# Patient Record
Sex: FEMALE | Race: WHITE | Employment: OTHER | URBAN - METROPOLITAN AREA
[De-identification: names, ages, dates, MRNs, and addresses within clinical notes are randomized per-mention and may not be internally consistent; named-entity substitution may affect disease eponyms.]

---

## 2017-01-15 RX ORDER — DEXLANSOPRAZOLE 60 MG/1
CAPSULE, DELAYED RELEASE ORAL
Qty: 30 CAP | Refills: 0 | Status: SHIPPED | OUTPATIENT
Start: 2017-01-15 | End: 2017-02-10 | Stop reason: SDUPTHER

## 2017-01-30 ENCOUNTER — HOSPITAL ENCOUNTER (OUTPATIENT)
Dept: ULTRASOUND IMAGING | Age: 72
Discharge: HOME | End: 2017-01-30
Attending: INTERNAL MEDICINE
Payer: MEDICARE

## 2017-01-30 DIAGNOSIS — M79.89 LEG SWELLING: ICD-10-CM

## 2017-01-30 DIAGNOSIS — M79.606 LEG PAIN: ICD-10-CM

## 2017-01-30 PROCEDURE — 93970 EXTREMITY STUDY: CPT

## 2017-02-10 RX ORDER — AMITRIPTYLINE HYDROCHLORIDE 50 MG/1
TABLET, FILM COATED ORAL
Qty: 60 TAB | Refills: 0 | Status: SHIPPED | OUTPATIENT
Start: 2017-02-10 | End: 2017-03-16 | Stop reason: SDUPTHER

## 2017-02-10 RX ORDER — DEXLANSOPRAZOLE 60 MG/1
CAPSULE, DELAYED RELEASE ORAL
Qty: 30 CAP | Refills: 0 | Status: SHIPPED | OUTPATIENT
Start: 2017-02-10 | End: 2017-03-16 | Stop reason: SDUPTHER

## 2017-02-10 RX ORDER — MILNACIPRAN HYDROCHLORIDE 50 MG/1
TABLET, FILM COATED ORAL
Qty: 60 TAB | Refills: 0 | Status: SHIPPED | OUTPATIENT
Start: 2017-02-10 | End: 2017-03-16 | Stop reason: SDUPTHER

## 2017-02-10 NOTE — TELEPHONE ENCOUNTER
Orders Placed This Encounter    SAVELLA 50 mg tablet     Sig: TAKE ONE TABLET BY MOUTH TWO TIMES A DAY     Dispense:  60 Tab     Refill:  0    amitriptyline (ELAVIL) 50 mg tablet     Sig: TAKE TWO TABLETS BY MOUTH AT BEDTIME AS NEEDED FOR SLEEP     Dispense:  60 Tab     Refill:  0    DEXILANT 60 mg CpDB     Sig: TAKE ONE CAPSULE BY MOUTH EVERY DAY     Dispense:  30 Cap     Refill:  0     The above medication refills were approved via verbal order by Dr. Modesto Franklin III.

## 2017-03-01 ENCOUNTER — OFFICE VISIT (OUTPATIENT)
Dept: INTERNAL MEDICINE CLINIC | Age: 72
End: 2017-03-01

## 2017-03-01 VITALS
DIASTOLIC BLOOD PRESSURE: 60 MMHG | WEIGHT: 185 LBS | BODY MASS INDEX: 34.93 KG/M2 | HEART RATE: 103 BPM | TEMPERATURE: 99 F | SYSTOLIC BLOOD PRESSURE: 102 MMHG | RESPIRATION RATE: 16 BRPM | OXYGEN SATURATION: 93 % | HEIGHT: 61 IN

## 2017-03-01 DIAGNOSIS — Z00.00 MEDICARE ANNUAL WELLNESS VISIT, SUBSEQUENT: Primary | ICD-10-CM

## 2017-03-01 DIAGNOSIS — Z23 NEED FOR TDAP VACCINATION: ICD-10-CM

## 2017-03-01 DIAGNOSIS — Z11.59 NEED FOR HEPATITIS C SCREENING TEST: ICD-10-CM

## 2017-03-01 DIAGNOSIS — Z13.39 SCREENING FOR ALCOHOLISM: ICD-10-CM

## 2017-03-01 DIAGNOSIS — M05.79 RHEUMATOID ARTHRITIS INVOLVING MULTIPLE SITES WITH POSITIVE RHEUMATOID FACTOR (HCC): ICD-10-CM

## 2017-03-01 DIAGNOSIS — Z00.00 ROUTINE GENERAL MEDICAL EXAMINATION AT A HEALTH CARE FACILITY: ICD-10-CM

## 2017-03-01 DIAGNOSIS — C90.00 MULTIPLE MYELOMA NOT HAVING ACHIEVED REMISSION (HCC): ICD-10-CM

## 2017-03-01 DIAGNOSIS — E78.2 MIXED HYPERLIPIDEMIA: ICD-10-CM

## 2017-03-01 RX ORDER — RANITIDINE 150 MG/1
300 TABLET, FILM COATED ORAL
COMMUNITY
End: 2019-12-09 | Stop reason: SDUPTHER

## 2017-03-01 RX ORDER — PREDNISONE 10 MG/1
20 TABLET ORAL DAILY
COMMUNITY
End: 2020-01-01 | Stop reason: ALTCHOICE

## 2017-03-01 NOTE — ACP (ADVANCE CARE PLANNING)
Advance Care Planning    Patient's Healthcare Decision Maker is: Named in scanned ACP document   Confirm Advance Directive Yes, on file      Reviewed the current advance care plan document on file with patient and all information is up to date. Advanced directive is on file, reviewed and up to date.

## 2017-03-01 NOTE — PROGRESS NOTES
Chief Complaint   Patient presents with    Annual Wellness Visit    Blood Clot     recently dx w/ right leg DVT on xarelto 20 mg daily     Goals that were addressed/or need to be completed after this visit:  Health Maintenance Due   Topic    Hepatitis C Screening     MEDICARE YEARLY EXAM

## 2017-03-01 NOTE — MR AVS SNAPSHOT
Visit Information Date & Time Provider Department Dept. Phone Encounter #  
 3/1/2017 10:30 AM Flex Patel MD [facility] Internal Medicine [phone] [id number] Upcoming Health Maintenance Date Due Hepatitis C Screening 1945 MEDICARE YEARLY EXAM 12/6/2010 GLAUCOMA SCREENING Q2Y 12/10/2017 BREAST CANCER SCRN MAMMOGRAM 9/23/2018 COLONOSCOPY 3/9/2026 DTaP/Tdap/Td series (2 - Td) 3/1/2027 Allergies as of 3/1/2017  Review Complete On: 3/1/2017 By: Kerry Corado LPN Severity Noted Reaction Type Reactions Aggrenox [Aspirin-dipyridamole]  09/28/2009    Rash Allergic to the dipyridamole and not aspirin. Broccoli  01/28/2013    Other (comments) C/O GAS Bumex [Bumetanide]  09/23/2016    Rash [place]  01/28/2013    Diarrhea Corn Starch-kaolin-zinc Oxide  01/28/2013    Diarrhea Doxil [Doxorubicin, Peg-liposomal]  05/06/2011    Rash Burning of skin Flagyl [Metronidazole]  09/28/2009    Rash Keflex [Cephalexin]  09/28/2009    Rash Lasix [Furosemide]  04/07/2011    Rash Macrobid [Nitrofurantoin Monohyd/m-cryst]  09/28/2009    Shortness of Breath Methotrexate  09/28/2009    Hives, Rash Pcn [Penicillins]  09/28/2009    Rash  
 Sulfa (Sulfonamide Antibiotics)  09/28/2009    Hives Tetanus And Diphtheria Toxoids  03/01/2017    Swelling Thalidomide  08/18/2010    Rash Current Immunizations  Reviewed on 6/4/2016 Name Date Influenza High Dose Vaccine PF 10/26/2016, 11/10/2015 Influenza Vaccine Whole 11/28/2012 Pneumococcal Polysaccharide (PPSV-23) 8/12/2014 Pneumococcal Vaccine (Unspecified Type) 11/21/2004 Not reviewed this visit You Were Diagnosed With   
  
 Codes Comments Need for Tdap vaccination    -  Primary ICD-10-CM: B50 ICD-9-CM: V06.1 Multiple myeloma not having achieved remission ([facility] [address]     ICD-10-CM: C90.00 ICD-9-CM: 203.00   
 Rheumatoid arthritis involving multiple sites with positive rheumatoid factor (HCC)     ICD-10-CM: M05.79 ICD-9-CM: 714.0 Mixed hyperlipidemia     ICD-10-CM: E78.2 ICD-9-CM: 272.2 Need for hepatitis C screening test     ICD-10-CM: Z11.59 
ICD-9-CM: V73.89 Medicare annual wellness visit, subsequent     ICD-10-CM: Z00.00 ICD-9-CM: V70.0 Vitals BP  
  
  
  
  
  
 102/60 Vitals History BMI and BSA Data Body Mass Index Body Surface Area 35.54 kg/m 2 1.89 m 2 Preferred Pharmacy Pharmacy Name Phone [address] [facility], [address] [place] [address] [phone] Your Updated Medication List  
  
   
This list is accurate as of: 3/1/17 11:28 AM.  Always use your most recent med list.  
  
  
  
  
 acetaminophen 500 mg tablet Commonly known as:  TYLENOL Take 1,000 mg by mouth every six (6) hours as needed for Pain. aluminum hydrox-magnesium carb  mg/15 mL suspension Commonly known as:  GAVISCON Take 15 mL by mouth every six (6) hours as needed for Indigestion. amitriptyline 50 mg tablet Commonly known as:  ELAVIL TAKE TWO TABLETS BY MOUTH AT BEDTIME AS NEEDED FOR SLEEP B.infantis-B.ani-B.long-B.bifi 10-15 mg Tbec Take 1 Tab by mouth every morning. betamethasone dipropionate 0.05 % ointment Commonly known as:  Essie Light Apply  to affected area two (2) times a day. calcium carbonate 200 mg calcium (500 mg) Chew Commonly known as:  TUMS Take 1 Tab by mouth as needed. calcium citrate-vitamin D3 tablet Commonly known as:  CITRACAL WITH VITAMIN D MAXIMUM Take  by mouth two (2) times a day. camphor-menthol 0.5-0.5 % lotion Commonly known as:  [employer] Apply  to affected area three (3) times daily as needed for Itching. cetirizine 10 mg tablet Commonly known as:  ZYRTEC Take 10 mg by mouth nightly. Comp. [address] [place] [address] 1 Device by Does Not Apply route daily. COMPAZINE 5 mg tablet Generic drug:  prochlorperazine Take 5 mg by mouth every six (6) hours as needed for Nausea. DEXILANT 60 mg Cpdb Generic drug:  Dexlansoprazole TAKE ONE CAPSULE BY MOUTH EVERY DAY  
  
 diazePAM 10 mg tablet Commonly known as:  VALIUM Take 10 mg by mouth two (2) times a day. docusate sodium 100 mg capsule Commonly known as:  Dorthelala Matsu Take 400 mg by mouth. 1 capsule in the morning and 3 capsule at night  
  
 fluticasone 50 mcg/actuation nasal spray Commonly known as:  Caffie Euler 2 Sprays by Both Nostrils route as needed. gabapentin 300 mg capsule Commonly known as:  NEURONTIN  
TAKE ONE CAPSULE BY MOUTH FOUR TIMES A DAY  
  
 guaiFENesin  mg SR tablet Commonly known as:  Padilla Cylex Padilla Take 1,200 mg by mouth as needed. HYDROmorphone 4 mg tablet Commonly known as:  DILAUDID Take 4-8 mg by mouth every four (4) hours as needed for Pain.  
  
 lubiPROStone 24 mcg capsule Commonly known as:  Terrilee Passer Take 1 Cap by mouth two (2) times a day. magnesium hydroxide 400 mg/5 mL suspension Commonly known as:  MILK OF MAGNESIA Take 30 mL by mouth daily as needed for Constipation. ondansetron 4 mg disintegrating tablet Commonly known as:  ZOFRAN ODT Take 1 Tab by mouth every six (6) hours as needed for Nausea. Indications: PREVENTION OF POST-OPERATIVE NAUSEA AND VOMITING  
  
 OTHER(NON-FORMULARY) LLQ Morphine/bupivacaine pain pump for multiple myeloma. 4.287mg/3.125 mg per day  
  
 polyethylene glycol 17 gram packet Commonly known as:  Ronne Manuel Take 17 g by mouth daily as needed. potassium chloride 20 mEq tablet Commonly known as:  K-DUR, KLOR-CON  
TAKE ONE TABLET BY MOUTH EVERY DAY  
  
 predniSONE 10 mg tablet Commonly known as:  Elton Felling Take  by mouth every other day. SAVELLA 50 mg tablet Generic drug:  Milnacipran TAKE ONE TABLET BY MOUTH TWO TIMES A DAY  
 senna 8.6 mg tablet Commonly known as:  [employer] Take 4 Tabs by mouth two (2) times a day. 4 PILLS IN AM  4 PILLS IN PM  
  
 simvastatin 20 mg tablet Commonly known as:  ZOCOR  
TAKE ONE TABLET BY MOUTH AT BEDTIME  
  
 spironolactone 50 mg tablet Commonly known as:  ALDACTONE Take 1 Tab by mouth two (2) times a day. Indications: EDEMA XARELTO 20 mg Tab tablet Generic drug:  rivaroxaban Take  by mouth daily. ZANTAC 150 mg tablet Generic drug:  raNITIdine Take 150 mg by mouth two (2) times a day. We Performed the Following CBC WITH AUTOMATED DIFF [95169 CPT(R)] HEPATITIS C AB [87742 CPT(R)] LIPID PANEL [48822 CPT(R)] METABOLIC PANEL, COMPREHENSIVE [86060 CPT(R)] TSH RFX ON ABNORMAL TO FREE T4 [[id number] Custom] UA/M W/RFLX CULTURE, ROUTINE [[id number] Custom] Introducing [facility]! Ren Martinez introduces [employer] patient portal. Now you can access parts of your medical record, email your doctor's office, and request medication refills online. 1. In your internet browser, go to [email or web address] 2. Click on the First Time User? Click Here link in the Sign In box. You will see the New Member Sign Up page. 3. Enter your [facility] Access Code exactly as it appears below. You will not need to use this code after youve completed the sign-up process. If you do not sign up before the expiration date, you must request a new code. · [facility] Access Code: [email or web address] Expires: 5/30/2017 10:55 AM 
 
4. Enter the last four digits of your Social Security Number (xxxx) and Date of Birth (mm/dd/yyyy) as indicated and click Submit. You will be taken to the next sign-up page. 5. Create a [employer] ID. This will be your [facility] [facility] login ID and cannot be changed, so think of one that is secure and easy to remember. 6. Create a [employer] password. You can change your password at any time. 7. Enter your Password Reset Question and Answer. This can be used at a later time if you forget your password. 8. Enter your e-mail address. You will receive e-mail notification when new information is available in [address] 9. Click Sign Up. You can now view and download portions of your medical record. 10. Click the Download Summary menu link to download a portable copy of your medical information. If you have questions, please visit the Frequently Asked Questions section of the [employer] website. Remember, [employer] is NOT to be used for urgent needs. For medical emergencies, dial 911. Now available from your iPhone and Android! Please provide this summary of care documentation to your next provider. Your primary care clinician is listed as Daniela Pulido64 If you have any questions after today's visit, please call [phone].

## 2017-03-02 NOTE — PROGRESS NOTES
This is a Subsequent Medicare Annual Wellness Visit providing Personalized Prevention Plan Services (PPPS) (Performed 12 months after initial AWV and PPPS )  Also for followup of lipids, recent DVT as well as chronic pain. Chronic constipation is up to date. I have reviewed the patient's medical history in detail and updated the computerized patient record. 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    Autoimmune disease ([address]     FIBROMYALGIA    Cancer ([address] 2007    MULTIPLE MYELOMA    Chronic pain     spinal stenosis per pt    GERD (gastroesophageal reflux disease)     High cholesterol     History of acute pancreatitis     History of blood transfusion     Ill-defined condition 2009    SALMONELLA     Ill-defined condition 2011    PANCREATITIS    Osteoporosis     Other complication due to venous access device ([facility] [address] 1/29/2013    Psychiatric disorder     ANXIETY    Recurrent umbilical hernia 1/02/5471    Recurrent ventral incisional hernia 3/28/2016    Rheumatoid arthritis(714.0)     Sleep apnea     INTOLERATANT OF CPAP    Thromboembolus (HCC)     Right leg DVT    Urinary incontinence       Past Surgical History:   Procedure Laterality Date    HX CHOLECYSTECTOMY  2004    HX CRANIOTOMY      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 Conception Jetty    HX ORTHOPAEDIC Right 2001    BONE SPURS SHOULDER    HX OTHER SURGICAL  2010    LLQ PAIN PUMP FOR MORPHINE INFUSION    HX OTHER SURGICAL  1-16-14    repair of recurrent umbilical hernia with ventralex pillow top mesh and extensive lysis of [id number]-SXD-OX.  Conception Jetty    HX OTHER SURGICAL  5-31-16 repair of recurrent ventral incisional hernia with underlay mesh-[facility]-Dr. Kenny Duckworth    HX VASCULAR ACCESS Right 1/2013    POWER PORT     HX VASCULAR ACCESS  2010    PAIN PUMP    NEUROLOGICAL PROCEDURE UNLISTED  2007    KYPHOPLASTY X2    NEUROLOGICAL PROCEDURE UNLISTED  10/26/15    Kyphoplasty t-5    PAIN PUMP DEVICE      implanted in LLQ, MORPHINE     Current Outpatient Prescriptions   Medication Sig Dispense Refill    rivaroxaban (XARELTO) 20 mg tab tablet Take  by mouth daily.  raNITIdine (ZANTAC) 150 mg tablet Take 150 mg by mouth two (2) times a day.  predniSONE (DELTASONE) 10 mg tablet Take  by mouth every other day.  camphor-menthol (SARNA) 0.5-0.5 % lotion Apply  to affected area three (3) times daily as needed for Itching. 222 mL 0    SAVELLA 50 mg tablet TAKE ONE TABLET BY MOUTH TWO TIMES A DAY 60 Tab 0    amitriptyline (ELAVIL) 50 mg tablet TAKE TWO TABLETS BY MOUTH AT BEDTIME AS NEEDED FOR SLEEP 60 Tab 0    DEXILANT 60 mg CpDB TAKE ONE CAPSULE BY MOUTH EVERY DAY 30 Cap 0    potassium chloride (K-DUR, KLOR-CON) 20 mEq tablet TAKE ONE TABLET BY MOUTH EVERY DAY 30 Tab 0    lubiPROStone (AMITIZA) 24 mcg capsule Take 1 Cap by mouth two (2) times a day. 180 Cap 1    simvastatin (ZOCOR) 20 mg tablet TAKE ONE TABLET BY MOUTH AT BEDTIME 30 Tab 11    spironolactone (ALDACTONE) 50 mg tablet Take 1 Tab by mouth two (2) times a day. Indications: EDEMA 180 Tab 1    Comp. Stocking,Knee,Regular,Lrg misc 1 Device by Does Not Apply route daily. 1 Device 0    B.infantis-B.ani-B.long-B.bifi 10-15 mg TbEC Take 1 Tab by mouth every morning.  aluminum hydrox-magnesium carb (GAVISCON)  mg/15 mL suspension Take 15 mL by mouth every six (6) hours as needed for Indigestion.  calcium carbonate (TUMS) 200 mg calcium (500 mg) chew Take 1 Tab by mouth as needed.  calcium citrate-vitamin D3 (CITRACAL WITH VITAMIN D MAXIMUM) tablet Take  by mouth two (2) times a day.       magnesium hydroxide (MILK OF MAGNESIA) 400 mg/5 mL suspension Take 30 mL by mouth daily as needed for Constipation.  gabapentin (NEURONTIN) 300 mg capsule TAKE ONE CAPSULE BY MOUTH FOUR TIMES A  Cap 5    betamethasone dipropionate (DIPROLENE) 0.05 % ointment Apply  to affected area two (2) times a day.  fluticasone (FLONASE) 50 mcg/actuation nasal spray 2 Sprays by Both Nostrils route as needed. (Patient taking differently: 2 Sprays by Both Nostrils route two (2) times a day.) 1 Bottle 3    OTHER,NON-FORMULARY, LLQ Morphine/bupivacaine pain pump for multiple myeloma. 4.287mg/3.125 mg per day      cetirizine (ZYRTEC) 10 mg tablet Take 10 mg by mouth nightly.  diazepam (VALIUM) 10 mg tablet Take 10 mg by mouth two (2) times a day.  docusate sodium (COLACE) 100 mg capsule Take 400 mg by mouth. 1 capsule in the morning and 3 capsule at night      polyethylene glycol (MIRALAX) 17 gram packet Take 17 g by mouth daily as needed.  senna (SENOKOT) 8.6 mg tablet Take 4 Tabs by mouth two (2) times a day. 4 PILLS IN AM  4 PILLS IN PM      HYDROmorphone (DILAUDID) 4 mg tablet Take 4-8 mg by mouth every four (4) hours as needed for Pain.  prochlorperazine (COMPAZINE) 5 mg tablet Take 5 mg by mouth every six (6) hours as needed for Nausea.  ondansetron (ZOFRAN ODT) 4 mg disintegrating tablet Take 1 Tab by mouth every six (6) hours as needed for Nausea. Indications: PREVENTION OF POST-OPERATIVE NAUSEA AND VOMITING 30 Tab 0    acetaminophen (TYLENOL) 500 mg tablet Take 1,000 mg by mouth every six (6) hours as needed for Pain.  guaiFENesin SR (MUCINEX) 600 mg SR tablet Take 1,200 mg by mouth as needed. 60 Tab 0     Allergies   Allergen Reactions    Aggrenox [Aspirin-Dipyridamole] Rash     Allergic to the dipyridamole and not aspirin.     Broccoli Other (comments)     C/O GAS    Bumex [Bumetanide] Rash    Corn Diarrhea    Corn Starch-Kaolin-Zinc Oxide Diarrhea    Doxil [Doxorubicin, Peg-Liposomal] Rash     Burning of skin      Flagyl [Metronidazole] Rash    Keflex [Cephalexin] Rash    Lasix [Furosemide] Rash    Macrobid [Nitrofurantoin Monohyd/M-Cryst] Shortness of Breath    Methotrexate Hives and Rash    Pcn [Penicillins] Rash    Sulfa (Sulfonamide Antibiotics) Hives    Tetanus And Diphtheria Toxoids Swelling    Thalidomide Rash     Family History   Problem Relation Age of Onset    Arthritis-osteo Mother     Anesth Problems Neg Hx      Social History   Substance Use Topics    Smoking status: Former Smoker     Years: 10.00     Quit date: 10/7/2005    Smokeless tobacco: Never Used    Alcohol use No     Patient Active Problem List   Diagnosis Code    Multiple myeloma ([facility]) C90.00    Fibromyalgia M79.7    GERD (gastroesophageal reflux disease) K21.9    Rheumatoid arthritis involving multiple sites with positive rheumatoid factor ([facility]) M05.79    Infected seroma, postoperative [id number]    Other complication due to venous access device ([facility]) T82.898A    Recurrent umbilical hernia U58.3    Umbilical hernia S36.0    Dysphagia R13.10    Acute bronchitis J20.9    Sinusitis J32.9    Advance directive on file Z78.9    Recurrent ventral incisional hernia K43.2       Depression Risk Factor Screening:     PHQ 2 / 9, over the last two weeks 9/1/2016   Little interest or pleasure in doing things Several days   Feeling down, depressed or hopeless Several days   Total Score PHQ 2 2     Alcohol Risk Factor Screening: On any occasion during the past 3 months, have you had more than 3 drinks containing alcohol? No    Do you average more than 7 drinks per week? No      Functional Ability and Level of Safety:     Hearing Loss   normal-to-mild    Activities of Daily Living   Self-care. Requires assistance with: no ADLs    Fall Risk     Fall Risk Assessment, last 12 mths 3/1/2017   Able to walk? Yes   Fall in past 12 months?  No     Abuse Screen   Patient is not abused    Review of Systems A comprehensive review of systems was negative except for: Constitutional: positive for weight up and doing little exercise. Gastrointestinal: positive for some loose bowels off and on with constipation. No bleeding. some ongoing back pain as well as burning and itch in the back. Physical Examination     Evaluation of Cognitive Function:  Mood/affect:  neutral  Appearance: age appropriate  Family member/caregiver input:  in the office with her and concurs with her history. Seeing the oncology MD regularly. Visit Vitals    /60    Pulse (!) 103    Temp 99 °F (37.2 °C) (Oral)    Resp 16    Ht 5' 0.5\" (1.537 m)    Wt 185 lb (83.9 kg)    SpO2 93%    BMI 35.54 kg/m2     General appearance: alert, cooperative, no distress, appears stated age  Head: Normocephalic, without obvious abnormality, atraumatic  Eyes: negative  Ears: normal TM's and external ear canals AU  Nose: Nares normal. Septum midline. Mucosa normal. No drainage or sinus tenderness. Throat: Lips, mucosa, and tongue normal. Teeth and gums normal  Neck: supple, symmetrical, trachea midline, no adenopathy, thyroid: not enlarged, symmetric, no tenderness/mass/nodules, no carotid bruit and no JVD  Back: symmetric, no curvature. ROM normal. No CVA tenderness. Lungs: clear to auscultation bilaterally  Heart: regular rate and rhythm, S1, S2 normal, no murmur, click, rub or gallop  Abdomen: soft, non-tender. Bowel sounds normal. No masses,  no organomegaly, there is a palpable pump in the left lower abdomen.    Extremities: edema 2  Pulses: 2+ and symmetric  Lymph nodes: Cervical, supraclavicular, and axillary nodes normal.  Neurologic: Grossly normal    Patient Care Team:  Nereida Mata MD as PCP - General  Ivan Pretty RN as Ambulatory Care Navigator (Internal Medicine)  Sarah Nava MD as Physician (Pain Management)  Joseph Calabrese MD as Physician (Hematology and Oncology)  Ashlee Quinteros MD as Physician (Gastroenterology)  Warren Swanson MD (General Surgery)  Channing Groves RN as Nurse Navigator (Internal Medicine)  Mechelle Vázquez MD (Dermatology)  Read Heaven Bergman MD (Ophthalmology)    Advice/Referrals/Counseling   Education and counseling provided:  Are appropriate based on today's review and evaluation  End-of-Life planning (with patient's consent)  Screening Mammography  Diabetes screening test      Assessment/Plan   Wilda Mi was seen today for annual wellness visit and blood clot. Diagnoses and all orders for this visit:    Need for Tdap vaccination    Multiple myeloma not having achieved remission ([facility] [address] - on palliative approach right now    Rheumatoid arthritis involving multiple sites with positive rheumatoid factor ([facility] [address] - stable    Mixed hyperlipidemia - check lipids to be sure LDL is controlled. -     CBC WITH AUTOMATED DIFF  -     LIPID PANEL  -     METABOLIC PANEL, COMPREHENSIVE  -     TSH RFX ON ABNORMAL TO FREE T4  -     UA/M W/RFLX CULTURE, ROUTINE    Need for hepatitis C screening test  -     HEPATITIS C AB    Medicare annual wellness visit, subsequent    Other orders chronic constipation - will continue same meds for now and hold for diarrhea. Follow-up Disposition: 12 months and as needed. Advised her to call back or return to office if symptoms worsen/change/persist.  Discussed expected course/resolution/complications of diagnosis in detail with patient. Medication risks/benefits/costs/interactions/alternatives discussed with patient. She was given an after visit summary which includes diagnoses, current medications, & vitals. She expressed understanding with the diagnosis and plan. Emely Haq

## 2017-03-07 ENCOUNTER — HOSPITAL ENCOUNTER (OUTPATIENT)
Dept: LAB | Age: 72
Discharge: HOME | End: 2017-03-07
Payer: MEDICARE

## 2017-03-07 PROCEDURE — 86803 HEPATITIS C AB TEST: CPT

## 2017-03-07 PROCEDURE — 36415 COLL VENOUS BLD VENIPUNCTURE: CPT

## 2017-03-07 PROCEDURE — 87086 URINE CULTURE/COLONY COUNT: CPT

## 2017-03-07 PROCEDURE — 80061 LIPID PANEL: CPT

## 2017-03-07 PROCEDURE — 85025 COMPLETE CBC W/AUTO DIFF WBC: CPT

## 2017-03-07 PROCEDURE — 80053 COMPREHEN METABOLIC PANEL: CPT

## 2017-03-07 PROCEDURE — 84443 ASSAY THYROID STIM HORMONE: CPT

## 2017-03-07 PROCEDURE — 81001 URINALYSIS AUTO W/SCOPE: CPT

## 2017-03-08 LAB
ALBUMIN SERPL-MCNC: 4.2 G/DL (ref 3.5–4.8)
ALBUMIN/GLOB SERPL: 1.4 {RATIO} (ref 1.1–2.5)
ALP SERPL-CCNC: 82 IU/L (ref 39–117)
ALT SERPL-CCNC: 6 IU/L (ref 0–32)
APPEARANCE UR: CLEAR
AST SERPL-CCNC: 29 IU/L (ref 0–40)
BACTERIA #/AREA URNS HPF: ABNORMAL /[HPF]
BACTERIA UR CULT: NORMAL
BASOPHILS # BLD AUTO: 0.1 X10E3/UL (ref 0–0.2)
BASOPHILS NFR BLD AUTO: 1 %
BILIRUB SERPL-MCNC: 0.2 MG/DL (ref 0–1.2)
BILIRUB UR QL STRIP: NEGATIVE
BUN SERPL-MCNC: 9 MG/DL (ref 8–27)
BUN/CREAT SERPL: 11 (ref 11–26)
CALCIUM SERPL-MCNC: 9.5 MG/DL (ref 8.7–10.3)
CASTS URNS QL MICRO: ABNORMAL /LPF
CHLORIDE SERPL-SCNC: 96 MMOL/L (ref 96–106)
CHOLEST SERPL-MCNC: 137 MG/DL (ref 100–199)
CO2 SERPL-SCNC: 27 MMOL/L (ref 18–29)
COLOR UR: YELLOW
CREAT SERPL-MCNC: 0.81 MG/DL (ref 0.57–1)
EOSINOPHIL # BLD AUTO: 0.7 X10E3/UL (ref 0–0.4)
EOSINOPHIL NFR BLD AUTO: 8 %
EPI CELLS #/AREA URNS HPF: ABNORMAL /HPF
ERYTHROCYTE [DISTWIDTH] IN BLOOD BY AUTOMATED COUNT: 13.8 % (ref 12.3–15.4)
GLOBULIN SER CALC-MCNC: 2.9 G/DL (ref 1.5–4.5)
GLUCOSE SERPL-MCNC: 85 MG/DL (ref 65–99)
GLUCOSE UR QL: NEGATIVE
HCT VFR BLD AUTO: 39.2 % (ref 34–46.6)
HCV AB S/CO SERPL IA: <0.1 S/CO RATIO (ref 0–0.9)
HDLC SERPL-MCNC: 47 MG/DL
HGB BLD-MCNC: 12.7 G/DL (ref 11.1–15.9)
HGB UR QL STRIP: NEGATIVE
IMM GRANULOCYTES # BLD: 0.1 X10E3/UL (ref 0–0.1)
IMM GRANULOCYTES NFR BLD: 1 %
KETONES UR QL STRIP: NEGATIVE
LDLC SERPL CALC-MCNC: 38 MG/DL (ref 0–99)
LEUKOCYTE ESTERASE UR QL STRIP: ABNORMAL
LYMPHOCYTES # BLD AUTO: 1.8 X10E3/UL (ref 0.7–3.1)
LYMPHOCYTES NFR BLD AUTO: 19 %
MCH RBC QN AUTO: 30.5 PG (ref 26.6–33)
MCHC RBC AUTO-ENTMCNC: 32.4 G/DL (ref 31.5–35.7)
MCV RBC AUTO: 94 FL (ref 79–97)
MICRO URNS: ABNORMAL
MONOCYTES # BLD AUTO: 0.8 X10E3/UL (ref 0.1–0.9)
MONOCYTES NFR BLD AUTO: 9 %
MUCOUS THREADS URNS QL MICRO: PRESENT
NEUTROPHILS # BLD AUTO: 5.9 X10E3/UL (ref 1.4–7)
NEUTROPHILS NFR BLD AUTO: 62 %
NITRITE UR QL STRIP: NEGATIVE
PH UR STRIP: 5.5 [PH] (ref 5–7.5)
PLATELET # BLD AUTO: 341 X10E3/UL (ref 150–379)
POTASSIUM SERPL-SCNC: 4.3 MMOL/L (ref 3.5–5.2)
PROT SERPL-MCNC: 7.1 G/DL (ref 6–8.5)
PROT UR QL STRIP: ABNORMAL
RBC # BLD AUTO: 4.16 X10E6/UL (ref 3.77–5.28)
RBC #/AREA URNS HPF: ABNORMAL /HPF
SODIUM SERPL-SCNC: 142 MMOL/L (ref 134–144)
SP GR UR: 1.02 (ref 1–1.03)
TRIGL SERPL-MCNC: 260 MG/DL (ref 0–149)
TSH SERPL DL<=0.005 MIU/L-ACNC: 1.45 UIU/ML (ref 0.45–4.5)
URINALYSIS REFLEX, 377202: ABNORMAL
UROBILINOGEN UR STRIP-MCNC: 0.2 MG/DL (ref 0.2–1)
VLDLC SERPL CALC-MCNC: 52 MG/DL (ref 5–40)
WBC # BLD AUTO: 9.3 X10E3/UL (ref 3.4–10.8)
WBC #/AREA URNS HPF: ABNORMAL /HPF

## 2017-03-16 RX ORDER — MILNACIPRAN HYDROCHLORIDE 50 MG/1
TABLET, FILM COATED ORAL
Qty: 180 TAB | Refills: 1 | Status: SHIPPED | OUTPATIENT
Start: 2017-03-16 | End: 2017-09-12 | Stop reason: SDUPTHER

## 2017-03-16 RX ORDER — DEXLANSOPRAZOLE 60 MG/1
CAPSULE, DELAYED RELEASE ORAL
Qty: 90 CAP | Refills: 1 | Status: SHIPPED | OUTPATIENT
Start: 2017-03-16 | End: 2017-09-12 | Stop reason: SDUPTHER

## 2017-03-16 RX ORDER — AMITRIPTYLINE HYDROCHLORIDE 50 MG/1
TABLET, FILM COATED ORAL
Qty: 180 TAB | Refills: 1 | Status: SHIPPED | OUTPATIENT
Start: 2017-03-16 | End: 2017-09-28 | Stop reason: SDUPTHER

## 2017-03-16 NOTE — TELEPHONE ENCOUNTER
Orders Placed This Encounter    DEXILANT 60 mg CpDB     Sig: TAKE ONE CAPSULE BY MOUTH EVERY DAY     Dispense:  90 Cap     Refill:  1    amitriptyline (ELAVIL) 50 mg tablet     Sig: TAKE TWO TABLETS BY MOUTH AT BEDTIME AS NEEDED FOR SLEEP     Dispense:  180 Tab     Refill:  1    SAVELLA 50 mg tablet     Sig: TAKE ONE TABLET BY MOUTH TWO TIMES A DAY     Dispense:  180 Tab     Refill:  1     The above medication refills were approved via verbal order by Dr. Joaquin Jack III.

## 2017-03-17 LAB — CREATININE, EXTERNAL: 0.9

## 2017-03-22 RX ORDER — GABAPENTIN 300 MG/1
CAPSULE ORAL
Qty: 120 CAP | Refills: 0 | Status: SHIPPED | OUTPATIENT
Start: 2017-03-22 | End: 2017-05-31 | Stop reason: SDUPTHER

## 2017-03-22 NOTE — TELEPHONE ENCOUNTER
Orders Placed This Encounter    gabapentin (NEURONTIN) 300 mg capsule     Sig: TAKE ONE CAPSULE BY MOUTH FOUR TIMES A DAY     Dispense:  120 Cap     Refill:  0     The above medication refills were approved via verbal order by Dr. Kle Fernández III. Patient due f/u visit 03/2018.

## 2017-05-31 RX ORDER — GABAPENTIN 300 MG/1
CAPSULE ORAL
Qty: 120 CAP | Refills: 1 | Status: SHIPPED | OUTPATIENT
Start: 2017-05-31 | End: 2017-09-12 | Stop reason: SDUPTHER

## 2017-05-31 NOTE — TELEPHONE ENCOUNTER
Orders Placed This Encounter    gabapentin (NEURONTIN) 300 mg capsule     Sig: TAKE ONE CAPSULE BY MOUTH FOUR TIMES A DAY     Dispense:  120 Cap     Refill:  1     The above medication refills were approved via verbal order by Dr. Wes Burnham III.

## 2017-09-10 ENCOUNTER — ANESTHESIA EVENT (OUTPATIENT)
Dept: INTERVENTIONAL RADIOLOGY/VASCULAR | Age: 72
End: 2017-09-10
Payer: MEDICARE

## 2017-09-11 ENCOUNTER — ANESTHESIA (OUTPATIENT)
Dept: INTERVENTIONAL RADIOLOGY/VASCULAR | Age: 72
End: 2017-09-11
Payer: MEDICARE

## 2017-09-11 ENCOUNTER — HOSPITAL ENCOUNTER (OUTPATIENT)
Dept: INTERVENTIONAL RADIOLOGY/VASCULAR | Age: 72
Discharge: HOME | End: 2017-09-11
Attending: INTERNAL MEDICINE | Admitting: INTERNAL MEDICINE
Payer: MEDICARE

## 2017-09-11 VITALS
SYSTOLIC BLOOD PRESSURE: 126 MMHG | OXYGEN SATURATION: 93 % | HEIGHT: 63 IN | RESPIRATION RATE: 18 BRPM | TEMPERATURE: 98.6 F | WEIGHT: 182 LBS | BODY MASS INDEX: 32.25 KG/M2 | DIASTOLIC BLOOD PRESSURE: 59 MMHG | HEART RATE: 95 BPM

## 2017-09-11 DIAGNOSIS — C90.00 MULTIPLE MYELOMA (HCC): ICD-10-CM

## 2017-09-11 PROCEDURE — C1713 ANCHOR/SCREW BN/BN,TIS/BN: HCPCS

## 2017-09-11 PROCEDURE — 76060000032 HC ANESTHESIA 0.5 TO 1 HR

## 2017-09-11 PROCEDURE — 74011000250 HC RX REV CODE- 250: Performed by: RADIOLOGY

## 2017-09-11 PROCEDURE — 74011250636 HC RX REV CODE- 250/636: Performed by: RADIOLOGY

## 2017-09-11 PROCEDURE — 22514 PERQ VERTEBRAL AUGMENTATION: CPT

## 2017-09-11 PROCEDURE — 77030034842 HC TAMP SPN BN INFL EXP II KYPH -I

## 2017-09-11 PROCEDURE — 74011000250 HC RX REV CODE- 250

## 2017-09-11 PROCEDURE — 74011250636 HC RX REV CODE- 250/636

## 2017-09-11 PROCEDURE — 77030003666 HC NDL SPINAL BD -A

## 2017-09-11 PROCEDURE — 74011636320 HC RX REV CODE- 636/320: Performed by: RADIOLOGY

## 2017-09-11 PROCEDURE — 77030003560 HC NDL HUBR BARD -A

## 2017-09-11 RX ORDER — DEXMEDETOMIDINE HYDROCHLORIDE 4 UG/ML
INJECTION, SOLUTION INTRAVENOUS AS NEEDED
Status: DISCONTINUED | OUTPATIENT
Start: 2017-09-11 | End: 2017-09-11 | Stop reason: HOSPADM

## 2017-09-11 RX ORDER — VANCOMYCIN 1.75 GRAM/500 ML IN 0.9 % SODIUM CHLORIDE INTRAVENOUS
1750 ONCE
Status: COMPLETED | OUTPATIENT
Start: 2017-09-11 | End: 2017-09-11

## 2017-09-11 RX ORDER — DEXAMETHASONE SODIUM PHOSPHATE 4 MG/ML
INJECTION, SOLUTION INTRA-ARTICULAR; INTRALESIONAL; INTRAMUSCULAR; INTRAVENOUS; SOFT TISSUE AS NEEDED
Status: DISCONTINUED | OUTPATIENT
Start: 2017-09-11 | End: 2017-09-11 | Stop reason: HOSPADM

## 2017-09-11 RX ORDER — KETOROLAC TROMETHAMINE 30 MG/ML
15 INJECTION, SOLUTION INTRAMUSCULAR; INTRAVENOUS AS NEEDED
Status: DISCONTINUED | OUTPATIENT
Start: 2017-09-11 | End: 2017-09-11 | Stop reason: HOSPADM

## 2017-09-11 RX ORDER — MIDAZOLAM HYDROCHLORIDE 1 MG/ML
INJECTION, SOLUTION INTRAMUSCULAR; INTRAVENOUS AS NEEDED
Status: DISCONTINUED | OUTPATIENT
Start: 2017-09-11 | End: 2017-09-11 | Stop reason: HOSPADM

## 2017-09-11 RX ORDER — SODIUM CHLORIDE 9 MG/ML
INJECTION, SOLUTION INTRAVENOUS
Status: DISCONTINUED | OUTPATIENT
Start: 2017-09-11 | End: 2017-09-11 | Stop reason: HOSPADM

## 2017-09-11 RX ORDER — BUPIVACAINE HYDROCHLORIDE 5 MG/ML
30 INJECTION, SOLUTION EPIDURAL; INTRACAUDAL
Status: COMPLETED | OUTPATIENT
Start: 2017-09-11 | End: 2017-09-11

## 2017-09-11 RX ORDER — FENTANYL CITRATE 50 UG/ML
INJECTION, SOLUTION INTRAMUSCULAR; INTRAVENOUS AS NEEDED
Status: DISCONTINUED | OUTPATIENT
Start: 2017-09-11 | End: 2017-09-11 | Stop reason: HOSPADM

## 2017-09-11 RX ORDER — PROPOFOL 10 MG/ML
INJECTION, EMULSION INTRAVENOUS AS NEEDED
Status: DISCONTINUED | OUTPATIENT
Start: 2017-09-11 | End: 2017-09-11 | Stop reason: HOSPADM

## 2017-09-11 RX ORDER — SODIUM CHLORIDE 9 MG/ML
25 INJECTION, SOLUTION INTRAVENOUS ONCE
Status: COMPLETED | OUTPATIENT
Start: 2017-09-11 | End: 2017-09-11

## 2017-09-11 RX ORDER — PROPOFOL 10 MG/ML
INJECTION, EMULSION INTRAVENOUS
Status: DISCONTINUED | OUTPATIENT
Start: 2017-09-11 | End: 2017-09-11 | Stop reason: HOSPADM

## 2017-09-11 RX ORDER — LIDOCAINE HYDROCHLORIDE 20 MG/ML
20 INJECTION, SOLUTION INFILTRATION; PERINEURAL
Status: COMPLETED | OUTPATIENT
Start: 2017-09-11 | End: 2017-09-11

## 2017-09-11 RX ORDER — ACETAMINOPHEN 10 MG/ML
INJECTION, SOLUTION INTRAVENOUS AS NEEDED
Status: DISCONTINUED | OUTPATIENT
Start: 2017-09-11 | End: 2017-09-11 | Stop reason: HOSPADM

## 2017-09-11 RX ORDER — ONDANSETRON 2 MG/ML
INJECTION INTRAMUSCULAR; INTRAVENOUS AS NEEDED
Status: DISCONTINUED | OUTPATIENT
Start: 2017-09-11 | End: 2017-09-11 | Stop reason: HOSPADM

## 2017-09-11 RX ADMIN — DEXMEDETOMIDINE HYDROCHLORIDE 4 MCG: 4 INJECTION, SOLUTION INTRAVENOUS at 12:38

## 2017-09-11 RX ADMIN — DEXMEDETOMIDINE HYDROCHLORIDE 4 MCG: 4 INJECTION, SOLUTION INTRAVENOUS at 12:32

## 2017-09-11 RX ADMIN — MIDAZOLAM HYDROCHLORIDE 1 MG: 1 INJECTION, SOLUTION INTRAMUSCULAR; INTRAVENOUS at 12:32

## 2017-09-11 RX ADMIN — PROPOFOL 30 MG: 10 INJECTION, EMULSION INTRAVENOUS at 13:02

## 2017-09-11 RX ADMIN — ACETAMINOPHEN 1000 MG: 10 INJECTION, SOLUTION INTRAVENOUS at 12:35

## 2017-09-11 RX ADMIN — SODIUM CHLORIDE 25 ML/HR: 900 INJECTION, SOLUTION INTRAVENOUS at 11:46

## 2017-09-11 RX ADMIN — FENTANYL CITRATE 50 MCG: 50 INJECTION, SOLUTION INTRAMUSCULAR; INTRAVENOUS at 12:32

## 2017-09-11 RX ADMIN — FENTANYL CITRATE 50 MCG: 50 INJECTION, SOLUTION INTRAMUSCULAR; INTRAVENOUS at 12:34

## 2017-09-11 RX ADMIN — IOHEXOL 20 ML: 240 INJECTION, SOLUTION INTRATHECAL; INTRAVASCULAR; INTRAVENOUS; ORAL at 13:09

## 2017-09-11 RX ADMIN — DEXMEDETOMIDINE HYDROCHLORIDE 4 MCG: 4 INJECTION, SOLUTION INTRAVENOUS at 12:33

## 2017-09-11 RX ADMIN — DEXMEDETOMIDINE HYDROCHLORIDE 4 MCG: 4 INJECTION, SOLUTION INTRAVENOUS at 12:31

## 2017-09-11 RX ADMIN — PROPOFOL 40 MCG/KG/MIN: 10 INJECTION, EMULSION INTRAVENOUS at 12:34

## 2017-09-11 RX ADMIN — DEXMEDETOMIDINE HYDROCHLORIDE 4 MCG: 4 INJECTION, SOLUTION INTRAVENOUS at 12:47

## 2017-09-11 RX ADMIN — MIDAZOLAM HYDROCHLORIDE 1 MG: 1 INJECTION, SOLUTION INTRAMUSCULAR; INTRAVENOUS at 12:30

## 2017-09-11 RX ADMIN — DEXMEDETOMIDINE HYDROCHLORIDE 4 MCG: 4 INJECTION, SOLUTION INTRAVENOUS at 12:43

## 2017-09-11 RX ADMIN — ONDANSETRON 4 MG: 2 INJECTION INTRAMUSCULAR; INTRAVENOUS at 12:38

## 2017-09-11 RX ADMIN — DEXMEDETOMIDINE HYDROCHLORIDE 4 MCG: 4 INJECTION, SOLUTION INTRAVENOUS at 12:36

## 2017-09-11 RX ADMIN — DEXMEDETOMIDINE HYDROCHLORIDE 4 MCG: 4 INJECTION, SOLUTION INTRAVENOUS at 12:35

## 2017-09-11 RX ADMIN — SODIUM CHLORIDE: 9 INJECTION, SOLUTION INTRAVENOUS at 11:00

## 2017-09-11 RX ADMIN — KETOROLAC TROMETHAMINE 15 MG: 60 INJECTION, SOLUTION INTRAMUSCULAR at 13:11

## 2017-09-11 RX ADMIN — DEXAMETHASONE SODIUM PHOSPHATE 4 MG: 4 INJECTION, SOLUTION INTRA-ARTICULAR; INTRALESIONAL; INTRAMUSCULAR; INTRAVENOUS; SOFT TISSUE at 12:38

## 2017-09-11 RX ADMIN — LIDOCAINE HYDROCHLORIDE 10 ML: 20 INJECTION, SOLUTION INFILTRATION; PERINEURAL at 13:08

## 2017-09-11 RX ADMIN — PROPOFOL 30 MG: 10 INJECTION, EMULSION INTRAVENOUS at 12:49

## 2017-09-11 RX ADMIN — DEXMEDETOMIDINE HYDROCHLORIDE 4 MCG: 4 INJECTION, SOLUTION INTRAVENOUS at 12:34

## 2017-09-11 RX ADMIN — BUPIVACAINE HYDROCHLORIDE 10 ML: 5 INJECTION, SOLUTION EPIDURAL; INTRACAUDAL at 13:07

## 2017-09-11 RX ADMIN — VANCOMYCIN HYDROCHLORIDE 1750 MG: 10 INJECTION, POWDER, LYOPHILIZED, FOR SOLUTION INTRAVENOUS at 12:00

## 2017-09-11 RX ADMIN — DEXMEDETOMIDINE HYDROCHLORIDE 4 MCG: 4 INJECTION, SOLUTION INTRAVENOUS at 12:40

## 2017-09-11 NOTE — IP AVS SNAPSHOT
[address] 
[phone] Patient: Deep Frazier MRN: [id number] :1945 You are allergic to the following Allergen Reactions Aggrenox (Aspirin-Dipyridamole) Rash Allergic to the dipyridamole and not aspirin. Broccoli Other (comments) C/O GAS Bumex (Bumetanide) Rash Corn Diarrhea Corn Starch-Kaolin-Zinc Oxide Diarrhea Doxil (Doxorubicin, Peg-Liposomal) Rash Burning of skin Flagyl (Metronidazole) Rash Keflex (Cephalexin) Rash Lasix (Furosemide) Rash Macrobid (Nitrofurantoin Monohyd/M-Cryst) Shortness of Breath Methotrexate Hives Rash Pcn (Penicillins) Rash  
    
 Sulfa (Sulfonamide Antibiotics) Hives Tetanus And Diphtheria Toxoids Swelling Thalidomide Rash Recent Documentation Height Weight BMI OB Status Smoking Status 1.6 m 82.6 kg 32.24 kg/m2 Postmenopausal Former Smoker Emergency Contacts Name Discharge Info Relation Home Work Mobile Jenkins,Claude DISCHARGE CAREGIVER [3] Spouse [3] [phone] About your hospitalization You were admitted on:  2017 You last received care in the:  [facility] RAD [facility]GIO IR You were discharged on:  2017 Unit phone number:  [phone] Why you were hospitalized Your primary diagnosis was:  Not on File Providers Seen During Your Hospitalizations Provider Role Specialty Primary office phone Karime Hui MD Attending Provider Hematology and Oncology [phone] Your Primary Care Physician (PCP) Primary Care Physician Office Phone Office Fax Mohini Salas [phone] Follow-up Information Follow up With Details Comments Contact Info Via Yashira Porter MD   [address]  [address] [place] [address] 
[phone] Current Discharge Medication List  
  
CONTINUE these medications which have CHANGED Dose & Instructions Dispensing Information Comments Morning Noon Evening Bedtime  
 fluticasone 50 mcg/actuation nasal spray Commonly known as:  Nga Paulson What changed:  when to take this Your last dose was: Your next dose is:    
   
   
 Dose:  2 Spray 2 Sprays by Both Nostrils route as needed. Quantity:  1 Bottle Refills:  3 CONTINUE these medications which have NOT CHANGED Dose & Instructions Dispensing Information Comments Morning Noon Evening Bedtime  
 acetaminophen 500 mg tablet Commonly known as:  TYLENOL Your last dose was: Your next dose is:    
   
   
 Dose:  1000 mg Take 1,000 mg by mouth every six (6) hours as needed for Pain. Refills:  0  
     
   
   
   
  
 aluminum hydrox-magnesium carb  mg/15 mL suspension Commonly known as:  GAVISCON Your last dose was: Your next dose is:    
   
   
 Dose:  15 mL Take 15 mL by mouth every six (6) hours as needed for Indigestion. Refills:  0  
     
   
   
   
  
 amitriptyline 50 mg tablet Commonly known as:  ELAVIL Your last dose was: Your next dose is: TAKE TWO TABLETS BY MOUTH AT BEDTIME AS NEEDED FOR SLEEP Quantity:  180 Tab Refills:  1 B.infantis-B.ani-B.long-B.bifi 10-15 mg Tbec Your last dose was: Your next dose is:    
   
   
 Dose:  1 Tab Take 1 Tab by mouth every morning. Refills:  0  
     
   
   
   
  
 betamethasone dipropionate 0.05 % ointment Commonly known as:  Erin Carloss Your last dose was: Your next dose is:    
   
   
 Apply  to affected area two (2) times a day. Refills:  0  
     
   
   
   
  
 calcium carbonate 200 mg calcium (500 mg) Chew Commonly known as:  TUMS Your last dose was: Your next dose is: Dose:  1 Tab Take 1 Tab by mouth as needed. Refills:  0  
     
   
   
   
  
 calcium citrate-vitamin D3 tablet Commonly known as:  CITRACAL WITH VITAMIN D MAXIMUM Your last dose was: Your next dose is: Take  by mouth two (2) times a day. Refills:  0  
     
   
   
   
  
 camphor-menthol 0.5-0.5 % lotion Commonly known as:  [employer] Your last dose was: Your next dose is:    
   
   
 Apply  to affected area three (3) times daily as needed for Itching. Quantity:  222 mL Refills:  0  
     
   
   
   
  
 cetirizine 10 mg tablet Commonly known as:  ZYRTEC Your last dose was: Your next dose is:    
   
   
 Dose:  10 mg Take 10 mg by mouth nightly. Refills:  0 Comp. [address] Your last dose was: Your next dose is:    
   
   
 Dose:  1 Device 1 Device by Does Not Apply route daily. Quantity:  1 Device Refills:  0  
 20-30mmHg COMPAZINE 5 mg tablet Generic drug:  prochlorperazine Your last dose was: Your next dose is:    
   
   
 Dose:  5 mg Take 5 mg by mouth every six (6) hours as needed for Nausea. Refills:  0 DEXILANT 60 mg Cpdb Generic drug:  Dexlansoprazole Your last dose was: Your next dose is: TAKE ONE CAPSULE BY MOUTH EVERY DAY Quantity:  90 Cap Refills:  1  
     
   
   
   
  
 diazePAM 10 mg tablet Commonly known as:  VALIUM Your last dose was: Your next dose is:    
   
   
 Dose:  10 mg Take 10 mg by mouth two (2) times a day. Refills:  0  
     
   
   
   
  
 docusate sodium 100 mg capsule Commonly known as:  Leonidas Hicks Your last dose was: Your next dose is:    
   
   
 Dose:  400 mg Take 400 mg by mouth. 1 capsule in the morning and 3 capsule at night Refills:  0 gabapentin 300 mg capsule Commonly known as:  NEURONTIN Your last dose was: Your next dose is: TAKE ONE CAPSULE BY MOUTH FOUR TIMES A DAY Quantity:  120 Cap Refills:  1  
     
   
   
   
  
 guaiFENesin  mg SR tablet Commonly known as:  Padilla & Padilla Your last dose was: Your next dose is:    
   
   
 Dose:  1200 mg Take 1,200 mg by mouth as needed. Quantity:  60 Tab Refills:  0 HYDROmorphone 4 mg tablet Commonly known as:  DILAUDID Your last dose was: Your next dose is:    
   
   
 Dose:  4-8 mg Take 4-8 mg by mouth every four (4) hours as needed for Pain. Refills:  0  
     
   
   
   
  
 lubiPROStone 24 mcg capsule Commonly known as:  Annabelle Space Your last dose was: Your next dose is:    
   
   
 Dose:  24 mcg Take 1 Cap by mouth two (2) times a day. Quantity:  180 Cap Refills:  1  
     
   
   
   
  
 magnesium hydroxide 400 mg/5 mL suspension Commonly known as:  MILK OF MAGNESIA Your last dose was: Your next dose is:    
   
   
 Dose:  30 mL Take 30 mL by mouth daily as needed for Constipation. Refills:  0  
     
   
   
   
  
 ondansetron 4 mg disintegrating tablet Commonly known as:  ZOFRAN ODT Your last dose was: Your next dose is:    
   
   
 Dose:  4 mg Take 1 Tab by mouth every six (6) hours as needed for Nausea. Indications: PREVENTION OF POST-OPERATIVE NAUSEA AND VOMITING Quantity:  30 Tab Refills:  0  
     
   
   
   
  
 OTHER(NON-FORMULARY) Your last dose was: Your next dose is: LLQ Morphine/bupivacaine pain pump for multiple myeloma. 4.287mg/3.125 mg per day Refills:  0  
     
   
   
   
  
 polyethylene glycol 17 gram packet Commonly known as:  Paula Mule Your last dose was: Your next dose is:    
   
   
 Dose:  17 g Take 17 g by mouth daily as needed. Refills:  0 potassium chloride 20 mEq tablet Commonly known as:  K-DUR, KLOR-CON Your last dose was: Your next dose is: TAKE ONE TABLET BY MOUTH EVERY DAY Quantity:  30 Tab Refills:  0  
     
   
   
   
  
 predniSONE 10 mg tablet Commonly known as:  Jomar Noss Your last dose was: Your next dose is: Take  by mouth every other day. Refills:  0 SAVELLA 50 mg tablet Generic drug:  Milnacipran Your last dose was: Your next dose is: TAKE ONE TABLET BY MOUTH TWO TIMES A DAY Quantity:  180 Tab Refills:  1  
     
   
   
   
  
 senna 8.6 mg tablet Commonly known as:  Arsh Gutierrez Your last dose was: Your next dose is:    
   
   
 Dose:  4 Tab Take 4 Tabs by mouth two (2) times a day. 4 PILLS IN AM  4 PILLS IN PM  
 Refills:  0  
     
   
   
   
  
 simvastatin 20 mg tablet Commonly known as:  ZOCOR Your last dose was: Your next dose is: TAKE ONE TABLET BY MOUTH AT BEDTIME Quantity:  30 Tab Refills:  11  
     
   
   
   
  
 spironolactone 50 mg tablet Commonly known as:  ALDACTONE Your last dose was: Your next dose is:    
   
   
 Dose:  50 mg Take 1 Tab by mouth two (2) times a day. Indications: EDEMA Quantity:  180 Tab Refills:  1 XARELTO 20 mg Tab tablet Generic drug:  rivaroxaban Your last dose was: Your next dose is: Take  by mouth daily. Refills:  0  
     
   
   
   
  
 ZANTAC 150 mg tablet Generic drug:  raNITIdine Your last dose was: Your next dose is:    
   
   
 Dose:  150 mg Take 150 mg by mouth two (2) times a day. Refills:  0 Discharge Instructions Kyphoplasty: What to Expect at [facility] Your Recovery After kyphoplasty to relieve pain from compression fractures, your back may feel sore where the hollow needle (trocar) went into your back. This should go away in a few days. Most people are able to return to their daily activities within a day after kyphoplasty. This care sheet gives you a general idea about how long it will take for you to recover. But each person recovers at a different pace. Follow the steps below to get better as quickly as possible. How can you care for yourself at home? Activity · Take it easy for the first 24 hours. Rest when you feel tired. Getting enough sleep will help you recover. · For the first day after the procedure, avoid lifting anything that would make you strain. This may include heavy grocery bags and milk containers, a heavy briefcase or backpack, cat litter or dog food bags, a vacuum , or a child. Diet · You can eat your normal diet. If your stomach is upset, try bland, low-fat foods like plain rice, broiled chicken, toast, and yogurt. Medicines · Your doctor will tell you if and when you can restart your medicines. He or she will also give you instructions about taking any new medicines. · If you take blood thinners, such as warfarin (Coumadin), clopidogrel (Plavix), or aspirin, be sure to talk to your doctor. He or she will tell you if and when to start taking those medicines again. Make sure that you understand exactly what your doctor wants you to do. · Be safe with medicines. Take pain medicines exactly as directed. ¨ If the doctor gave you a prescription medicine for pain, take it as prescribed. ¨ If you are not taking a prescription pain medicine, ask your doctor if you can take an over-the-counter medicine. ¨ Do not take two or more pain medicines at the same time unless your doctor told you to. Many pain medicines have acetaminophen, which is Tylenol. Too much acetaminophen (Tylenol) can be harmful. Incision care · You will have a dressing over the cut (incision).  A dressing helps the incision heal and protects it. You may shower tomorrow and replace bandage with bandaids until sites are fully healed. Ice 
· If you are sore where the needle was inserted, put ice or a cold pack on your back for 10 to 20 minutes at a time. Try to do this every 1 to 2 hours for the next 3 days (when you are awake) or until the swelling goes down. Put a thin cloth between the ice and your skin. Follow-up care is a key part of your treatment and safety. Be sure to make and go to all appointments, and call your doctor if you are having problems. It's also a good idea to know your test results and keep a list of the medicines you take. When should you call for help? Call 911 anytime you think you may need emergency care. For example, call if: 
· You passed out (lost consciousness). · You have severe trouble breathing. · You have sudden chest pain and shortness of breath, or you cough up blood. · You are unable to move a leg at all. Call your doctor now or seek immediate medical care if: 
· You have new or worse symptoms in your legs, belly, or buttocks. Symptoms may include: ¨ Numbness or tingling. ¨ Weakness. ¨ Pain. · You lose bladder or bowel control. · You have signs of infection, such as: 
¨ Increased pain, swelling, warmth, or redness. ¨ Red streaks leading from the incision. ¨ Pus draining from the incision. ¨ Swollen lymph nodes in your neck, armpits, or groin. ¨ A fever. Watch closely for any changes in your health, and be sure to contact your doctor if: 
· You do not get better as expected. Where can you learn more? Go to http://hadley-clemencia.info/. Enter [phone] in the search box to learn more about \"Kyphoplasty: What to Expect at Home. \" Current as of: March 21, 2017 Content Version: 11.3 © 5096-1012 [employer], [employer].  Care instructions adapted under license by [employer] (which disclaims liability or warranty for this information). If you have questions about a medical condition or this instruction, always ask your healthcare professional. [place] [address] any warranty or liability for your use of this information. Side effects of sedation medications and other medications used today have been reviewed. Notify us of nausea, itching, hives, dizziness, or anything else out of the ordinary. Should you experience any of these significant changes, please call [phone] between the hours of 7:30 am and 10 pm or [phone] after hours. After hours, ask the  to page the [address] Technologist, and describe the problem to the technologist.  
 
 
 
Discharge Orders None ACO Transitions of Care Introducing [employer] [address] Nicole Owens offers a voluntary care coordination program to provide high quality service and care to [facility] fee-for-service beneficiaries. Venkata Gordon was designed to help you enhance your health and well-being through the following services: ? Transitions of Care  support for individuals who are transitioning from one care setting to another (example: Hospital to home). ? Chronic and Complex Care Coordination  support for individuals and caregivers of those with serious or chronic illnesses or with more than one chronic (ongoing) condition and those who take a number of different medications. If you meet specific medical criteria, a [facility] [address] may call you directly to coordinate your care with your primary care physician and your other care providers. For questions about the [facility] programs, please, contact your physicians office. For general questions or additional information about Accountable Care Organizations: 
Please visit www.medicare.gov/acos. html or call 1-800-MEDICARE (6-756.814.5002) TTY users should call 8-275.638.3202. Introducing hospitals & [facility] SERVICES! [employer] introduces [employer] patient portal. Now you can access parts of your medical record, email your doctor's office, and request medication refills online. 1. In your internet browser, go to [email or web address] 2. Click on the First Time User? Click Here link in the Sign In box. You will see the New Member Sign Up page. 3. Enter your [employer] Access Code exactly as it appears below. You will not need to use this code after youve completed the sign-up process. If you do not sign up before the expiration date, you must request a new code. · [employer] Access Code: [email or web address] Expires: 12/10/2017  2:58 PM 
 
4. Enter the last four digits of your Social Security Number (xxxx) and Date of Birth (mm/dd/yyyy) as indicated and click Submit. You will be taken to the next sign-up page. 5. Create a [employer]lass[employer] ID. This will be your [employer] login ID and cannot be changed, so think of one that is secure and easy to remember. 6. Create a [employer] password. You can change your password at any time. 7. Enter your Password Reset Question and Answer. This can be used at a later time if you forget your password. 8. Enter your e-mail address. You will receive e-mail notification when new information is available in [address] 9. Click Sign Up. You can now view and download portions of your medical record. 10. Click the Download Summary menu link to download a portable copy of your medical information. If you have questions, please visit the Frequently Asked Questions section of the [employer] website. Remember, [employer] is NOT to be used for urgent needs. For medical emergencies, dial 911. Now available from your iPhone and Android! General Information Please provide this summary of care documentation to your next provider. Patient Signature:  ____________________________________________________________ Date:  ____________________________________________________________  
  
Gearldine Moulds Provider Signature:  ____________________________________________________________ Date:  ____________________________________________________________

## 2017-09-11 NOTE — IP AVS SNAPSHOT
[address] [facility] [address] [place] [address] 
[phone] Patient: Jordyn Dimas MRN: [id number] :1945 Current Discharge Medication List  
  
CONTINUE these medications which have CHANGED Dose & Instructions Dispensing Information Comments Morning Noon Evening Bedtime  
 fluticasone 50 mcg/actuation nasal spray Commonly known as:  Sharla [place] What changed:  when to take this Your last dose was: Your next dose is:    
   
   
 Dose:  2 Spray 2 Sprays by Both Nostrils route as needed. Quantity:  1 Bottle Refills:  3 CONTINUE these medications which have NOT CHANGED Dose & Instructions Dispensing Information Comments Morning Noon Evening Bedtime  
 acetaminophen 500 mg tablet Commonly known as:  TYLENOL Your last dose was: Your next dose is:    
   
   
 Dose:  1000 mg Take 1,000 mg by mouth every six (6) hours as needed for Pain. Refills:  0  
     
   
   
   
  
 aluminum hydrox-magnesium carb  mg/15 mL suspension Commonly known as:  GAVISCON Your last dose was: Your next dose is:    
   
   
 Dose:  15 mL Take 15 mL by mouth every six (6) hours as needed for Indigestion. Refills:  0  
     
   
   
   
  
 amitriptyline 50 mg tablet Commonly known as:  ELAVIL Your last dose was: Your next dose is: TAKE TWO TABLETS BY MOUTH AT BEDTIME AS NEEDED FOR SLEEP Quantity:  180 Tab Refills:  1 B.infantis-B.ani-B.long-B.bifi 10-15 mg Tbec Your last dose was: Your next dose is:    
   
   
 Dose:  1 Tab Take 1 Tab by mouth every morning. Refills:  0  
     
   
   
   
  
 betamethasone dipropionate 0.05 % ointment Commonly known as:  Cecille Tipton Your last dose was: Your next dose is:    
   
   
 Apply  to affected area two (2) times a day. Refills:  0 calcium carbonate 200 mg calcium (500 mg) Chew Commonly known as:  TUMS Your last dose was: Your next dose is:    
   
   
 Dose:  1 Tab Take 1 Tab by mouth as needed. Refills:  0  
     
   
   
   
  
 calcium citrate-vitamin D3 tablet Commonly known as:  CITRACAL WITH VITAMIN D MAXIMUM Your last dose was: Your next dose is: Take  by mouth two (2) times a day. Refills:  0  
     
   
   
   
  
 camphor-menthol 0.5-0.5 % lotion Commonly known as:  [employer] Your last dose was: Your next dose is:    
   
   
 Apply  to affected area three (3) times daily as needed for Itching. Quantity:  222 mL Refills:  0  
     
   
   
   
  
 cetirizine 10 mg tablet Commonly known as:  ZYRTEC Your last dose was: Your next dose is:    
   
   
 Dose:  10 mg Take 10 mg by mouth nightly. Refills:  0 [address]. [address] Your last dose was: Your next dose is:    
   
   
 Dose:  1 Device 1 Device by Does Not Apply route daily. Quantity:  1 Device Refills:  0  
 20-30mmHg COMPAZINE 5 mg tablet Generic drug:  prochlorperazine Your last dose was: Your next dose is:    
   
   
 Dose:  5 mg Take 5 mg by mouth every six (6) hours as needed for Nausea. Refills:  0 DEXILANT 60 mg Cpdb Generic drug:  Dexlansoprazole Your last dose was: Your next dose is: TAKE ONE CAPSULE BY MOUTH EVERY DAY Quantity:  90 Cap Refills:  1  
     
   
   
   
  
 diazePAM 10 mg tablet Commonly known as:  VALIUM Your last dose was: Your next dose is:    
   
   
 Dose:  10 mg Take 10 mg by mouth two (2) times a day. Refills:  0  
     
   
   
   
  
 docusate sodium 100 mg capsule Commonly known as:  Ramón Luke Your last dose was: Your next dose is: Dose:  400 mg Take 400 mg by mouth. 1 capsule in the morning and 3 capsule at night Refills:  0  
     
   
   
   
  
 gabapentin 300 mg capsule Commonly known as:  NEURONTIN Your last dose was: Your next dose is: TAKE ONE CAPSULE BY MOUTH FOUR TIMES A DAY Quantity:  120 Cap Refills:  1  
     
   
   
   
  
 guaiFENesin  mg SR tablet Commonly known as:  Padilla & Padilla Your last dose was: Your next dose is:    
   
   
 Dose:  1200 mg Take 1,200 mg by mouth as needed. Quantity:  60 Tab Refills:  0 HYDROmorphone 4 mg tablet Commonly known as:  DILAUDID Your last dose was: Your next dose is:    
   
   
 Dose:  4-8 mg Take 4-8 mg by mouth every four (4) hours as needed for Pain. Refills:  0  
     
   
   
   
  
 lubiPROStone 24 mcg capsule Commonly known as:  Ioana Proper Your last dose was: Your next dose is:    
   
   
 Dose:  24 mcg Take 1 Cap by mouth two (2) times a day. Quantity:  180 Cap Refills:  1  
     
   
   
   
  
 magnesium hydroxide 400 mg/5 mL suspension Commonly known as:  MILK OF MAGNESIA Your last dose was: Your next dose is:    
   
   
 Dose:  30 mL Take 30 mL by mouth daily as needed for Constipation. Refills:  0  
     
   
   
   
  
 ondansetron 4 mg disintegrating tablet Commonly known as:  ZOFRAN ODT Your last dose was: Your next dose is:    
   
   
 Dose:  4 mg Take 1 Tab by mouth every six (6) hours as needed for Nausea. Indications: PREVENTION OF POST-OPERATIVE NAUSEA AND VOMITING Quantity:  30 Tab Refills:  0  
     
   
   
   
  
 OTHER(NON-FORMULARY) Your last dose was: Your next dose is: LLQ Morphine/bupivacaine pain pump for multiple myeloma. 4.287mg/3.125 mg per day Refills:  0  
     
   
   
   
  
 polyethylene glycol 17 gram packet Commonly known as:  Ton Hernandez Your last dose was: Your next dose is:    
   
   
 Dose:  17 g Take 17 g by mouth daily as needed. Refills:  0  
     
   
   
   
  
 potassium chloride 20 mEq tablet Commonly known as:  K-NICKY SHELLEYOR-CON Your last dose was: Your next dose is: TAKE ONE TABLET BY MOUTH EVERY DAY Quantity:  30 Tab Refills:  0  
     
   
   
   
  
 predniSONE 10 mg tablet Commonly known as:  Whit Smoker Your last dose was: Your next dose is: Take  by mouth every other day. Refills:  0 SAVELLA 50 mg tablet Generic drug:  Milnacipran Your last dose was: Your next dose is: TAKE ONE TABLET BY MOUTH TWO TIMES A DAY Quantity:  180 Tab Refills:  1  
     
   
   
   
  
 senna 8.6 mg tablet Commonly known as:  Arsh [employer] Your last dose was: Your next dose is:    
   
   
 Dose:  4 Tab Take 4 Tabs by mouth two (2) times a day. 4 PILLS IN AM  4 PILLS IN PM  
 Refills:  0  
     
   
   
   
  
 simvastatin 20 mg tablet Commonly known as:  ZOCOR Your last dose was: Your next dose is: TAKE ONE TABLET BY MOUTH AT BEDTIME Quantity:  30 Tab Refills:  11  
     
   
   
   
  
 spironolactone 50 mg tablet Commonly known as:  ALDACTONE Your last dose was: Your next dose is:    
   
   
 Dose:  50 mg Take 1 Tab by mouth two (2) times a day. Indications: EDEMA Quantity:  180 Tab Refills:  1 XARELTO 20 mg Tab tablet Generic drug:  rivaroxaban Your last dose was: Your next dose is: Take  by mouth daily. Refills:  0  
     
   
   
   
  
 ZANTAC 150 mg tablet Generic drug:  raNITIdine Your last dose was: Your next dose is:    
   
   
 Dose:  150 mg Take 150 mg by mouth two (2) times a day. Refills:  0

## 2017-09-11 NOTE — DISCHARGE INSTRUCTIONS
Kyphoplasty: What to Expect at [address] [place] [address]  After kyphoplasty to relieve pain from compression fractures, your back may feel sore where the hollow needle (trocar) went into your back. This should go away in a few days. Most people are able to return to their daily activities within a day after kyphoplasty. This care sheet gives you a general idea about how long it will take for you to recover. But each person recovers at a different pace. Follow the steps below to get better as quickly as possible. How can you care for yourself at home? Activity  · Take it easy for the first 24 hours. Rest when you feel tired. Getting enough sleep will help you recover. · For the first day after the procedure, avoid lifting anything that would make you strain. This may include heavy grocery bags and milk containers, a heavy briefcase or backpack, cat litter or dog food bags, a vacuum , or a child. Diet  · You can eat your normal diet. If your stomach is upset, try bland, low-fat foods like plain rice, broiled chicken, toast, and yogurt. Medicines  · Your doctor will tell you if and when you can restart your medicines. He or she will also give you instructions about taking any new medicines. · If you take blood thinners, such as warfarin (Coumadin), clopidogrel (Plavix), or aspirin, be sure to talk to your doctor. He or she will tell you if and when to start taking those medicines again. Make sure that you understand exactly what your doctor wants you to do. · Be safe with medicines. Take pain medicines exactly as directed. ¨ If the doctor gave you a prescription medicine for pain, take it as prescribed. ¨ If you are not taking a prescription pain medicine, ask your doctor if you can take an over-the-counter medicine. ¨ Do not take two or more pain medicines at the same time unless your doctor told you to. Many pain medicines have acetaminophen, which is Tylenol.  Too much acetaminophen (Tylenol) can be harmful. Incision care  · You will have a dressing over the cut (incision). A dressing helps the incision heal and protects it. You may shower tomorrow and replace bandage with bandaids until sites are fully healed. Ice  · If you are sore where the needle was inserted, put ice or a cold pack on your back for 10 to 20 minutes at a time. Try to do this every 1 to 2 hours for the next 3 days (when you are awake) or until the swelling goes down. Put a thin cloth between the ice and your skin. Follow-up care is a key part of your treatment and safety. Be sure to make and go to all appointments, and call your doctor if you are having problems. It's also a good idea to know your test results and keep a list of the medicines you take. When should you call for help? Call 911 anytime you think you may need emergency care. For example, call if:  · You passed out (lost consciousness). · You have severe trouble breathing. · You have sudden chest pain and shortness of breath, or you cough up blood. · You are unable to move a leg at all. Call your doctor now or seek immediate medical care if:  · You have new or worse symptoms in your legs, belly, or buttocks. Symptoms may include:  ¨ Numbness or tingling. ¨ Weakness. ¨ Pain. · You lose bladder or bowel control. · You have signs of infection, such as:  ¨ Increased pain, swelling, warmth, or redness. ¨ Red streaks leading from the incision. ¨ Pus draining from the incision. ¨ Swollen lymph nodes in your neck, armpits, or groin. ¨ A fever. Watch closely for any changes in your health, and be sure to contact your doctor if:  · You do not get better as expected. Where can you learn more? Go to http://hadley-clemencia.info/. Enter 473-804-226 in the search box to learn more about \"Kyphoplasty: What to Expect at Home. \"  Current as of: March 21, 2017  Content Version: 11.3  © 0385-3776 [employer]  Care instructions adapted under license by [employer] (which disclaims liability or warranty for this information). If you have questions about a medical condition or this instruction, always ask your healthcare professional. [place] [address] any warranty or liability for your use of this information. Side effects of sedation medications and other medications used today have been reviewed. Notify us of nausea, itching, hives, dizziness, or anything else out of the ordinary. Should you experience any of these significant changes, please call [phone] between the hours of 7:30 am and 10 pm or [phone] after hours.  After hours, ask the  to page the [address] Technologist, and describe the problem to the technologist.

## 2017-09-11 NOTE — ANESTHESIA PREPROCEDURE EVALUATION
Anesthetic History   No history of anesthetic complications            Review of Systems / Medical History  Patient summary reviewed, nursing notes reviewed and pertinent labs reviewed    Pulmonary        Sleep apnea: No treatment           Neuro/Psych   Within defined limits           Cardiovascular  Within defined limits                Exercise tolerance: >4 METS     GI/Hepatic/Renal     GERD           Endo/Other        Obesity and arthritis     Other Findings   Comments:   Multiple myeloma (HCC)  09/29/2009     Fibromyalgia  09/29/2009     GERD (gastroesophageal reflux disease)  09/29/2009     Rheumatoid Arthritis (HCC)                   Physical Exam    Airway  Mallampati: II  TM Distance: > 6 cm  Neck ROM: normal range of motion   Mouth opening: Normal     Cardiovascular  Regular rate and rhythm,  S1 and S2 normal,  no murmur, click, rub, or gallop             Dental    Dentition: Caps/crowns     Pulmonary  Breath sounds clear to auscultation               Abdominal  GI exam deferred       Other Findings            Anesthetic Plan    ASA: 3  Anesthesia type: MAC          Induction: Intravenous  Anesthetic plan and risks discussed with: Patient      All questions answered

## 2017-09-11 NOTE — PROCEDURES
PROCEDURE:Kyphoplasty L4 vertebra. INDICATION:severe refractory LBP. ANESTHESIA:MAC and local.  COMPLICATION:NONE. SPECIMENS REMOVED:none. BLOOD LOSS:NONE. /ASSISTANT:BOB Tong RECOMMENDATIONS:none. CONSENT OBTAINED:YES.  NOTES:none.

## 2017-09-11 NOTE — ROUTINE PROCESS
Pt. Discharged to home and transported to d/c lot via w/c. Assisted with dressing and into bathroom via w/c. Pt. and family verbalized understanding of d/c instructions.

## 2017-09-11 NOTE — H&P
Radiology History and Physical    Patient: Jag Edgar 70 y.o. female     Chief Complaint: No chief complaint on file. History of Present Illness: Severe refractory LBP. History:    Past Medical History:   Diagnosis Date    Anemia     Aneurysm ([facility] 3/2005    HX LEFT OPTIC NERVE    Autoimmune disease ([facility]     FIBROMYALGIA    Cancer ([facility] 2007    MULTIPLE MYELOMA    Chronic pain     spinal stenosis per pt    Fibromyalgia     GERD (gastroesophageal reflux disease)     High cholesterol     History of acute pancreatitis     History of blood transfusion     Ill-defined condition 2009    SALMONELLA     Ill-defined condition 2011    PANCREATITIS    Osteoporosis     Other complication due to venous access device ([facility] 1/29/2013    Psychiatric disorder     ANXIETY    Recurrent umbilical hernia 2/62/3011    Recurrent ventral incisional hernia 3/28/2016    Rheumatoid arthritis ([facility]     Sleep apnea     INTOLERATANT OF CPAP    Thromboembolus (HCC)     Right leg DVT    Urinary incontinence      Family History   Problem Relation Age of Onset    Arthritis-osteo Mother     Anesth Problems Neg Hx      Social History     Social History    Marital status:      Spouse name: N/A    Number of children: N/A    Years of education: N/A     Occupational History    Not on file. Social History Main Topics    Smoking status: Former Smoker     Years: 10.00     Quit date: 10/7/2005    Smokeless tobacco: Never Used    Alcohol use No    Drug use: No    Sexual activity: Not on file     Other Topics Concern    Not on file     Social History Narrative       Allergies: Allergies   Allergen Reactions    Aggrenox [Aspirin-Dipyridamole] Rash     Allergic to the dipyridamole and not aspirin.     Broccoli Other (comments)     C/O GAS    Bumex [Bumetanide] Rash    Corn Diarrhea    Corn Starch-Kaolin-Zinc Oxide Diarrhea    Doxil [Doxorubicin, Peg-Liposomal] Rash     Burning of skin      Flagyl [Metronidazole] Rash    Keflex [Cephalexin] Rash    Lasix [Furosemide] Rash    Macrobid [Nitrofurantoin Monohyd/M-Cryst] Shortness of Breath    Methotrexate Hives and Rash    Pcn [Penicillins] Rash    Sulfa (Sulfonamide Antibiotics) Hives    Tetanus And Diphtheria Toxoids Swelling    Thalidomide Rash       Current Medications:  Current Facility-Administered Medications   Medication Dose Route Frequency    ketorolac (TORADOL) injection 15 mg  15 mg IntraVENous PRN        Physical Exam:  Blood pressure (!) 88/38, pulse 83, temperature 98.6 °F (37 °C), resp. rate 13, height 5' 3\" (1.6 m), weight 82.6 kg (182 lb), SpO2 100 %. GENERAL: alert, cooperative, severe distress, appears stated age, LUNG: clear to auscultation bilaterally, HEART: regular rate and rhythm      Alerts:    Hospital Problems  Date Reviewed: 9/11/2017    None          Laboratory:    No results for input(s): HGB, HCT, WBC, PLT, INR, BUN, CREA, K, CRCLT, HGBEXT, HCTEXT, PLTEXT in the last 72 hours. No lab exists for component: PTT, PT, INREXT      Plan of Care/Planned Procedure:  Risks, benefits, and alternatives reviewed with patient and she agrees to proceed with the procedure.

## 2017-09-12 RX ORDER — GABAPENTIN 300 MG/1
CAPSULE ORAL
Qty: 120 CAP | Refills: 0 | Status: SHIPPED | OUTPATIENT
Start: 2017-09-12 | End: 2017-10-09 | Stop reason: SDUPTHER

## 2017-09-12 RX ORDER — MILNACIPRAN HYDROCHLORIDE 50 MG/1
TABLET, FILM COATED ORAL
Qty: 60 TAB | Refills: 0 | Status: SHIPPED | OUTPATIENT
Start: 2017-09-12 | End: 2017-10-09 | Stop reason: SDUPTHER

## 2017-09-12 RX ORDER — DEXLANSOPRAZOLE 60 MG/1
CAPSULE, DELAYED RELEASE ORAL
Qty: 30 CAP | Refills: 0 | Status: SHIPPED | OUTPATIENT
Start: 2017-09-12 | End: 2017-10-09 | Stop reason: SDUPTHER

## 2017-09-17 NOTE — ANESTHESIA POSTPROCEDURE EVALUATION
Post-Anesthesia Evaluation and Assessment    Patient: Mumtaz Hernandez MRN: [id number]  SSN: [id number]    YOB: 1945  Age: 70 y.o. Sex: female       Cardiovascular Function/Vital Signs  Visit Vitals    /59 (BP 1 Location: Left arm, BP Patient Position: Head of bed elevated (Comment degrees))    Pulse 95    Temp 37 °C (98.6 °F)    Resp 18    Ht 5' 3\" (1.6 m)    Wt 82.6 kg (182 lb)    SpO2 93%    BMI 32.24 kg/m2       Patient is status post MAC anesthesia for * No procedures listed *. Nausea/Vomiting: None    Postoperative hydration reviewed and adequate. Pain:  Pain Scale 1: Numeric (0 - 10) (09/11/17 1555)  Pain Intensity 1: 0 (09/11/17 1555)   Managed    Neurological Status:   Neuro  LLE Motor Response: Purposeful (09/11/17 1338)  RLE Motor Response: Purposeful (09/11/17 1338)   At baseline    Mental Status and Level of Consciousness: Arousable    Pulmonary Status:   O2 Device: Room air (09/11/17 1515)   Adequate oxygenation and airway patent    Complications related to anesthesia: None    Post-anesthesia assessment completed.  No concerns    Signed By: Graciela Baltazar MD     September 17, 2017

## 2017-09-19 ENCOUNTER — HOSPITAL ENCOUNTER (OUTPATIENT)
Dept: GENERAL RADIOLOGY | Age: 72
Discharge: HOME | End: 2017-09-19
Payer: MEDICARE

## 2017-09-19 DIAGNOSIS — J02.9 SORE THROAT: ICD-10-CM

## 2017-09-19 PROCEDURE — 71020 XR CHEST PA LAT: CPT

## 2017-09-28 ENCOUNTER — OFFICE VISIT (OUTPATIENT)
Dept: INTERNAL MEDICINE CLINIC | Age: 72
End: 2017-09-28

## 2017-09-28 VITALS
HEIGHT: 63 IN | RESPIRATION RATE: 16 BRPM | BODY MASS INDEX: 32.6 KG/M2 | HEART RATE: 108 BPM | TEMPERATURE: 99.4 F | OXYGEN SATURATION: 94 % | DIASTOLIC BLOOD PRESSURE: 68 MMHG | SYSTOLIC BLOOD PRESSURE: 112 MMHG | WEIGHT: 184 LBS

## 2017-09-28 DIAGNOSIS — Z23 ENCOUNTER FOR IMMUNIZATION: Primary | ICD-10-CM

## 2017-09-28 RX ORDER — AMITRIPTYLINE HYDROCHLORIDE 50 MG/1
TABLET, FILM COATED ORAL
Qty: 180 TAB | Refills: 1 | Status: SHIPPED | OUTPATIENT
Start: 2017-09-28 | End: 2018-03-24 | Stop reason: SDUPTHER

## 2017-09-28 NOTE — MR AVS SNAPSHOT
Visit Information Date & Time Provider Department Dept. Phone Encounter #  
 9/28/2017 10:15 AM Jeremy Hernandez MD [facility] [phone] [id number] Follow-up Instructions Return for Wellness Visit. Upcoming Health Maintenance Date Due INFLUENZA AGE 9 TO ADULT 8/1/2017 GLAUCOMA SCREENING Q2Y 12/10/2017 MEDICARE YEARLY EXAM 3/2/2018 BREAST CANCER SCRN MAMMOGRAM 9/23/2018 COLONOSCOPY 3/9/2026 DTaP/Tdap/Td series (2 - Td) 3/1/2027 Allergies as of 9/28/2017  Review Complete On: 9/28/2017 By: Jeremy Hernandez MD  
  
 Severity Noted Reaction Type Reactions Aggrenox [Aspirin-dipyridamole]  09/28/2009    Rash Allergic to the dipyridamole and not aspirin. Broccoli  01/28/2013    Other (comments) C/O GAS Bumex [Bumetanide]  09/23/2016    Rash [place]  01/28/2013    Diarrhea Corn Starch-kaolin-zinc Oxide  01/28/2013    Diarrhea Doxil [Doxorubicin, Peg-liposomal]  05/06/2011    Rash Burning of skin Flagyl [Metronidazole]  09/28/2009    Rash Keflex [Cephalexin]  09/28/2009    Rash Lasix [Furosemide]  04/07/2011    Rash Macrobid [Nitrofurantoin Monohyd/m-cryst]  09/28/2009    Shortness of Breath Methotrexate  09/28/2009    Hives, Rash Pcn [Penicillins]  09/28/2009    Rash  
 Sulfa (Sulfonamide Antibiotics)  09/28/2009    Hives Tetanus And Diphtheria Toxoids  03/01/2017    Swelling Thalidomide  08/18/2010    Rash Current Immunizations  Reviewed on 6/4/2016 Name Date Influenza High Dose Vaccine PF  Incomplete, 10/26/2016, 11/10/2015 Influenza Vaccine Whole 11/28/2012 Pneumococcal Polysaccharide (PPSV-23) 8/12/2014 ZZZ-RETIRED (DO NOT USE) Pneumococcal Vaccine (Unspecified Type) 11/21/2004 Not reviewed this visit You Were Diagnosed With   
  
 Codes Comments Encounter for immunization    -  Primary ICD-10-CM: D84 ICD-9-CM: V03.89 Vitals BP Pulse Temp Resp Height(growth percentile) Weight(growth percentile) 112/68 (!) 108 99.4 °F (37.4 °C) (Oral) 16 5' 3\" (1.6 m) 184 lb (83.5 kg) SpO2 BMI OB Status Smoking Status 94% 32.59 kg/m2 Postmenopausal Former Smoker Vitals History BMI and BSA Data Body Mass Index Body Surface Area 32.59 kg/m 2 1.93 m 2 Preferred Pharmacy Pharmacy Name Phone [address], [address] [place] [address] [phone] Your Updated Medication List  
  
   
This list is accurate as of: 9/28/17 11:10 AM.  Always use your most recent med list.  
  
  
  
  
 acetaminophen 500 mg tablet Commonly known as:  TYLENOL Take 1,000 mg by mouth every six (6) hours as needed for Pain. aluminum hydrox-magnesium carb  mg/15 mL suspension Commonly known as:  GAVISCON Take 15 mL by mouth every six (6) hours as needed for Indigestion. amitriptyline 50 mg tablet Commonly known as:  ELAVIL TAKE TWO TABLETS BY MOUTH AT BEDTIME AS NEEDED FOR SLEEP B.infantis-B.ani-B.long-B.bifi 10-15 mg Tbec Take 1 Tab by mouth every morning. betamethasone dipropionate 0.05 % ointment Commonly known as:  Birtha Risk Apply  to affected area two (2) times a day. calcium carbonate 200 mg calcium (500 mg) Chew Commonly known as:  TUMS Take 1 Tab by mouth as needed. calcium citrate-vitamin D3 tablet Commonly known as:  CITRACAL WITH VITAMIN D MAXIMUM Take  by mouth two (2) times a day. camphor-menthol 0.5-0.5 % lotion Commonly known as:  Kenyatta Products Apply  to affected area three (3) times daily as needed for Itching. cetirizine 10 mg tablet Commonly known as:  ZYRTEC Take 10 mg by mouth nightly. Comp. [address] [facility] [address] 1 Device by Does Not Apply route daily. COMPAZINE 5 mg tablet Generic drug:  prochlorperazine Take 5 mg by mouth every six (6) hours as needed for Nausea. DEXILANT 60 mg Cpdb Generic drug:  Dexlansoprazole TAKE 1 CAPSULE BY MOUTH EVERY DAY  
  
 diazePAM 10 mg tablet Commonly known as:  VALIUM Take 10 mg by mouth two (2) times a day. docusate sodium 100 mg capsule Commonly known as:  Vernard Geovani Take 400 mg by mouth. 1 capsule in the morning and 3 capsule at night  
  
 fluticasone 50 mcg/actuation nasal spray Commonly known as:  [place] 2 Sprays by Both Nostrils route as needed. gabapentin 300 mg capsule Commonly known as:  NEURONTIN  
TAKE ONE CAPSULE BY MOUTH FOUR TIMES A DAY  
  
 guaiFENesin  mg SR tablet Commonly known as:  [address] Take 1,200 mg by mouth as needed. HYDROmorphone 4 mg tablet Commonly known as:  DILAUDID Take 4-8 mg by mouth every four (4) hours as needed for Pain.  
  
 lubiPROStone 24 mcg capsule Commonly known as:  Nereyda Gasman Take 1 Cap by mouth two (2) times a day. magnesium hydroxide 400 mg/5 mL suspension Commonly known as:  MILK OF MAGNESIA Take 30 mL by mouth daily as needed for Constipation. ondansetron 4 mg disintegrating tablet Commonly known as:  ZOFRAN ODT Take 1 Tab by mouth every six (6) hours as needed for Nausea. Indications: PREVENTION OF POST-OPERATIVE NAUSEA AND VOMITING  
  
 OTHER(NON-FORMULARY) LLQ Morphine/bupivacaine pain pump for multiple myeloma. 4.287mg/3.125 mg per day  
  
 polyethylene glycol 17 gram packet Commonly known as:  Marlane Blinks Take 17 g by mouth daily as needed. potassium chloride 20 mEq tablet Commonly known as:  K-DUR, KLOR-CON  
TAKE ONE TABLET BY MOUTH EVERY DAY  
  
 predniSONE 10 mg tablet Commonly known as:  Susan Ada Take  by mouth every other day. SAVELLA 50 mg tablet Generic drug:  Milnacipran TAKE 1 TABLET BY MOUTH TWICE DAILY  
  
 senna 8.6 mg tablet Commonly known as:  [employer] Take 4 Tabs by mouth two (2) times a day. 4 PILLS IN AM  4 PILLS IN PM  
  
 simvastatin 20 mg tablet Commonly known as:  ZOCOR  
 TAKE ONE TABLET BY MOUTH AT BEDTIME  
  
 spironolactone 50 mg tablet Commonly known as:  ALDACTONE Take 1 Tab by mouth two (2) times a day. Indications: EDEMA XARELTO 20 mg Tab tablet Generic drug:  rivaroxaban Take  by mouth daily. ZANTAC 150 mg tablet Generic drug:  raNITIdine Take 150 mg by mouth two (2) times a day. We Performed the Following ADMIN INFLUENZA VIRUS VAC [ [facility]] INFLUENZA VIRUS VACCINE, HIGH DOSE SEASONAL, PRESERVATIVE FREE [52348 CPT(R)] Follow-up Instructions Return for Wellness Visit. Introducing [facility]! Artie Atkinson introduces [employer] patient portal. Now you can access parts of your medical record, email your doctor's office, and request medication refills online. 1. In your internet browser, go to [email or web address] 2. Click on the First Time User? Click Here link in the Sign In box. You will see the New Member Sign Up page. 3. Enter your [employer] Access Code exactly as it appears below. You will not need to use this code after youve completed the sign-up process. If you do not sign up before the expiration date, you must request a new code. · [employer] Access Code: [email or web address] Expires: 12/10/2017  2:58 PM 
 
4. Enter the last four digits of your Social Security Number (xxxx) and Date of Birth (mm/dd/yyyy) as indicated and click Submit. You will be taken to the next sign-up page. 5. Create a [employer] ID. This will be your [employer] login ID and cannot be changed, so think of one that is secure and easy to remember. 6. Create a [employer] password. You can change your password at any time. 7. Enter your Password Reset Question and Answer. This can be used at a later time if you forget your password. 8. Enter your e-mail address. You will receive e-mail notification when new information is available in [address]. 9. Click Sign Up.  You can now view and download portions of your medical record. 10. Click the Download Summary menu link to download a portable copy of your medical information. If you have questions, please visit the Frequently Asked Questions section of the [employer] website. Remember, [employer] is NOT to be used for urgent needs. For medical emergencies, dial 911. Now available from your iPhone and Android! Please provide this summary of care documentation to your next provider. Your primary care clinician is listed as Daniela [phone] If you have any questions after today's visit, please call [phone].

## 2017-09-28 NOTE — PROGRESS NOTES
Patient denies any symptoms, reactions or allergies that would exclude them from being immunized today. Risks and adverse reactions were discussed and the VIS was given to them. All questions were addressed. Fluzone High-Dose  [id number] Formula  Left Arm  0.5 mL  Lot# [id number]  Exp: 04/13/2018  [facility] [address]  [address] - [id number]  Pt tolerated well. The patient was observed for 15 min post injection. There were no reactions observed.

## 2017-09-28 NOTE — PROGRESS NOTES
HPI:  Adri Narayan is a 70y.o. year old female who is here for a routine visit:    Here for follow-up of multiple medical problems. She has multiple myeloma and recently had increase in her protein levels. She is seeing oncology regularly and having lab work done on a weekly basis. She was recently started back on chemotherapy but had to stop secondary to fevers up to 103. She just had another kyphoplasty done of her back in the last 2 weeks and her back pain is slightly better. Does have some persistent pain that comes around the front of her abdomen. Some increase in abdominal girth as well as increased edema in her ankles. She is unable able to tolerate any other diuretics secondary to her allergies. She has had some increase in her shortness of breath and dyspnea on exertion. And she does have an appointment scheduled for an echocardiogram in the next week.       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    Autoimmune disease ([address]     FIBROMYALGIA    Cancer ([facility] 2007    MULTIPLE MYELOMA    Chronic pain     spinal stenosis per pt    Fibromyalgia     GERD (gastroesophageal reflux disease)     High cholesterol     History of acute pancreatitis     History of blood transfusion     Ill-defined condition 2009    SALMONELLA     Ill-defined condition 2011    PANCREATITIS    Osteoporosis     Other complication due to venous access device ([address] 1/29/2013    Psychiatric disorder     ANXIETY    Recurrent umbilical hernia 6/99/6507    Recurrent ventral incisional hernia 3/28/2016    Rheumatoid arthritis ([address]     Sleep apnea     INTOLERATANT OF CPAP    Thromboembolus (HCC)     Right leg DVT    Urinary incontinence        Past Surgical History:   Procedure Laterality Date    HX CHOLECYSTECTOMY  2004    HX CRANIOTOMY      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 Rogers Roy    HX KYPHOPLASTY  09/14/2017    L4    HX ORTHOPAEDIC Right 2001    BONE SPURS SHOULDER    HX OTHER SURGICAL  2010    LLQ PAIN PUMP FOR MORPHINE INFUSION    HX OTHER SURGICAL  1-16-14    repair of recurrent umbilical hernia with ventralex pillow top mesh and extensive lysis of ximzzebco-RXN-RD. Rudi Snuffer    HX OTHER SURGICAL  5-31-16    repair of recurrent ventral incisional hernia with underlay mesh-[facility]-Dr. Rogers Roy    HX VASCULAR ACCESS Right 1/2013    POWER PORT     HX VASCULAR ACCESS  2010    PAIN PUMP    NEUROLOGICAL PROCEDURE UNLISTED  2007    KYPHOPLASTY X2    NEUROLOGICAL PROCEDURE UNLISTED  10/26/15    Kyphoplasty t-5    PAIN PUMP DEVICE      implanted in Q, MORPHINE       Prior to Admission medications    Medication Sig Start Date End Date Taking? Authorizing Provider   amitriptyline (ELAVIL) 50 mg tablet TAKE TWO TABLETS BY MOUTH AT BEDTIME AS NEEDED FOR SLEEP 9/28/17  Yes Juana Amin III, MD   DEXILANT 60 mg CpDB TAKE 1 CAPSULE BY MOUTH EVERY DAY 9/12/17  Yes Juana Amin III, MD   SAVELLA 50 mg tablet TAKE 1 TABLET BY MOUTH TWICE DAILY 9/12/17  Yes Juana Amin III, MD   gabapentin (NEURONTIN) 300 mg capsule TAKE ONE CAPSULE BY MOUTH FOUR TIMES A DAY 9/12/17  Yes Juana Amin III, MD   rivaroxaban (XARELTO) 20 mg tab tablet Take  by mouth daily. Yes Historical Provider   raNITIdine (ZANTAC) 150 mg tablet Take 150 mg by mouth two (2) times a day. Yes Historical Provider   camphor-menthol PILAR OROPEZA [facility]) 0.5-0.5 % lotion Apply  to affected area three (3) times daily as needed for Itching.  3/1/17  Yes Juana Amin III, MD   potassium chloride (K-DUR, KLOR-CON) 20 mEq tablet TAKE ONE TABLET BY MOUTH EVERY DAY 11/30/16  Yes Irish Daniels MD   lubiPROStone [facility]) 24 mcg capsule Take 1 Cap by mouth two (2) times a day. 11/15/16  Yes Hang Escalante III, MD   simvastatin (ZOCOR) 20 mg tablet TAKE ONE TABLET BY MOUTH AT BEDTIME 10/23/16  Yes Hang Escalante III, MD   spironolactone (ALDACTONE) 50 mg tablet Take 1 Tab by mouth two (2) times a day. Indications: EDEMA 9/23/16  Yes Chip Gresham MD   Comp. Stocking,Knee,Regular,Lrg misc 1 Device by Does Not Apply route daily. 9/23/16  Yes Hang Escalante III, MD   ondansetron (ZOFRAN ODT) 4 mg disintegrating tablet Take 1 Tab by mouth every six (6) hours as needed for Nausea. Indications: PREVENTION OF POST-OPERATIVE NAUSEA AND VOMITING 6/7/16  Yes Pieter Kocher, NP   B.infantis-B.ani-B.long-B.bifi 10-15 mg TbEC Take 1 Tab by mouth every morning. Yes Historical Provider   aluminum hydrox-magnesium carb (GAVISCON)  mg/15 mL suspension Take 15 mL by mouth every six (6) hours as needed for Indigestion. Yes Historical Provider   calcium carbonate (TUMS) 200 mg calcium (500 mg) chew Take 1 Tab by mouth as needed. Yes Historical Provider   calcium citrate-vitamin D3 (CITRACAL WITH VITAMIN D MAXIMUM) tablet Take  by mouth two (2) times a day. Yes Historical Provider   magnesium hydroxide (MILK OF MAGNESIA) 400 mg/5 mL suspension Take 30 mL by mouth daily as needed for Constipation. Yes Historical Provider   betamethasone dipropionate (DIPROLENE) 0.05 % ointment Apply  to affected area two (2) times a day. Yes Historical Provider   acetaminophen (TYLENOL) 500 mg tablet Take 1,000 mg by mouth every six (6) hours as needed for Pain. Yes Historical Provider   fluticasone (FLONASE) 50 mcg/actuation nasal spray 2 Sprays by Both Nostrils route as needed. Patient taking differently: 2 Sprays by Both Nostrils route two (2) times a day. 3/9/16  Yes Hang Escalante III, MD   OTHER,NON-FORMULARY, LLQ Morphine/bupivacaine pain pump for multiple myeloma.   4.287mg/3.125 mg per day   Yes Historical Provider   diazepam (VALIUM) 10 mg tablet Take 10 mg by mouth two (2) times a day. Yes Historical Provider   docusate sodium (COLACE) 100 mg capsule Take 400 mg by mouth. 1 capsule in the morning and 3 capsule at night   Yes Historical Provider   polyethylene glycol (MIRALAX) 17 gram packet Take 17 g by mouth daily as needed. Yes Historical Provider   senna (SENOKOT) 8.6 mg tablet Take 4 Tabs by mouth two (2) times a day. 4 PILLS IN AM  4 PILLS IN PM   Yes Historical Provider   HYDROmorphone (DILAUDID) 4 mg tablet Take 4-8 mg by mouth every four (4) hours as needed for Pain. Yes Historical Provider   prochlorperazine (COMPAZINE) 5 mg tablet Take 5 mg by mouth every six (6) hours as needed for Nausea. Yes Historical Provider   predniSONE (DELTASONE) 10 mg tablet Take  by mouth every other day. Historical Provider   guaiFENesin SR (MUCINEX) 600 mg SR tablet Take 1,200 mg by mouth as needed. 3/9/16   Marielena Billings III, MD   cetirizine (ZYRTEC) 10 mg tablet Take 10 mg by mouth nightly. Historical Provider       Social History     Social History    Marital status:      Spouse name: N/A    Number of children: N/A    Years of education: N/A     Occupational History    Not on file.      Social History Main Topics    Smoking status: Former Smoker     Years: 10.00     Quit date: 10/7/2005    Smokeless tobacco: Never Used    Alcohol use No    Drug use: No    Sexual activity: Not on file     Other Topics Concern    Not on file     Social History Narrative          ROS  Per HPI    Visit Vitals    /68    Pulse (!) 108    Temp 99.4 °F (37.4 °C) (Oral)    Resp 16    Ht 5' 3\" (1.6 m)    Wt 184 lb (83.5 kg)    SpO2 94%    BMI 32.59 kg/m2         Physical Exam   Physical Examination: General appearance - chronically ill appearing  Mouth - mucous membranes moist, pharynx normal without lesions  Neck - supple, no significant adenopathy  Lymphatics - no palpable lymphadenopathy, no hepatosplenomegaly  Chest -breath sounds in both bases that are coarse. No wheeze. Heart - normal rate, regular rhythm, normal S1, S2, no murmurs, rubs, clicks or gallops  Abdomen -normal bowel sounds but the abdomen is distended and diffusely tender. Extremities - pedal edema 2 +, intact peripheral pulses      Assessment/Plan:  Diagnoses and all orders for this visit:    1. Encounter for immunization  -     ADMIN INFLUENZA VIRUS VAC  -     INFLUENZA VIRUS VACCINE, HIGH DOSE SEASONAL, PRESERVATIVE FREE    Other orders  -     amitriptyline (ELAVIL) 50 mg tablet; TAKE TWO TABLETS BY MOUTH AT BEDTIME AS NEEDED FOR SLEEP       Follow-up Disposition:  Return for Wellness Visit. #1 edema and? Ascites likely related to her underlying illness. After her echocardiogram would consider an abdominal ultrasound to evaluate for this. She is likely unable to to benefit from other diuretics but may consider paracentesis. 2.  Multiple myeloma as per the oncologist.  3.  Chronic pain syndrome per her pain management MD  4. Chronic constipation controlled on her current medicines. Advised her to call back or return to office if symptoms worsen/change/persist.  Discussed expected course/resolution/complications of diagnosis in detail with patient. Medication risks/benefits/costs/interactions/alternatives discussed with patient. She was given an after visit summary which includes diagnoses, current medications, & vitals. She expressed understanding with the diagnosis and plan.

## 2017-10-09 RX ORDER — MILNACIPRAN HYDROCHLORIDE 50 MG/1
TABLET, FILM COATED ORAL
Qty: 60 TAB | Refills: 0 | Status: SHIPPED | OUTPATIENT
Start: 2017-10-09 | End: 2017-11-06 | Stop reason: SDUPTHER

## 2017-10-09 RX ORDER — GABAPENTIN 300 MG/1
CAPSULE ORAL
Qty: 120 CAP | Refills: 0 | Status: SHIPPED | OUTPATIENT
Start: 2017-10-09 | End: 2017-12-12 | Stop reason: SDUPTHER

## 2017-10-09 RX ORDER — DEXLANSOPRAZOLE 60 MG/1
CAPSULE, DELAYED RELEASE ORAL
Qty: 30 CAP | Refills: 0 | Status: SHIPPED | OUTPATIENT
Start: 2017-10-09 | End: 2017-11-06 | Stop reason: SDUPTHER

## 2017-10-20 ENCOUNTER — HOSPITAL ENCOUNTER (OUTPATIENT)
Dept: GENERAL RADIOLOGY | Age: 72
Discharge: HOME | End: 2017-10-20
Payer: MEDICARE

## 2017-10-20 DIAGNOSIS — M25.511 RIGHT SHOULDER PAIN: ICD-10-CM

## 2017-10-20 DIAGNOSIS — C90.00 MULTIPLE MYELOMA (HCC): ICD-10-CM

## 2017-10-20 DIAGNOSIS — R07.81 PLEURITIC CHEST PAIN: ICD-10-CM

## 2017-10-20 PROCEDURE — 71101 X-RAY EXAM UNILAT RIBS/CHEST: CPT

## 2017-10-20 PROCEDURE — 73030 X-RAY EXAM OF SHOULDER: CPT

## 2017-10-30 RX ORDER — LUBIPROSTONE 24 UG/1
CAPSULE, GELATIN COATED ORAL
Qty: 60 CAP | Refills: 0 | Status: SHIPPED | OUTPATIENT
Start: 2017-10-30 | End: 2017-12-02 | Stop reason: SDUPTHER

## 2017-11-05 RX ORDER — SIMVASTATIN 20 MG/1
TABLET, FILM COATED ORAL
Qty: 30 TAB | Refills: 0 | Status: SHIPPED | OUTPATIENT
Start: 2017-11-05 | End: 2017-12-02 | Stop reason: SDUPTHER

## 2017-11-06 RX ORDER — MILNACIPRAN HYDROCHLORIDE 50 MG/1
TABLET, FILM COATED ORAL
Qty: 60 TAB | Refills: 0 | Status: SHIPPED | OUTPATIENT
Start: 2017-11-06 | End: 2017-12-02 | Stop reason: SDUPTHER

## 2017-11-06 RX ORDER — DEXLANSOPRAZOLE 60 MG/1
CAPSULE, DELAYED RELEASE ORAL
Qty: 30 CAP | Refills: 0 | Status: SHIPPED | OUTPATIENT
Start: 2017-11-06 | End: 2017-12-02 | Stop reason: SDUPTHER

## 2017-12-03 RX ORDER — DEXLANSOPRAZOLE 60 MG/1
CAPSULE, DELAYED RELEASE ORAL
Qty: 30 CAP | Refills: 0 | Status: SHIPPED | OUTPATIENT
Start: 2017-12-03 | End: 2017-12-27 | Stop reason: SDUPTHER

## 2017-12-03 RX ORDER — SIMVASTATIN 20 MG/1
TABLET, FILM COATED ORAL
Qty: 30 TAB | Refills: 0 | Status: SHIPPED | OUTPATIENT
Start: 2017-12-03 | End: 2017-12-28 | Stop reason: SDUPTHER

## 2017-12-03 RX ORDER — MILNACIPRAN HYDROCHLORIDE 50 MG/1
TABLET, FILM COATED ORAL
Qty: 60 TAB | Refills: 0 | Status: SHIPPED | OUTPATIENT
Start: 2017-12-03 | End: 2017-12-27 | Stop reason: SDUPTHER

## 2017-12-03 RX ORDER — LUBIPROSTONE 24 UG/1
CAPSULE, GELATIN COATED ORAL
Qty: 60 CAP | Refills: 0 | Status: SHIPPED | OUTPATIENT
Start: 2017-12-03 | End: 2017-12-27 | Stop reason: SDUPTHER

## 2017-12-12 RX ORDER — GABAPENTIN 300 MG/1
CAPSULE ORAL
Qty: 120 CAP | Refills: 0 | Status: SHIPPED | OUTPATIENT
Start: 2017-12-12 | End: 2018-02-20 | Stop reason: SDUPTHER

## 2017-12-27 RX ORDER — MILNACIPRAN HYDROCHLORIDE 50 MG/1
TABLET, FILM COATED ORAL
Qty: 60 TAB | Refills: 0 | Status: SHIPPED | OUTPATIENT
Start: 2017-12-27 | End: 2018-02-01 | Stop reason: SDUPTHER

## 2017-12-27 RX ORDER — LUBIPROSTONE 24 UG/1
CAPSULE, GELATIN COATED ORAL
Qty: 60 CAP | Refills: 0 | Status: SHIPPED | OUTPATIENT
Start: 2017-12-27 | End: 2018-02-01 | Stop reason: SDUPTHER

## 2017-12-27 RX ORDER — DEXLANSOPRAZOLE 60 MG/1
CAPSULE, DELAYED RELEASE ORAL
Qty: 30 CAP | Refills: 0 | Status: SHIPPED | OUTPATIENT
Start: 2017-12-27 | End: 2018-02-01 | Stop reason: SDUPTHER

## 2017-12-28 RX ORDER — SIMVASTATIN 20 MG/1
TABLET, FILM COATED ORAL
Qty: 30 TAB | Refills: 0 | Status: SHIPPED | OUTPATIENT
Start: 2017-12-28 | End: 2018-03-03 | Stop reason: SDUPTHER

## 2018-02-01 RX ORDER — DEXLANSOPRAZOLE 60 MG/1
CAPSULE, DELAYED RELEASE ORAL
Qty: 30 CAP | Refills: 0 | Status: SHIPPED | OUTPATIENT
Start: 2018-02-01 | End: 2018-02-02 | Stop reason: SDUPTHER

## 2018-02-01 RX ORDER — LUBIPROSTONE 24 UG/1
CAPSULE, GELATIN COATED ORAL
Qty: 60 CAP | Refills: 0 | Status: SHIPPED | OUTPATIENT
Start: 2018-02-01 | End: 2018-02-02 | Stop reason: SDUPTHER

## 2018-02-01 RX ORDER — MILNACIPRAN HYDROCHLORIDE 50 MG/1
TABLET, FILM COATED ORAL
Qty: 60 TAB | Refills: 0 | Status: SHIPPED | OUTPATIENT
Start: 2018-02-01 | End: 2018-02-02 | Stop reason: SDUPTHER

## 2018-02-02 ENCOUNTER — OFFICE VISIT (OUTPATIENT)
Dept: INTERNAL MEDICINE CLINIC | Age: 73
End: 2018-02-02

## 2018-02-02 VITALS
BODY MASS INDEX: 32.78 KG/M2 | WEIGHT: 185 LBS | TEMPERATURE: 98.2 F | SYSTOLIC BLOOD PRESSURE: 124 MMHG | DIASTOLIC BLOOD PRESSURE: 78 MMHG | HEART RATE: 100 BPM | HEIGHT: 63 IN | OXYGEN SATURATION: 98 % | RESPIRATION RATE: 16 BRPM

## 2018-02-02 DIAGNOSIS — M05.79 RHEUMATOID ARTHRITIS INVOLVING MULTIPLE SITES WITH POSITIVE RHEUMATOID FACTOR (HCC): ICD-10-CM

## 2018-02-02 DIAGNOSIS — C90.00 MULTIPLE MYELOMA NOT HAVING ACHIEVED REMISSION (HCC): ICD-10-CM

## 2018-02-02 DIAGNOSIS — L03.116 CELLULITIS AND ABSCESS OF LEFT LEG: Primary | ICD-10-CM

## 2018-02-02 DIAGNOSIS — L02.416 CELLULITIS AND ABSCESS OF LEFT LEG: Primary | ICD-10-CM

## 2018-02-02 RX ORDER — FLUTICASONE PROPIONATE 50 MCG
2 SPRAY, SUSPENSION (ML) NASAL 2 TIMES DAILY
Qty: 3 BOTTLE | Refills: 3 | Status: SHIPPED | OUTPATIENT
Start: 2018-02-02 | End: 2019-02-07 | Stop reason: SDUPTHER

## 2018-02-02 RX ORDER — CLINDAMYCIN HYDROCHLORIDE 300 MG/1
300 CAPSULE ORAL 3 TIMES DAILY
Qty: 30 CAP | Refills: 0 | Status: SHIPPED | OUTPATIENT
Start: 2018-02-02 | End: 2018-03-23 | Stop reason: ALTCHOICE

## 2018-02-02 RX ORDER — CLINDAMYCIN HYDROCHLORIDE 300 MG/1
300 CAPSULE ORAL 3 TIMES DAILY
COMMUNITY
Start: 2018-01-26 | End: 2018-02-02 | Stop reason: SDUPTHER

## 2018-02-02 RX ORDER — LUBIPROSTONE 24 UG/1
CAPSULE, GELATIN COATED ORAL
Qty: 60 CAP | Refills: 0 | Status: SHIPPED | OUTPATIENT
Start: 2018-02-02 | End: 2018-03-23 | Stop reason: SDUPTHER

## 2018-02-02 RX ORDER — DEXLANSOPRAZOLE 60 MG/1
CAPSULE, DELAYED RELEASE ORAL
Qty: 30 CAP | Refills: 0 | Status: SHIPPED | OUTPATIENT
Start: 2018-02-02 | End: 2018-03-23 | Stop reason: SDUPTHER

## 2018-02-02 NOTE — PROGRESS NOTES
HPI:  Wilmer Ashley is a 67y.o. year old female who is here for a routine visit:    Here for an emergency room follow-up visit. She was seen there last week at the podiatrist and her there with redness on her left lower leg. They had given her a prescription for Cipro but were concerned that she might have a DVT. In the emergency room Doppler was negative for DVT. Her antibiotic was changed to clindamycin and she was discharged. The redness and swelling and bruising are significantly improved. She continues to have some pain and redness there. She has a follow-up appointment next week with the oncologist to discuss her treatment for myeloma. They are trying to get her to reduce her use of narcotics due to insurance reasons and she is having a difficult time being able to do that.       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    Anxiety     Chronic pain     spinal stenosis per pt    Fibromyalgia     GERD (gastroesophageal reflux disease)     High cholesterol     History of acute pancreatitis     History of blood transfusion     History of Salmonella infection 2009    Multiple myeloma ([address] 2007    Osteoporosis     Other complication due to venous access device ([address] 1/29/2013    Recurrent umbilical hernia 9/66/3190    Recurrent ventral incisional hernia 3/28/2016    Rheumatoid arthritis(714.0)     Sleep apnea     INTOLERATANT OF CPAP    Thromboembolus (HCC)     Right leg DVT    Urinary incontinence        Past Surgical History:   Procedure Laterality Date    HX CHOLECYSTECTOMY  2004    HX CRANIOTOMY      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 Barbra Perez. Yonathan    HX KYPHOPLASTY  09/14/2017    L4    HX ORTHOPAEDIC Right 2001    BONE SPURS SHOULDER    HX OTHER SURGICAL  2010    LLQ PAIN PUMP FOR MORPHINE INFUSION    HX OTHER SURGICAL  1-16-14    repair of recurrent umbilical hernia with ventralex pillow top mesh and extensive lysis of [id number]-YKR-JV. Julene Minus    HX OTHER SURGICAL  5-31-16    [facility] recurrent ventral incisional her[facility]    HX VASCULAR ACCESS Right 1/2013    POWER PORT     HX VASCULAR ACCESS  2010    PAIN PUMP    NEUROLOGICAL PROCEDURE UNLISTED  2007    KYPHOPLASTY X2    NEUROLOGICAL PROCEDURE UNLISTED  10/26/15    Kyphoplasty t-5    PAIN PUMP DEVICE      implanted in LLQ, MORPHINE       Prior to Admission medications    Medication Sig Start Date End Date Taking? Authorizing Provider   fluticasone (FLONASE) 50 mcg/actuation nasal spray 2 Sprays by Both Nostrils route two (2) times a day. 2/2/18  Yes Satish Hernandez III, MD   clindamycin (CLEOCIN) 300 mg capsule Take 1 Cap by mouth three (3) times daily. 2/2/18  Yes Satish Hernandez III, MD   lubiPROStone (AMITIZA) 24 mcg capsule TAKE 1 CAPSULE BY MOUTH TWICE DAILY 2/2/18  Yes Satish Hernandez III, MD   Milnacipran (SAVELLA) 50 mg tablet TAKE 1 TABLET BY MOUTH TWICE DAILY 2/2/18  Yes Satish Hernandez III, MD   Dexlansoprazole (DEXILANT) 60 mg CpDB TAKE 1 CAPSULE BY MOUTH EVERY DAY 2/2/18  Yes Satish Hernandez III, MD   simvastatin (ZOCOR) 20 mg tablet TAKE ONE TABLET BY MOUTH AT BEDTIME 12/28/17  Yes Satish Hernandez III, MD   gabapentin (NEURONTIN) 300 mg capsule TAKE ONE CAPSULE BY MOUTH FOUR TIMES A DAY 12/12/17  Yes Satish Hernandez III, MD   amitriptyline (ELAVIL) 50 mg tablet TAKE TWO TABLETS BY MOUTH AT BEDTIME AS NEEDED FOR SLEEP 9/28/17  Yes Elise Byrnes MD   rivaroxaban (XARELTO) 20 mg tab tablet Take  by mouth daily. Yes Historical Provider   raNITIdine (ZANTAC) 150 mg tablet Take 150 mg by mouth two (2) times a day.    Yes Historical Provider predniSONE (DELTASONE) 10 mg tablet Take 20 mg by mouth every other day. Yes Historical Provider   potassium chloride (K-DUR, KLOR-CON) 20 mEq tablet TAKE ONE TABLET BY MOUTH EVERY DAY 11/30/16  Yes Christo Horta III, MD   spironolactone (ALDACTONE) 50 mg tablet Take 1 Tab by mouth two (2) times a day. Indications: EDEMA 9/23/16  Yes Charity Renee MD   Comp. Stocking,Knee,Regular,Lrg misc 1 Device by Does Not Apply route daily. 9/23/16  Yes Christo Horta III, MD   ondansetron (ZOFRAN ODT) 4 mg disintegrating tablet Take 1 Tab by mouth every six (6) hours as needed for Nausea. Indications: PREVENTION OF POST-OPERATIVE NAUSEA AND VOMITING 6/7/16  Yes WENDY Blairinfantis-B.ani-B.long-B.bifi 10-15 mg TbEC Take 1 Tab by mouth every morning. Yes Historical Provider   aluminum hydrox-magnesium carb (GAVISCON)  mg/15 mL suspension Take 15 mL by mouth every six (6) hours as needed for Indigestion. Yes Historical Provider   calcium carbonate (TUMS) 200 mg calcium (500 mg) chew Take 1 Tab by mouth as needed. Yes Historical Provider   calcium citrate-vitamin D3 (CITRACAL WITH VITAMIN D MAXIMUM) tablet Take  by mouth two (2) times a day. Yes Historical Provider   magnesium hydroxide (MILK OF MAGNESIA) 400 mg/5 mL suspension Take 30 mL by mouth daily as needed for Constipation. Yes Historical Provider   betamethasone dipropionate (DIPROLENE) 0.05 % ointment Apply  to affected area two (2) times a day. Yes Historical Provider   acetaminophen (TYLENOL) 500 mg tablet Take 1,000 mg by mouth every six (6) hours as needed for Pain. Yes Historical Provider   guaiFENesin SR (MUCINEX) 600 mg SR tablet Take 1,200 mg by mouth as needed. 3/9/16  Yes Christo Horta III, MD   OTHER,NON-FORMULARY, LLQ Morphine/bupivacaine pain pump for multiple myeloma. 4.287mg/3.125 mg per day   Yes Historical Provider   cetirizine (ZYRTEC) 10 mg tablet Take 10 mg by mouth nightly.    Yes Historical Provider   diazepam (VALIUM) 10 mg tablet Take 10 mg by mouth two (2) times a day. Yes Historical Provider   docusate sodium (COLACE) 100 mg capsule Take 400 mg by mouth. 1 capsule in the morning and 3 capsule at night   Yes Historical Provider   polyethylene glycol (MIRALAX) 17 gram packet Take 17 g by mouth daily as needed. Yes Historical Provider   HYDROmorphone (DILAUDID) 4 mg tablet Take 4-8 mg by mouth every four (4) hours as needed for Pain. Yes Historical Provider   prochlorperazine (COMPAZINE) 5 mg tablet Take 5 mg by mouth every six (6) hours as needed for Nausea. Yes Historical Provider   camphor-menthol PILAR OROPEZA [facility]) 0.5-0.5 % lotion Apply  to affected area three (3) times daily as needed for Itching. 3/1/17   MD jeniffer Kapadia III (SENOKOT) 8.6 mg tablet Take 4 Tabs by mouth two (2) times a day. 4 PILLS IN AM  4 PILLS IN PM    Historical Provider       Social History     Social History    Marital status:      Spouse name: N/A    Number of children: N/A    Years of education: N/A     Occupational History    Not on file. Social History Main Topics    Smoking status: Former Smoker     Years: 10.00     Quit date: 10/7/2005    Smokeless tobacco: Never Used    Alcohol use No    Drug use: No    Sexual activity: Not on file     Other Topics Concern    Not on file     Social History Narrative          ROS  Per HPI.   Some pain in the right knee as well where she has had a previous history of Baker's cyst.    Visit Vitals    /78    Pulse 100    Temp 98.2 °F (36.8 °C) (Oral)    Resp 16    Ht 5' 3\" (1.6 m)    Wt 185 lb (83.9 kg)    SpO2 98%    BMI 32.77 kg/m2         Physical Exam   Physical Examination: General appearance - alert, well appearing, and in no distress and overweight  Chest - clear to auscultation, no wheezes, rales or rhonchi, symmetric air entry  Heart - normal rate, regular rhythm, normal S1, S2, no murmurs, rubs, clicks or gallops  Musculoskeletal - abnormal exam of right knee with tenderness behind the popliteal fossa. Normal range of motion without effusion. , abnormal exam of left left lower leg with moderate erythema and 1+ edema on the lower anterior shin. No calf tenderness. 1+ edema. Cells are intact. Extremities - peripheral pulses normal, no pedal edema, no clubbing or cyanosis      Assessment/Plan:  Diagnoses and all orders for this visit:    1. Cellulitis and abscess of left leg -will extend her antibiotics out a little bit longer. Also asked her to elevate the leg, wrap it, and use warm compresses and she will let me know if not improving. 2. Rheumatoid arthritis involving multiple sites with positive rheumatoid factor (HCC) -stable    3. Multiple myeloma not having achieved remission ([facility] [address] [address] -progressive and followed by oncology. Other orders  -     fluticasone (FLONASE) 50 mcg/actuation nasal spray; 2 Sprays by Both Nostrils route two (2) times a day. -     clindamycin (CLEOCIN) 300 mg capsule; Take 1 Cap by mouth three (3) times daily. -     lubiPROStone (AMITIZA) 24 mcg capsule; TAKE 1 CAPSULE BY MOUTH TWICE DAILY  -     Milnacipran (SAVELLA) 50 mg tablet; TAKE 1 TABLET BY MOUTH TWICE DAILY  -     Dexlansoprazole (DEXILANT) 60 mg CpDB; TAKE 1 CAPSULE BY MOUTH EVERY DAY  4.  Baker's cyst- suggested a brace for her knee but also explained that there is little else that can be done to improve that. Follow-up Disposition: as needed. Advised her to call back or return to office if symptoms worsen/change/persist.  Discussed expected course/resolution/complications of diagnosis in detail with patient. Medication risks/benefits/costs/interactions/alternatives discussed with patient. She was given an after visit summary which includes diagnoses, current medications, & vitals. She expressed understanding with the diagnosis and plan.

## 2018-02-02 NOTE — MR AVS SNAPSHOT
[address] [facility] [address] [place] [address] 
[phone] Patient: Russell Posada MRN:  [id number] Visit Information Date & Time Provider Department Dept. Phone Encounter #  
 2018  2:00 PM Rony Garcia MD [facility] Internal Medicine [id number] Your Appointments 3/23/2018 11:00 AM  
ROUTINE CARE with Rony Garcia MD  
[facility] Internal Medicine [facility]: 6 month f/u  
 [address] [facility] [address] [facility] [address]  
[id number] [address] Upcoming Health Maintenance Date Due  
 GLAUCOMA SCREENING Q2Y 12/10/2017 MEDICARE YEARLY EXAM 3/2/2018 BREAST CANCER SCRN MAMMOGRAM 2018 COLONOSCOPY 3/9/2026 DTaP/Tdap/Td series (2 - Td) 3/1/2027 Allergies as of 2018  Review Complete On: 2018 By: Rony Garcia MD  
  
 Severity Noted Reaction Type Reactions Aggrenox [Aspirin-dipyridamole]  2009    Rash Allergic to the dipyridamole and not aspirin. Broccoli  2013    Other (comments) C/O GAS Bumex [Bumetanide]  2016    Rash Pinola  2013    Diarrhea Corn Starch-kaolin-zinc Oxide  2013    Diarrhea Doxil [Doxorubicin, Peg-liposomal]  2011    Rash Burning of skin Flagyl [Metronidazole]  2009    Rash Keflex [Cephalexin]  2009    Rash Lasix [Furosemide]  2011    Rash Macrobid [Nitrofurantoin Monohyd/m-cryst]  2009    Shortness of Breath Methotrexate  2009    Hives, Rash Pcn [Penicillins]  2009    Rash  
 Sulfa (Sulfonamide Antibiotics)  2009    Hives Tetanus And Diphtheria Toxoids  2017    Swelling Thalidomide  2010    Rash Current Immunizations  Reviewed on 2016 Name Date Influenza High Dose Vaccine PF 2017, 10/26/2016, 11/10/2015 Influenza Vaccine Whole 11/28/2012 Pneumococcal Polysaccharide (PPSV-23) 8/12/2014 ZZZ-RETIRED (DO NOT USE) Pneumococcal Vaccine (Unspecified Type) 11/21/2004 Not reviewed this visit You Were Diagnosed With   
  
 Codes Comments Cellulitis and abscess of left leg    -  Primary ICD-10-CM: L03.116, L02.416 ICD-9-CM: 035. 6 Rheumatoid arthritis involving multiple sites with positive rheumatoid factor (HCC)     ICD-10-CM: M05.79 ICD-9-CM: 714.0 Multiple myeloma not having achieved remission ([facility]     ICD-10-CM: C90.00 ICD-9-CM: 203.00 Vitals BP Pulse Temp Resp Height(growth percentile) Weight(growth percentile) 124/78 100 98.2 °F (36.8 °C) (Oral) 16 5' 3\" (1.6 m) 185 lb (83.9 kg) SpO2 BMI OB Status Smoking Status 98% 32.77 kg/m2 Postmenopausal Former Smoker Vitals History BMI and BSA Data Body Mass Index Body Surface Area 32.77 kg/m 2 1.93 m 2 Preferred Pharmacy Pharmacy Name Phone 5 [facility], [address] [place] [address] [phone] Your Updated Medication List  
  
   
This list is accurate as of: 2/2/18  2:38 PM.  Always use your most recent med list.  
  
  
  
  
 acetaminophen 500 mg tablet Commonly known as:  TYLENOL Take 1,000 mg by mouth every six (6) hours as needed for Pain. aluminum hydrox-magnesium carb  mg/15 mL suspension Commonly known as:  GAVISCON Take 15 mL by mouth every six (6) hours as needed for Indigestion. AMITIZA 24 mcg capsule Generic drug:  lubiPROStone TAKE 1 CAPSULE BY MOUTH TWICE DAILY  
  
 amitriptyline 50 mg tablet Commonly known as:  ELAVIL TAKE TWO TABLETS BY MOUTH AT BEDTIME AS NEEDED FOR SLEEP B.infantis-B.ani-B.long-B.bifi 10-15 mg Tbec Take 1 Tab by mouth every morning. betamethasone dipropionate 0.05 % ointment Commonly known as:  Deborrah Alfredo Apply  to affected area two (2) times a day. calcium carbonate 200 mg calcium (500 mg) Chew Commonly known as:  TUMS Take 1 Tab by mouth as needed. calcium citrate-vitamin D3 tablet Commonly known as:  CITRACAL WITH VITAMIN D MAXIMUM Take  by mouth two (2) times a day. camphor-menthol 0.5-0.5 % lotion Commonly known as:  Kenyatta Products Apply  to affected area three (3) times daily as needed for Itching. cetirizine 10 mg tablet Commonly known as:  ZYRTEC Take 10 mg by mouth nightly. clindamycin 300 mg capsule Commonly known as:  CLEOCIN Take 1 Cap by mouth three (3) times daily. Comp. [address] 1 Device by Does Not Apply route daily. COMPAZINE 5 mg tablet Generic drug:  prochlorperazine Take 5 mg by mouth every six (6) hours as needed for Nausea. DEXILANT 60 mg Cpdb Generic drug:  Dexlansoprazole TAKE 1 CAPSULE BY MOUTH EVERY DAY  
  
 diazePAM 10 mg tablet Commonly known as:  VALIUM Take 10 mg by mouth two (2) times a day. docusate sodium 100 mg capsule Commonly known as:  Montey Roch Take 400 mg by mouth. 1 capsule in the morning and 3 capsule at night  
  
 fluticasone 50 mcg/actuation nasal spray Commonly known as:  Myna Heller 2 Sprays by Both Nostrils route two (2) times a day.  
  
 gabapentin 300 mg capsule Commonly known as:  NEURONTIN  
TAKE ONE CAPSULE BY MOUTH FOUR TIMES A DAY  
  
 guaiFENesin  mg SR tablet Commonly known as:  Padilla [employer] Padilla Take 1,200 mg by mouth as needed. HYDROmorphone 4 mg tablet Commonly known as:  DILAUDID Take 4-8 mg by mouth every four (4) hours as needed for Pain.  
  
 magnesium hydroxide 400 mg/5 mL suspension Commonly known as:  MILK OF MAGNESIA Take 30 mL by mouth daily as needed for Constipation. ondansetron 4 mg disintegrating tablet Commonly known as:  ZOFRAN ODT Take 1 Tab by mouth every six (6) hours as needed for Nausea.  Indications: PREVENTION OF POST-OPERATIVE NAUSEA AND VOMITING  
  
 OTHER(NON-FORMULARY) LLQ Morphine/bupivacaine pain pump for multiple myeloma. 4.287mg/3.125 mg per day  
  
 polyethylene glycol 17 gram packet Commonly known as:  Venetta Linear Take 17 g by mouth daily as needed. potassium chloride 20 mEq tablet Commonly known as:  K-DUR, KLOR-CON  
TAKE ONE TABLET BY MOUTH EVERY DAY  
  
 predniSONE 10 mg tablet Commonly known as:  Namrata Passy Take 20 mg by mouth every other day. SAVELLA 50 mg tablet Generic drug:  Milnacipran TAKE 1 TABLET BY MOUTH TWICE DAILY  
  
 senna 8.6 mg tablet Commonly known as:  [employer] Take 4 Tabs by mouth two (2) times a day. 4 PILLS IN AM  4 PILLS IN PM  
  
 simvastatin 20 mg tablet Commonly known as:  ZOCOR  
TAKE ONE TABLET BY MOUTH AT BEDTIME  
  
 spironolactone 50 mg tablet Commonly known as:  ALDACTONE Take 1 Tab by mouth two (2) times a day. Indications: EDEMA XARELTO 20 mg Tab tablet Generic drug:  rivaroxaban Take  by mouth daily. ZANTAC 150 mg tablet Generic drug:  raNITIdine Take 150 mg by mouth two (2) times a day. Prescriptions Sent to Pharmacy Refills  
 fluticasone (FLONASE) 50 mcg/actuation nasal spray 3 Si Sprays by Both Nostrils route two (2) times a day. Class: Normal  
 Pharmacy: [address] [place] [address] Ph #: [phone] Route: Both Nostrils  
 clindamycin (CLEOCIN) 300 mg capsule 0 Sig: Take 1 Cap by mouth three (3) times daily. Class: Normal  
 Pharmacy: [address] [place] [address] Ph #: [phone] Route: Oral  
  
Introducing \[facility] [place] [facility]\"" & HEALTH SERVICES! Jenae Luevano introduces [employer] patient portal. Now you can access parts of your medical record, email your doctor's office, and request medication refills online. 1. In your internet browser, go to [email or web address] 2. Click on the First Time User? Click Here link in the Sign In box.  You will see the New Member Sign Up page. 3. Enter your [employer] Access Code exactly as it appears below. You will not need to use this code after youve completed the sign-up process. If you do not sign up before the expiration date, you must request a new code. · [employer] Access Code: [facility] Expires: 5/3/2018  2:38 PM 
 
4. Enter the last four digits of your Social Security Number (xxxx) and Date of Birth (mm/dd/yyyy) as indicated and click Submit. You will be taken to the next sign-up page. 5. Create a [employer] ID. This will be your [employer] login ID and cannot be changed, so think of one that is secure and easy to remember. 6. Create a [employer] password. You can change your password at any time. 7. Enter your Password Reset Question and Answer. This can be used at a later time if you forget your password. 8. Enter your e-mail address. You will receive e-mail notification when new information is available in [address]. 9. Click Sign Up. You can now view and download portions of your medical record. 10. Click the Download Summary menu link to download a portable copy of your medical information. If you have questions, please visit the Frequently Asked Questions section of the [employer] website. Remember, [employer] is NOT to be used for urgent needs. For medical emergencies, dial 911. Now available from your iPhone and Android! Please provide this summary of care documentation to your next provider. Your primary care clinician is listed as Daniela Pulido64 If you have any questions after today's visit, please call [phone].

## 2018-02-19 ENCOUNTER — HOSPITAL ENCOUNTER (OUTPATIENT)
Dept: GENERAL RADIOLOGY | Age: 73
Discharge: HOME | End: 2018-02-19
Attending: INTERNAL MEDICINE
Payer: MEDICARE

## 2018-02-19 DIAGNOSIS — C90.00 MYELOMA (HCC): ICD-10-CM

## 2018-02-19 PROCEDURE — 77075 RADEX OSSEOUS SURVEY COMPL: CPT

## 2018-02-20 RX ORDER — GABAPENTIN 300 MG/1
CAPSULE ORAL
Qty: 120 CAP | Refills: 0 | Status: SHIPPED | OUTPATIENT
Start: 2018-02-20 | End: 2018-03-23 | Stop reason: SDUPTHER

## 2018-03-04 RX ORDER — SIMVASTATIN 20 MG/1
TABLET, FILM COATED ORAL
Qty: 30 TAB | Refills: 0 | Status: SHIPPED | OUTPATIENT
Start: 2018-03-04 | End: 2018-03-23 | Stop reason: SDUPTHER

## 2018-03-23 ENCOUNTER — OFFICE VISIT (OUTPATIENT)
Dept: INTERNAL MEDICINE CLINIC | Age: 73
End: 2018-03-23

## 2018-03-23 VITALS
HEART RATE: 107 BPM | OXYGEN SATURATION: 96 % | RESPIRATION RATE: 14 BRPM | HEIGHT: 63 IN | WEIGHT: 188 LBS | TEMPERATURE: 98.5 F | SYSTOLIC BLOOD PRESSURE: 128 MMHG | DIASTOLIC BLOOD PRESSURE: 78 MMHG | BODY MASS INDEX: 33.31 KG/M2

## 2018-03-23 DIAGNOSIS — R60.9 EDEMA, UNSPECIFIED TYPE: ICD-10-CM

## 2018-03-23 DIAGNOSIS — M79.7 FIBROMYALGIA: ICD-10-CM

## 2018-03-23 DIAGNOSIS — Z23 NEED FOR SHINGLES VACCINE: ICD-10-CM

## 2018-03-23 DIAGNOSIS — C90.00 MULTIPLE MYELOMA NOT HAVING ACHIEVED REMISSION (HCC): ICD-10-CM

## 2018-03-23 DIAGNOSIS — M05.79 RHEUMATOID ARTHRITIS INVOLVING MULTIPLE SITES WITH POSITIVE RHEUMATOID FACTOR (HCC): ICD-10-CM

## 2018-03-23 DIAGNOSIS — E78.2 MIXED HYPERLIPIDEMIA: Primary | ICD-10-CM

## 2018-03-23 RX ORDER — DEXLANSOPRAZOLE 60 MG/1
CAPSULE, DELAYED RELEASE ORAL
Qty: 30 CAP | Refills: 11 | Status: SHIPPED | OUTPATIENT
Start: 2018-03-23 | End: 2019-03-02 | Stop reason: SDUPTHER

## 2018-03-23 RX ORDER — AMMONIUM LACTATE 12 G/100G
LOTION TOPICAL
COMMUNITY
Start: 2018-03-08 | End: 2019-02-07 | Stop reason: ALTCHOICE

## 2018-03-23 RX ORDER — POTASSIUM CHLORIDE 20 MEQ/1
TABLET, EXTENDED RELEASE ORAL
Qty: 30 TAB | Refills: 11 | Status: SHIPPED | OUTPATIENT
Start: 2018-03-23 | End: 2019-04-10 | Stop reason: ALTCHOICE

## 2018-03-23 RX ORDER — GABAPENTIN 300 MG/1
CAPSULE ORAL
Qty: 120 CAP | Refills: 11 | Status: SHIPPED | OUTPATIENT
Start: 2018-03-23 | End: 2019-06-20 | Stop reason: SDUPTHER

## 2018-03-23 RX ORDER — LUBIPROSTONE 24 UG/1
CAPSULE, GELATIN COATED ORAL
Qty: 60 CAP | Refills: 11 | Status: SHIPPED | OUTPATIENT
Start: 2018-03-23 | End: 2019-01-31

## 2018-03-23 RX ORDER — SIMVASTATIN 20 MG/1
TABLET, FILM COATED ORAL
Qty: 30 TAB | Refills: 11 | Status: SHIPPED | OUTPATIENT
Start: 2018-03-23 | End: 2019-04-18 | Stop reason: SDUPTHER

## 2018-03-23 RX ORDER — CIPROFLOXACIN 500 MG/1
500 TABLET ORAL 2 TIMES DAILY
COMMUNITY
Start: 2018-03-14 | End: 2019-01-24

## 2018-03-23 NOTE — PROGRESS NOTES
HPI:  Bryanna Briseno is a 67y.o. year old female who is here for a routine visit:    Here for her follow-up visit. Had recent problems with cellulitis in both of her lower extremities. She has been seeing dermatology and is now on her third week of Cipro. She has been trying to use compression stockings intermittently. She denies any fevers or chills. Denies any sweats. She is tolerating the antibiotics reasonably well. She is seeing oncology as well as pain and dermatology regularly. The dermatologist has been treating her for the cellulitis. She did have a recent culture done per her. She saw the oncologist this week who wants to restart her chemotherapy but she is not done that due to the ongoing cellulitis. Bowel movements have been doing reasonably well. Denies any chest pains or shortness of breath. No issues with her previously repaired hernia.       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    Anxiety     Chronic pain     spinal stenosis per pt    Fibromyalgia     GERD (gastroesophageal reflux disease)     High cholesterol     History of acute pancreatitis     History of blood transfusion     History of Salmonella infection 2009    Multiple myeloma ([address] 2007    Osteoporosis     Other complication due to venous access device ([address] 1/29/2013    Recurrent umbilical hernia 5/42/7120    Recurrent ventral incisional hernia 3/28/2016    Rheumatoid arthritis(714.0)     Sleep apnea     INTOLERATANT OF CPAP    Thromboembolus (HCC)     Right leg DVT    Urinary incontinence        Past Surgical History:   Procedure Laterality Date    HX CHOLECYSTECTOMY  2004    HX CRANIOTOMY      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 Ezekiel Reyez    HX KYPHOPLASTY  09/14/2017    L4    HX ORTHOPAEDIC Right 2001    BONE SPURS SHOULDER    HX OTHER SURGICAL  2010    LLQ PAIN PUMP FOR MORPHINE INFUSION    HX OTHER SURGICAL  1-16-14    repair of recurrent umbilical hernia with ventralex pillow top mesh and extensive lysis of rybcldswz-EAP-HLDR. Ezekiel Reyez    HX OTHER SURGICAL  5-31-16    repair of recurrent ventral incisional hernia with underlay mesh-[facility]-Dr. Ezekiel Reyez    HX VASCULAR ACCESS Right 1/2013    POWER PORT     HX VASCULAR ACCESS  2010    PAIN PUMP    NEUROLOGICAL PROCEDURE UNLISTED  2007    KYPHOPLASTY X2    NEUROLOGICAL PROCEDURE UNLISTED  10/26/15    Kyphoplasty t-5    PAIN PUMP DEVICE      implanted in LLQ, MORPHINE       Prior to Admission medications    Medication Sig Start Date End Date Taking? Authorizing Provider   varicella-zoster recombinant, PF, (SHINGRIX, PF,) 50 mcg/0.5 mL susr injection 0.5 mL by IntraMUSCular route once for 1 dose. 3/23/18 3/23/18 Yes Ethan Blanton III, MD   ciprofloxacin HCl (CIPRO) 500 mg tablet Take 500 mg by mouth two (2) times a day.  3/14/18  Yes Historical Provider   ammonium lactate (LAC-HYDRIN) 12 % lotion  3/8/18  Yes Historical Provider   gabapentin (NEURONTIN) 300 mg capsule TAKE ONE CAPSULE BY MOUTH FOUR TIMES A DAY 3/23/18  Yes Ethan Blanton III, MD   simvastatin (ZOCOR) 20 mg tablet TAKE ONE TABLET BY MOUTH AT BEDTIME 3/23/18  Yes Ethan Blanton III, MD   potassium chloride (K-DUR, KLOR-CON) 20 mEq tablet TAKE ONE TABLET BY MOUTH EVERY DAY 3/23/18  Yes Ethan Blanton III, MD   lubiPROStone (AMITIZA) 24 mcg capsule TAKE 1 CAPSULE BY MOUTH TWICE DAILY 3/23/18  Yes Ethan Blanton III, MD   Milnacipran (SAVELLA) 50 mg tablet TAKE 1 TABLET BY MOUTH TWICE DAILY 3/23/18  Yes Ethan Blanton III, MD   Dexlansoprazole (DEXILANT) 60 mg CpDB TAKE 1 CAPSULE BY MOUTH EVERY DAY 3/23/18  Yes Delana Lefort, MD   fluticasone (FLONASE) 50 mcg/actuation nasal spray 2 Sprays by Both Nostrils route two (2) times a day. 2/2/18  Yes Scott Dave III, MD   amitriptyline (ELAVIL) 50 mg tablet TAKE TWO TABLETS BY MOUTH AT BEDTIME AS NEEDED FOR SLEEP 9/28/17  Yes Yesica Demarco MD   rivaroxaban (XARELTO) 20 mg tab tablet Take  by mouth daily. Yes Historical Provider   raNITIdine (ZANTAC) 150 mg tablet Take 150 mg by mouth two (2) times a day. Yes Historical Provider   predniSONE (DELTASONE) 10 mg tablet Take 20 mg by mouth every other day. Yes Historical Provider   spironolactone (ALDACTONE) 50 mg tablet Take 1 Tab by mouth two (2) times a day. Indications: EDEMA 9/23/16  Yes Yesica Demarco MD   Comp. Stocking,Knee,Regular,Lrg misc 1 Device by Does Not Apply route daily. 9/23/16  Yes Scott Dave III, MD   aluminum hydrox-magnesium carb (GAVISCON)  mg/15 mL suspension Take 15 mL by mouth every six (6) hours as needed for Indigestion. Yes Historical Provider   calcium carbonate (TUMS) 200 mg calcium (500 mg) chew Take 1 Tab by mouth as needed. Yes Historical Provider   calcium citrate-vitamin D3 (CITRACAL WITH VITAMIN D MAXIMUM) tablet Take  by mouth two (2) times a day. Yes Historical Provider   magnesium hydroxide (MILK OF MAGNESIA) 400 mg/5 mL suspension Take 30 mL by mouth daily as needed for Constipation. Yes Historical Provider   acetaminophen (TYLENOL) 500 mg tablet Take 1,000 mg by mouth every six (6) hours as needed for Pain. Yes Historical Provider   OTHER,NON-FORMULARY, LLQ Morphine/bupivacaine pain pump for multiple myeloma. 4.287mg/3.125 mg per day   Yes Historical Provider   cetirizine (ZYRTEC) 10 mg tablet Take 10 mg by mouth nightly. Yes Historical Provider   diazepam (VALIUM) 10 mg tablet Take 10 mg by mouth two (2) times a day. Yes Historical Provider   docusate sodium (COLACE) 100 mg capsule Take 400 mg by mouth.  1 capsule in the morning and 3 capsule at night   Yes Historical Provider polyethylene glycol (MIRALAX) 17 gram packet Take 17 g by mouth daily as needed. Yes Historical Provider   senna (SENOKOT) 8.6 mg tablet Take 4 Tabs by mouth two (2) times a day. 4 PILLS IN AM  4 PILLS IN PM   Yes Historical Provider   HYDROmorphone (DILAUDID) 4 mg tablet Take 4-8 mg by mouth every four (4) hours as needed for Pain. Yes Historical Provider   prochlorperazine (COMPAZINE) 5 mg tablet Take 5 mg by mouth every six (6) hours as needed for Nausea. Yes Historical Provider   ondansetron (ZOFRAN ODT) 4 mg disintegrating tablet Take 1 Tab by mouth every six (6) hours as needed for Nausea. Indications: PREVENTION OF POST-OPERATIVE NAUSEA AND VOMITING 6/7/16   WENDY Merrittinfantis-B.ani-B.long-B.bifi 10-15 mg TbEC Take 1 Tab by mouth every morning. Historical Provider   betamethasone dipropionate (DIPROLENE) 0.05 % ointment Apply  to affected area two (2) times a day. Historical Provider   guaiFENesin SR (MUCINEX) 600 mg SR tablet Take 1,200 mg by mouth as needed. 3/9/16   Eli Batista MD       Social History     Social History    Marital status:      Spouse name: N/A    Number of children: N/A    Years of education: N/A     Occupational History    Not on file.      Social History Main Topics    Smoking status: Former Smoker     Years: 10.00     Quit date: 10/7/2005    Smokeless tobacco: Never Used    Alcohol use No    Drug use: No    Sexual activity: Not on file     Other Topics Concern    Not on file     Social History Narrative          ROS  Per HPI    Visit Vitals    /78    Pulse (!) 107    Temp 98.5 °F (36.9 °C) (Oral)    Resp 14    Ht 5' 3\" (1.6 m)    Wt 188 lb (85.3 kg)    SpO2 96%    BMI 33.3 kg/m2         Physical Exam   Physical Examination: General appearance - alert, well appearing, and in no distress and overweight  Mouth - mucous membranes moist, pharynx normal without lesions  Neck - supple, no significant adenopathy  Chest - clear to auscultation, no wheezes, rales or rhonchi, symmetric air entry  Heart - normal rate, regular rhythm, normal S1, S2, no murmurs, rubs, clicks or gallops  Abdomen - soft, nontender, nondistended, no masses or organomegaly  Extremities -she has 2+ pitting edema bilaterally. She does have erythema, warmth, and slight tenderness on the anterior shins of both lower extremities. She does have extensive varicosities. No skin breaks. No drainage. Pulses are 2+ and equal bilaterally. Assessment/Plan:  Diagnoses and all orders for this visit:    1. Mixed hyperlipidemia -due for follow-up labs. Appears to be tolerating medication well. Will check these with her next blood test at the oncologist.  -     LIPID PANEL  -     TSH RFX ON ABNORMAL TO FREE T4  -     UA/M W/RFLX CULTURE, ROUTINE    2. Need for shingles vaccine    3. Edema, unspecified type -appears stable. She is getting lab work done through the oncologist on a monthly basis. Her underlying edema is contributing to the cellulitis in her legs. We wrapped her legs with an Ace bandage today and instructed her to keep them elevated as much as possible and use warm compresses and complete her antibiotics. She will let me know if not improving.  -     potassium chloride (K-DUR, KLOR-CON) 20 mEq tablet; TAKE ONE TABLET BY MOUTH EVERY DAY    4. Multiple myeloma not having achieved remission (HCC) -per oncology. 5. Rheumatoid arthritis involving multiple sites with positive rheumatoid factor ([address] [address]    6.  Fibromyalgia    Other orders  -     varicella-zoster recombinant, PF, (SHINGRIX, PF,) 50 mcg/0.5 mL susr injection; 0.5 mL by IntraMUSCular route once for 1 dose.  -     gabapentin (NEURONTIN) 300 mg capsule; TAKE ONE CAPSULE BY MOUTH FOUR TIMES A DAY  -     simvastatin (ZOCOR) 20 mg tablet; TAKE ONE TABLET BY MOUTH AT BEDTIME  -     lubiPROStone (AMITIZA) 24 mcg capsule; TAKE 1 CAPSULE BY MOUTH TWICE DAILY  -     Milnacipran (SAVELLA) 50 mg tablet; TAKE 1 TABLET BY MOUTH TWICE DAILY  -     Dexlansoprazole (DEXILANT) 60 mg CpDB; TAKE 1 CAPSULE BY MOUTH EVERY DAY       Follow-up Disposition:  Return for Wellness Visit. Advised her to call back or return to office if symptoms worsen/change/persist.  Discussed expected course/resolution/complications of diagnosis in detail with patient. Medication risks/benefits/costs/interactions/alternatives discussed with patient. She was given an after visit summary which includes diagnoses, current medications, & vitals. She expressed understanding with the diagnosis and plan.

## 2018-03-23 NOTE — MR AVS SNAPSHOT
[address] [address] 
[phone] Patient: Claudy Rushing MRN:  [id number] Visit Information Date & Time Provider Department Dept. Phone Encounter #  
 3/23/2018 11:00 AM Yesica Demarco MD [facility] Internal Medicine [phone] [id number] Follow-up Instructions Return for Wellness Visit. Upcoming Health Maintenance Date Due  
 GLAUCOMA SCREENING Q2Y 12/10/2017 MEDICARE YEARLY EXAM 3/14/2018 BREAST CANCER SCRN MAMMOGRAM 2018 COLONOSCOPY 3/9/2026 DTaP/Tdap/Td series (2 - Td) 3/1/2027 Allergies as of 3/23/2018  Review Complete On: 3/23/2018 By: Yesica Demarco MD  
  
 Severity Noted Reaction Type Reactions Aggrenox [Aspirin-dipyridamole]  2009    Rash Allergic to the dipyridamole and not aspirin. Broccoli  2013    Other (comments) C/O GAS Bumex [Bumetanide]  2016    Rash Nelson  2013    Diarrhea Corn Starch-kaolin-zinc Oxide  2013    Diarrhea Doxil [Doxorubicin, Peg-liposomal]  2011    Rash Burning of skin Flagyl [Metronidazole]  2009    Rash Keflex [Cephalexin]  2009    Rash Lasix [Furosemide]  2011    Rash Macrobid [Nitrofurantoin Monohyd/m-cryst]  2009    Shortness of Breath Methotrexate  2009    Hives, Rash Pcn [Penicillins]  2009    Rash  
 Sulfa (Sulfonamide Antibiotics)  2009    Hives Tetanus And Diphtheria Toxoids  2017    Swelling Thalidomide  2010    Rash Current Immunizations  Reviewed on 2016 Name Date Influenza High Dose Vaccine PF 2017, 10/26/2016, 11/10/2015 Influenza Vaccine Whole 2012 Pneumococcal Polysaccharide (PPSV-23) 2014 ZZZ-RETIRED (DO NOT USE) Pneumococcal Vaccine (Unspecified Type) 2004 Not reviewed this visit You Were Diagnosed With   
  
 Codes Comments Mixed hyperlipidemia    -  Primary ICD-10-CM: S31.2 ICD-9-CM: 272.2 Need for shingles vaccine     ICD-10-CM: S40 ICD-9-CM: V04.89 Edema, unspecified type     ICD-10-CM: R60.9 ICD-9-CM: 782.3 Multiple myeloma not having achieved remission ([facility] [address]     ICD-10-CM: C90.00 ICD-9-CM: 203.00 Rheumatoid arthritis involving multiple sites with positive rheumatoid factor (HCC)     ICD-10-CM: M05.79 ICD-9-CM: 714.0 Fibromyalgia     ICD-10-CM: M79.7 ICD-9-CM: 729.1 Vitals BP Pulse Temp Resp Height(growth percentile) Weight(growth percentile) 128/78 (!) 107 98.5 °F (36.9 °C) (Oral) 14 5' 3\" (1.6 m) 188 lb (85.3 kg) SpO2 BMI OB Status Smoking Status 96% 33.3 kg/m2 Postmenopausal Former Smoker Vitals History BMI and BSA Data Body Mass Index Body Surface Area  
 33.3 kg/m 2 1.95 m 2 Preferred Pharmacy Pharmacy Name Phone [facility] Hodg[facility] [phone] Your Updated Medication List  
  
   
This list is accurate as of 3/23/18 11:45 AM.  Always use your most recent med list.  
  
  
  
  
 acetaminophen 500 mg tablet Commonly known as:  TYLENOL Take 1,000 mg by mouth every six (6) hours as needed for Pain. aluminum hydrox-magnesium carb  mg/15 mL suspension Commonly known as:  GAVISCON Take 15 mL by mouth every six (6) hours as needed for Indigestion. amitriptyline 50 mg tablet Commonly known as:  ELAVIL TAKE TWO TABLETS BY MOUTH AT BEDTIME AS NEEDED FOR SLEEP  
  
 ammonium lactate 12 % lotion Commonly known as:  LAC-HYDRIN B.infantis-B.ani-B.long-B.bifi 10-15 mg Tbec Take 1 Tab by mouth every morning. betamethasone dipropionate 0.05 % ointment Commonly known as:  Jose Raul [place] Apply  to affected area two (2) times a day. calcium carbonate 200 mg calcium (500 mg) Chew Commonly known as:  TUMS Take 1 Tab by mouth as needed. calcium citrate-vitamin D3 tablet Commonly known as:  CITRACAL WITH VITAMIN D MAXIMUM Take  by mouth two (2) times a day. cetirizine 10 mg tablet Commonly known as:  ZYRTEC Take 10 mg by mouth nightly. ciprofloxacin HCl 500 mg tablet Commonly known as:  CIPRO Take 500 mg by mouth two (2) times a day. Comp. [address] 1 Device by Does Not Apply route daily. COMPAZINE 5 mg tablet Generic drug:  prochlorperazine Take 5 mg by mouth every six (6) hours as needed for Nausea. Dexlansoprazole 60 mg Cpdb Commonly known as:  DEXILANT TAKE 1 CAPSULE BY MOUTH EVERY DAY  
  
 diazePAM 10 mg tablet Commonly known as:  VALIUM Take 10 mg by mouth two (2) times a day. docusate sodium 100 mg capsule Commonly known as:  Alfonse Kras Take 400 mg by mouth. 1 capsule in the morning and 3 capsule at night  
  
 fluticasone 50 mcg/actuation nasal spray Commonly known as:  [place] 2 Sprays by Both Nostrils route two (2) times a day.  
  
 gabapentin 300 mg capsule Commonly known as:  NEURONTIN  
TAKE ONE CAPSULE BY MOUTH FOUR TIMES A DAY  
  
 guaiFENesin  mg SR tablet Commonly known as:  Padilla & Padilla Take 1,200 mg by mouth as needed. HYDROmorphone 4 mg tablet Commonly known as:  DILAUDID Take 4-8 mg by mouth every four (4) hours as needed for Pain.  
  
 lubiPROStone 24 mcg capsule Commonly known as:  Pricila Jack TAKE 1 CAPSULE BY MOUTH TWICE DAILY  
  
 magnesium hydroxide 400 mg/5 mL suspension Commonly known as:  MILK OF MAGNESIA Take 30 mL by mouth daily as needed for Constipation. Milnacipran 50 mg tablet Commonly known as:  Kvng Pick TAKE 1 TABLET BY MOUTH TWICE DAILY  
  
 ondansetron 4 mg disintegrating tablet Commonly known as:  ZOFRAN ODT Take 1 Tab by mouth every six (6) hours as needed for Nausea.  Indications: PREVENTION OF POST-OPERATIVE NAUSEA AND VOMITING  
  
 OTHER(NON-FORMULARY) LLQ Morphine/bupivacaine pain pump for multiple myeloma. 4.287mg/3.125 mg per day  
  
 polyethylene glycol 17 gram packet Commonly known as:  Nenana Goodpasture Take 17 g by mouth daily as needed. potassium chloride 20 mEq tablet Commonly known as:  K-DUR, KLOR-CON  
TAKE ONE TABLET BY MOUTH EVERY DAY  
  
 predniSONE 10 mg tablet Commonly known as:  Jacklyn Curio Take 20 mg by mouth every other day. senna 8.6 mg tablet Commonly known as:  Peter Lucianajs Matt Take 4 Tabs by mouth two (2) times a day. 4 PILLS IN AM  4 PILLS IN PM  
  
 simvastatin 20 mg tablet Commonly known as:  ZOCOR  
TAKE ONE TABLET BY MOUTH AT BEDTIME  
  
 spironolactone 50 mg tablet Commonly known as:  ALDACTONE Take 1 Tab by mouth two (2) times a day. Indications: EDEMA  
  
 varicella-zoster recombinant (PF) 50 mcg/0.5 mL Susr injection Commonly known as:  SHINGRIX (PF)  
0.5 mL by IntraMUSCular route once for 1 dose. XARELTO 20 mg Tab tablet Generic drug:  rivaroxaban Take  by mouth daily. ZANTAC 150 mg tablet Generic drug:  raNITIdine Take 150 mg by mouth two (2) times a day. Prescriptions Sent to Pharmacy Refills  
 varicella-zoster recombinant, PF, (SHINGRIX, PF,) 50 mcg/0.5 mL susr injection 1 Si.5 mL by IntraMUSCular route once for 1 dose. Class: Normal  
 Pharmacy: [address] [place] [address] Ph #: [phone] Route: IntraMUSCular  
 gabapentin (NEURONTIN) 300 mg capsule 11 Sig: TAKE ONE CAPSULE BY MOUTH FOUR TIMES A DAY Class: Normal  
 Pharmacy: [address] [place] [address] Ph #: [phone]  
 simvastatin (ZOCOR) 20 mg tablet 11 Sig: TAKE ONE TABLET BY MOUTH AT BEDTIME Class: Normal  
 Pharmacy: [address] [place] [address] Ph #: [phone]  
 potassium chloride (K-DUR, KLOR-CON) 20 mEq tablet 11  Sig: TAKE ONE TABLET BY MOUTH EVERY DAY  
 Class: Normal  
 Pharmacy: [address] [place] [address] Ph #: [phone]  
 lubiPROStone (AMITIZA) 24 mcg capsule 11 Sig: TAKE 1 CAPSULE BY MOUTH TWICE DAILY Class: Normal  
 Pharmacy: [address], [address] [place] [address] Ph #: [phone] Milnacipran (SAVELLA) 50 mg tablet 11 Sig: TAKE 1 TABLET BY MOUTH TWICE DAILY Class: Normal  
 Pharmacy: [address], [address] [place] [address] Ph #: [phone] Dexlansoprazole (DEXILANT) 60 mg CpDB 11 Sig: TAKE 1 CAPSULE BY MOUTH EVERY DAY Class: Normal  
 Pharmacy: [address], [address] [place] [address] Ph #: [phone] We Performed the Following LIPID PANEL [11898 CPT(R)] TSH RFX ON ABNORMAL TO FREE T4 [[id number] Custom] UA/M W/RFLX CULTURE, ROUTINE [[id number] Custom] Follow-up Instructions Return for Wellness Visit. Introducing [facility] HE[facility]! [facility] introduces [employer] patient portal. Now you can access parts of your medical record, email your doctor's office, and request medication refills online. 1. In your internet browser, go to [email or web address] 2. Click on the First Time User? Click Here link in the Sign In box. You will see the New Member Sign Up page. 3. Enter your [employer] Access Code exactly as it appears below. You will not need to use this code after youve completed the sign-up process. If you do not sign up before the expiration date, you must request a new code. · [employer] Access Code: [email or web address] Expires: 5/3/2018  3:38 PM 
 
4. Enter the last four digits of your Social Security Number (xxxx) and Date of Birth (mm/dd/yyyy) as indicated and click Submit. You will be taken to the next sign-up page. 5. Create a [employer] ID. This will be your [employer] login ID and cannot be changed, so think of one that is secure and easy to remember. 6. Create a [employer] password. You can change your password at any time. 7. Enter your Password Reset Question and Answer. This can be used at a later time if you forget your password. 8. Enter your e-mail address. You will receive e-mail notification when new information is available in [address] 9. Click Sign Up. You can now view and download portions of your medical record. 10. Click the Download Summary menu link to download a portable copy of your medical information. If you have questions, please visit the Frequently Asked Questions section of the [employer] website. Remember, [employer] is NOT to be used for urgent needs. For medical emergencies, dial 911. Now available from your iPhone and Android! Please provide this summary of care documentation to your next provider. Your primary care clinician is listed as [email or web address] If you have any questions after today's visit, please call [phone].

## 2018-03-25 RX ORDER — AMITRIPTYLINE HYDROCHLORIDE 50 MG/1
TABLET, FILM COATED ORAL
Qty: 60 TAB | Refills: 0 | Status: SHIPPED | OUTPATIENT
Start: 2018-03-25 | End: 2018-04-28 | Stop reason: SDUPTHER

## 2018-04-29 RX ORDER — AMITRIPTYLINE HYDROCHLORIDE 50 MG/1
TABLET, FILM COATED ORAL
Qty: 60 TAB | Refills: 0 | Status: SHIPPED | OUTPATIENT
Start: 2018-04-29 | End: 2018-05-26 | Stop reason: SDUPTHER

## 2018-05-28 RX ORDER — AMITRIPTYLINE HYDROCHLORIDE 50 MG/1
TABLET, FILM COATED ORAL
Qty: 60 TAB | Refills: 0 | Status: SHIPPED | OUTPATIENT
Start: 2018-05-28 | End: 2018-06-26 | Stop reason: SDUPTHER

## 2018-06-26 RX ORDER — AMITRIPTYLINE HYDROCHLORIDE 50 MG/1
TABLET, FILM COATED ORAL
Qty: 60 TAB | Refills: 0 | Status: SHIPPED | OUTPATIENT
Start: 2018-06-26 | End: 2018-07-24 | Stop reason: SDUPTHER

## 2018-06-29 ENCOUNTER — HOSPITAL ENCOUNTER (OUTPATIENT)
Dept: GENERAL RADIOLOGY | Age: 73
Discharge: HOME | End: 2018-06-29
Attending: INTERNAL MEDICINE
Payer: MEDICARE

## 2018-06-29 DIAGNOSIS — Z87.81 HISTORY OF COMPRESSION FRACTURE OF SPINE: ICD-10-CM

## 2018-06-29 PROCEDURE — 72100 X-RAY EXAM L-S SPINE 2/3 VWS: CPT

## 2018-06-29 PROCEDURE — 72050 X-RAY EXAM NECK SPINE 4/5VWS: CPT

## 2018-06-29 PROCEDURE — 72070 X-RAY EXAM THORAC SPINE 2VWS: CPT

## 2018-07-03 ENCOUNTER — HOSPITAL ENCOUNTER (OUTPATIENT)
Dept: CT IMAGING | Age: 73
Discharge: HOME | End: 2018-07-03
Attending: INTERNAL MEDICINE
Payer: MEDICARE

## 2018-07-03 ENCOUNTER — HOSPITAL ENCOUNTER (OUTPATIENT)
Dept: NUCLEAR MEDICINE | Age: 73
Discharge: HOME | End: 2018-07-03
Attending: INTERNAL MEDICINE
Payer: MEDICARE

## 2018-07-03 DIAGNOSIS — Z87.81 HISTORY OF COMPRESSION FRACTURE OF SPINE: ICD-10-CM

## 2018-07-03 PROCEDURE — 78306 BONE IMAGING WHOLE BODY: CPT

## 2018-07-03 PROCEDURE — 72131 CT LUMBAR SPINE W/O DYE: CPT

## 2018-07-13 ENCOUNTER — HOSPITAL ENCOUNTER (OUTPATIENT)
Dept: INTERVENTIONAL RADIOLOGY/VASCULAR | Age: 73
Discharge: HOME | End: 2018-07-13
Attending: INTERNAL MEDICINE | Admitting: RADIOLOGY
Payer: MEDICARE

## 2018-07-13 ENCOUNTER — ANESTHESIA EVENT (OUTPATIENT)
Dept: INTERVENTIONAL RADIOLOGY/VASCULAR | Age: 73
End: 2018-07-13
Payer: MEDICARE

## 2018-07-13 ENCOUNTER — ANESTHESIA (OUTPATIENT)
Dept: INTERVENTIONAL RADIOLOGY/VASCULAR | Age: 73
End: 2018-07-13
Payer: MEDICARE

## 2018-07-13 VITALS
RESPIRATION RATE: 14 BRPM | DIASTOLIC BLOOD PRESSURE: 58 MMHG | BODY MASS INDEX: 32.78 KG/M2 | HEART RATE: 88 BPM | OXYGEN SATURATION: 97 % | SYSTOLIC BLOOD PRESSURE: 117 MMHG | TEMPERATURE: 98.3 F | WEIGHT: 185 LBS | HEIGHT: 63 IN

## 2018-07-13 DIAGNOSIS — E85.9 MYELOMA ASSOCIATED AMYLOIDOSIS (HCC): ICD-10-CM

## 2018-07-13 DIAGNOSIS — C90.00 MYELOMA ASSOCIATED AMYLOIDOSIS (HCC): ICD-10-CM

## 2018-07-13 PROCEDURE — 77030021782 HC SYS CEM CART DEL KYPH -C

## 2018-07-13 PROCEDURE — 77030003666 HC NDL SPINAL BD -A

## 2018-07-13 PROCEDURE — 74011000250 HC RX REV CODE- 250

## 2018-07-13 PROCEDURE — C1713 ANCHOR/SCREW BN/BN,TIS/BN: HCPCS

## 2018-07-13 PROCEDURE — 22514 PERQ VERTEBRAL AUGMENTATION: CPT

## 2018-07-13 PROCEDURE — 77030021783 HC SYS CEM DEL MEDT -D

## 2018-07-13 PROCEDURE — 77030034842 HC TAMP SPN BN INFL EXP II KYPH -I

## 2018-07-13 PROCEDURE — 74011636320 HC RX REV CODE- 636/320: Performed by: RADIOLOGY

## 2018-07-13 PROCEDURE — 76060000032 HC ANESTHESIA 0.5 TO 1 HR

## 2018-07-13 PROCEDURE — 74011250636 HC RX REV CODE- 250/636

## 2018-07-13 PROCEDURE — 74011250636 HC RX REV CODE- 250/636: Performed by: RADIOLOGY

## 2018-07-13 PROCEDURE — 74011000250 HC RX REV CODE- 250: Performed by: RADIOLOGY

## 2018-07-13 RX ORDER — KETOROLAC TROMETHAMINE 30 MG/ML
INJECTION, SOLUTION INTRAMUSCULAR; INTRAVENOUS AS NEEDED
Status: DISCONTINUED | OUTPATIENT
Start: 2018-07-13 | End: 2018-07-13 | Stop reason: HOSPADM

## 2018-07-13 RX ORDER — MIDAZOLAM HYDROCHLORIDE 1 MG/ML
INJECTION, SOLUTION INTRAMUSCULAR; INTRAVENOUS AS NEEDED
Status: DISCONTINUED | OUTPATIENT
Start: 2018-07-13 | End: 2018-07-13 | Stop reason: HOSPADM

## 2018-07-13 RX ORDER — HEPARIN 100 UNIT/ML
500 SYRINGE INTRAVENOUS
Status: DISCONTINUED | OUTPATIENT
Start: 2018-07-13 | End: 2018-07-13 | Stop reason: HOSPADM

## 2018-07-13 RX ORDER — MIDAZOLAM HYDROCHLORIDE 1 MG/ML
1 INJECTION, SOLUTION INTRAMUSCULAR; INTRAVENOUS AS NEEDED
Status: CANCELLED | OUTPATIENT
Start: 2018-07-13

## 2018-07-13 RX ORDER — SODIUM CHLORIDE, SODIUM LACTATE, POTASSIUM CHLORIDE, CALCIUM CHLORIDE 600; 310; 30; 20 MG/100ML; MG/100ML; MG/100ML; MG/100ML
100 INJECTION, SOLUTION INTRAVENOUS CONTINUOUS
Status: CANCELLED | OUTPATIENT
Start: 2018-07-13 | End: 2018-07-14

## 2018-07-13 RX ORDER — SODIUM CHLORIDE 9 MG/ML
INJECTION, SOLUTION INTRAVENOUS
Status: DISCONTINUED | OUTPATIENT
Start: 2018-07-13 | End: 2018-07-13 | Stop reason: HOSPADM

## 2018-07-13 RX ORDER — FENTANYL CITRATE 50 UG/ML
25 INJECTION, SOLUTION INTRAMUSCULAR; INTRAVENOUS
Status: CANCELLED | OUTPATIENT
Start: 2018-07-13

## 2018-07-13 RX ORDER — DEXMEDETOMIDINE HYDROCHLORIDE 4 UG/ML
INJECTION, SOLUTION INTRAVENOUS AS NEEDED
Status: DISCONTINUED | OUTPATIENT
Start: 2018-07-13 | End: 2018-07-13 | Stop reason: HOSPADM

## 2018-07-13 RX ORDER — SODIUM CHLORIDE 9 MG/ML
25 INJECTION, SOLUTION INTRAVENOUS ONCE
Status: COMPLETED | OUTPATIENT
Start: 2018-07-13 | End: 2018-07-13

## 2018-07-13 RX ORDER — ROPIVACAINE HYDROCHLORIDE 5 MG/ML
30 INJECTION, SOLUTION EPIDURAL; INFILTRATION; PERINEURAL AS NEEDED
Status: CANCELLED | OUTPATIENT
Start: 2018-07-13

## 2018-07-13 RX ORDER — PROPOFOL 10 MG/ML
INJECTION, EMULSION INTRAVENOUS AS NEEDED
Status: DISCONTINUED | OUTPATIENT
Start: 2018-07-13 | End: 2018-07-13 | Stop reason: HOSPADM

## 2018-07-13 RX ORDER — PROPOFOL 10 MG/ML
INJECTION, EMULSION INTRAVENOUS
Status: DISCONTINUED | OUTPATIENT
Start: 2018-07-13 | End: 2018-07-13 | Stop reason: HOSPADM

## 2018-07-13 RX ORDER — VANCOMYCIN 1.75 GRAM/500 ML IN 0.9 % SODIUM CHLORIDE INTRAVENOUS
1750
Status: COMPLETED | OUTPATIENT
Start: 2018-07-13 | End: 2018-07-13

## 2018-07-13 RX ORDER — ONDANSETRON 2 MG/ML
INJECTION INTRAMUSCULAR; INTRAVENOUS AS NEEDED
Status: DISCONTINUED | OUTPATIENT
Start: 2018-07-13 | End: 2018-07-13 | Stop reason: HOSPADM

## 2018-07-13 RX ORDER — FENTANYL CITRATE 50 UG/ML
INJECTION, SOLUTION INTRAMUSCULAR; INTRAVENOUS AS NEEDED
Status: DISCONTINUED | OUTPATIENT
Start: 2018-07-13 | End: 2018-07-13 | Stop reason: HOSPADM

## 2018-07-13 RX ORDER — DIPHENHYDRAMINE HYDROCHLORIDE 50 MG/ML
12.5 INJECTION, SOLUTION INTRAMUSCULAR; INTRAVENOUS AS NEEDED
Status: CANCELLED | OUTPATIENT
Start: 2018-07-13 | End: 2018-07-13

## 2018-07-13 RX ORDER — FENTANYL CITRATE 50 UG/ML
25 INJECTION, SOLUTION INTRAMUSCULAR; INTRAVENOUS
Status: DISCONTINUED | OUTPATIENT
Start: 2018-07-13 | End: 2018-07-13 | Stop reason: ALTCHOICE

## 2018-07-13 RX ORDER — SODIUM CHLORIDE, SODIUM LACTATE, POTASSIUM CHLORIDE, CALCIUM CHLORIDE 600; 310; 30; 20 MG/100ML; MG/100ML; MG/100ML; MG/100ML
100 INJECTION, SOLUTION INTRAVENOUS CONTINUOUS
Status: CANCELLED | OUTPATIENT
Start: 2018-07-13

## 2018-07-13 RX ORDER — MIDAZOLAM HYDROCHLORIDE 1 MG/ML
0.5 INJECTION, SOLUTION INTRAMUSCULAR; INTRAVENOUS
Status: CANCELLED | OUTPATIENT
Start: 2018-07-13

## 2018-07-13 RX ORDER — BUPIVACAINE HYDROCHLORIDE 5 MG/ML
30 INJECTION, SOLUTION EPIDURAL; INTRACAUDAL
Status: COMPLETED | OUTPATIENT
Start: 2018-07-13 | End: 2018-07-13

## 2018-07-13 RX ORDER — DEXAMETHASONE SODIUM PHOSPHATE 4 MG/ML
INJECTION, SOLUTION INTRA-ARTICULAR; INTRALESIONAL; INTRAMUSCULAR; INTRAVENOUS; SOFT TISSUE AS NEEDED
Status: DISCONTINUED | OUTPATIENT
Start: 2018-07-13 | End: 2018-07-13 | Stop reason: HOSPADM

## 2018-07-13 RX ORDER — LIDOCAINE HYDROCHLORIDE 10 MG/ML
0.1 INJECTION, SOLUTION EPIDURAL; INFILTRATION; INTRACAUDAL; PERINEURAL AS NEEDED
Status: CANCELLED | OUTPATIENT
Start: 2018-07-13

## 2018-07-13 RX ORDER — FAMOTIDINE 10 MG/ML
INJECTION INTRAVENOUS AS NEEDED
Status: DISCONTINUED | OUTPATIENT
Start: 2018-07-13 | End: 2018-07-13 | Stop reason: HOSPADM

## 2018-07-13 RX ORDER — LIDOCAINE HYDROCHLORIDE 10 MG/ML
10 INJECTION, SOLUTION EPIDURAL; INFILTRATION; INTRACAUDAL; PERINEURAL
Status: COMPLETED | OUTPATIENT
Start: 2018-07-13 | End: 2018-07-13

## 2018-07-13 RX ORDER — SODIUM CHLORIDE 0.9 % (FLUSH) 0.9 %
5-10 SYRINGE (ML) INJECTION AS NEEDED
Status: CANCELLED | OUTPATIENT
Start: 2018-07-13

## 2018-07-13 RX ORDER — FAMOTIDINE 10 MG/ML
20 INJECTION INTRAVENOUS EVERY 12 HOURS
Status: DISCONTINUED | OUTPATIENT
Start: 2018-07-13 | End: 2018-07-13 | Stop reason: HOSPADM

## 2018-07-13 RX ORDER — SODIUM CHLORIDE 0.9 % (FLUSH) 0.9 %
5-10 SYRINGE (ML) INJECTION EVERY 8 HOURS
Status: CANCELLED | OUTPATIENT
Start: 2018-07-13

## 2018-07-13 RX ORDER — ONDANSETRON 2 MG/ML
4 INJECTION INTRAMUSCULAR; INTRAVENOUS AS NEEDED
Status: CANCELLED | OUTPATIENT
Start: 2018-07-13

## 2018-07-13 RX ORDER — SODIUM CHLORIDE 9 MG/ML
25 INJECTION, SOLUTION INTRAVENOUS CONTINUOUS
Status: CANCELLED | OUTPATIENT
Start: 2018-07-13 | End: 2018-07-14

## 2018-07-13 RX ORDER — MORPHINE SULFATE 10 MG/ML
2 INJECTION, SOLUTION INTRAMUSCULAR; INTRAVENOUS
Status: CANCELLED | OUTPATIENT
Start: 2018-07-13

## 2018-07-13 RX ORDER — FENTANYL CITRATE 50 UG/ML
50 INJECTION, SOLUTION INTRAMUSCULAR; INTRAVENOUS AS NEEDED
Status: CANCELLED | OUTPATIENT
Start: 2018-07-13

## 2018-07-13 RX ADMIN — KETOROLAC TROMETHAMINE 15 MG: 30 INJECTION, SOLUTION INTRAMUSCULAR; INTRAVENOUS at 12:36

## 2018-07-13 RX ADMIN — FENTANYL CITRATE 25 MCG: 50 INJECTION, SOLUTION INTRAMUSCULAR; INTRAVENOUS at 11:21

## 2018-07-13 RX ADMIN — VANCOMYCIN HYDROCHLORIDE 1750 MG: 10 INJECTION, POWDER, LYOPHILIZED, FOR SOLUTION INTRAVENOUS at 11:45

## 2018-07-13 RX ADMIN — LIDOCAINE HYDROCHLORIDE 10 ML: 10 INJECTION, SOLUTION EPIDURAL; INFILTRATION; INTRACAUDAL; PERINEURAL at 12:07

## 2018-07-13 RX ADMIN — DEXMEDETOMIDINE HYDROCHLORIDE 4 MCG: 4 INJECTION, SOLUTION INTRAVENOUS at 12:06

## 2018-07-13 RX ADMIN — VANCOMYCIN HYDROCHLORIDE 1750 MG: 10 INJECTION, POWDER, LYOPHILIZED, FOR SOLUTION INTRAVENOUS at 11:58

## 2018-07-13 RX ADMIN — PROPOFOL 30 MG: 10 INJECTION, EMULSION INTRAVENOUS at 12:19

## 2018-07-13 RX ADMIN — MIDAZOLAM HYDROCHLORIDE 2 MG: 1 INJECTION, SOLUTION INTRAMUSCULAR; INTRAVENOUS at 12:03

## 2018-07-13 RX ADMIN — DEXMEDETOMIDINE HYDROCHLORIDE 4 MCG: 4 INJECTION, SOLUTION INTRAVENOUS at 12:13

## 2018-07-13 RX ADMIN — DEXMEDETOMIDINE HYDROCHLORIDE 4 MCG: 4 INJECTION, SOLUTION INTRAVENOUS at 12:16

## 2018-07-13 RX ADMIN — PROPOFOL 40 MCG/KG/MIN: 10 INJECTION, EMULSION INTRAVENOUS at 12:02

## 2018-07-13 RX ADMIN — DEXMEDETOMIDINE HYDROCHLORIDE 4 MCG: 4 INJECTION, SOLUTION INTRAVENOUS at 12:10

## 2018-07-13 RX ADMIN — DEXAMETHASONE SODIUM PHOSPHATE 4 MG: 4 INJECTION, SOLUTION INTRA-ARTICULAR; INTRALESIONAL; INTRAMUSCULAR; INTRAVENOUS; SOFT TISSUE at 12:04

## 2018-07-13 RX ADMIN — PROPOFOL 20 MG: 10 INJECTION, EMULSION INTRAVENOUS at 12:28

## 2018-07-13 RX ADMIN — PROPOFOL 20 MG: 10 INJECTION, EMULSION INTRAVENOUS at 12:32

## 2018-07-13 RX ADMIN — FAMOTIDINE 20 MG: 10 INJECTION INTRAVENOUS at 11:30

## 2018-07-13 RX ADMIN — PROPOFOL 20 MG: 10 INJECTION, EMULSION INTRAVENOUS at 12:14

## 2018-07-13 RX ADMIN — FENTANYL CITRATE 50 MCG: 50 INJECTION, SOLUTION INTRAMUSCULAR; INTRAVENOUS at 12:03

## 2018-07-13 RX ADMIN — SODIUM CHLORIDE: 9 INJECTION, SOLUTION INTRAVENOUS at 11:58

## 2018-07-13 RX ADMIN — DEXMEDETOMIDINE HYDROCHLORIDE 4 MCG: 4 INJECTION, SOLUTION INTRAVENOUS at 12:07

## 2018-07-13 RX ADMIN — FENTANYL CITRATE 25 MCG: 50 INJECTION, SOLUTION INTRAMUSCULAR; INTRAVENOUS at 11:36

## 2018-07-13 RX ADMIN — SODIUM CHLORIDE 25 ML/HR: 900 INJECTION, SOLUTION INTRAVENOUS at 11:30

## 2018-07-13 RX ADMIN — PROPOFOL 20 MG: 10 INJECTION, EMULSION INTRAVENOUS at 12:23

## 2018-07-13 RX ADMIN — ONDANSETRON 4 MG: 2 INJECTION INTRAMUSCULAR; INTRAVENOUS at 12:04

## 2018-07-13 RX ADMIN — IOHEXOL 10 ML: 240 INJECTION, SOLUTION INTRATHECAL; INTRAVASCULAR; INTRAVENOUS; ORAL at 12:37

## 2018-07-13 RX ADMIN — BUPIVACAINE HYDROCHLORIDE 150 MG: 5 INJECTION, SOLUTION EPIDURAL; INTRACAUDAL at 12:18

## 2018-07-13 NOTE — ANESTHESIA PREPROCEDURE EVALUATION
Anesthetic History   No history of anesthetic complications            Review of Systems / Medical History  Patient summary reviewed, nursing notes reviewed and pertinent labs reviewed    Pulmonary  Within defined limits      Sleep apnea           Neuro/Psych   Within defined limits           Cardiovascular  Within defined limits                Exercise tolerance: >4 METS     GI/Hepatic/Renal  Within defined limits   GERD           Endo/Other  Within defined limits      Morbid obesity and arthritis     Other Findings              Physical Exam    Airway  Mallampati: III  TM Distance: 4 - 6 cm  Neck ROM: normal range of motion   Mouth opening: Normal     Cardiovascular  Regular rate and rhythm,  S1 and S2 normal,  no murmur, click, rub, or gallop             Dental    Dentition: Caps/crowns     Pulmonary  Breath sounds clear to auscultation               Abdominal  GI exam deferred       Other Findings            Anesthetic Plan    ASA: 2  Anesthesia type: MAC          Induction: Intravenous  Anesthetic plan and risks discussed with: Patient

## 2018-07-13 NOTE — ANESTHESIA POSTPROCEDURE EVALUATION
Post-Anesthesia Evaluation and Assessment    Patient: Kerrie Lew MRN: [id number]  SSN: [id number]    YOB: 1945  Age: 67 y.o. Sex: female       Cardiovascular Function/Vital Signs  Visit Vitals    /58 (BP 1 Location: Left arm, BP Patient Position: Sitting)    Pulse 88    Temp 36.8 °C (98.3 °F)    Resp 14    Ht 5' 3\" (1.6 m)    Wt 83.9 kg (185 lb)    SpO2 97%    Breastfeeding No    BMI 32.77 kg/m2       Patient is status post MAC anesthesia for * No procedures listed *. Nausea/Vomiting: None    Postoperative hydration reviewed and adequate. Pain:  Pain Scale 1: Numeric (0 - 10) (07/13/18 1430)  Pain Intensity 1: 6 (07/13/18 1430)   Managed    Neurological Status: At baseline    Mental Status and Level of Consciousness: Arousable    Pulmonary Status:   O2 Device: Room air (07/13/18 1430)   Adequate oxygenation and airway patent    Complications related to anesthesia: None    Post-anesthesia assessment completed.  No concerns    Signed By: Jacqueline Dao MD     July 13, 2018

## 2018-07-13 NOTE — H&P
Radiology History and Physical    Patient: Anthony Peña 67 y.o. female       Chief Complaint: No chief complaint on file. History of Present Illness: for kypho    History:    Past Medical History:   Diagnosis Date    Anemia     Aneurysm ([facility] [address] 3/2005    HX LEFT OPTIC NERVE    Anxiety     Chronic pain     spinal stenosis per pt    Fibromyalgia     GERD (gastroesophageal reflux disease)     High cholesterol     History of acute pancreatitis     History of blood transfusion     History of Salmonella infection 2009    Multiple myeloma ([facility] [address] 2007    Osteoporosis     Other complication due to venous access device ([facility] 1/29/2013    Recurrent umbilical hernia 8/17/9899    Recurrent ventral incisional hernia 3/28/2016    Rheumatoid arthritis(714.0)     Sleep apnea     INTOLERATANT OF CPAP    Thromboembolus (HCC)     Right leg DVT    Urinary incontinence      Family History   Problem Relation Age of Onset    Arthritis-osteo Mother     Anesth Problems Neg Hx      Social History     Social History    Marital status:      Spouse name: N/A    Number of children: N/A    Years of education: N/A     Occupational History    Not on file. Social History Main Topics    Smoking status: Former Smoker     Years: 10.00     Quit date: 10/7/2005    Smokeless tobacco: Never Used    Alcohol use No    Drug use: No    Sexual activity: Not on file     Other Topics Concern    Not on file     Social History Narrative       Allergies: Allergies   Allergen Reactions    Aggrenox [Aspirin-Dipyridamole] Rash     Allergic to the dipyridamole and not aspirin.     Broccoli Other (comments)     C/O GAS    Bumex [Bumetanide] Rash    Corn Diarrhea    Corn Starch-Kaolin-Zinc Oxide Diarrhea    Doxil [Doxorubicin, Peg-Liposomal] Rash     Burning of skin      Flagyl [Metronidazole] Rash    Keflex [Cephalexin] Rash    Lasix [Furosemide] Rash    Macrobid [Nitrofurantoin Monohyd/M-Cryst] Shortness of Breath  Methotrexate Hives and Rash    Pcn [Penicillins] Rash    Sulfa (Sulfonamide Antibiotics) Hives    Tetanus And Diphtheria Toxoids Swelling    Thalidomide Rash       Current Medications:  Current Facility-Administered Medications   Medication Dose Route Frequency    iohexol (OMNIPAQUE) solution 50 mL  50 mL Intrathecal RAD ONCE    lidocaine (PF) (XYLOCAINE) 10 mg/mL (1 %) injection 10 mL  10 mL SubCUTAneous RAD ONCE    bupivacaine (PF) (MARCAINE) 0.5 % (5 mg/mL) injection 150 mg  30 mL SubCUTAneous RAD ONCE    famotidine (PF) (PEPCID) injection 20 mg  20 mg IntraVENous Q12H    fentaNYL citrate (PF) injection 25 mcg  25 mcg IntraVENous RAD PRN    vancomycin (VANCOCIN) 1750 mg in  ml infusion  1,750 mg IntraVENous RAD ONCE     Facility-Administered Medications Ordered in Other Encounters   Medication Dose Route Frequency    famotidine (PF) (PEPCID) injection   IntraVENous PRN        Physical Exam:  Blood pressure 121/46, pulse 91, temperature 98.3 °F (36.8 °C), resp. rate 11, height 5' 3\" (1.6 m), weight 83.9 kg (185 lb), SpO2 95 %, not currently breastfeeding. LUNG: clear to auscultation bilaterally, HEART: regular rate and rhythm      Alerts:    Hospital Problems  Date Reviewed: 3/23/2018    None          Laboratory:    No results for input(s): HGB, HCT, WBC, PLT, INR, BUN, CREA, K, CRCLT, HGBEXT, HCTEXT, PLTEXT in the last 72 hours. No lab exists for component: PTT, PT, INREXT      Plan of Care/Planned Procedure:  Risks, benefits, and alternatives reviewed with patient and she agrees to proceed with the procedure.        Maddi Oconnor MD

## 2018-07-13 NOTE — PROGRESS NOTES
Kyphoplasty completed by Dr. Marcos William. Report at bedside from Margarito Miller. Denies any discomfort. Remains on monitor.

## 2018-07-13 NOTE — PROGRESS NOTES
Rcvd. In angio via wheelchair. States walks with cane at times. Unable to walk long distance R/T pain. Light pink raised rash noted lower back. States has this off an on x 2 years and takes Prednisone when it appears. VSS. Assessment as noted.  and  at bedside. Dr. Eladio Colón in and explained procedure. Patient and  verbalize understanding of same. Consent signed by patient. \"I know what this is about as I have had several.\"      States pain pump (implanted in left mid abd.) was filled yesterday. Discharge instructions reviewed with patient and . Both verbalize understanding of same.

## 2018-07-13 NOTE — PROGRESS NOTES
Awake and alert following Kyphoplasty. Eating and drinking. HOB elevated. States pain is now 6. \"It takes time. \"   at bedside. Discharge instructions reviewed with patient and . Both verbalize understanding of same.

## 2018-07-13 NOTE — PROGRESS NOTES
Up to bathroom. Gait steady. Denies weakness or numbness. \"I am just tired. \"  Port flushed with Hep lock flush and access removed. To auto via wheelchair with  as .

## 2018-07-13 NOTE — DISCHARGE INSTRUCTIONS
Side effects of sedation medications and other medications used today have been reviewed. Notify us of nausea, itching, hives, dizziness, or anything else out of the ordinary. Should you experience any of these significant changes, please call [phone] between the hours of 7:30 am and 10 pm or [phone] after hours. After hours, ask the  to page the X-ray Technologist, and describe the problem to the technologist.         [address] [facility]  Radiology Department        Radiologist:   Hernandez Clemons MD    Date:    July 13, 2018       Kyphoplasty Discharge Instructions    Go home and rest  and restrict your activity the next 24 hours. You have been given sedating medications, so do not drive or drink alcohol today. Resume your previous diet and medications. You may take Tylenol, as directed on the label, for pain or discomfort. Avoid Ibuprofen (Advil, Motrin etc.) and Aspirin today as they may increase your risk of bleeding. Avoid heavy lifting (nothing greater than 5 pounds),  excessive bending,  and pushing or pulling movements for one week. Then begin to advance your activity as tolerated. Call you doctor right away with any redness, swelling, increasing pain, drainage or fever. Be sure to follow up with your physician, and let him know how you are progressing. If you have new severe pain, numbness, tingling, or weakness in your legs go to the nearest emergency department, and tell them you have had a Kyphoplasty.

## 2018-07-13 NOTE — PROGRESS NOTES
Side effects of sedation medications and other medications used today have been reviewed. Notify us of nausea, itching, hives, dizziness, or any unusual symptoms. Should you experience any of these significant changes, please call [phone] between the hours of 7:30 am and 10 pm or [phone] after hours. After hours, ask the  to page the X-ray Technologist, and describe the problem to the technologist.     [address]  Radiologist:    Ritchie Mitchell MD    Date:   July 13, 2018       Kyphoplasty Discharge Instructions    Go home and rest  and restrict your activity the next 24 hours. You have been given sedating medications, so do not drive or drink alcohol today. Resume your previous diet and medications. You may take Tylenol, as directed on the label, for pain or discomfort. Avoid Ibuprofen (Advil, Motrin etc.) and Aspirin today as they may increase your risk of bleeding. Avoid heavy lifting (nothing greater than 5 pounds),  excessive bending,  and pushing or pulling movements for one week. Then begin to advance your activity as tolerated. Monitor the kyphoplasty site for symptoms of infection, increasing pain, redness, drainage, or fever. Call you physician immediately with any of these symptoms. If you have bleeding -               More than a spot on your dressing -  Hold pressure x 10 min. If you continue to have bleeding or swelling, please call your doctor or our department. Be sure to follow up with your physician, and let him know how you are progressing. If you have new severe pain, numbness, tingling, or weakness in your legs go to the nearest emergency department, and tell them you have had a Kyphoplasty.                   Brendan Montanez RN              Angio Department

## 2018-07-24 RX ORDER — AMITRIPTYLINE HYDROCHLORIDE 50 MG/1
TABLET, FILM COATED ORAL
Qty: 60 TAB | Refills: 0 | Status: SHIPPED | OUTPATIENT
Start: 2018-07-24 | End: 2018-08-16 | Stop reason: SDUPTHER

## 2018-08-16 RX ORDER — AMITRIPTYLINE HYDROCHLORIDE 50 MG/1
TABLET, FILM COATED ORAL
Qty: 60 TAB | Refills: 0 | Status: SHIPPED | OUTPATIENT
Start: 2018-08-16 | End: 2018-09-18 | Stop reason: SDUPTHER

## 2018-09-18 RX ORDER — AMITRIPTYLINE HYDROCHLORIDE 50 MG/1
TABLET, FILM COATED ORAL
Qty: 60 TAB | Refills: 0 | Status: SHIPPED | OUTPATIENT
Start: 2018-09-18 | End: 2018-10-20 | Stop reason: SDUPTHER

## 2018-10-21 RX ORDER — AMITRIPTYLINE HYDROCHLORIDE 50 MG/1
TABLET, FILM COATED ORAL
Qty: 60 TAB | Refills: 0 | Status: SHIPPED | OUTPATIENT
Start: 2018-10-21 | End: 2018-11-24 | Stop reason: SDUPTHER

## 2018-11-25 RX ORDER — AMITRIPTYLINE HYDROCHLORIDE 50 MG/1
TABLET, FILM COATED ORAL
Qty: 60 TAB | Refills: 0 | Status: SHIPPED | OUTPATIENT
Start: 2018-11-25 | End: 2018-12-26 | Stop reason: SDUPTHER

## 2018-11-30 ENCOUNTER — TELEPHONE (OUTPATIENT)
Dept: INTERNAL MEDICINE CLINIC | Age: 73
End: 2018-11-30

## 2018-11-30 NOTE — TELEPHONE ENCOUNTER
Called  and they are not sure why they called? I explained that pt has never had to have a dx mammo? To call back with questions.

## 2018-12-05 ENCOUNTER — HOSPITAL ENCOUNTER (OUTPATIENT)
Dept: MAMMOGRAPHY | Age: 73
Discharge: HOME | End: 2018-12-05
Attending: INTERNAL MEDICINE
Payer: MEDICARE

## 2018-12-05 DIAGNOSIS — Z12.39 SCREENING BREAST EXAMINATION: ICD-10-CM

## 2018-12-05 PROCEDURE — 77067 SCR MAMMO BI INCL CAD: CPT

## 2018-12-26 RX ORDER — AMITRIPTYLINE HYDROCHLORIDE 50 MG/1
TABLET, FILM COATED ORAL
Qty: 60 TAB | Refills: 0 | Status: SHIPPED | OUTPATIENT
Start: 2018-12-26 | End: 2019-02-01 | Stop reason: SDUPTHER

## 2019-01-10 ENCOUNTER — HOSPITAL ENCOUNTER (OUTPATIENT)
Dept: GENERAL RADIOLOGY | Age: 74
Discharge: HOME | End: 2019-01-10
Attending: NURSE PRACTITIONER
Payer: MEDICARE

## 2019-01-10 DIAGNOSIS — M25.512 LEFT SHOULDER PAIN: ICD-10-CM

## 2019-01-10 DIAGNOSIS — Z85.79 HISTORY OF MULTIPLE MYELOMA: ICD-10-CM

## 2019-01-10 PROCEDURE — 73030 X-RAY EXAM OF SHOULDER: CPT

## 2019-01-24 ENCOUNTER — APPOINTMENT (OUTPATIENT)
Dept: CT IMAGING | Age: 74
DRG: 389 | End: 2019-01-24
Attending: EMERGENCY MEDICINE
Payer: MEDICARE

## 2019-01-24 ENCOUNTER — APPOINTMENT (OUTPATIENT)
Dept: GENERAL RADIOLOGY | Age: 74
DRG: 389 | End: 2019-01-24
Attending: EMERGENCY MEDICINE
Payer: MEDICARE

## 2019-01-24 ENCOUNTER — HOSPITAL ENCOUNTER (INPATIENT)
Age: 74
LOS: 7 days | Discharge: HOME | DRG: 389 | End: 2019-01-31
Attending: EMERGENCY MEDICINE | Admitting: INTERNAL MEDICINE
Payer: MEDICARE

## 2019-01-24 DIAGNOSIS — A41.9 SEPSIS, DUE TO UNSPECIFIED ORGANISM: ICD-10-CM

## 2019-01-24 DIAGNOSIS — K56.609 SBO (SMALL BOWEL OBSTRUCTION) (HCC): Primary | ICD-10-CM

## 2019-01-24 LAB
ALBUMIN SERPL-MCNC: 3.1 G/DL (ref 3.5–5)
ALBUMIN/GLOB SERPL: 0.8 {RATIO} (ref 1.1–2.2)
ALP SERPL-CCNC: 101 U/L (ref 45–117)
ALT SERPL-CCNC: 14 U/L (ref 12–78)
ANION GAP SERPL CALC-SCNC: 11 MMOL/L (ref 5–15)
APPEARANCE UR: CLEAR
AST SERPL-CCNC: 46 U/L (ref 15–37)
ATRIAL RATE: 116 BPM
BACTERIA URNS QL MICRO: NEGATIVE /HPF
BASOPHILS # BLD: 0 K/UL (ref 0–0.1)
BASOPHILS NFR BLD: 0 % (ref 0–1)
BILIRUB SERPL-MCNC: 0.5 MG/DL (ref 0.2–1)
BILIRUB UR QL: NEGATIVE
BUN SERPL-MCNC: 9 MG/DL (ref 6–20)
BUN/CREAT SERPL: 9 (ref 12–20)
CALCIUM SERPL-MCNC: 8.9 MG/DL (ref 8.5–10.1)
CALCULATED P AXIS, ECG09: 2 DEGREES
CALCULATED T AXIS, ECG11: 26 DEGREES
CHLORIDE SERPL-SCNC: 94 MMOL/L (ref 97–108)
CO2 SERPL-SCNC: 31 MMOL/L (ref 21–32)
COLOR UR: ABNORMAL
COMMENT, HOLDF: NORMAL
CREAT SERPL-MCNC: 1.01 MG/DL (ref 0.55–1.02)
DIAGNOSIS, 93000: NORMAL
DIFFERENTIAL METHOD BLD: ABNORMAL
EOSINOPHIL # BLD: 0.1 K/UL (ref 0–0.4)
EOSINOPHIL NFR BLD: 1 % (ref 0–7)
EPITH CASTS URNS QL MICRO: ABNORMAL /LPF
ERYTHROCYTE [DISTWIDTH] IN BLOOD BY AUTOMATED COUNT: 17.2 % (ref 11.5–14.5)
GLOBULIN SER CALC-MCNC: 4.1 G/DL (ref 2–4)
GLUCOSE SERPL-MCNC: 142 MG/DL (ref 65–100)
GLUCOSE UR STRIP.AUTO-MCNC: NEGATIVE MG/DL
HCT VFR BLD AUTO: 35.7 % (ref 35–47)
HGB BLD-MCNC: 10.5 G/DL (ref 11.5–16)
HGB UR QL STRIP: ABNORMAL
HYALINE CASTS URNS QL MICRO: ABNORMAL /LPF (ref 0–5)
IMM GRANULOCYTES # BLD AUTO: 0.2 K/UL (ref 0–0.04)
IMM GRANULOCYTES NFR BLD AUTO: 1 % (ref 0–0.5)
KETONES UR QL STRIP.AUTO: ABNORMAL MG/DL
LACTATE BLD-SCNC: 1.34 MMOL/L (ref 0.4–2)
LACTATE BLD-SCNC: 2.4 MMOL/L (ref 0.4–2)
LEUKOCYTE ESTERASE UR QL STRIP.AUTO: NEGATIVE
LIPASE SERPL-CCNC: 77 U/L (ref 73–393)
LYMPHOCYTES # BLD: 0.9 K/UL (ref 0.8–3.5)
LYMPHOCYTES NFR BLD: 4 % (ref 12–49)
MCH RBC QN AUTO: 24.2 PG (ref 26–34)
MCHC RBC AUTO-ENTMCNC: 29.4 G/DL (ref 30–36.5)
MCV RBC AUTO: 82.4 FL (ref 80–99)
MONOCYTES # BLD: 1.2 K/UL (ref 0–1)
MONOCYTES NFR BLD: 6 % (ref 5–13)
NEUTS SEG # BLD: 19.5 K/UL (ref 1.8–8)
NEUTS SEG NFR BLD: 89 % (ref 32–75)
NITRITE UR QL STRIP.AUTO: NEGATIVE
NRBC # BLD: 0.04 K/UL (ref 0–0.01)
NRBC BLD-RTO: 0.2 PER 100 WBC
P-R INTERVAL, ECG05: 122 MS
PH UR STRIP: 7.5 [PH] (ref 5–8)
PLATELET # BLD AUTO: 355 K/UL (ref 150–400)
PMV BLD AUTO: 8.2 FL (ref 8.9–12.9)
POTASSIUM SERPL-SCNC: 3.2 MMOL/L (ref 3.5–5.1)
PROT SERPL-MCNC: 7.2 G/DL (ref 6.4–8.2)
PROT UR STRIP-MCNC: ABNORMAL MG/DL
Q-T INTERVAL, ECG07: 380 MS
QRS DURATION, ECG06: 80 MS
QTC CALCULATION (BEZET), ECG08: 528 MS
RBC # BLD AUTO: 4.33 M/UL (ref 3.8–5.2)
RBC #/AREA URNS HPF: ABNORMAL /HPF (ref 0–5)
SAMPLES BEING HELD,HOLD: NORMAL
SODIUM SERPL-SCNC: 136 MMOL/L (ref 136–145)
SP GR UR REFRACTOMETRY: <1.005 (ref 1–1.03)
TROPONIN I SERPL-MCNC: <0.05 NG/ML
UR CULT HOLD, URHOLD: NORMAL
UROBILINOGEN UR QL STRIP.AUTO: 1 EU/DL (ref 0.2–1)
VENTRICULAR RATE, ECG03: 116 BPM
WBC # BLD AUTO: 22 K/UL (ref 3.6–11)
WBC URNS QL MICRO: ABNORMAL /HPF (ref 0–4)

## 2019-01-24 PROCEDURE — 74011000258 HC RX REV CODE- 258: Performed by: RADIOLOGY

## 2019-01-24 PROCEDURE — 93005 ELECTROCARDIOGRAM TRACING: CPT

## 2019-01-24 PROCEDURE — 81001 URINALYSIS AUTO W/SCOPE: CPT

## 2019-01-24 PROCEDURE — 96375 TX/PRO/DX INJ NEW DRUG ADDON: CPT

## 2019-01-24 PROCEDURE — 71046 X-RAY EXAM CHEST 2 VIEWS: CPT

## 2019-01-24 PROCEDURE — 96361 HYDRATE IV INFUSION ADD-ON: CPT

## 2019-01-24 PROCEDURE — 65270000029 HC RM PRIVATE

## 2019-01-24 PROCEDURE — 80053 COMPREHEN METABOLIC PANEL: CPT

## 2019-01-24 PROCEDURE — 74011250636 HC RX REV CODE- 250/636: Performed by: INTERNAL MEDICINE

## 2019-01-24 PROCEDURE — 77030008771 HC TU NG SALEM SUMP -A

## 2019-01-24 PROCEDURE — 84484 ASSAY OF TROPONIN QUANT: CPT

## 2019-01-24 PROCEDURE — 96374 THER/PROPH/DIAG INJ IV PUSH: CPT

## 2019-01-24 PROCEDURE — 85025 COMPLETE CBC W/AUTO DIFF WBC: CPT

## 2019-01-24 PROCEDURE — 0D9670Z DRAINAGE OF STOMACH WITH DRAINAGE DEVICE, VIA NATURAL OR ARTIFICIAL OPENING: ICD-10-PCS | Performed by: INTERNAL MEDICINE

## 2019-01-24 PROCEDURE — 83605 ASSAY OF LACTIC ACID: CPT

## 2019-01-24 PROCEDURE — 74011636320 HC RX REV CODE- 636/320: Performed by: RADIOLOGY

## 2019-01-24 PROCEDURE — 99285 EMERGENCY DEPT VISIT HI MDM: CPT

## 2019-01-24 PROCEDURE — 83690 ASSAY OF LIPASE: CPT

## 2019-01-24 PROCEDURE — 87040 BLOOD CULTURE FOR BACTERIA: CPT

## 2019-01-24 PROCEDURE — 74011250636 HC RX REV CODE- 250/636: Performed by: EMERGENCY MEDICINE

## 2019-01-24 PROCEDURE — 74011000258 HC RX REV CODE- 258: Performed by: EMERGENCY MEDICINE

## 2019-01-24 PROCEDURE — 74011000250 HC RX REV CODE- 250: Performed by: EMERGENCY MEDICINE

## 2019-01-24 PROCEDURE — 74177 CT ABD & PELVIS W/CONTRAST: CPT

## 2019-01-24 PROCEDURE — 36415 COLL VENOUS BLD VENIPUNCTURE: CPT

## 2019-01-24 PROCEDURE — 74018 RADEX ABDOMEN 1 VIEW: CPT

## 2019-01-24 RX ORDER — SODIUM CHLORIDE 9 MG/ML
75 INJECTION, SOLUTION INTRAVENOUS CONTINUOUS
Status: DISCONTINUED | OUTPATIENT
Start: 2019-01-24 | End: 2019-01-25

## 2019-01-24 RX ORDER — GABAPENTIN 300 MG/1
300 CAPSULE ORAL 4 TIMES DAILY
Status: DISCONTINUED | OUTPATIENT
Start: 2019-01-24 | End: 2019-01-31 | Stop reason: HOSPADM

## 2019-01-24 RX ORDER — ONDANSETRON 2 MG/ML
4 INJECTION INTRAMUSCULAR; INTRAVENOUS
Status: COMPLETED | OUTPATIENT
Start: 2019-01-24 | End: 2019-01-24

## 2019-01-24 RX ORDER — SODIUM CHLORIDE 0.9 % (FLUSH) 0.9 %
5-40 SYRINGE (ML) INJECTION AS NEEDED
Status: DISCONTINUED | OUTPATIENT
Start: 2019-01-24 | End: 2019-01-31 | Stop reason: HOSPADM

## 2019-01-24 RX ORDER — LEVOFLOXACIN 5 MG/ML
750 INJECTION, SOLUTION INTRAVENOUS
Status: DISCONTINUED | OUTPATIENT
Start: 2019-01-24 | End: 2019-01-24

## 2019-01-24 RX ORDER — MORPHINE SULFATE 2 MG/ML
2 INJECTION, SOLUTION INTRAMUSCULAR; INTRAVENOUS
Status: DISCONTINUED | OUTPATIENT
Start: 2019-01-24 | End: 2019-01-31 | Stop reason: HOSPADM

## 2019-01-24 RX ORDER — AMITRIPTYLINE HYDROCHLORIDE 50 MG/1
100 TABLET, FILM COATED ORAL
Status: DISCONTINUED | OUTPATIENT
Start: 2019-01-24 | End: 2019-01-31 | Stop reason: HOSPADM

## 2019-01-24 RX ORDER — POTASSIUM CHLORIDE 750 MG/1
20 TABLET, FILM COATED, EXTENDED RELEASE ORAL DAILY
Status: DISCONTINUED | OUTPATIENT
Start: 2019-01-25 | End: 2019-01-31 | Stop reason: HOSPADM

## 2019-01-24 RX ORDER — TRIAMCINOLONE ACETONIDE 1 MG/G
OINTMENT TOPICAL 2 TIMES DAILY
Status: DISCONTINUED | OUTPATIENT
Start: 2019-01-25 | End: 2019-01-31 | Stop reason: HOSPADM

## 2019-01-24 RX ORDER — FLUTICASONE PROPIONATE 50 MCG
2 SPRAY, SUSPENSION (ML) NASAL 2 TIMES DAILY
Status: DISCONTINUED | OUTPATIENT
Start: 2019-01-25 | End: 2019-01-31 | Stop reason: HOSPADM

## 2019-01-24 RX ORDER — SODIUM CHLORIDE 0.9 % (FLUSH) 0.9 %
10 SYRINGE (ML) INJECTION
Status: COMPLETED | OUTPATIENT
Start: 2019-01-24 | End: 2019-01-24

## 2019-01-24 RX ORDER — LEVOFLOXACIN 5 MG/ML
750 INJECTION, SOLUTION INTRAVENOUS EVERY 24 HOURS
Status: DISCONTINUED | OUTPATIENT
Start: 2019-01-25 | End: 2019-01-29

## 2019-01-24 RX ORDER — ONDANSETRON 2 MG/ML
4 INJECTION INTRAMUSCULAR; INTRAVENOUS
Status: DISCONTINUED | OUTPATIENT
Start: 2019-01-24 | End: 2019-01-31 | Stop reason: HOSPADM

## 2019-01-24 RX ORDER — MORPHINE SULFATE 2 MG/ML
6 INJECTION, SOLUTION INTRAMUSCULAR; INTRAVENOUS
Status: COMPLETED | OUTPATIENT
Start: 2019-01-24 | End: 2019-01-24

## 2019-01-24 RX ORDER — LUBIPROSTONE 24 UG/1
24 CAPSULE, GELATIN COATED ORAL
Status: DISCONTINUED | OUTPATIENT
Start: 2019-01-25 | End: 2019-01-25

## 2019-01-24 RX ORDER — SIMVASTATIN 20 MG/1
20 TABLET, FILM COATED ORAL
Status: DISCONTINUED | OUTPATIENT
Start: 2019-01-24 | End: 2019-01-31 | Stop reason: HOSPADM

## 2019-01-24 RX ORDER — LIDOCAINE HYDROCHLORIDE 20 MG/ML
10 SOLUTION OROPHARYNGEAL
Status: COMPLETED | OUTPATIENT
Start: 2019-01-24 | End: 2019-01-24

## 2019-01-24 RX ORDER — SODIUM CHLORIDE 0.9 % (FLUSH) 0.9 %
5-40 SYRINGE (ML) INJECTION EVERY 8 HOURS
Status: DISCONTINUED | OUTPATIENT
Start: 2019-01-24 | End: 2019-01-31 | Stop reason: HOSPADM

## 2019-01-24 RX ORDER — SPIRONOLACTONE 25 MG/1
50 TABLET ORAL 2 TIMES DAILY
Status: DISCONTINUED | OUTPATIENT
Start: 2019-01-25 | End: 2019-01-31 | Stop reason: HOSPADM

## 2019-01-24 RX ADMIN — MORPHINE SULFATE 6 MG: 2 INJECTION, SOLUTION INTRAMUSCULAR; INTRAVENOUS at 17:16

## 2019-01-24 RX ADMIN — SODIUM CHLORIDE 1000 ML: 900 INJECTION, SOLUTION INTRAVENOUS at 17:16

## 2019-01-24 RX ADMIN — SODIUM CHLORIDE 100 ML: 900 INJECTION, SOLUTION INTRAVENOUS at 17:58

## 2019-01-24 RX ADMIN — SODIUM CHLORIDE 1000 ML: 900 INJECTION, SOLUTION INTRAVENOUS at 17:37

## 2019-01-24 RX ADMIN — MORPHINE SULFATE 2 MG: 2 INJECTION, SOLUTION INTRAMUSCULAR; INTRAVENOUS at 21:52

## 2019-01-24 RX ADMIN — SODIUM CHLORIDE 75 ML/HR: 900 INJECTION, SOLUTION INTRAVENOUS at 23:58

## 2019-01-24 RX ADMIN — ONDANSETRON 4 MG: 2 INJECTION INTRAMUSCULAR; INTRAVENOUS at 17:16

## 2019-01-24 RX ADMIN — LEVOFLOXACIN 750 MG: 5 INJECTION, SOLUTION INTRAVENOUS at 23:58

## 2019-01-24 RX ADMIN — LIDOCAINE HYDROCHLORIDE 10 ML: 20 SOLUTION ORAL; TOPICAL at 21:05

## 2019-01-24 RX ADMIN — Medication 10 ML: at 17:58

## 2019-01-24 RX ADMIN — SODIUM CHLORIDE 641 ML: 900 INJECTION, SOLUTION INTRAVENOUS at 20:39

## 2019-01-24 RX ADMIN — IOPAMIDOL 100 ML: 755 INJECTION, SOLUTION INTRAVENOUS at 17:58

## 2019-01-24 RX ADMIN — CEFEPIME 2 G: 2 INJECTION, POWDER, FOR SOLUTION INTRAVENOUS at 20:37

## 2019-01-24 NOTE — ED TRIAGE NOTES
Triage:  Pt to ED due to concerns over increasing abd distention, abd pain, and nausea over the past day. Pt arrives via EMS from home with noted distended abd, and intermittent belching. Pt has baseline level of confusion per EMS.

## 2019-01-24 NOTE — ED PROVIDER NOTES
68 y.o. female with past medical history significant for anemia, high cholesterol, urinary incontinence, pancreatitis, recurrent umbilical hernia, RA, aneurysm, thromboembolus, and multiple myeloma who presents from home via EMS with chief complaint of abdominal pain. Pt reports 7/10 bilateral lower abdominal pain for 1 day accompanied by subjective fever, diarrhea, vomiting x3, and cough. When asked about prior abdominal surgery, Pt's  states Pt has had cholecystectomy and several hernia repairs. Surgeon - Dr. Angie Burton. Pt is on Xarelto. Pt denies bloody/black stool and hx of appendectomy. There are no other acute medical concerns at this time. PCP: Lily Casper MD 
 
Note written by Arnaldo Hudson, as dictated by Marta Mclean MD 4:32 PM 
 
 
 
  
 
Past Medical History:  
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   
 spinal stenosis per pt  Fibromyalgia  GERD (gastroesophageal reflux disease)  High cholesterol  History of acute pancreatitis  History of blood transfusion  History of Salmonella infection 2009  Multiple myeloma ([address] 2007  Osteoporosis  Other complication due to venous access device ([address] 1/29/2013  Recurrent umbilical hernia 0/03/6072  Recurrent ventral incisional hernia 3/28/2016  Rheumatoid arthritis(714.0)  Sleep apnea INTOLERATANT OF CPAP  Thromboembolus ([address] Right leg DVT  Urinary incontinence Past Surgical History:  
Procedure Laterality Date  HX CHOLECYSTECTOMY  2004  HX CRANIOTOMY    
 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    
 COLONOSCOPIES, EGD  HX GI    
 ESOPHAGUS STREACHED  
 HX HEENT  10/2011  
 b/l cataract surgery in October 2011  HX HERNIA REPAIR  [id number] X6  
 HX HERNIA REPAIR  10-13-14  
 repair of recurrent ventral incisional hernia  with primary suture [id number]. Chanelle Dooley  HX KYPHOPLASTY  2017 L4  
 HX ORTHOPAEDIC Right  BONE SPURS SHOULDER  
 HX OTHER SURGICAL   LLQ PAIN PUMP FOR MORPHINE INFUSION  
 HX OTHER SURGICAL  14  
 repair of recurrent umbilical hernia with ventralex pillow top mesh and extensive lysis of [id number]. Ephraim Sing  HX OTHER SURGICAL  16  
 repair of recurrent ventral incisional hernia with underlay mesh-[facility]-Dr. James Adams  HX VASCULAR ACCESS Right 2013 POWER PORT   
 HX VASCULAR ACCESS  2010 PAIN PUMP  NEUROLOGICAL PROCEDURE UNLISTED   KYPHOPLASTY X2  
 NEUROLOGICAL PROCEDURE UNLISTED  10/26/15 Kyphoplasty t-5  PAIN PUMP DEVICE    
 implanted in LLQ, MORPHINE Family History:  
Problem Relation Age of Onset [facility] Arthritis-osteo Mother  Anesth Problems Neg Hx Social History Socioeconomic History  Marital status:  Spouse name: Not on file  Number of children: Not on file  Years of education: Not on file  Highest education level: Not on file Social Needs  Financial resource strain: Not on file  Food insecurity - worry: Not on file  Food insecurity - inability: Not on file  Transportation needs - medical: Not on file  Transportation needs - non-medical: Not on file Occupational History  Not on file Tobacco Use  Smoking status: Former Smoker Years: 10.00 Last attempt to quit: 10/7/2005 Years since quittin.3  Smokeless tobacco: Never Used Substance and Sexual Activity  Alcohol use: No  
 Drug use: No  
 Sexual activity: Not on file Other Topics Concern  Not on file Social History Narrative  Not on file ALLERGIES: Aggrenox [aspirin-dipyridamole]; Broccoli; Bumex [bumetanide]; Corn; Corn starch-kaolin-zinc oxide; Doxil [doxorubicin, peg-liposomal]; Flagyl [metronidazole]; Keflex [cephalexin]; Lasix [furosemide];  Abbe Arch [nitrofurantoin monohyd/m-cryst]; Methotrexate; Pcn [penicillins]; Sulfa (sulfonamide antibiotics); Tetanus and diphtheria toxoids; and Thalidomide Review of Systems Constitutional: Positive for fever. Negative for appetite change. HENT: Negative for congestion, nosebleeds and sore throat. Eyes: Negative for discharge. Respiratory: Positive for cough. Negative for shortness of breath. Cardiovascular: Negative for chest pain. Gastrointestinal: Positive for abdominal pain, diarrhea, nausea and vomiting. Negative for blood in stool. Genitourinary: Negative for dysuria. Musculoskeletal: Negative. Skin: Negative for rash. Neurological: Negative for weakness and headaches. Hematological: Negative for adenopathy. Psychiatric/Behavioral: Negative. All other systems reviewed and are negative. Vitals:  
 01/24/19 1555 BP: 131/72 Pulse: (!) 120 Resp: 18 Temp: 99.2 °F (37.3 °C) SpO2: 95% Weight: 89.2 kg (196 lb 10.4 oz) Height: 5' 4\" (1.626 m) Physical Exam  
Constitutional: She is oriented to person, place, and time. She appears well-developed and well-nourished. HENT:  
Head: Normocephalic and atraumatic. Mouth/Throat: Oropharynx is clear and moist.  
Eyes: Conjunctivae are normal.  
Neck: Normal range of motion. Neck supple. Cardiovascular: Regular rhythm. Tachycardia present. Murmur heard. Systolic murmur is present with a grade of 3/6. Pulmonary/Chest: Effort normal and breath sounds normal.  
Port in R upper chest.   
Abdominal: Soft. Bowel sounds are normal. There is tenderness (diffuse). Pain pump in LLQ. Musculoskeletal: Normal range of motion. She exhibits no edema or tenderness. Neurological: She is alert and oriented to person, place, and time. Skin: Skin is warm and dry. Psychiatric: She has a normal mood and affect. Her behavior is normal.  
Nursing note and vitals reviewed. Note written by Arnaldo Carpio, as dictated by Jorge Dominguez MD 4:32 PM 
 
MDM Number of Diagnoses or Management Options Critical Care Total time providing critical care: 30-74 minutes Procedures CONSULT NOTE: 
7:18 PM Jorge Dominguez MD spoke with Dr. Leticia Lynch, Consult for Surgery. Discussed available diagnostic tests and clinical findings. Dr. Leticia Lynch recommends medical admission. 7:43 PM 
CONSULT Dr. Ziggy Garcia - will see in consultation. Would like the hospitalist to admit. Hospitalist Floyd for Admission 7:43 PM - Dr. Peterson 
 
ED Room Number: DQ01/09 Patient Name and age:  Claudia Chavarria 68 y.o.  female Working Diagnosis: sepsis. SBO. Readmission: no 
Isolation Requirements:  no 
Recommended Level of Care:  telemetry ED EKG interpretation: 
Rhythm: sinus tachycardia; and regular . Rate (approx.): 120; Axis: normal; P wave: normal; QRS interval: normal ; ST/T wave: non-specific changes; QTc 528 ms. This EKG was interpreted by Srinivas Martin MD,ED Provider. A/P: 
1. SBO - NG tube. Surgery to follow. 2. Sepsis - Cefepime and Levofloxacin given. IVF given.

## 2019-01-25 ENCOUNTER — APPOINTMENT (OUTPATIENT)
Dept: GENERAL RADIOLOGY | Age: 74
DRG: 389 | End: 2019-01-25
Attending: SURGERY
Payer: MEDICARE

## 2019-01-25 LAB
ALBUMIN SERPL-MCNC: 2.3 G/DL (ref 3.5–5)
ALBUMIN/GLOB SERPL: 0.7 {RATIO} (ref 1.1–2.2)
ALP SERPL-CCNC: 84 U/L (ref 45–117)
ALT SERPL-CCNC: 14 U/L (ref 12–78)
ANION GAP SERPL CALC-SCNC: 5 MMOL/L (ref 5–15)
ANION GAP SERPL CALC-SCNC: 7 MMOL/L (ref 5–15)
APTT PPP: 26.1 SEC (ref 22.1–32)
APTT PPP: 39.5 SEC (ref 22.1–32)
AST SERPL-CCNC: 39 U/L (ref 15–37)
BASOPHILS # BLD: 0 K/UL (ref 0–0.1)
BASOPHILS NFR BLD: 0 % (ref 0–1)
BILIRUB SERPL-MCNC: 0.4 MG/DL (ref 0.2–1)
BUN SERPL-MCNC: 8 MG/DL (ref 6–20)
BUN SERPL-MCNC: 9 MG/DL (ref 6–20)
BUN/CREAT SERPL: 10 (ref 12–20)
BUN/CREAT SERPL: 10 (ref 12–20)
CALCIUM SERPL-MCNC: 7.2 MG/DL (ref 8.5–10.1)
CALCIUM SERPL-MCNC: 7.4 MG/DL (ref 8.5–10.1)
CHLORIDE SERPL-SCNC: 100 MMOL/L (ref 97–108)
CHLORIDE SERPL-SCNC: 105 MMOL/L (ref 97–108)
CO2 SERPL-SCNC: 30 MMOL/L (ref 21–32)
CO2 SERPL-SCNC: 31 MMOL/L (ref 21–32)
CREAT SERPL-MCNC: 0.82 MG/DL (ref 0.55–1.02)
CREAT SERPL-MCNC: 0.89 MG/DL (ref 0.55–1.02)
DIFFERENTIAL METHOD BLD: ABNORMAL
EOSINOPHIL # BLD: 0.2 K/UL (ref 0–0.4)
EOSINOPHIL NFR BLD: 1 % (ref 0–7)
ERYTHROCYTE [DISTWIDTH] IN BLOOD BY AUTOMATED COUNT: 17.3 % (ref 11.5–14.5)
ERYTHROCYTE [DISTWIDTH] IN BLOOD BY AUTOMATED COUNT: 17.5 % (ref 11.5–14.5)
GLOBULIN SER CALC-MCNC: 3.4 G/DL (ref 2–4)
GLUCOSE SERPL-MCNC: 132 MG/DL (ref 65–100)
GLUCOSE SERPL-MCNC: 99 MG/DL (ref 65–100)
HCT VFR BLD AUTO: 30 % (ref 35–47)
HCT VFR BLD AUTO: 40 % (ref 35–47)
HGB BLD-MCNC: 11.2 G/DL (ref 11.5–16)
HGB BLD-MCNC: 8.4 G/DL (ref 11.5–16)
IMM GRANULOCYTES # BLD AUTO: 0.2 K/UL (ref 0–0.04)
IMM GRANULOCYTES NFR BLD AUTO: 1 % (ref 0–0.5)
LACTATE SERPL-SCNC: 1.3 MMOL/L (ref 0.4–2)
LYMPHOCYTES # BLD: 1.1 K/UL (ref 0.8–3.5)
LYMPHOCYTES NFR BLD: 6 % (ref 12–49)
MCH RBC QN AUTO: 23.4 PG (ref 26–34)
MCH RBC QN AUTO: 23.5 PG (ref 26–34)
MCHC RBC AUTO-ENTMCNC: 28 G/DL (ref 30–36.5)
MCHC RBC AUTO-ENTMCNC: 28 G/DL (ref 30–36.5)
MCV RBC AUTO: 83.7 FL (ref 80–99)
MCV RBC AUTO: 83.8 FL (ref 80–99)
MONOCYTES # BLD: 1.4 K/UL (ref 0–1)
MONOCYTES NFR BLD: 8 % (ref 5–13)
NEUTS SEG # BLD: 14.6 K/UL (ref 1.8–8)
NEUTS SEG NFR BLD: 84 % (ref 32–75)
NRBC # BLD: 0 K/UL (ref 0–0.01)
NRBC # BLD: 0.02 K/UL (ref 0–0.01)
NRBC BLD-RTO: 0 PER 100 WBC
NRBC BLD-RTO: 0.1 PER 100 WBC
PLATELET # BLD AUTO: 209 K/UL (ref 150–400)
PLATELET # BLD AUTO: 298 K/UL (ref 150–400)
PMV BLD AUTO: 8.1 FL (ref 8.9–12.9)
PMV BLD AUTO: 8.6 FL (ref 8.9–12.9)
POTASSIUM SERPL-SCNC: 3.3 MMOL/L (ref 3.5–5.1)
POTASSIUM SERPL-SCNC: 3.4 MMOL/L (ref 3.5–5.1)
PROT SERPL-MCNC: 5.7 G/DL (ref 6.4–8.2)
RBC # BLD AUTO: 3.58 M/UL (ref 3.8–5.2)
RBC # BLD AUTO: 4.78 M/UL (ref 3.8–5.2)
RBC MORPH BLD: ABNORMAL
SODIUM SERPL-SCNC: 138 MMOL/L (ref 136–145)
SODIUM SERPL-SCNC: 140 MMOL/L (ref 136–145)
THERAPEUTIC RANGE,PTTT: ABNORMAL SECS (ref 58–77)
THERAPEUTIC RANGE,PTTT: NORMAL SECS (ref 58–77)
WBC # BLD AUTO: 17.5 K/UL (ref 3.6–11)
WBC # BLD AUTO: 8.7 K/UL (ref 3.6–11)

## 2019-01-25 PROCEDURE — 74011250637 HC RX REV CODE- 250/637: Performed by: HOSPITALIST

## 2019-01-25 PROCEDURE — 74011250636 HC RX REV CODE- 250/636: Performed by: FAMILY MEDICINE

## 2019-01-25 PROCEDURE — 74018 RADEX ABDOMEN 1 VIEW: CPT

## 2019-01-25 PROCEDURE — 74011250636 HC RX REV CODE- 250/636: Performed by: HOSPITALIST

## 2019-01-25 PROCEDURE — 65270000029 HC RM PRIVATE

## 2019-01-25 PROCEDURE — 85730 THROMBOPLASTIN TIME PARTIAL: CPT

## 2019-01-25 PROCEDURE — 74011250636 HC RX REV CODE- 250/636: Performed by: PHYSICIAN ASSISTANT

## 2019-01-25 PROCEDURE — 74011250636 HC RX REV CODE- 250/636: Performed by: INTERNAL MEDICINE

## 2019-01-25 PROCEDURE — 77010033678 HC OXYGEN DAILY

## 2019-01-25 PROCEDURE — 74019 RADEX ABDOMEN 2 VIEWS: CPT

## 2019-01-25 PROCEDURE — 85027 COMPLETE CBC AUTOMATED: CPT

## 2019-01-25 PROCEDURE — 74011000250 HC RX REV CODE- 250: Performed by: INTERNAL MEDICINE

## 2019-01-25 PROCEDURE — 36415 COLL VENOUS BLD VENIPUNCTURE: CPT

## 2019-01-25 PROCEDURE — 80048 BASIC METABOLIC PNL TOTAL CA: CPT

## 2019-01-25 PROCEDURE — 93970 EXTREMITY STUDY: CPT

## 2019-01-25 PROCEDURE — 77030027138 HC INCENT SPIROMETER -A

## 2019-01-25 PROCEDURE — 74011000250 HC RX REV CODE- 250: Performed by: PHYSICIAN ASSISTANT

## 2019-01-25 RX ORDER — HEPARIN SODIUM 10000 [USP'U]/100ML
16-36 INJECTION, SOLUTION INTRAVENOUS
Status: DISCONTINUED | OUTPATIENT
Start: 2019-01-25 | End: 2019-01-29

## 2019-01-25 RX ORDER — DEXTROSE, SODIUM CHLORIDE, AND POTASSIUM CHLORIDE 5; .45; .15 G/100ML; G/100ML; G/100ML
75 INJECTION INTRAVENOUS CONTINUOUS
Status: DISCONTINUED | OUTPATIENT
Start: 2019-01-25 | End: 2019-01-31 | Stop reason: HOSPADM

## 2019-01-25 RX ORDER — HEPARIN SODIUM 5000 [USP'U]/ML
80 INJECTION, SOLUTION INTRAVENOUS; SUBCUTANEOUS ONCE
Status: COMPLETED | OUTPATIENT
Start: 2019-01-25 | End: 2019-01-25

## 2019-01-25 RX ORDER — POTASSIUM CHLORIDE 7.45 MG/ML
10 INJECTION INTRAVENOUS
Status: COMPLETED | OUTPATIENT
Start: 2019-01-25 | End: 2019-01-25

## 2019-01-25 RX ADMIN — HEPARIN SODIUM 7150 UNITS: 5000 INJECTION, SOLUTION INTRAVENOUS; SUBCUTANEOUS at 19:43

## 2019-01-25 RX ADMIN — METHYLPREDNISOLONE SODIUM SUCCINATE 20 MG: 40 INJECTION, POWDER, FOR SOLUTION INTRAMUSCULAR; INTRAVENOUS at 10:06

## 2019-01-25 RX ADMIN — LEVOFLOXACIN 750 MG: 5 INJECTION, SOLUTION INTRAVENOUS at 23:20

## 2019-01-25 RX ADMIN — DEXTROSE MONOHYDRATE, SODIUM CHLORIDE, AND POTASSIUM CHLORIDE 75 ML/HR: 50; 4.5; 1.49 INJECTION, SOLUTION INTRAVENOUS at 09:14

## 2019-01-25 RX ADMIN — ONDANSETRON 4 MG: 2 INJECTION INTRAMUSCULAR; INTRAVENOUS at 09:39

## 2019-01-25 RX ADMIN — MORPHINE SULFATE 2 MG: 2 INJECTION, SOLUTION INTRAMUSCULAR; INTRAVENOUS at 09:39

## 2019-01-25 RX ADMIN — POTASSIUM CHLORIDE 10 MEQ: 7.46 INJECTION, SOLUTION INTRAVENOUS at 01:54

## 2019-01-25 RX ADMIN — MORPHINE SULFATE 2 MG: 2 INJECTION, SOLUTION INTRAMUSCULAR; INTRAVENOUS at 13:58

## 2019-01-25 RX ADMIN — POTASSIUM CHLORIDE 10 MEQ: 7.46 INJECTION, SOLUTION INTRAVENOUS at 04:35

## 2019-01-25 RX ADMIN — MORPHINE SULFATE 2 MG: 2 INJECTION, SOLUTION INTRAMUSCULAR; INTRAVENOUS at 22:14

## 2019-01-25 RX ADMIN — FAMOTIDINE 20 MG: 10 INJECTION, SOLUTION INTRAVENOUS at 13:02

## 2019-01-25 RX ADMIN — MORPHINE SULFATE 2 MG: 2 INJECTION, SOLUTION INTRAMUSCULAR; INTRAVENOUS at 18:40

## 2019-01-25 RX ADMIN — MORPHINE SULFATE 2 MG: 2 INJECTION, SOLUTION INTRAMUSCULAR; INTRAVENOUS at 01:42

## 2019-01-25 RX ADMIN — HEPARIN SODIUM AND DEXTROSE 16 UNITS/KG/HR: 10000; 5 INJECTION INTRAVENOUS at 12:47

## 2019-01-25 RX ADMIN — CHLORASEPTIC 1 SPRAY: 1.5 LIQUID ORAL at 12:04

## 2019-01-25 RX ADMIN — SODIUM CHLORIDE 10 ML: 9 INJECTION, SOLUTION INTRAMUSCULAR; INTRAVENOUS; SUBCUTANEOUS at 22:15

## 2019-01-25 RX ADMIN — POTASSIUM CHLORIDE 10 MEQ: 7.46 INJECTION, SOLUTION INTRAVENOUS at 03:48

## 2019-01-25 RX ADMIN — SODIUM CHLORIDE 20 ML: 9 INJECTION, SOLUTION INTRAMUSCULAR; INTRAVENOUS; SUBCUTANEOUS at 13:02

## 2019-01-25 RX ADMIN — FAMOTIDINE 20 MG: 10 INJECTION, SOLUTION INTRAVENOUS at 23:20

## 2019-01-25 RX ADMIN — DEXTROSE MONOHYDRATE, SODIUM CHLORIDE, AND POTASSIUM CHLORIDE 75 ML/HR: 50; 4.5; 1.49 INJECTION, SOLUTION INTRAVENOUS at 22:35

## 2019-01-25 NOTE — PROGRESS NOTES
Hospitalist Progress Note Nasreen Vyas MD 
Answering service: [phone] OR [phone] from in house phone Date of Service:  2019 NAME:  Sarah Villavicencio :  1945 MRN:  [id number] Admission Summary:  
Sarah Villavicencio is a 68 y.o. female with extensive PMH of multiple hernia surgeries, MM, RA, DVT, hld, chronic anemia, who presents with abdominal pain. Interval history / Subjective:  
  Pt seen in follow up of bowel obstruction She denies passing gas or moving her bowels NG in place. Assessment & Plan: SBO Likley from Adhesions - pt had previous surgeries Continue bowel rest 
SeraiL abd checks Surgery consulted and following On Levaquin She has Multiple other allergies - flagyl and pcn being one of them Will request Dr Duy Ansari consult per pt request 
NG tube to Intermittent suction H/o Allergic rash On Prednisone  Po 15mg chronically - switch to IV solumedrol for equivalency GERD On PPI 
 
PMH of Rheumatoid Arthritis, Hyperlipidemia and Chronic anemia Chronic and stable H/o Right Leg DVT Check Duplex for Followup Switch from po xarelto to heparin for coverage if surgery planned Hypokalemia Repleted Code status: Full DVT prophylaxis: SCD Care Plan discussed with: Patient/Family and Nurse - D/W  and daughter in room Patient has given Verbal permission to discuss medical care with  
persons present in the room and and also with contact as listed on face sheet. Disposition: TBD Hospital Problems  Date Reviewed: 3/23/2018 Codes Class Noted POA  
 SBO (small bowel obstruction) [facility] ICD-10-CM: C21.213 ICD-9-CM: 560.9  2019 Unknown Review of Systems:  
Pertinent items are noted in HPI. Vital Signs:  
 Last 24hrs VS reviewed since prior progress note. Most recent are: 
Visit Vitals /65 Pulse 95 Temp 98 °F (36.7 °C) Resp 16 Ht 5' 4\" (1.626 m) Wt 89.2 kg (196 lb 10.4 oz) SpO2 98% Breastfeeding? No  
BMI 33.76 kg/m² Intake/Output Summary (Last 24 hours) at 1/25/2019 0935 Last data filed at 1/25/2019 6242 Gross per 24 hour Intake 1200 ml Output  Net 1200 ml Physical Examination:  
 
 
     
Constitutional:  Mild Distress. , cooperative, pleasant  NG in place with Green Drainage ENT:  Oral mucous moist, oropharynx benign. Neck supple, Resp:  CTA bilaterally. No wheezing/rhonchi/rales. No accessory muscle use CV:  Regular rhythm, normal rate, no murmurs, gallops, rubs GI:  Distended, Sluggish Bowel sounds. no hepatosplenomegaly Musculoskeletal:  No edema, warm, 2+ pulses throughout Neurologic:  Moves all extremities. AAOx3, CN II-XII reviewed Skin:  Good turgor, no rashes or ulcers Data Review:  
 Review and/or order of clinical lab test 
Review and/or order of tests in the radiology section of CPT Review and/or order of tests in the medicine section of CPT Labs:  
 
Recent Labs  
  01/25/19 0754 01/24/19 2333 WBC 8.7 17.5* HGB 11.2* 8.4* HCT 40.0 30.0*  
 298 Recent Labs  
  01/25/19 
0754 01/24/19 
2333 01/24/19 
1701  138 136  
K 3.4* 3.3* 3.2*  
 100 94* CO2 30 31 31 BUN 8 9 9 CREA 0.82 0.89 1.01  
GLU 99 132* 142* CA 7.2* 7.4* 8.9 Recent Labs  
  01/24/19 
2333 01/24/19 
1701 SGOT 39* 46* ALT 14 14 AP 84 101 TBILI 0.4 0.5 TP 5.7* 7.2 ALB 2.3* 3.1*  
GLOB 3.4 4.1*  
LPSE  --  77 No results for input(s): INR, PTP, APTT in the last 72 hours. No lab exists for component: INREXT No results for input(s): FE, TIBC, PSAT, FERR in the last 72 hours. Lab Results Component Value Date/Time Folate 34.3 (H) 05/28/2010 04:30 AM  
  
No results for input(s): PH, PCO2, PO2 in the last 72 hours. Recent Labs  
  01/24/19 
1701 TROIQ <0.05 Lab Results Component Value Date/Time Cholesterol, total 137 03/06/2017 08:01 AM  
 HDL Cholesterol 47 03/06/2017 08:01 AM  
 LDL, calculated 38 03/06/2017 08:01 AM  
 Triglyceride 260 (H) 03/06/2017 08:01 AM  
 CHOL/HDL Ratio 5.0 12/16/2011 05:05 AM  
 
Lab Results Component Value Date/Time Glucose (POC) 113 (H) 06/02/2016 11:06 AM  
 Glucose (POC) 87 09/15/2012 08:29 PM  
 Glucose (POC) 87 09/15/2012 11:58 AM  
 
Lab Results Component Value Date/Time Color YELLOW/STRAW 01/24/2019 08:09 PM  
 Appearance CLEAR 01/24/2019 08:09 PM  
 Specific gravity <1.005 01/24/2019 08:09 PM  
 Specific gravity 1.008 03/01/2016 03:44 PM  
 pH (UA) 7.5 01/24/2019 08:09 PM  
 Protein TRACE (A) 01/24/2019 08:09 PM  
 Glucose NEGATIVE  01/24/2019 08:09 PM  
 Ketone TRACE (A) 01/24/2019 08:09 PM  
 Bilirubin NEGATIVE  01/24/2019 08:09 PM  
 Urobilinogen 1.0 01/24/2019 08:09 PM  
 Nitrites NEGATIVE  01/24/2019 08:09 PM  
 Leukocyte Esterase NEGATIVE  01/24/2019 08:09 PM  
 Epithelial cells FEW 01/24/2019 08:09 PM  
 Bacteria NEGATIVE  01/24/2019 08:09 PM  
 WBC 0-4 01/24/2019 08:09 PM  
 RBC 10-20 01/24/2019 08:09 PM  
 
 
 
Medications Reviewed:  
 
Current Facility-Administered Medications Medication Dose Route Frequency  dextrose 5% - 0.45% NaCl with KCl 20 mEq/L infusion  75 mL/hr IntraVENous CONTINUOUS  
 heparin 25,000 units in D5W 250 ml infusion  18-36 Units/kg/hr IntraVENous TITRATE  methylPREDNISolone (PF) (SOLU-MEDROL) injection 20 mg  20 mg IntraVENous DAILY  triamcinolone acetonide (KENALOG) 0.1 % ointment   Topical BID  spironolactone (ALDACTONE) tablet 50 mg  50 mg Oral BID  fluticasone (FLONASE) 50 mcg/actuation nasal spray 2 Spray  2 Spray Both Nostrils BID  potassium chloride SR (KLOR-CON 10) tablet 20 mEq  20 mEq Oral DAILY  . PHARMACY TO SUBSTITUTE PER PROTOCOL (Reordered from: Milnacipran (SAVELLA) 50 mg tablet)    Per Protocol  amitriptyline (ELAVIL) tablet 100 mg  100 mg Oral QHS  gabapentin (NEURONTIN) capsule 300 mg  300 mg Oral QID  lubiPROStone (AMITIZA) capsule 24 mcg  24 mcg Oral DAILY WITH BREAKFAST  simvastatin (ZOCOR) tablet 20 mg  20 mg Oral QHS  sodium chloride (NS) flush 5-40 mL  5-40 mL IntraVENous Q8H  
 sodium chloride (NS) flush 5-40 mL  5-40 mL IntraVENous PRN  
 morphine injection 2 mg  2 mg IntraVENous Q4H PRN  
 ondansetron (ZOFRAN) injection 4 mg  4 mg IntraVENous Q4H PRN  
 levoFLOXacin (LEVAQUIN) 750 mg in D5W IVPB  750 mg IntraVENous Q24H  
 
______________________________________________________________________ EXPECTED LENGTH OF STAY: - - - 
ACTUAL LENGTH OF STAY:          1 
 
            
Heather Alvarado MD

## 2019-01-25 NOTE — PROGRESS NOTES
Admission Medication Reconciliation: 
 
Information obtained from: daughter and prescription bottles. Significant PMH/Disease States:  
Past Medical History:  
Diagnosis Date  Anemia  Aneurysm ([facility] 3/2005 HX LEFT OPTIC NERVE  Anxiety  Chronic pain   
 spinal stenosis per pt  Fibromyalgia  GERD (gastroesophageal reflux disease)  High cholesterol  History of acute pancreatitis  History of blood transfusion  History of Salmonella infection 2009  Multiple myeloma ([facility] 2007  Osteoporosis  Other [facility] 1[facility]29/2013  Recurrent umbilical hernia 9/45/8623  Recurrent ventral incisional hernia 3/28/2016  Rheumatoid arthritis(714.0)  Sleep apnea INTOLERATANT OF CPAP  Thromboembolus ([facility] Right leg DVT  Urinary incontinence Chief Complaint for this Admission:  abdominal pain Allergies:  Aggrenox [aspirin-dipyridamole]; Broccoli; Bumex [bumetanide]; Corn; Corn starch-kaolin-zinc oxide; Doxil [doxorubicin, peg-liposomal]; Flagyl [metronidazole]; Keflex [cephalexin]; Lasix [furosemide]; Macrobid [nitrofurantoin monohyd/m-cryst]; Methotrexate; Pcn [penicillins]; Sulfa (sulfonamide antibiotics); Tetanus and diphtheria toxoids; and Thalidomide Prior to Admission Medications:  
Prior to Admission Medications Prescriptions Last Dose Informant Patient Reported? Taking? B.infantis-B.ani-B.long-B.bifi 10-15 mg TbEC   Yes No  
Sig: Take 1 Tab by mouth every morning. Dexlansoprazole (DEXILANT) 60 mg CpDB   No No  
Sig: TAKE 1 CAPSULE BY MOUTH EVERY DAY  
HYDROmorphone (DILAUDID) 4 mg tablet   Yes Yes Sig: Take 4-8 mg by mouth every four (4) hours as needed for Pain. Took 8 mg at 6 am 7-13-18 Milnacipran (SAVELLA) 50 mg tablet   No No  
Sig: TAKE 1 TABLET BY MOUTH TWICE DAILY  
OTHER,NON-FORMULARY,  Self Yes No  
Sig: LLQ Morphine/bupivacaine pain pump for multiple myeloma. 4.287mg/3.125 mg per day acetaminophen (TYLENOL) 500 mg tablet   Yes No  
Sig: Take 1,000 mg by mouth every six (6) hours as needed for Pain. aluminum hydrox-magnesium carb (GAVISCON)  mg/15 mL suspension   Yes No  
Sig: Take 15 mL by mouth every six (6) hours as needed for Indigestion. amitriptyline (ELAVIL) 50 mg tablet   No No  
Sig: TAKE TWO TABLETS BY MOUTH AT BEDTIME AS NEEDED FOR SLEEP  
ammonium lactate (LAC-HYDRIN) 12 % lotion   Yes No  
betamethasone dipropionate (DIPROLENE) 0.05 % ointment   Yes No  
Sig: Apply  to affected area two (2) times a day. calcium carbonate (TUMS) 200 mg calcium (500 mg) chew   Yes No  
Sig: Take 1 Tab by mouth as needed. calcium citrate-vitamin D3 (CITRACAL WITH VITAMIN D MAXIMUM) tablet   Yes No  
Sig: Take  by mouth two (2) times a day. cetirizine (ZYRTEC) 10 mg tablet   Yes Yes Sig: Take 10 mg by mouth nightly. diazepam (VALIUM) 10 mg tablet   Yes Yes Sig: Take 10 mg by mouth two (2) times a day. docusate sodium (COLACE) 100 mg capsule   Yes No  
Sig: Take 400 mg by mouth. 1 capsule in the morning and 3 capsule at night  
fluticasone (FLONASE) 50 mcg/actuation nasal spray   No No  
Si Sprays by Both Nostrils route two (2) times a day.  
gabapentin (NEURONTIN) 300 mg capsule   No Yes Sig: TAKE ONE CAPSULE BY MOUTH FOUR TIMES A DAY  
guaiFENesin SR (MUCINEX) 600 mg SR tablet   Yes No  
Sig: Take 1,200 mg by mouth as needed. lubiPROStone (AMITIZA) 24 mcg capsule   No No  
Sig: TAKE 1 CAPSULE BY MOUTH TWICE DAILY  
magnesium hydroxide (MILK OF MAGNESIA) 400 mg/5 mL suspension   Yes No  
Sig: Take 30 mL by mouth daily as needed for Constipation. mirabegron ER (MYRBETRIQ) 50 mg ER tablet   Yes Yes Sig: Take 50 mg by mouth daily. ondansetron (ZOFRAN ODT) 4 mg disintegrating tablet   No No  
Sig: Take 1 Tab by mouth every six (6) hours as needed for Nausea.  Indications: PREVENTION OF POST-OPERATIVE NAUSEA AND VOMITING  
 polyethylene glycol (MIRALAX) 17 gram packet   Yes No  
Sig: Take 17 g by mouth daily as needed. potassium chloride (K-DUR, KLOR-CON) 20 mEq tablet   No Yes Sig: TAKE ONE TABLET BY MOUTH EVERY DAY  
predniSONE (DELTASONE) 10 mg tablet   Yes No  
Sig: Take 15 mg by mouth daily. prochlorperazine (COMPAZINE) 5 mg tablet  Self Yes No  
Sig: Take 5 mg by mouth every six (6) hours as needed for Nausea. raNITIdine (ZANTAC) 150 mg tablet   Yes Yes Sig: Take 150 mg by mouth two (2) times a day. rivaroxaban (XARELTO) 10 mg tablet   Yes No  
Sig: Take 10 mg by mouth daily. senna (SENOKOT) 8.6 mg tablet   Yes No  
Sig: Take 4 Tabs by mouth two (2) times a day. 4 PILLS IN AM  4 PILLS IN PM  
simvastatin (ZOCOR) 20 mg tablet   No No  
Sig: TAKE ONE TABLET BY MOUTH AT BEDTIME  
spironolactone (ALDACTONE) 50 mg tablet   No Yes Sig: Take 1 Tab by mouth two (2) times a day. Indications: EDEMA Facility-Administered Medications: None Comments/Recommendations: Medication review attempted with daughter and patient's prescription bottles. Able to verify cetirizine, diazepam, hydromorphone, prednisone, ranitidine, rivaroxaban, spironolactone, and Myrbetriq. Rivaroxaban dose changed from 20mg to 10mg as per rx bottle. Will contact provider with new dose information. Prednisone dose changed per rx bottle. Will contact provider with new dose information.

## 2019-01-25 NOTE — PROGRESS NOTES
Care Management Interventions PCP Verified by CM: Yes 
Palliative Care Criteria Met (RRAT>21 & CHF Dx)?: No 
Mode of Transport at Discharge: Self Transition of Care Consult (CM Consult): Other(no needs identified at this time will follow) MyChart Signup: No 
Discharge Durable Medical Equipment: No 
Health Maintenance Reviewed: Yes Physical Therapy Consult: No 
Occupational Therapy Consult: No 
Speech Therapy Consult: No 
Current Support Network: Lives with Spouse Confirm Follow Up Transport: Family Plan discussed with Pt/Family/Caregiver: Yes Freedom of Choice Offered: Yes [employer] Information Provided?: No 
Discharge Location Discharge Placement: Home Reason for Admission: Small [place] Obstruction RRAT Score:     13 Plan for utilizing home health:      No need at this time Likelihood of Readmission:  low Transition of Care Plan:      Home with spouse

## 2019-01-25 NOTE — PROGRESS NOTES
TRANSFER - IN REPORT: 
 
Verbal report received from GONZÁLEZ Fine(name) on Maria Del Carmen Melchor  being received from ED(unit) for routine progression of care Report consisted of patients Situation, Background, Assessment and  
Recommendations(SBAR). Information from the following report(s) SBAR, Kardex, Intake/Output, MAR, Accordion and Recent Results was reviewed with the receiving nurse. Opportunity for questions and clarification was provided. Assessment completed upon patients arrival to unit and care assumed.

## 2019-01-25 NOTE — PROGRESS NOTES
· Surgery Progress Note 1/25/2019 Admit Date: 1/24/2019 Subjective:  
 
  Pt has complaint of chronic back pain, she has an implanted pain pump but is never pain free at her baseline. no abdominal pain but occasional cramping, NGT is uncomfortable . Pts pain present - adequately treated. No SOB. No CP. Pt is ambulating. Patient 's current diet is NPO. Eura Bao Pt reports  no nausea and no vomiting. Pt reports no fever or chills Bowel Movements: None and no flatus Objective:  
 
Blood pressure 117/65, pulse 95, temperature 98 °F (36.7 °C), resp. rate 16, height 5' 4\" (1.626 m), weight 196 lb 10.4 oz (89.2 kg), SpO2 98 %, not currently breastfeeding. 01/25 0701 - 01/25 1900 In: 1200 [I.V.:1200] Out: - No intake/output data recorded. General appearance: alert, cooperative, no distress, appears stated age, morbidly obese, NGT in place with bilious output Lungs: clear to auscultation bilaterally Heart: regular rate and rhythm Abd:distended, normal bowel sounds, tenderness mild - in the upper abdomen, without guarding, without rebound, well healed surgical scars noted, no appreciable hernia Extremities: no edema Neurologic: Grossly normal 
 
Data Review Recent Results (from the past 12 hour(s)) METABOLIC PANEL, COMPREHENSIVE Collection Time: 01/24/19 11:33 PM  
Result Value Ref Range Sodium 138 136 - 145 mmol/L Potassium 3.3 (L) 3.5 - 5.1 mmol/L Chloride 100 97 - 108 mmol/L  
 CO2 31 21 - 32 mmol/L Anion gap 7 5 - 15 mmol/L Glucose 132 (H) 65 - 100 mg/dL BUN 9 6 - 20 MG/DL Creatinine 0.89 0.55 - 1.02 MG/DL  
 BUN/Creatinine ratio 10 (L) 12 - 20 GFR est AA >60 >60 ml/min/1.73m2 GFR est non-AA >60 >60 ml/min/1.73m2 Calcium 7.4 (L) 8.5 - 10.1 MG/DL Bilirubin, total 0.4 0.2 - 1.0 MG/DL  
 ALT (SGPT) 14 12 - 78 U/L  
 AST (SGOT) 39 (H) 15 - 37 U/L Alk. phosphatase 84 45 - 117 U/L Protein, total 5.7 (L) 6.4 - 8.2 g/dL Albumin 2.3 (L) 3.5 - 5.0 g/dL Globulin 3.4 2.0 - 4.0 g/dL A-G Ratio 0.7 (L) 1.1 - 2.2    
CBC WITH AUTOMATED DIFF Collection Time: 01/24/19 11:33 PM  
Result Value Ref Range WBC 17.5 (H) 3.6 - 11.0 K/uL  
 RBC 3.58 (L) 3.80 - 5.20 M/uL HGB 8.4 (L) 11.5 - 16.0 g/dL HCT 30.0 (L) 35.0 - 47.0 % MCV 83.8 80.0 - 99.0 FL  
 MCH 23.5 (L) 26.0 - 34.0 PG  
 MCHC 28.0 (L) 30.0 - 36.5 g/dL  
 RDW 17.3 (H) 11.5 - 14.5 % PLATELET 277 305 - 439 K/uL MPV 8.6 (L) 8.9 - 12.9 FL  
 NRBC 0.1 (H) 0  WBC ABSOLUTE NRBC 0.02 (H) 0.00 - 0.01 K/uL NEUTROPHILS 84 (H) 32 - 75 % LYMPHOCYTES 6 (L) 12 - 49 % MONOCYTES 8 5 - 13 % EOSINOPHILS 1 0 - 7 % BASOPHILS 0 0 - 1 % IMMATURE GRANULOCYTES 1 (H) 0.0 - 0.5 % ABS. NEUTROPHILS 14.6 (H) 1.8 - 8.0 K/UL  
 ABS. LYMPHOCYTES 1.1 0.8 - 3.5 K/UL  
 ABS. MONOCYTES 1.4 (H) 0.0 - 1.0 K/UL  
 ABS. EOSINOPHILS 0.2 0.0 - 0.4 K/UL  
 ABS. BASOPHILS 0.0 0.0 - 0.1 K/UL  
 ABS. IMM. GRANS. 0.2 (H) 0.00 - 0.04 K/UL  
 DF SMEAR SCANNED    
 RBC COMMENTS ANISOCYTOSIS 1+ 
    
 RBC COMMENTS POLYCHROMASIA PRESENT 
    
 RBC COMMENTS TEARDROP CELLS 
PRESENT 
    
 RBC COMMENTS OVALOCYTES PRESENT 
    
 RBC COMMENTS HYPOCHROMIA 1+ LACTIC ACID Collection Time: 01/24/19 11:33 PM  
Result Value Ref Range Lactic acid 1.3 0.4 - 2.0 MMOL/L  
METABOLIC PANEL, BASIC Collection Time: 01/25/19  7:54 AM  
Result Value Ref Range Sodium 140 136 - 145 mmol/L Potassium 3.4 (L) 3.5 - 5.1 mmol/L Chloride 105 97 - 108 mmol/L  
 CO2 30 21 - 32 mmol/L Anion gap 5 5 - 15 mmol/L Glucose 99 65 - 100 mg/dL BUN 8 6 - 20 MG/DL Creatinine 0.82 0.55 - 1.02 MG/DL  
 BUN/Creatinine ratio 10 (L) 12 - 20 GFR est AA >60 >60 ml/min/1.73m2 GFR est non-AA >60 >60 ml/min/1.73m2 Calcium 7.2 (L) 8.5 - 10.1 MG/DL  
CBC W/O DIFF Collection Time: 01/25/19  7:54 AM  
Result Value Ref Range WBC 8.7 3.6 - 11.0 K/uL  
 RBC 4.78 3.80 - 5.20 M/uL HGB 11.2 (L) 11.5 - 16.0 g/dL HCT 40.0 35.0 - 47.0 % MCV 83.7 80.0 - 99.0 FL  
 MCH 23.4 (L) 26.0 - 34.0 PG  
 MCHC 28.0 (L) 30.0 - 36.5 g/dL  
 RDW 17.5 (H) 11.5 - 14.5 % PLATELET 200 237 - 285 K/uL MPV 8.1 (L) 8.9 - 12.9 FL  
 NRBC 0.0 0  WBC ABSOLUTE NRBC 0.00 0.00 - 0.01 K/uL PTT Collection Time: 01/25/19 10:02 AM  
Result Value Ref Range aPTT 26.1 22.1 - 32.0 sec  
 aPTT, therapeutic range     58.0 - 77.0 SECS  
 
AXR today IMPRESSION IMPRESSION: Persistent small bowel dilation and air-fluid levels. Assessment:  
 
Active Problems: 
  SBO (small bowel obstruction) ([facility] [address] (1/24/2019) hx of VHR x 4 by Dr. Julio C Perez Hx DVT on Xarelto Chronic pain with implanted pain pump Hx MM Plan:  
[address] and NGT to LIS,  abdominal films in AM, await return of bowel function Pain management--IV morphine PRN back pain GI Prophylaxis- pepcid ordered OOB to chair daily NATHANAEL Whitlock Pt is on Heparin gtt as xarelto is held Her white count has normalized --labs unremarkable Pt has made it clear that if she requires surgery on this admission she is requesting Dr Julio C Perez, she understands that if he is not available and she should need  urgent surgery, it would be one of his associates. Her last surgery required a 20x 30 cm piece of mesh per Op report. At this time no acute surgical indication and the hope is she will open up with NGT decompression, bowel rest and ambulation. Check x ray tomorrow, Further plan per Dr. Philippe Johnson PA-C

## 2019-01-25 NOTE — ED NOTES
Bedside and Verbal shift change report given to [facility] (oncoming nurse) by Franny Herrera (offgoing nurse). Report included the following information SBAR, ED Summary, MAR and Recent Results.

## 2019-01-25 NOTE — PROGRESS NOTES
Bedside and Verbal shift change report given to Suhail Barbour RN (oncoming nurse) by Criss Urban RN (offgoing nurse). Report included the following information SBAR, Kardex and MAR.

## 2019-01-25 NOTE — ROUTINE PROCESS
TRANSFER - OUT REPORT: 
 
Verbal report given to Eladio(name) on Claudia Chavarria  being transferred to (unit) for routine progression of care Report consisted of patients Situation, Background, Assessment and  
Recommendations(SBAR). Information from the following report(s) SBAR and ED Summary was reviewed with the receiving nurse. Lines:  
Venous Access Device right chest portacath 01/07/16 Upper chest (subclavicular area, right (Active) Opportunity for questions and clarification was provided. Patient transported with: 
 [employer]

## 2019-01-25 NOTE — PROCEDURES
[facility] 
*** FINAL REPORT *** Name: Damir Morgan MRN: [id number]    Inpatient : 06 Dec 1945 [facility] Order #: [id number] [facility] Visit #: [id number] Date: 2019 TYPE OF TEST: Peripheral Venous Testing REASON FOR TEST Pain in limb, Limb swelling Right Leg:- 
Deep venous thrombosis:           No 
Superficial venous thrombosis:    No 
Deep venous insufficiency:        Not examined Superficial venous insufficiency: Not examined Left Leg:- 
Deep venous thrombosis:           No 
Superficial venous thrombosis:    No 
Deep venous insufficiency:        Not examined Superficial venous insufficiency: Not examined INTERPRETATION/FINDINGS 
PROCEDURE:  Color duplex ultrasound imaging of lower extremity veins. FINDINGS: 
     Right: The common femoral, deep femoral, femoral, popliteal, 
posterior tibial, peroneal, and great saphenous are patent and without 
 evidence of thrombus;  each is fully compressible and there is no 
narrowing of the flow channel on color Doppler imaging. There is 
limited visualization of the calf veins. Phasic flow is observed in 
the common femoral vein. Left:   The common femoral, deep femoral, femoral, popliteal, 
posterior tibial, peroneal, and great saphenous are patent and without 
 evidence of thrombus;  each is fully compressible and there is no 
narrowing of the flow channel on color Doppler imaging. Phasic flow 
is observed in the common femoral vein. IMPRESSION:  No evidence of right or left lower extremity vein 
thrombosis where seen. Due to limited visualization of the right calf 
 veins, isolated thrombosis cannot be completely excluded. ADDITIONAL COMMENTS I have personally reviewed the data relevant to the interpretation of 
this 
study. TECHNOLOGIST: Carlos Pedroza RVT Signed: 2019 01:56 PM 
 
PHYSICIAN: Nikolay Arnold MD 
Signed: 2019 10:56 AM

## 2019-01-25 NOTE — CONSULTS
Surgery Consult Subjective:  
  
[place] is a 68 y.o. female who began having abdominal pain last evening. With this she had nausea vomiting. She also noted Diarrhea. She had a subjective fever at home . The patient has a history of multiple hernia repair in the past. She has multiple medical problems such asanemia, high cholesterol, urinary incontinence, pancreatitis, recurrent umbilical hernia, RA, aneurysm, thromboembolus, and multiple myeloma. She also has chronic pain for which she has a pain pump and is on 8mg of dialudid every 3 hours. Patient Active Problem List  
 Diagnosis Date Noted  Recurrent ventral incisional hernia 03/28/2016  Advance directive on file 03/09/2016  Acute bronchitis 03/03/2016  Sinusitis 03/03/2016  Dysphagia 01/07/2016  Umbilical hernia 98/73/2757  Recurrent umbilical hernia 07/39/9312  Other complication due to venous access device ([address] 01/29/2013  Infected seroma, postoperative 09/13/2012  Multiple myeloma ([address] 09/29/2009  Fibromyalgia 09/29/2009  GERD (gastroesophageal reflux disease) 09/29/2009  Rheumatoid arthritis involving multiple sites with positive rheumatoid factor ([address] 09/29/2009 Past Medical History:  
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   
 spinal stenosis per pt  Fibromyalgia  GERD (gastroesophageal reflux disease)  High cholesterol  History of acute pancreatitis  History of blood transfusion  History of Salmonella infection 2009  Multiple myeloma ([address] 2007  Osteoporosis  Other complication due to venous access device ([address] 1/29/2013  Recurrent umbilical hernia 8/76/4347  Recurrent ventral incisional hernia 3/28/2016  Rheumatoid arthritis(714.0)  Sleep apnea INTOLERATANT OF CPAP  Thromboembolus ([address] Right leg DVT  Urinary incontinence Past Surgical History: Procedure Laterality Date  HX CHOLECYSTECTOMY    HX CRANIOTOMY    
 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    
 COLONOSCOPIES, EGD  HX GI    
 ESOPHAGUS STREACHED  
 HX HEENT  10/2011  
 b/l cataract surgery in 2011  HX HERNIA REPAIR  [id number] X6  
 HX HERNIA REPAIR  10-13-14  
 repair of recurrent ventral incisional hernia  with primary suture [id number]. Alvaro Martínez  HX KYPHOPLASTY  2017 L4  
 HX ORTHOPAEDIC Right  BONE SPURS SHOULDER  
 HX OTHER SURGICAL   LLQ PAIN PUMP FOR MORPHINE INFUSION  
 HX OTHER SURGICAL  14  
 repair of recurrent umbilical hernia with ventralex pillow top mesh and extensive lysis of [id number]-EXDR. Alvaro Martínez  HX OTHER SURGICAL  16  
 repair of recurrent ventral incisional hernia with underlay mesh-[facility]-Dr. Alvaro Martínez  HX VASCULAR ACCESS Right 2013 POWER PORT   
 HX VASCULAR ACCESS  2010 PAIN PUMP  NEUROLOGICAL PROCEDURE UNLISTED   KYPHOPLASTY X2  
 NEUROLOGICAL PROCEDURE UNLISTED  10/26/15 Kyphoplasty t-5  PAIN PUMP DEVICE    
 implanted in LLQ, MORPHINE Social History Tobacco Use  Smoking status: Former Smoker Years: 10.00 Last attempt to quit: 10/7/2005 Years since quittin.3  Smokeless tobacco: Never Used Substance Use Topics  Alcohol use: No  
  
Family History Problem Relation Age of Onset 24 [facility] Arthritis-osteo Mother  Anesth Problems Neg Hx Current Facility-Administered Medications Medication Dose Route Frequency  sodium chloride 0.9 % bolus infusion 641 mL  641 mL IntraVENous ONCE  
 levoFLOXacin (LEVAQUIN) 750 mg in D5W IVPB  750 mg IntraVENous NOW  cefepime (MAXIPIME) 2 g in 0.9% sodium chloride (MBP/ADV) 100 mL  2 g IntraVENous NOW  lidocaine (XYLOCAINE) 2 % viscous solution 10 mL  10 mL Mouth/Throat NOW Current Outpatient Medications Medication Sig  
  amitriptyline (ELAVIL) 50 mg tablet TAKE TWO TABLETS BY MOUTH AT BEDTIME AS NEEDED FOR SLEEP  
 ammonium lactate (LAC-HYDRIN) 12 % lotion  gabapentin (NEURONTIN) 300 mg capsule TAKE ONE CAPSULE BY MOUTH FOUR TIMES A DAY  simvastatin (ZOCOR) 20 mg tablet TAKE ONE TABLET BY MOUTH AT BEDTIME  
 potassium chloride (K-DUR, KLOR-CON) 20 mEq tablet TAKE ONE TABLET BY MOUTH EVERY DAY  lubiPROStone (AMITIZA) 24 mcg capsule TAKE 1 CAPSULE BY MOUTH TWICE DAILY  Milnacipran (SAVELLA) 50 mg tablet TAKE 1 TABLET BY MOUTH TWICE DAILY  Dexlansoprazole (DEXILANT) 60 mg CpDB TAKE 1 CAPSULE BY MOUTH EVERY DAY  fluticasone (FLONASE) 50 mcg/actuation nasal spray 2 Sprays by Both Nostrils route two (2) times a day.  rivaroxaban (XARELTO) 20 mg tab tablet Take  by mouth daily.  raNITIdine (ZANTAC) 150 mg tablet Take 150 mg by mouth two (2) times a day.  predniSONE (DELTASONE) 10 mg tablet Take 20 mg by mouth every other day.  spironolactone (ALDACTONE) 50 mg tablet Take 1 Tab by mouth two (2) times a day. Indications: EDEMA  ondansetron (ZOFRAN ODT) 4 mg disintegrating tablet Take 1 Tab by mouth every six (6) hours as needed for Nausea. Indications: PREVENTION OF POST-OPERATIVE NAUSEA AND VOMITING  
 B.infantis-B.ani-B.long-B.bifi 10-15 mg TbEC Take 1 Tab by mouth every morning.  aluminum hydrox-magnesium carb (GAVISCON)  mg/15 mL suspension Take 15 mL by mouth every six (6) hours as needed for Indigestion.  calcium carbonate (TUMS) 200 mg calcium (500 mg) chew Take 1 Tab by mouth as needed.  calcium citrate-vitamin D3 (CITRACAL WITH VITAMIN D MAXIMUM) tablet Take  by mouth two (2) times a day.  magnesium hydroxide (MILK OF MAGNESIA) 400 mg/5 mL suspension Take 30 mL by mouth daily as needed for Constipation.  betamethasone dipropionate (DIPROLENE) 0.05 % ointment Apply  to affected area two (2) times a day.  acetaminophen (TYLENOL) 500 mg tablet Take 1,000 mg by mouth every six (6) hours as needed for Pain.  guaiFENesin SR (MUCINEX) 600 mg SR tablet Take 1,200 mg by mouth as needed.  OTHER,NON-FORMULARY, LLQ Morphine/bupivacaine pain pump for multiple myeloma. 4.287mg/3.125 mg per day  cetirizine (ZYRTEC) 10 mg tablet Take 10 mg by mouth nightly.  diazepam (VALIUM) 10 mg tablet Take 10 mg by mouth two (2) times a day.  docusate sodium (COLACE) 100 mg capsule Take 400 mg by mouth. 1 capsule in the morning and 3 capsule at night  polyethylene glycol (MIRALAX) 17 gram packet Take 17 g by mouth daily as needed.  senna (SENOKOT) 8.6 mg tablet Take 4 Tabs by mouth two (2) times a day. 4 PILLS IN AM  4 PILLS IN PM  
 HYDROmorphone (DILAUDID) 4 mg tablet Take 4-8 mg by mouth every four (4) hours as needed for Pain. Took 8 mg at 6 am 7-13-18  prochlorperazine (COMPAZINE) 5 mg tablet Take 5 mg by mouth every six (6) hours as needed for Nausea. Allergies Allergen Reactions  Aggrenox [Aspirin-Dipyridamole] Rash Allergic to the dipyridamole and not aspirin.  Broccoli Other (comments) C/O GAS  Bumex [Bumetanide] Rash  Corn Diarrhea  Corn Starch-Kaolin-Zinc Oxide Diarrhea  Doxil [Doxorubicin, Peg-Liposomal] Rash Burning of skin  Flagyl [Metronidazole] Rash  Keflex [Cephalexin] Rash  Lasix [Furosemide] Rash  Macrobid [Nitrofurantoin Monohyd/M-Cryst] Shortness of Breath  Methotrexate Hives and Rash  Pcn [Penicillins] Rash  Sulfa (Sulfonamide Antibiotics) Hives  Tetanus And Diphtheria Toxoids Swelling  Thalidomide Rash Review of Systems:   
Constitutional: positive for fevers Eyes: negative Ears, Nose, Mouth, Throat, and Face: negative Respiratory: positive for cough Cardiovascular: negative Gastrointestinal: positive for nausea, vomiting, diarrhea and abdominal pain Genitourinary:positive for urinary incontinence Integument/Breast: negative Hematologic/Lymphatic: negative Musculoskeletal:negative Neurological: negative Behavioral/Psychiatric: negative Endocrine: negative Allergic/Immunologic: negative Objective:  
 
  
Visit Vitals /62 Pulse (!) 109 Temp 98.5 °F (36.9 °C) Resp 22 Ht 5' 4\" (1.626 m) Wt 196 lb 10.4 oz (89.2 kg) SpO2 97% Breastfeeding? No  
BMI 33.76 kg/m² Physical Exam: 
GENERAL: alert, cooperative, mild distress, appears stated age, EYE: negative, THROAT & NECK: normal, LUNG: clear to auscultation bilaterally, HEART: mild tachycardia, ABDOMEN: soft with generalized tenderness no rebound or guarding. LLQ pain Pump, EXTREMITIES: + Edema  
, SKIN: Normal., NEUROLOGIC: negative, PSYCH: non focal 
 
Imaging:  images and reports reviewed CT- There is a small bowel obstruction secondary to tethering of small bowel loops 
along the anterior abdominal wall in the left lower quadrant. 
  
Lab/Data Review: All lab results for the last 24 hours reviewed. Recent Results (from the past 24 hour(s)) EKG, 12 LEAD, INITIAL Collection Time: 01/24/19  4:34 PM  
Result Value Ref Range Ventricular Rate 116 BPM  
 Atrial Rate 116 BPM  
 P-R Interval 122 ms QRS Duration 80 ms  
 Q-T Interval 380 ms QTC Calculation (Bezet) 528 ms Calculated P Axis 2 degrees Calculated T Axis 26 degrees Diagnosis Sinus tachycardia Inferior infarct , age undetermined Prolonged QT When compared with ECG of 01-MAR-2016 12:29, No significant change was found Confirmed by Boogie Prime ([id number]) on 1/24/2019 6:54:36 PM 
  
SAMPLES BEING HELD Collection Time: 01/24/19  5:00 PM  
Result Value Ref Range SAMPLES BEING HELD red,juan miguel COMMENT Add-on orders for these samples will be processed based on acceptable specimen integrity and analyte stability, which may vary by analyte. CBC WITH AUTOMATED DIFF Collection Time: 01/24/19  5:01 PM  
Result Value Ref Range WBC 22.0 (H) 3.6 - 11.0 K/uL  
 RBC 4.33 3.80 - 5.20 M/uL  
 HGB 10.5 (L) 11.5 - 16.0 g/dL HCT 35.7 35.0 - 47.0 % MCV 82.4 80.0 - 99.0 FL  
 MCH 24.2 (L) 26.0 - 34.0 PG  
 MCHC 29.4 (L) 30.0 - 36.5 g/dL  
 RDW 17.2 (H) 11.5 - 14.5 % PLATELET 135 188 - 066 K/uL MPV 8.2 (L) 8.9 - 12.9 FL  
 NRBC 0.2 (H) 0  WBC ABSOLUTE NRBC 0.04 (H) 0.00 - 0.01 K/uL NEUTROPHILS 89 (H) 32 - 75 % LYMPHOCYTES 4 (L) 12 - 49 % MONOCYTES 6 5 - 13 % EOSINOPHILS 1 0 - 7 % BASOPHILS 0 0 - 1 % IMMATURE GRANULOCYTES 1 (H) 0.0 - 0.5 % ABS. NEUTROPHILS 19.5 (H) 1.8 - 8.0 K/UL  
 ABS. LYMPHOCYTES 0.9 0.8 - 3.5 K/UL  
 ABS. MONOCYTES 1.2 (H) 0.0 - 1.0 K/UL  
 ABS. EOSINOPHILS 0.1 0.0 - 0.4 K/UL  
 ABS. BASOPHILS 0.0 0.0 - 0.1 K/UL  
 ABS. IMM. GRANS. 0.2 (H) 0.00 - 0.04 K/UL  
 DF AUTOMATED METABOLIC PANEL, COMPREHENSIVE Collection Time: 01/24/19  5:01 PM  
Result Value Ref Range Sodium 136 136 - 145 mmol/L Potassium 3.2 (L) 3.5 - 5.1 mmol/L Chloride 94 (L) 97 - 108 mmol/L  
 CO2 31 21 - 32 mmol/L Anion gap 11 5 - 15 mmol/L Glucose 142 (H) 65 - 100 mg/dL BUN 9 6 - 20 MG/DL Creatinine 1.01 0.55 - 1.02 MG/DL  
 BUN/Creatinine ratio 9 (L) 12 - 20 GFR est AA >60 >60 ml/min/1.73m2 GFR est non-AA 54 (L) >60 ml/min/1.73m2 Calcium 8.9 8.5 - 10.1 MG/DL Bilirubin, total 0.5 0.2 - 1.0 MG/DL  
 ALT (SGPT) 14 12 - 78 U/L  
 AST (SGOT) 46 (H) 15 - 37 U/L Alk. phosphatase 101 45 - 117 U/L Protein, total 7.2 6.4 - 8.2 g/dL Albumin 3.1 (L) 3.5 - 5.0 g/dL Globulin 4.1 (H) 2.0 - 4.0 g/dL A-G Ratio 0.8 (L) 1.1 - 2.2 LIPASE Collection Time: 01/24/19  5:01 PM  
Result Value Ref Range Lipase 77 73 - 393 U/L  
TROPONIN I Collection Time: 01/24/19  5:01 PM  
Result Value Ref Range Troponin-I, Qt. <0.05 <0.05 ng/mL POC LACTIC ACID Collection Time: 01/24/19  5:07 PM  
Result Value Ref Range  Lactic Acid (POC) 2.40 (HH) 0.40 - 2.00 mmol/L  
 URINALYSIS W/MICROSCOPIC Collection Time: 01/24/19  8:09 PM  
Result Value Ref Range Color YELLOW/STRAW Appearance CLEAR CLEAR Specific gravity <1.005 1.003 - 1.030  
 pH (UA) 7.5 5.0 - 8.0 Protein TRACE (A) NEG mg/dL Glucose NEGATIVE  NEG mg/dL Ketone TRACE (A) NEG mg/dL Bilirubin NEGATIVE  NEG Blood TRACE (A) NEG Urobilinogen 1.0 0.2 - 1.0 EU/dL Nitrites NEGATIVE  NEG Leukocyte Esterase NEGATIVE  NEG    
 WBC 0-4 0 - 4 /hpf  
 RBC 10-20 0 - 5 /hpf Epithelial cells FEW FEW /lpf Bacteria NEGATIVE  NEG /hpf Hyaline cast 0-2 0 - 5 /lpf URINE CULTURE HOLD SAMPLE Collection Time: 01/24/19  8:09 PM  
Result Value Ref Range Urine culture hold URINE ON HOLD IN MICROBIOLOGY DEPT FOR 3 DAYS. IF UNPRESERVED URINE IS SUBMITTED, IT CANNOT BE USED FOR ADDITIONAL TESTING AFTER 24 HRS, RECOLLECTION WILL BE REQUIRED. Recent Results (from the past 24 hour(s)) EKG, 12 LEAD, INITIAL Collection Time: 01/24/19  4:34 PM  
Result Value Ref Range Ventricular Rate 116 BPM  
 Atrial Rate 116 BPM  
 P-R Interval 122 ms QRS Duration 80 ms  
 Q-T Interval 380 ms QTC Calculation (Bezet) 528 ms Calculated P Axis 2 degrees Calculated T Axis 26 degrees Diagnosis Sinus tachycardia Inferior infarct , age undetermined Prolonged QT When compared with ECG of 01-MAR-2016 12:29, No significant change was found Confirmed by Anselmo Rivera ([id number]) on 1/24/2019 6:54:36 PM 
  
SAMPLES BEING HELD Collection Time: 01/24/19  5:00 PM  
Result Value Ref Range SAMPLES BEING HELD red,juan miguel COMMENT Add-on orders for these samples will be processed based on acceptable specimen integrity and analyte stability, which may vary by analyte. CBC WITH AUTOMATED DIFF Collection Time: 01/24/19  5:01 PM  
Result Value Ref Range WBC 22.0 (H) 3.6 - 11.0 K/uL  
 RBC 4.33 3.80 - 5.20 M/uL  
 HGB 10.5 (L) 11.5 - 16.0 g/dL HCT 35.7 35.0 - 47.0 % MCV 82.4 80.0 - 99.0 FL  
 MCH 24.2 (L) 26.0 - 34.0 PG  
 MCHC 29.4 (L) 30.0 - 36.5 g/dL  
 RDW 17.2 (H) 11.5 - 14.5 % PLATELET 467 578 - 388 K/uL MPV 8.2 (L) 8.9 - 12.9 FL  
 NRBC 0.2 (H) 0  WBC ABSOLUTE NRBC 0.04 (H) 0.00 - 0.01 K/uL NEUTROPHILS 89 (H) 32 - 75 % LYMPHOCYTES 4 (L) 12 - 49 % MONOCYTES 6 5 - 13 % EOSINOPHILS 1 0 - 7 % BASOPHILS 0 0 - 1 % IMMATURE GRANULOCYTES 1 (H) 0.0 - 0.5 % ABS. NEUTROPHILS 19.5 (H) 1.8 - 8.0 K/UL  
 ABS. LYMPHOCYTES 0.9 0.8 - 3.5 K/UL  
 ABS. MONOCYTES 1.2 (H) 0.0 - 1.0 K/UL  
 ABS. EOSINOPHILS 0.1 0.0 - 0.4 K/UL  
 ABS. BASOPHILS 0.0 0.0 - 0.1 K/UL  
 ABS. IMM. GRANS. 0.2 (H) 0.00 - 0.04 K/UL  
 DF AUTOMATED METABOLIC PANEL, COMPREHENSIVE Collection Time: 01/24/19  5:01 PM  
Result Value Ref Range Sodium 136 136 - 145 mmol/L Potassium 3.2 (L) 3.5 - 5.1 mmol/L Chloride 94 (L) 97 - 108 mmol/L  
 CO2 31 21 - 32 mmol/L Anion gap 11 5 - 15 mmol/L Glucose 142 (H) 65 - 100 mg/dL BUN 9 6 - 20 MG/DL Creatinine 1.01 0.55 - 1.02 MG/DL  
 BUN/Creatinine ratio 9 (L) 12 - 20 GFR est AA >60 >60 ml/min/1.73m2 GFR est non-AA 54 (L) >60 ml/min/1.73m2 Calcium 8.9 8.5 - 10.1 MG/DL Bilirubin, total 0.5 0.2 - 1.0 MG/DL  
 ALT (SGPT) 14 12 - 78 U/L  
 AST (SGOT) 46 (H) 15 - 37 U/L Alk. phosphatase 101 45 - 117 U/L Protein, total 7.2 6.4 - 8.2 g/dL Albumin 3.1 (L) 3.5 - 5.0 g/dL Globulin 4.1 (H) 2.0 - 4.0 g/dL A-G Ratio 0.8 (L) 1.1 - 2.2 LIPASE Collection Time: 01/24/19  5:01 PM  
Result Value Ref Range Lipase 77 73 - 393 U/L  
TROPONIN I Collection Time: 01/24/19  5:01 PM  
Result Value Ref Range Troponin-I, Qt. <0.05 <0.05 ng/mL POC LACTIC ACID Collection Time: 01/24/19  5:07 PM  
Result Value Ref Range Lactic Acid (POC) 2.40 (HH) 0.40 - 2.00 mmol/L  
URINALYSIS W/MICROSCOPIC Collection Time: 01/24/19  8:09 PM  
Result Value Ref Range Color YELLOW/STRAW Appearance CLEAR CLEAR Specific gravity <1.005 1.003 - 1.030  
 pH (UA) 7.5 5.0 - 8.0 Protein TRACE (A) NEG mg/dL Glucose NEGATIVE  NEG mg/dL Ketone TRACE (A) NEG mg/dL Bilirubin NEGATIVE  NEG Blood TRACE (A) NEG Urobilinogen 1.0 0.2 - 1.0 EU/dL Nitrites NEGATIVE  NEG Leukocyte Esterase NEGATIVE  NEG    
 WBC 0-4 0 - 4 /hpf  
 RBC 10-20 0 - 5 /hpf Epithelial cells FEW FEW /lpf Bacteria NEGATIVE  NEG /hpf Hyaline cast 0-2 0 - 5 /lpf URINE CULTURE HOLD SAMPLE Collection Time: 01/24/19  8:09 PM  
Result Value Ref Range Urine culture hold URINE ON HOLD IN MICROBIOLOGY DEPT FOR 3 DAYS. IF UNPRESERVED URINE IS SUBMITTED, IT CANNOT BE USED FOR ADDITIONAL TESTING AFTER 24 HRS, RECOLLECTION WILL BE REQUIRED. Assessment:  
Small bowel Obstrucion Will need to have NGT due to the vomiting and the dilated stomach and esophagus. Npo  
F/u AXR in am 
Would hold the xarelto if at all possible in case she should require surgery. If anti-coagulation needed would consider heparin Gtt. Does not need emergent operation at this time but will continue to monitor. Signed By: Becky Osorio MD   
 January 24, 2019

## 2019-01-25 NOTE — PROGRESS NOTES
Bedside and Verbal shift change report given to Sandoval matthews RN (oncoming nurse) by Kai Andersen RN (offgoing nurse). Report included the following information SBAR.

## 2019-01-25 NOTE — H&P
History and Physical 
Primary Care Provider: Daniel Cotton MD 
 
Subjective:  
 
Nicole Scott is a 68 y.o. female with extensive PMH of multiple hernia surgeries, MM, RA, DVT, hld, chronic anemia, who presents with abdominal pain. Pt with worsening symptoms since yesterday. Pain diffuse but worse in lower quadrants. Associated with nausea and emesis. Last BM was 3 days prior. Did have associated low grade fever at home but denies chills. Otherwise denies gross hematochezia, hematemesis, dyspnea, CP, urinary symptoms, URI symptoms, sputum production. Review of Systems: A comprehensive review of systems was negative except for that written in the History of Present Illness. Past Medical History:  
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   
 spinal stenosis per pt  Fibromyalgia  GERD (gastroesophageal reflux disease)  High cholesterol  History of acute pancreatitis  History of blood transfusion  History of Salmonella infection 2009  Multiple myeloma ([address] 2007  Osteoporosis  Other complication due to venous access device ([address] 1/29/2013  Recurrent umbilical hernia 8/84/8857  Recurrent ventral incisional hernia 3/28/2016  Rheumatoid arthritis(714.0)  Sleep apnea INTOLERATANT OF CPAP  Thromboembolus ([address] Right leg DVT  Urinary incontinence Past Surgical History:  
Procedure Laterality Date  HX CHOLECYSTECTOMY  2004  HX CRANIOTOMY    
 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    
 COLONOSCOPIES, EGD  HX GI    
 ESOPHAGUS STREACHED  
 HX HEENT  10/2011  
 b/l cataract surgery in October 2011  HX HERNIA REPAIR  [id number] X6  
 HX HERNIA REPAIR  10-13-14  
 repair of recurrent ventral incisional hernia  with primary suture [id number]. Ovi Yu  HX KYPHOPLASTY  09/14/2017 L4  
 HX ORTHOPAEDIC Right 2001  BONE SPURS SHOULDER  
  HX OTHER SURGICAL  2010 [facility] PAIN PUMP FOR MORPHINE INFUSION  
 HX OTHER SURGICAL  1-16-14  
 repair of recurrent umbilical hernia with ventralex pillow top mesh and extensive lysis of [id number]-DOK-VNDR. Andrés Miranda  HX OTHER SURGICAL  5-31-16  
 repair of recurrent ventral incisional hernia with underlay mesh-[facility]-Dr. Andrés iMranda  HX VASCULAR ACCESS Right 1/2013 POWER PORT   
 HX VASCULAR ACCESS  2010 PAIN PUMP  NEUROLOGICAL PROCEDURE UNLISTED  2007 KYPHOPLASTY X2  
 NEUROLOGICAL PROCEDURE UNLISTED  10/26/15 Kyphoplasty t-5  PAIN PUMP DEVICE    
 implanted in Q, MORPHINE Prior to Admission medications Medication Sig Start Date End Date Taking? Authorizing Provider  
amitriptyline (ELAVIL) 50 mg tablet TAKE TWO TABLETS BY MOUTH AT BEDTIME AS NEEDED FOR SLEEP 12/26/18   Ronnie Muller III, MD  
ammonium lactate (LAC-HYDRIN) 12 % lotion  3/8/18   Provider, Historical  
gabapentin (NEURONTIN) 300 mg capsule TAKE ONE CAPSULE BY MOUTH FOUR TIMES A DAY 3/23/18   Ronnie Mluler III, MD  
simvastatin (ZOCOR) 20 mg tablet TAKE ONE TABLET BY MOUTH AT BEDTIME 3/23/18   Ronnie Muller III, MD  
potassium chloride (K-DUR, KLOR-CON) 20 mEq tablet TAKE ONE TABLET BY MOUTH EVERY DAY 3/23/18   Ronnie Muller III, MD  
lubiPROStone (AMITIZA) 24 mcg capsule TAKE 1 CAPSULE BY MOUTH TWICE DAILY 3/23/18   Ronnie Muller III, MD  
Milnacipran (SAVELLA) 50 mg tablet TAKE 1 TABLET BY MOUTH TWICE DAILY 3/23/18   Ronnie Muller III, MD  
Dexlansoprazole (DEXILANT) 60 mg CpDB TAKE 1 CAPSULE BY MOUTH EVERY DAY 3/23/18   Richard Galvan MD  
fluticasone (FLONASE) 50 mcg/actuation nasal spray 2 Sprays by Both Nostrils route two (2) times a day. 2/2/18   Ronnie Muller III, MD  
rivaroxaban (XARELTO) 20 mg tab tablet Take  by mouth daily. Provider, Historical  
raNITIdine (ZANTAC) 150 mg tablet Take 150 mg by mouth two (2) times a day.     Provider, Historical  
 predniSONE (DELTASONE) 10 mg tablet Take 20 mg by mouth every other day. Provider, Historical  
spironolactone (ALDACTONE) 50 mg tablet Take 1 Tab by mouth two (2) times a day. Indications: EDEMA 9/23/16   Anali Baxter III, MD  
ondansetron (ZOFRAN ODT) 4 mg disintegrating tablet Take 1 Tab by mouth every six (6) hours as needed for Nausea. Indications: PREVENTION OF POST-OPERATIVE NAUSEA AND VOMITING 6/7/16   Ezekiel Olivia NP  
B.infantis-B.ani-B.long-B.bifi 10-15 mg TbEC Take 1 Tab by mouth every morning. Provider, Historical  
aluminum hydrox-magnesium carb (GAVISCON)  mg/15 mL suspension Take 15 mL by mouth every six (6) hours as needed for Indigestion. Provider, Historical  
calcium carbonate (TUMS) 200 mg calcium (500 mg) chew Take 1 Tab by mouth as needed. Provider, Historical  
calcium citrate-vitamin D3 (CITRACAL WITH VITAMIN D MAXIMUM) tablet Take  by mouth two (2) times a day. Provider, Historical  
magnesium hydroxide (MILK OF MAGNESIA) 400 mg/5 mL suspension Take 30 mL by mouth daily as needed for Constipation. Provider, Historical  
betamethasone dipropionate (DIPROLENE) 0.05 % ointment Apply  to affected area two (2) times a day. Provider, Historical  
acetaminophen (TYLENOL) 500 mg tablet Take 1,000 mg by mouth every six (6) hours as needed for Pain. Provider, Historical  
guaiFENesin SR (MUCINEX) 600 mg SR tablet Take 1,200 mg by mouth as needed. 3/9/16   Siena Augustin MD  
OTHER,NON-FORMULARY, LLQ Morphine/bupivacaine pain pump for multiple myeloma. 4.287mg/3.125 mg per day    Provider, Historical  
cetirizine (ZYRTEC) 10 mg tablet Take 10 mg by mouth nightly. Provider, Historical  
diazepam (VALIUM) 10 mg tablet Take 10 mg by mouth two (2) times a day. Provider, Historical  
docusate sodium (COLACE) 100 mg capsule Take 400 mg by mouth.  1 capsule in the morning and 3 capsule at night    Provider, Historical  
 polyethylene glycol (MIRALAX) 17 gram packet Take 17 g by mouth daily as needed. Provider, Historical  
senna (SENOKOT) 8.6 mg tablet Take 4 Tabs by mouth two (2) times a day. 4 PILLS IN AM  4 PILLS IN PM    Provider, Historical  
HYDROmorphone (DILAUDID) 4 mg tablet Take 4-8 mg by mouth every four (4) hours as needed for Pain. Took 8 mg at 6 am 18    Provider, Historical  
prochlorperazine (COMPAZINE) 5 mg tablet Take 5 mg by mouth every six (6) hours as needed for Nausea. Provider, Historical  
 
Allergies Allergen Reactions  Aggrenox [Aspirin-Dipyridamole] Rash Allergic to the dipyridamole and not aspirin.  Broccoli Other (comments) C/O GAS  Bumex [Bumetanide] Rash  Corn Diarrhea  Corn Starch-Kaolin-Zinc Oxide Diarrhea  Doxil [Doxorubicin, Peg-Liposomal] Rash Burning of skin  Flagyl [Metronidazole] Rash  Keflex [Cephalexin] Rash  Lasix [Furosemide] Rash  Macrobid [Nitrofurantoin Monohyd/M-Cryst] Shortness of Breath  Methotrexate Hives and Rash  Pcn [Penicillins] Rash  Sulfa (Sulfonamide Antibiotics) Hives  Tetanus And Diphtheria Toxoids Swelling  Thalidomide Rash Family History Problem Relation Age of Onset Tray Arthritis-osteo Mother  Anesth Problems Neg Hx SOCIAL HISTORY: 
Social History Socioeconomic History  Marital status:  Spouse name: Not on file  Number of children: Not on file  Years of education: Not on file  Highest education level: Not on file Social Needs  Financial resource strain: Not on file  Food insecurity - worry: Not on file  Food insecurity - inability: Not on file  Transportation needs - medical: Not on file  Transportation needs - non-medical: Not on file Occupational History  Not on file Tobacco Use  Smoking status: Former Smoker Years: 10.00 Last attempt to quit: 10/7/2005 Years since quittin.3  Smokeless tobacco: Never Used Substance and Sexual Activity  Alcohol use: No  
 Drug use: No  
 Sexual activity: Not on file Other Topics Concern  Not on file Social History Narrative  Not on file Objective:  
 
Physical Exam:  
General:    Alert, cooperative, no distress, appears stated age. HEENT: Atraumatic, anicteric sclerae Neck:  Supple, symmetrical 
Lungs:   Diminished, Clear to auscultation bilaterally. No Wheezing or Rhonchi. No rales. No Accessory muscle use. Heart:   Regular  rhythm,  Normal S1 and S2,  No edema Abdomen:   Soft, diffuse TTP. Obese. Bowel sounds normal.  No rebound Extremities: No cyanosis. No clubbing. Venous stasis changes Skin:     Warm, dry Neurologic: No gross focal neuro deficit Data Review: All diagnostic labs and studies have been reviewed. Assessment:  
 
Active Problems: 
  SBO (small bowel obstruction) ([facility] (1/24/2019) Small bowel obstruction. Hx of multiple hernia surgeries in the past.  Concerns for adhesions. SIRS. No gross foci of infxn at this time. CXR non-acute. UA neg. History of DVT; on xarelto PMH of MM, RA, DVT, hld, chronic anemia Plan:  
 
General surgery already contacted in ED Pain management with IV morphine; hold po for now PRN anti-emetics with zofran NPO for now Pending NG placement in ED Gentle IVF hydration Empiric levaquin coverage for now; history of multiple abx allergies. De-escalate abx if cultures negative Follow up cultures PPx: xarelto Dispo: Inpt Signed By: Erich King MD   
 January 24, 2019

## 2019-01-25 NOTE — PROGRESS NOTES
0130 
Patient pulled NG tube out. New tube inserted at [facility]. Placement verified by KUB. Continuous suction resumed.

## 2019-01-26 ENCOUNTER — APPOINTMENT (OUTPATIENT)
Dept: GENERAL RADIOLOGY | Age: 74
DRG: 389 | End: 2019-01-26
Attending: PHYSICIAN ASSISTANT
Payer: MEDICARE

## 2019-01-26 LAB
ANION GAP SERPL CALC-SCNC: 4 MMOL/L (ref 5–15)
APTT PPP: 35.8 SEC (ref 22.1–32)
APTT PPP: 55.8 SEC (ref 22.1–32)
APTT PPP: 89.5 SEC (ref 22.1–32)
BASOPHILS # BLD: 0 K/UL (ref 0–0.1)
BASOPHILS NFR BLD: 0 % (ref 0–1)
BUN SERPL-MCNC: 5 MG/DL (ref 6–20)
BUN/CREAT SERPL: 7 (ref 12–20)
CALCIUM SERPL-MCNC: 7.6 MG/DL (ref 8.5–10.1)
CHLORIDE SERPL-SCNC: 104 MMOL/L (ref 97–108)
CO2 SERPL-SCNC: 30 MMOL/L (ref 21–32)
CREAT SERPL-MCNC: 0.74 MG/DL (ref 0.55–1.02)
DIFFERENTIAL METHOD BLD: ABNORMAL
EOSINOPHIL # BLD: 0.4 K/UL (ref 0–0.4)
EOSINOPHIL NFR BLD: 4 % (ref 0–7)
ERYTHROCYTE [DISTWIDTH] IN BLOOD BY AUTOMATED COUNT: 17.2 % (ref 11.5–14.5)
GLUCOSE SERPL-MCNC: 109 MG/DL (ref 65–100)
HCT VFR BLD AUTO: 27.2 % (ref 35–47)
HGB BLD-MCNC: 7.7 G/DL (ref 11.5–16)
IMM GRANULOCYTES # BLD AUTO: 0.1 K/UL (ref 0–0.04)
IMM GRANULOCYTES NFR BLD AUTO: 1 % (ref 0–0.5)
LYMPHOCYTES # BLD: 1.6 K/UL (ref 0.8–3.5)
LYMPHOCYTES NFR BLD: 14 % (ref 12–49)
MCH RBC QN AUTO: 24.1 PG (ref 26–34)
MCHC RBC AUTO-ENTMCNC: 28.3 G/DL (ref 30–36.5)
MCV RBC AUTO: 85 FL (ref 80–99)
MONOCYTES # BLD: 1.1 K/UL (ref 0–1)
MONOCYTES NFR BLD: 10 % (ref 5–13)
NEUTS SEG # BLD: 7.9 K/UL (ref 1.8–8)
NEUTS SEG NFR BLD: 71 % (ref 32–75)
NRBC # BLD: 0.02 K/UL (ref 0–0.01)
NRBC BLD-RTO: 0.2 PER 100 WBC
PLATELET # BLD AUTO: 258 K/UL (ref 150–400)
PLATELET COMMENTS,PCOM: ABNORMAL
PMV BLD AUTO: 8.6 FL (ref 8.9–12.9)
POTASSIUM SERPL-SCNC: 3.2 MMOL/L (ref 3.5–5.1)
RBC # BLD AUTO: 3.2 M/UL (ref 3.8–5.2)
RBC MORPH BLD: ABNORMAL
SODIUM SERPL-SCNC: 138 MMOL/L (ref 136–145)
THERAPEUTIC RANGE,PTTT: ABNORMAL SECS (ref 58–77)
WBC # BLD AUTO: 11.1 K/UL (ref 3.6–11)

## 2019-01-26 PROCEDURE — 36415 COLL VENOUS BLD VENIPUNCTURE: CPT

## 2019-01-26 PROCEDURE — 94760 N-INVAS EAR/PLS OXIMETRY 1: CPT

## 2019-01-26 PROCEDURE — 74011250637 HC RX REV CODE- 250/637: Performed by: INTERNAL MEDICINE

## 2019-01-26 PROCEDURE — 74019 RADEX ABDOMEN 2 VIEWS: CPT

## 2019-01-26 PROCEDURE — 85025 COMPLETE CBC W/AUTO DIFF WBC: CPT

## 2019-01-26 PROCEDURE — 74011250636 HC RX REV CODE- 250/636: Performed by: HOSPITALIST

## 2019-01-26 PROCEDURE — 74011250636 HC RX REV CODE- 250/636: Performed by: INTERNAL MEDICINE

## 2019-01-26 PROCEDURE — 74011250636 HC RX REV CODE- 250/636: Performed by: PHYSICIAN ASSISTANT

## 2019-01-26 PROCEDURE — 85730 THROMBOPLASTIN TIME PARTIAL: CPT

## 2019-01-26 PROCEDURE — 74011000250 HC RX REV CODE- 250: Performed by: PHYSICIAN ASSISTANT

## 2019-01-26 PROCEDURE — 77010033678 HC OXYGEN DAILY

## 2019-01-26 PROCEDURE — 80048 BASIC METABOLIC PNL TOTAL CA: CPT

## 2019-01-26 PROCEDURE — 65270000029 HC RM PRIVATE

## 2019-01-26 PROCEDURE — 51798 US URINE CAPACITY MEASURE: CPT

## 2019-01-26 RX ORDER — HEPARIN SODIUM 5000 [USP'U]/ML
7200 INJECTION, SOLUTION INTRAVENOUS; SUBCUTANEOUS ONCE
Status: COMPLETED | OUTPATIENT
Start: 2019-01-26 | End: 2019-01-26

## 2019-01-26 RX ORDER — KETOROLAC TROMETHAMINE 30 MG/ML
15 INJECTION, SOLUTION INTRAMUSCULAR; INTRAVENOUS
Status: COMPLETED | OUTPATIENT
Start: 2019-01-26 | End: 2019-01-26

## 2019-01-26 RX ADMIN — MORPHINE SULFATE 2 MG: 2 INJECTION, SOLUTION INTRAMUSCULAR; INTRAVENOUS at 15:41

## 2019-01-26 RX ADMIN — AMITRIPTYLINE HYDROCHLORIDE 100 MG: 50 TABLET, FILM COATED ORAL at 21:55

## 2019-01-26 RX ADMIN — METHYLPREDNISOLONE SODIUM SUCCINATE 20 MG: 40 INJECTION, POWDER, FOR SOLUTION INTRAMUSCULAR; INTRAVENOUS at 13:47

## 2019-01-26 RX ADMIN — LEVOFLOXACIN 750 MG: 5 INJECTION, SOLUTION INTRAVENOUS at 23:05

## 2019-01-26 RX ADMIN — MORPHINE SULFATE 2 MG: 2 INJECTION, SOLUTION INTRAMUSCULAR; INTRAVENOUS at 11:21

## 2019-01-26 RX ADMIN — KETOROLAC TROMETHAMINE 15 MG: 30 INJECTION, SOLUTION INTRAMUSCULAR at 19:48

## 2019-01-26 RX ADMIN — SODIUM CHLORIDE 10 ML: 9 INJECTION, SOLUTION INTRAMUSCULAR; INTRAVENOUS; SUBCUTANEOUS at 06:39

## 2019-01-26 RX ADMIN — ONDANSETRON 4 MG: 2 INJECTION INTRAMUSCULAR; INTRAVENOUS at 11:21

## 2019-01-26 RX ADMIN — FAMOTIDINE 20 MG: 10 INJECTION, SOLUTION INTRAVENOUS at 23:05

## 2019-01-26 RX ADMIN — DEXTROSE MONOHYDRATE, SODIUM CHLORIDE, AND POTASSIUM CHLORIDE 75 ML/HR: 50; 4.5; 1.49 INJECTION, SOLUTION INTRAVENOUS at 17:45

## 2019-01-26 RX ADMIN — GABAPENTIN 300 MG: 300 CAPSULE ORAL at 23:04

## 2019-01-26 RX ADMIN — SODIUM CHLORIDE 10 ML: 9 INJECTION, SOLUTION INTRAMUSCULAR; INTRAVENOUS; SUBCUTANEOUS at 15:25

## 2019-01-26 RX ADMIN — SIMVASTATIN 20 MG: 20 TABLET, FILM COATED ORAL at 21:55

## 2019-01-26 RX ADMIN — FAMOTIDINE 20 MG: 10 INJECTION, SOLUTION INTRAVENOUS at 11:21

## 2019-01-26 RX ADMIN — MORPHINE SULFATE 2 MG: 2 INJECTION, SOLUTION INTRAMUSCULAR; INTRAVENOUS at 06:30

## 2019-01-26 RX ADMIN — SPIRONOLACTONE 50 MG: 25 TABLET ORAL at 19:47

## 2019-01-26 RX ADMIN — HEPARIN SODIUM AND DEXTROSE 17 UNITS/KG/HR: 10000; 5 INJECTION INTRAVENOUS at 03:52

## 2019-01-26 RX ADMIN — HEPARIN SODIUM AND DEXTROSE 22 UNITS/KG/HR: 10000; 5 INJECTION INTRAVENOUS at 22:00

## 2019-01-26 RX ADMIN — SODIUM CHLORIDE 10 ML: 9 INJECTION, SOLUTION INTRAMUSCULAR; INTRAVENOUS; SUBCUTANEOUS at 23:05

## 2019-01-26 RX ADMIN — GABAPENTIN 300 MG: 300 CAPSULE ORAL at 19:47

## 2019-01-26 RX ADMIN — HEPARIN SODIUM 7200 UNITS: 5000 INJECTION, SOLUTION INTRAVENOUS; SUBCUTANEOUS at 21:47

## 2019-01-26 RX ADMIN — MORPHINE SULFATE 2 MG: 2 INJECTION, SOLUTION INTRAMUSCULAR; INTRAVENOUS at 02:46

## 2019-01-26 RX ADMIN — GABAPENTIN 300 MG: 300 CAPSULE ORAL at 13:00

## 2019-01-26 RX ADMIN — MORPHINE SULFATE 2 MG: 2 INJECTION, SOLUTION INTRAMUSCULAR; INTRAVENOUS at 19:48

## 2019-01-26 NOTE — PROGRESS NOTES
Hospitalist Progress Note Parisa Burton MD 
Answering service: [phone] OR [phone] from in house phone Date of Service:  2019 NAME:  Michael Abdi :  1945 MRN:  [id number] Admission Summary:  
Michael Abdi is a 68 y.o. female with extensive PMH of multiple hernia surgeries, MM, RA, DVT, hld, chronic anemia, who presents with abdominal pain. Interval history / Subjective:  
 Pt seen in follow up of bowel obstruction She denies passing gas or moving her bowels NG in place. She is up in chair Reports pain in right Rib area Assessment & Plan: SBO Likley from Adhesions - pt had previous surgeries Continue bowel rest 
SeriaL abd checks Surgery consulted and following On Levaquin She has Multiple other allergies - flagyl and pcn being one of them Will request Dr Solares Gave consult per pt request 
NG tube to Intermittent suction H/O MM Chronic Anemia noted Transfuse if Hg<7 Seeing Dr Brian Cristobal as op On Chemotherapy as op Has a port H/o Allergic rash On Prednisone  Po 15mg chronically - switch to IV solumedrol for equivalency GERD On PPI Nasal Dryness Wean off oxygen Flonase prn Neuropathy Resume Home Elavil PMH of Rheumatoid Arthritis, Hyperlipidemia and Chronic anemia Chronic and stable Rib Pains Check XR ribs - r/o fracture H/o Right Leg DVT Check Duplex neg for DVT Switch from po xarelto to heparin for coverage if surgery planned Will request Oncology eval for recommendations on anticoagulation Hypokalemia Repleted Code status: Full DVT prophylaxis: SCD Care Plan discussed with: Patient/Family and Nurse - D/W  and daughter in room - D/W pt and  Patient has given Verbal permission to discuss medical care with  
persons present in the room and and also with contact as listed on face sheet.   
Disposition: TBD  
 
 Hospital Problems  Date Reviewed: 3/23/2018 Codes Class Noted POA  
 SBO (small bowel obstruction) ([facility] ICD-10-CM: S12.283 ICD-9-CM: 560.9  1/24/2019 Unknown Review of Systems:  
Pertinent items are noted in HPI. Vital Signs:  
 Last 24hrs VS reviewed since prior progress note. Most recent are: 
Visit Vitals /80 Pulse 90 Temp 97.7 °F (36.5 °C) Resp 18 Ht 5' 4\" (1.626 m) Wt 89.2 kg (196 lb 10.4 oz) SpO2 99% Breastfeeding? No  
BMI 33.76 kg/m² Intake/Output Summary (Last 24 hours) at 1/26/2019 1717 Last data filed at 1/26/2019 1149 Gross per 24 hour Intake 150 ml Output 1570 ml Net -1420 ml Physical Examination:  
 
 
     
Constitutional:  Mild Distress. , cooperative, pleasant  NG in place with Green Drainage ENT:  Oral mucous moist, oropharynx benign. Neck supple, Resp:  CTA bilaterally. No wheezing/rhonchi/rales. No accessory muscle use CV:  Regular rhythm, normal rate, no murmurs, gallops, rubs GI:  Distended, Sluggish Bowel sounds. no hepatosplenomegaly Musculoskeletal:  No edema, warm, 2+ pulses throughout Neurologic:  Moves all extremities. AAOx3, CN II-XII reviewed Skin:  Good turgor, no rashes or ulcers Data Review:  
 Review and/or order of clinical lab test 
Review and/or order of tests in the radiology section of CPT Review and/or order of tests in the medicine section of CPT Labs:  
 
Recent Labs  
  01/26/19 
0136 01/25/19 
8504 WBC 11.1* 8.7 HGB 7.7* 11.2* HCT 27.2* 40.0  209 Recent Labs  
  01/26/19 
0136 01/25/19 
0754 01/24/19 
2333  140 138  
K 3.2* 3.4* 3.3*  
 105 100 CO2 30 30 31 BUN 5* 8 9 CREA 0.74 0.82 0.89 * 99 132* CA 7.6* 7.2* 7.4* Recent Labs  
  01/24/19 
2333 01/24/19 
1701 SGOT 39* 46* ALT 14 14 AP 84 101 TBILI 0.4 0.5 TP 5.7* 7.2 ALB 2.3* 3.1*  
GLOB 3.4 4.1*  
LPSE  --  77 Recent Labs  
  01/26/19 081 850 53 42 01/26/19 
0136 01/25/19 
1837 APTT 55.8* 89.5* 39.5* No results for input(s): FE, TIBC, PSAT, FERR in the last 72 hours. Lab Results Component Value Date/Time Folate 34.3 (H) 05/28/2010 04:30 AM  
  
No results for input(s): PH, PCO2, PO2 in the last 72 hours. Recent Labs  
  01/24/19 
1701 TROIQ <0.05 Lab Results Component Value Date/Time Cholesterol, total 137 03/06/2017 08:01 AM  
 HDL Cholesterol 47 03/06/2017 08:01 AM  
 LDL, calculated 38 03/06/2017 08:01 AM  
 Triglyceride 260 (H) 03/06/2017 08:01 AM  
 CHOL/HDL Ratio 5.0 12/16/2011 05:05 AM  
 
Lab Results Component Value Date/Time Glucose (POC) 113 (H) 06/02/2016 11:06 AM  
 Glucose (POC) 87 09/15/2012 08:29 PM  
 Glucose (POC) 87 09/15/2012 11:58 AM  
 
Lab Results Component Value Date/Time Color YELLOW/STRAW 01/24/2019 08:09 PM  
 Appearance CLEAR 01/24/2019 08:09 PM  
 Specific gravity <1.005 01/24/2019 08:09 PM  
 Specific gravity 1.008 03/01/2016 03:44 PM  
 pH (UA) 7.5 01/24/2019 08:09 PM  
 Protein TRACE (A) 01/24/2019 08:09 PM  
 Glucose NEGATIVE  01/24/2019 08:09 PM  
 Ketone TRACE (A) 01/24/2019 08:09 PM  
 Bilirubin NEGATIVE  01/24/2019 08:09 PM  
 Urobilinogen 1.0 01/24/2019 08:09 PM  
 Nitrites NEGATIVE  01/24/2019 08:09 PM  
 Leukocyte Esterase NEGATIVE  01/24/2019 08:09 PM  
 Epithelial cells FEW 01/24/2019 08:09 PM  
 Bacteria NEGATIVE  01/24/2019 08:09 PM  
 WBC 0-4 01/24/2019 08:09 PM  
 RBC 10-20 01/24/2019 08:09 PM  
 
 
 
Medications Reviewed:  
 
Current Facility-Administered Medications Medication Dose Route Frequency  dextrose 5% - 0.45% NaCl with KCl 20 mEq/L infusion  75 mL/hr IntraVENous CONTINUOUS  
 heparin 25,000 units in D5W 250 ml infusion  16-36 Units/kg/hr IntraVENous TITRATE  methylPREDNISolone (PF) (SOLU-MEDROL) injection 20 mg  20 mg IntraVENous DAILY  phenol throat spray (CHLORASEPTIC) 1 Spray  1 Spray Oral PRN  
  famotidine (PF) (PEPCID) 20 mg in sodium chloride 0.9% 10 mL injection  20 mg IntraVENous Q12H  
 triamcinolone acetonide (KENALOG) 0.1 % ointment   Topical BID  spironolactone (ALDACTONE) tablet 50 mg  50 mg Oral BID  fluticasone (FLONASE) 50 mcg/actuation nasal spray 2 Spray  2 Spray Both Nostrils BID  potassium chloride SR (KLOR-CON 10) tablet 20 mEq  20 mEq Oral DAILY  . PHARMACY TO SUBSTITUTE PER PROTOCOL (Reordered from: Milnacipran (SAVELLA) 50 mg tablet)    Per Protocol  amitriptyline (ELAVIL) tablet 100 mg  100 mg Oral QHS  gabapentin (NEURONTIN) capsule 300 mg  300 mg Oral QID  simvastatin (ZOCOR) tablet 20 mg  20 mg Oral QHS  sodium chloride (NS) flush 5-40 mL  5-40 mL IntraVENous Q8H  
 sodium chloride (NS) flush 5-40 mL  5-40 mL IntraVENous PRN  
 morphine injection 2 mg  2 mg IntraVENous Q4H PRN  
 ondansetron (ZOFRAN) injection 4 mg  4 mg IntraVENous Q4H PRN  
 levoFLOXacin (LEVAQUIN) 750 mg in D5W IVPB  750 mg IntraVENous Q24H  
 
______________________________________________________________________ EXPECTED LENGTH OF STAY: 3d 7h 
ACTUAL LENGTH OF STAY:          2 
 
            
Cain Farley MD

## 2019-01-26 NOTE — PROGRESS NOTES
Bedside and Verbal shift change report given to Dhaval Mckeno RN (oncoming nurse) by Storm Pool RN (offgoing nurse). Report included the following information SBAR, Kardex, Intake/Output and MAR.

## 2019-01-26 NOTE — PROGRESS NOTES
Bedside and Verbal shift change report given to Zacarias Stanley (oncoming nurse) by Dario Araya (offgoing nurse). Report included the following information SBAR.

## 2019-01-27 ENCOUNTER — APPOINTMENT (OUTPATIENT)
Dept: GENERAL RADIOLOGY | Age: 74
DRG: 389 | End: 2019-01-27
Attending: HOSPITALIST
Payer: MEDICARE

## 2019-01-27 LAB
ANION GAP SERPL CALC-SCNC: 6 MMOL/L (ref 5–15)
APTT PPP: 36.4 SEC (ref 22.1–32)
APTT PPP: 43.3 SEC (ref 22.1–32)
APTT PPP: 50 SEC (ref 22.1–32)
APTT PPP: >130 SEC (ref 22.1–32)
BASOPHILS # BLD: 0 K/UL (ref 0–0.1)
BASOPHILS NFR BLD: 0 % (ref 0–1)
BUN SERPL-MCNC: 4 MG/DL (ref 6–20)
BUN/CREAT SERPL: 5 (ref 12–20)
CALCIUM SERPL-MCNC: 8.7 MG/DL (ref 8.5–10.1)
CHLORIDE SERPL-SCNC: 104 MMOL/L (ref 97–108)
CO2 SERPL-SCNC: 30 MMOL/L (ref 21–32)
CREAT SERPL-MCNC: 0.8 MG/DL (ref 0.55–1.02)
DIFFERENTIAL METHOD BLD: ABNORMAL
EOSINOPHIL # BLD: 0 K/UL (ref 0–0.4)
EOSINOPHIL NFR BLD: 0 % (ref 0–7)
ERYTHROCYTE [DISTWIDTH] IN BLOOD BY AUTOMATED COUNT: 17 % (ref 11.5–14.5)
GLUCOSE SERPL-MCNC: 143 MG/DL (ref 65–100)
HCT VFR BLD AUTO: 29.2 % (ref 35–47)
HGB BLD-MCNC: 8.3 G/DL (ref 11.5–16)
IMM GRANULOCYTES # BLD AUTO: 0.2 K/UL (ref 0–0.04)
IMM GRANULOCYTES NFR BLD AUTO: 2 % (ref 0–0.5)
LYMPHOCYTES # BLD: 0.8 K/UL (ref 0.8–3.5)
LYMPHOCYTES NFR BLD: 7 % (ref 12–49)
MCH RBC QN AUTO: 24.1 PG (ref 26–34)
MCHC RBC AUTO-ENTMCNC: 28.4 G/DL (ref 30–36.5)
MCV RBC AUTO: 84.9 FL (ref 80–99)
MONOCYTES # BLD: 0.5 K/UL (ref 0–1)
MONOCYTES NFR BLD: 4 % (ref 5–13)
NEUTS SEG # BLD: 10.3 K/UL (ref 1.8–8)
NEUTS SEG NFR BLD: 87 % (ref 32–75)
NRBC # BLD: 0 K/UL (ref 0–0.01)
NRBC BLD-RTO: 0 PER 100 WBC
PLATELET # BLD AUTO: 302 K/UL (ref 150–400)
PLATELET COMMENTS,PCOM: ABNORMAL
PMV BLD AUTO: 8.4 FL (ref 8.9–12.9)
POTASSIUM SERPL-SCNC: 3.8 MMOL/L (ref 3.5–5.1)
RBC # BLD AUTO: 3.44 M/UL (ref 3.8–5.2)
RBC MORPH BLD: ABNORMAL
SODIUM SERPL-SCNC: 140 MMOL/L (ref 136–145)
THERAPEUTIC RANGE,PTTT: ABNORMAL SECS (ref 58–77)
WBC # BLD AUTO: 11.8 K/UL (ref 3.6–11)

## 2019-01-27 PROCEDURE — 74011250636 HC RX REV CODE- 250/636: Performed by: INTERNAL MEDICINE

## 2019-01-27 PROCEDURE — 71101 X-RAY EXAM UNILAT RIBS/CHEST: CPT

## 2019-01-27 PROCEDURE — 85730 THROMBOPLASTIN TIME PARTIAL: CPT

## 2019-01-27 PROCEDURE — 74011250636 HC RX REV CODE- 250/636: Performed by: HOSPITALIST

## 2019-01-27 PROCEDURE — 74011250637 HC RX REV CODE- 250/637: Performed by: HOSPITALIST

## 2019-01-27 PROCEDURE — 74011250636 HC RX REV CODE- 250/636: Performed by: PHYSICIAN ASSISTANT

## 2019-01-27 PROCEDURE — 74011000250 HC RX REV CODE- 250: Performed by: PHYSICIAN ASSISTANT

## 2019-01-27 PROCEDURE — 74019 RADEX ABDOMEN 2 VIEWS: CPT

## 2019-01-27 PROCEDURE — 36415 COLL VENOUS BLD VENIPUNCTURE: CPT

## 2019-01-27 PROCEDURE — 80048 BASIC METABOLIC PNL TOTAL CA: CPT

## 2019-01-27 PROCEDURE — 85025 COMPLETE CBC W/AUTO DIFF WBC: CPT

## 2019-01-27 PROCEDURE — 74011250637 HC RX REV CODE- 250/637: Performed by: INTERNAL MEDICINE

## 2019-01-27 PROCEDURE — 65270000029 HC RM PRIVATE

## 2019-01-27 RX ORDER — FACIAL-BODY WIPES
10 EACH TOPICAL ONCE
Status: COMPLETED | OUTPATIENT
Start: 2019-01-27 | End: 2019-01-27

## 2019-01-27 RX ADMIN — HEPARIN SODIUM AND DEXTROSE 21 UNITS/KG/HR: 10000; 5 INJECTION INTRAVENOUS at 15:26

## 2019-01-27 RX ADMIN — MORPHINE SULFATE 2 MG: 2 INJECTION, SOLUTION INTRAMUSCULAR; INTRAVENOUS at 19:31

## 2019-01-27 RX ADMIN — SODIUM CHLORIDE 10 ML: 9 INJECTION, SOLUTION INTRAMUSCULAR; INTRAVENOUS; SUBCUTANEOUS at 14:23

## 2019-01-27 RX ADMIN — GABAPENTIN 300 MG: 300 CAPSULE ORAL at 22:17

## 2019-01-27 RX ADMIN — BISACODYL 10 MG: 10 SUPPOSITORY RECTAL at 11:21

## 2019-01-27 RX ADMIN — AMITRIPTYLINE HYDROCHLORIDE 100 MG: 50 TABLET, FILM COATED ORAL at 22:17

## 2019-01-27 RX ADMIN — METHYLPREDNISOLONE SODIUM SUCCINATE 20 MG: 40 INJECTION, POWDER, FOR SOLUTION INTRAMUSCULAR; INTRAVENOUS at 11:10

## 2019-01-27 RX ADMIN — MORPHINE SULFATE 2 MG: 2 INJECTION, SOLUTION INTRAMUSCULAR; INTRAVENOUS at 11:12

## 2019-01-27 RX ADMIN — BENZOCAINE AND MENTHOL 1 LOZENGE: 15; 3.6 LOZENGE ORAL at 11:11

## 2019-01-27 RX ADMIN — MORPHINE SULFATE 2 MG: 2 INJECTION, SOLUTION INTRAMUSCULAR; INTRAVENOUS at 03:14

## 2019-01-27 RX ADMIN — DEXTROSE MONOHYDRATE, SODIUM CHLORIDE, AND POTASSIUM CHLORIDE 75 ML/HR: 50; 4.5; 1.49 INJECTION, SOLUTION INTRAVENOUS at 20:41

## 2019-01-27 RX ADMIN — MORPHINE SULFATE 2 MG: 2 INJECTION, SOLUTION INTRAMUSCULAR; INTRAVENOUS at 07:21

## 2019-01-27 RX ADMIN — FAMOTIDINE 20 MG: 10 INJECTION, SOLUTION INTRAVENOUS at 11:12

## 2019-01-27 RX ADMIN — SIMVASTATIN 20 MG: 20 TABLET, FILM COATED ORAL at 22:17

## 2019-01-27 RX ADMIN — SODIUM CHLORIDE 10 ML: 9 INJECTION, SOLUTION INTRAMUSCULAR; INTRAVENOUS; SUBCUTANEOUS at 22:18

## 2019-01-27 RX ADMIN — DEXTROSE MONOHYDRATE, SODIUM CHLORIDE, AND POTASSIUM CHLORIDE 75 ML/HR: 50; 4.5; 1.49 INJECTION, SOLUTION INTRAVENOUS at 06:46

## 2019-01-27 RX ADMIN — SODIUM CHLORIDE 10 ML: 9 INJECTION, SOLUTION INTRAMUSCULAR; INTRAVENOUS; SUBCUTANEOUS at 07:21

## 2019-01-27 RX ADMIN — POTASSIUM CHLORIDE 20 MEQ: 750 TABLET, EXTENDED RELEASE ORAL at 11:11

## 2019-01-27 NOTE — PROGRESS NOTES
Hospitalist Progress Note Mina Colon MD 
Answering service: [phone] OR [phone] from in house phone Date of Service:  2019 NAME:  Nicole Scott :  1945 MRN:  [id number] Admission Summary:  
Nicole Scott is a 68 y.o. female with extensive PMH of multiple hernia surgeries, MM, RA, DVT, hld, chronic anemia, who presents with abdominal pain. Interval history / Subjective:  
 Pt seen in follow up of bowel obstruction She denies passing gas or moving her bowels NG in place. She is up in chair She Feels Less Distended Reports pain in right Rib area Assessment & Plan: SBO Likley from Adhesions - pt had previous surgeries Continue bowel rest 
SeriaL abd checks Surgery consulted and following On Levaquin She has Multiple other allergies - flagyl and pcn being one of them Will request Dr Dre Miller consult per pt request 
NG tube to Intermittent suction H/O MM Chronic Anemia noted Transfuse if Hg<7 Seeing Dr Kris Moore as op On Chemotherapy as op Has a port Oncology Consult requested H/o Allergic rash Chronically on On Prednisone  Po 15mg chronically - on IV solumedrol for equivalency GERD On PPI Nasal Dryness Wean off oxygen Flonase prn Neuropathy Resume Home Elavil PMH of Rheumatoid Arthritis, Hyperlipidemia and Chronic anemia Chronic and stable Rib Pains Check XR ribs - r/o fracture, not performed since yesterday - called to [facility]ay to have it done. H/o Right Leg DVT Duplex neg for DVT Switched from po xarelto to heparin for coverage if surgery planned Oncology eval for recommendations on anticoagulation Hypokalemia Repleted Code status: Full DVT prophylaxis: SCD Care Plan discussed with: Patient/Family and Nurse - D/W  and daughter in room - D/W pt and  Patient has given Verbal permission to discuss medical care with  
persons present in the room and and also with contact as listed on face sheet. Disposition: TBD Hospital Problems  Date Reviewed: 3/23/2018 Codes Class Noted POA  
 SBO (small bowel obstruction) ([facility] ICD-10-CM: L98.650 ICD-9-CM: 560.9  1/24/2019 Unknown Review of Systems:  
Pertinent items are noted in HPI. Vital Signs:  
 Last 24hrs VS reviewed since prior progress note. Most recent are: 
Visit Vitals /83 (BP 1 Location: Right arm, BP Patient Position: At rest) Pulse 86 Temp 97.6 °F (36.4 °C) Resp 14 Ht 5' 4\" (1.626 m) Wt 89.2 kg (196 lb 10.4 oz) SpO2 99% Breastfeeding? No  
BMI 33.76 kg/m² Intake/Output Summary (Last 24 hours) at 1/27/2019 1422 Last data filed at 1/27/2019 0900 Gross per 24 hour Intake 0 ml Output 525 ml Net -525 ml Physical Examination:  
 
 
     
Constitutional:  Mild Distress. , cooperative, pleasant  NG in place with Green Drainage ENT:  Oral mucous moist, oropharynx benign. Neck supple, Resp:  CTA bilaterally. No wheezing/rhonchi/rales. No accessory muscle use CV:  Regular rhythm, normal rate, no murmurs, gallops, rubs GI:  Distended, Sluggish Bowel sounds. Softer than yesterday. no hepatosplenomegaly Musculoskeletal:  No edema, warm, 2+ pulses throughout Neurologic:  Moves all extremities. AAOx3, CN II-XII reviewed Skin:  Good turgor, no rashes or ulcers Data Review:  
 Review and/or order of clinical lab test 
Review and/or order of tests in the radiology section of CPT Review and/or order of tests in the medicine section of CPT Labs:  
 
Recent Labs  
  01/27/19 
3472 01/26/19 
0136 WBC 11.8* 11.1* HGB 8.3* 7.7* HCT 29.2* 27.2*  
 258 Recent Labs  
  01/27/19 
0312 01/26/19 
0136 01/25/19 
3657  138 140  
K 3.8 3.2* 3.4*  
 104 105 CO2 30 30 30 BUN 4* 5* 8  
 CREA 0.80 0.74 0.82 * 109* 99  
CA 8.7 7.6* 7.2* Recent Labs  
  01/24/19 
2333 01/24/19 
1701 SGOT 39* 46* ALT 14 14 AP 84 101 TBILI 0.4 0.5 TP 5.7* 7.2 ALB 2.3* 3.1*  
GLOB 3.4 4.1*  
LPSE  --  77 Recent Labs  
  01/27/19 
3241 01/27/19 
0312 01/26/19 
1745 APTT 36.4* >130.0* 35.8* No results for input(s): FE, TIBC, PSAT, FERR in the last 72 hours. Lab Results Component Value Date/Time Folate 34.3 (H) 05/28/2010 04:30 AM  
  
No results for input(s): PH, PCO2, PO2 in the last 72 hours. Recent Labs  
  01/24/19 
1701 TROIQ <0.05 Lab Results Component Value Date/Time Cholesterol, total 137 03/06/2017 08:01 AM  
 HDL Cholesterol 47 03/06/2017 08:01 AM  
 LDL, calculated 38 03/06/2017 08:01 AM  
 Triglyceride 260 (H) 03/06/2017 08:01 AM  
 CHOL/HDL Ratio 5.0 12/16/2011 05:05 AM  
 
Lab Results Component Value Date/Time Glucose (POC) 113 (H) 06/02/2016 11:06 AM  
 Glucose (POC) 87 09/15/2012 08:29 PM  
 Glucose (POC) 87 09/15/2012 11:58 AM  
 
Lab Results Component Value Date/Time Color YELLOW/STRAW 01/24/2019 08:09 PM  
 Appearance CLEAR 01/24/2019 08:09 PM  
 Specific gravity <1.005 01/24/2019 08:09 PM  
 Specific gravity 1.008 03/01/2016 03:44 PM  
 pH (UA) 7.5 01/24/2019 08:09 PM  
 Protein TRACE (A) 01/24/2019 08:09 PM  
 Glucose NEGATIVE  01/24/2019 08:09 PM  
 Ketone TRACE (A) 01/24/2019 08:09 PM  
 Bilirubin NEGATIVE  01/24/2019 08:09 PM  
 Urobilinogen 1.0 01/24/2019 08:09 PM  
 Nitrites NEGATIVE  01/24/2019 08:09 PM  
 Leukocyte Esterase NEGATIVE  01/24/2019 08:09 PM  
 Epithelial cells FEW 01/24/2019 08:09 PM  
 Bacteria NEGATIVE  01/24/2019 08:09 PM  
 WBC 0-4 01/24/2019 08:09 PM  
 RBC 10-20 01/24/2019 08:09 PM  
 
 
 
Medications Reviewed:  
 
Current Facility-Administered Medications Medication Dose Route Frequency  benzocaine-menthol (CEPACOL) lozenge 1 Lozenge  1 Lozenge Mucous Membrane PRN  
  dextrose 5% - 0.45% NaCl with KCl 20 mEq/L infusion  75 mL/hr IntraVENous CONTINUOUS  
 heparin 25,000 units in D5W 250 ml infusion  16-36 Units/kg/hr IntraVENous TITRATE  methylPREDNISolone (PF) (SOLU-MEDROL) injection 20 mg  20 mg IntraVENous DAILY  phenol throat spray (CHLORASEPTIC) 1 Spray  1 Spray Oral PRN  
 famotidine (PF) (PEPCID) 20 mg in sodium chloride 0.9% 10 mL injection  20 mg IntraVENous Q12H  
 triamcinolone acetonide (KENALOG) 0.1 % ointment   Topical BID  spironolactone (ALDACTONE) tablet 50 mg  50 mg Oral BID  fluticasone (FLONASE) 50 mcg/actuation nasal spray 2 Spray  2 Spray Both Nostrils BID  potassium chloride SR (KLOR-CON 10) tablet 20 mEq  20 mEq Oral DAILY  . PHARMACY TO SUBSTITUTE PER PROTOCOL (Reordered from: Milnacipran (SAVELLA) 50 mg tablet)    Per Protocol  amitriptyline (ELAVIL) tablet 100 mg  100 mg Oral QHS  gabapentin (NEURONTIN) capsule 300 mg  300 mg Oral QID  simvastatin (ZOCOR) tablet 20 mg  20 mg Oral QHS  sodium chloride (NS) flush 5-40 mL  5-40 mL IntraVENous Q8H  
 sodium chloride (NS) flush 5-40 mL  5-40 mL IntraVENous PRN  
 morphine injection 2 mg  2 mg IntraVENous Q4H PRN  
 ondansetron (ZOFRAN) injection 4 mg  4 mg IntraVENous Q4H PRN  
 levoFLOXacin (LEVAQUIN) 750 mg in D5W IVPB  750 mg IntraVENous Q24H  
 
______________________________________________________________________ EXPECTED LENGTH OF STAY: 3d 7h 
ACTUAL LENGTH OF STAY:          3 
 
            
Parisa Burton MD

## 2019-01-27 NOTE — CONSULTS
Hematology Oncology Consultation Reason for consult: Multiple Myeloma Primary oncologist_ Dr Cyn Mohr Admitting Diagnosis: SBO (small bowel obstruction) ([facility] [address] HPI:  
68 yr old w f , known to my colleague, Dr Cyn Mohr- for her long standing Light chain Myeloma X 11 years- last Tx was carfilzomib and Dex- stopped in Sept 2017 - with stability of monoclonal proteins - last check mid dec 2018, on chronic prednisone at 15 mg po  Daily, on a pain pump with morphine ( Dr Chanell Girard), multiple surgeries for hernia repair, multiple prior Kyphoplasties with chronic low back pain, admitted with 2-3 days of abdo pain, no BMs and n/v- found to have a partial SBO, acute on chronic anemia , currently on a heparin gtt. She passed a little flatus this am, pain is bit better,and fatigued+. No f/c/n/v/ bleeding Recommendations: *)Partial SBO- NGT . Surgery following. *)Light chain Myeloma  -  X 11 years- last Tx was carfilzomib and Dex- stopped in Sept 2017 - with stability of monoclonal proteins - last check mid dec 2018, on chronic prednisone at 15 mg po  Daily as outpatient. Has an appt for Feb 13 with Dr Cyn Mohr, with labs prior. Will add Myeloma labs , given the worsened anemia. Last Lambda was 70 with K/L ratio -0.1 *)h/o DVT on chronic anticoagulation- Agree with switch from Xorelto to iv heparin, in case surgical intervention becomes necessary. *)Acute on chronic anemia- likely from Myeloma - Hb  - 1/10/2019 at [address]  - 10 ( which is close to her baseline) . Check Myeloma labs and compare to last one at [facility]  
*)Pain mx- known to Dr Chanell Girard- has pain pump with morphine Following. Thanks for consultation Imaging: 
Noted imaging - xrays, CT abdo and dopplers b/l le- neg for DVT Labs: 
 
Recent Results (from the past 24 hour(s)) PTT Collection Time: 01/26/19  5:45 PM  
Result Value Ref Range  aPTT 35.8 (H) 22.1 - 32.0 sec  
 aPTT, therapeutic range     58.0 - 77.0 SECS  
PTT  
 Collection Time: 01/27/19  3:12 AM  
Result Value Ref Range aPTT >130.0 (HH) 22.1 - 32.0 sec  
 aPTT, therapeutic range     58.0 - 77.0 SECS  
CBC WITH AUTOMATED DIFF Collection Time: 01/27/19  3:12 AM  
Result Value Ref Range WBC 11.8 (H) 3.6 - 11.0 K/uL  
 RBC 3.44 (L) 3.80 - 5.20 M/uL HGB 8.3 (L) 11.5 - 16.0 g/dL HCT 29.2 (L) 35.0 - 47.0 % MCV 84.9 80.0 - 99.0 FL  
 MCH 24.1 (L) 26.0 - 34.0 PG  
 MCHC 28.4 (L) 30.0 - 36.5 g/dL  
 RDW 17.0 (H) 11.5 - 14.5 % PLATELET 453 808 - 162 K/uL MPV 8.4 (L) 8.9 - 12.9 FL  
 NRBC 0.0 0  WBC ABSOLUTE NRBC 0.00 0.00 - 0.01 K/uL NEUTROPHILS 87 (H) 32 - 75 % LYMPHOCYTES 7 (L) 12 - 49 % MONOCYTES 4 (L) 5 - 13 % EOSINOPHILS 0 0 - 7 % BASOPHILS 0 0 - 1 % IMMATURE GRANULOCYTES 2 (H) 0.0 - 0.5 % ABS. NEUTROPHILS 10.3 (H) 1.8 - 8.0 K/UL  
 ABS. LYMPHOCYTES 0.8 0.8 - 3.5 K/UL  
 ABS. MONOCYTES 0.5 0.0 - 1.0 K/UL  
 ABS. EOSINOPHILS 0.0 0.0 - 0.4 K/UL  
 ABS. BASOPHILS 0.0 0.0 - 0.1 K/UL  
 ABS. IMM. GRANS. 0.2 (H) 0.00 - 0.04 K/UL  
 DF SMEAR SCANNED    
 PLATELET COMMENTS Large Platelets RBC COMMENTS ANISOCYTOSIS 1+ 
    
 RBC COMMENTS OVALOCYTES PRESENT 
    
 RBC COMMENTS HYPOCHROMIA 1+ 
    
 RBC COMMENTS POLYCHROMASIA PRESENT 
    
METABOLIC PANEL, BASIC Collection Time: 01/27/19  3:12 AM  
Result Value Ref Range Sodium 140 136 - 145 mmol/L Potassium 3.8 3.5 - 5.1 mmol/L Chloride 104 97 - 108 mmol/L  
 CO2 30 21 - 32 mmol/L Anion gap 6 5 - 15 mmol/L Glucose 143 (H) 65 - 100 mg/dL BUN 4 (L) 6 - 20 MG/DL Creatinine 0.80 0.55 - 1.02 MG/DL  
 BUN/Creatinine ratio 5 (L) 12 - 20 GFR est AA >60 >60 ml/min/1.73m2 GFR est non-AA >60 >60 ml/min/1.73m2 Calcium 8.7 8.5 - 10.1 MG/DL  
PTT Collection Time: 01/27/19  6:38 AM  
Result Value Ref Range aPTT 36.4 (H) 22.1 - 32.0 sec  
 aPTT, therapeutic range     58.0 - 77.0 SECS History: 
Past Medical History:  
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   
 spinal stenosis per pt  Fibromyalgia  GERD (gastroesophageal reflux disease)  High cholesterol  History of acute pancreatitis  History of blood transfusion  History of Salmonella infection 2009  Multiple myeloma ([facility] [address] 2007  Osteoporosis  Other complication due to venous access device ([facility] [address] 1/29/2013  Recurrent umbilical hernia 3/65/0841  Recurrent ventral incisional hernia 3/28/2016  Rheumatoid arthritis(714.0)  Sleep apnea INTOLERATANT OF CPAP  Thromboembolus ([facility] [address] Right leg DVT  Urinary incontinence Past Surgical History:  
Procedure Laterality Date  HX CHOLECYSTECTOMY  2004  HX CRANIOTOMY    
 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    
 COLONOSCOPIES, EGD  HX GI    
 ESOPHAGUS STREACHED  
 HX HEENT  10/2011  
 b/l cataract surgery in October 2011  HX HERNIA REPAIR  [id number] X6  
 HX HERNIA REPAIR  10-13-14  
 repair of recurrent ventral incisional hernia  with primary suture [id number]. Chanelle Dooley  HX KYPHOPLASTY  09/14/2017 L4  
 HX ORTHOPAEDIC Right 2001 BONE SPURS SHOULDER  
 HX OTHER SURGICAL  2010 LLQ PAIN PUMP FOR MORPHINE INFUSION  
 HX OTHER SURGICAL  1-16-14  
 repair of recurrent umbilical hernia with ventralex pillow top mesh and extensive lysis of [id number]-WADR. Chanelle Dooley  HX OTHER SURGICAL  5-31-16  
 repair of recurrent ventral incisional hernia with underlay mesh-[facility]-Dr. Chanelle Dooley  HX VASCULAR ACCESS Right 1/2013 POWER PORT   
 HX VASCULAR ACCESS  2010 PAIN PUMP  NEUROLOGICAL PROCEDURE UNLISTED  2007 KYPHOPLASTY X2  
 NEUROLOGICAL PROCEDURE UNLISTED  10/26/15 Kyphoplasty t-5  PAIN PUMP DEVICE    
 implanted in LLQ, MORPHINE Prior to Admission medications Medication Sig Start Date End Date Taking? Authorizing Provider gabapentin (NEURONTIN) 300 mg capsule TAKE ONE CAPSULE BY MOUTH FOUR TIMES A DAY 3/23/18  Yes Bonifacio Rapp MD  
potassium chloride (K-DUR, KLOR-CON) 20 mEq tablet TAKE ONE TABLET BY MOUTH EVERY DAY 3/23/18  Yes Marilou Dotson III, MD  
raNITIdine (ZANTAC) 150 mg tablet Take 150 mg by mouth two (2) times a day. Yes Provider, Historical  
spironolactone (ALDACTONE) 50 mg tablet Take 1 Tab by mouth two (2) times a day. Indications: EDEMA 9/23/16  Yes Bonifacio Rapp MD  
cetirizine (ZYRTEC) 10 mg tablet Take 10 mg by mouth nightly. Yes Provider, Historical  
diazepam (VALIUM) 10 mg tablet Take 10 mg by mouth two (2) times a day. Yes Provider, Historical  
HYDROmorphone (DILAUDID) 4 mg tablet Take 4-8 mg by mouth every four (4) hours as needed for Pain. Took 8 mg at 6 am 7-13-18   Yes Provider, Historical  
mirabegron ER (MYRBETRIQ) 50 mg ER tablet Take 50 mg by mouth daily. Provider, Historical  
amitriptyline (ELAVIL) 50 mg tablet TAKE TWO TABLETS BY MOUTH AT BEDTIME AS NEEDED FOR SLEEP 12/26/18   Marilou Dotson III, MD  
ammonium lactate (LAC-HYDRIN) 12 % lotion  3/8/18   Provider, Historical  
simvastatin (ZOCOR) 20 mg tablet TAKE ONE TABLET BY MOUTH AT BEDTIME 3/23/18   Marilou Dotson III, MD  
lubiPROStone (AMITIZA) 24 mcg capsule TAKE 1 CAPSULE BY MOUTH TWICE DAILY 3/23/18   Marilou Dotson III, MD  
Milnacipran (SAVELLA) 50 mg tablet TAKE 1 TABLET BY MOUTH TWICE DAILY 3/23/18   Marilou Dotson III, MD  
Dexlansoprazole (DEXILANT) 60 mg CpDB TAKE 1 CAPSULE BY MOUTH EVERY DAY 3/23/18   Bonifacio Rapp MD  
fluticasone (FLONASE) 50 mcg/actuation nasal spray 2 Sprays by Both Nostrils route two (2) times a day. 2/2/18   Marilou Dotson III, MD  
rivaroxaban (XARELTO) 10 mg tablet Take 10 mg by mouth daily. Provider, Historical  
predniSONE (DELTASONE) 10 mg tablet Take 15 mg by mouth daily.     Provider, Historical  
 ondansetron (ZOFRAN ODT) 4 mg disintegrating tablet Take 1 Tab by mouth every six (6) hours as needed for Nausea. Indications: PREVENTION OF POST-OPERATIVE NAUSEA AND VOMITING 6/7/16   Marta Summers, NP  
B.infantis-B.ani-B.long-Nidia 10-15 mg TbEC Take 1 Tab by mouth every morning. Provider, Historical  
aluminum hydrox-magnesium carb (GAVISCON)  mg/15 mL suspension Take 15 mL by mouth every six (6) hours as needed for Indigestion. Provider, Historical  
calcium carbonate (TUMS) 200 mg calcium (500 mg) chew Take 1 Tab by mouth as needed. Provider, Historical  
calcium citrate-vitamin D3 (CITRACAL WITH VITAMIN D MAXIMUM) tablet Take  by mouth two (2) times a day. Provider, Historical  
magnesium hydroxide (MILK OF MAGNESIA) 400 mg/5 mL suspension Take 30 mL by mouth daily as needed for Constipation. Provider, Historical  
betamethasone dipropionate (DIPROLENE) 0.05 % ointment Apply  to affected area two (2) times a day. Provider, Historical  
acetaminophen (TYLENOL) 500 mg tablet Take 1,000 mg by mouth every six (6) hours as needed for Pain. Provider, Historical  
guaiFENesin SR (MUCINEX) 600 mg SR tablet Take 1,200 mg by mouth as needed. 3/9/16   Praveen Rod MD  
OTHER,NON-FORMULARY, LLQ Morphine/bupivacaine pain pump for multiple myeloma. 4.287mg/3.125 mg per day    Provider, Historical  
docusate sodium (COLACE) 100 mg capsule Take 400 mg by mouth. 1 capsule in the morning and 3 capsule at night    Provider, Historical  
polyethylene glycol (MIRALAX) 17 gram packet Take 17 g by mouth daily as needed. Provider, Historical  
senna (SENOKOT) 8.6 mg tablet Take 4 Tabs by mouth two (2) times a day. 4 PILLS IN AM  4 PILLS IN PM    Provider, Historical  
prochlorperazine (COMPAZINE) 5 mg tablet Take 5 mg by mouth every six (6) hours as needed for Nausea. Provider, Historical  
 
Allergies Allergen Reactions  Aggrenox [Aspirin-Dipyridamole] Rash Allergic to the dipyridamole and not aspirin.  Broccoli Other (comments) C/O GAS  Bumex [Bumetanide] Rash  Corn Diarrhea  Corn Starch-Kaolin-Zinc Oxide Diarrhea  Doxil [Doxorubicin, Peg-Liposomal] Rash Burning of skin  Flagyl [Metronidazole] Rash  Keflex [Cephalexin] Rash  Lasix [Furosemide] Rash  Macrobid [Nitrofurantoin Monohyd/M-Cryst] Shortness of Breath  Methotrexate Hives and Rash  Pcn [Penicillins] Rash  Sulfa (Sulfonamide Antibiotics) Hives  Tetanus And Diphtheria Toxoids Swelling  Thalidomide Rash Social History Tobacco Use  Smoking status: Former Smoker Years: 10.00 Last attempt to quit: 10/7/2005 Years since quittin.3  Smokeless tobacco: Never Used Substance Use Topics  Alcohol use: No  
  
Family History Problem Relation Age of Onset [facility] Arthritis-osteo Mother  Anesth Problems Neg Hx Current Medications: 
Current Facility-Administered Medications Medication Dose Route Frequency  benzocaine-menthol (CEPACOL) lozenge 1 Lozenge  1 Lozenge Mucous Membrane PRN  
 dextrose 5% - 0.45% NaCl with KCl 20 mEq/L infusion  75 mL/hr IntraVENous CONTINUOUS  
 heparin 25,000 units in D5W 250 ml infusion  16-36 Units/kg/hr IntraVENous TITRATE  methylPREDNISolone (PF) (SOLU-MEDROL) injection 20 mg  20 mg IntraVENous DAILY  phenol throat spray (CHLORASEPTIC) 1 Spray  1 Spray Oral PRN  
 famotidine (PF) (PEPCID) 20 mg in sodium chloride 0.9% 10 mL injection  20 mg IntraVENous Q12H  
 triamcinolone acetonide (KENALOG) 0.1 % ointment   Topical BID  spironolactone (ALDACTONE) tablet 50 mg  50 mg Oral BID  fluticasone (FLONASE) 50 mcg/actuation nasal spray 2 Spray  2 Spray Both Nostrils BID  potassium chloride SR (KLOR-CON 10) tablet 20 mEq  20 mEq Oral DAILY  . PHARMACY TO SUBSTITUTE PER PROTOCOL (Reordered from: Milnacipran (SAVELLA) 50 mg tablet)    Per Protocol  amitriptyline (ELAVIL) tablet 100 mg  100 mg Oral QHS  gabapentin (NEURONTIN) capsule 300 mg  300 mg Oral QID  simvastatin (ZOCOR) tablet 20 mg  20 mg Oral QHS  sodium chloride (NS) flush 5-40 mL  5-40 mL IntraVENous Q8H  
 sodium chloride (NS) flush 5-40 mL  5-40 mL IntraVENous PRN  
 morphine injection 2 mg  2 mg IntraVENous Q4H PRN  
 ondansetron (ZOFRAN) injection 4 mg  4 mg IntraVENous Q4H PRN  
 levoFLOXacin (LEVAQUIN) 750 mg in D5W IVPB  750 mg IntraVENous Q24H Review of Systems: 
12 point ROS done. Negative except for symptoms mentioned in HPI. Physical Exam: 
MS-Alert, or X 3, + pale Obese, cushingoid+ Neck-Supple, No JVD CVS-S1,S2 normal 
Chest wall- Port site -looks fine RS-CTA b/l Abdomen-soft, mild distension, mild ruq tenderness+, LLQ pain pump- no erythema or tenderness Extre- No c/c/. Trace edema b/l le Neuro- No FND.

## 2019-01-27 NOTE — PROGRESS NOTES
Progress Note Patient: Angella Valle MRN: [id number]  SSN: [id number] YOB: 1945  Age: 68 y.o. Sex: female Admit Date: 2019 Small bowel obstruction Subjective: No acute surgical issues. Pt reported passing some flatus earlier this morning. Abdomen is less distended although pt did report some nausea Objective:  
 
Visit Vitals /81 (BP 1 Location: Left arm, BP Patient Position: At rest) Pulse 99 Temp 98.2 °F (36.8 °C) Resp 15 Ht 5' 4\" (1.626 m) Wt 196 lb 10.4 oz (89.2 kg) SpO2 92% Breastfeeding? No  
BMI 33.76 kg/m² Temp (24hrs), Av.6 °F (37 °C), Min:97.6 °F (36.4 °C), Max:100.6 °F (38.1 °C) Physical Exam:   
Gen:  NAD Pulm:  Unlabored Abd:  S/moderately distended/Non-TTP without guarding or rebound Recent Results (from the past 24 hour(s)) PTT Collection Time: 19  5:45 PM  
Result Value Ref Range aPTT 35.8 (H) 22.1 - 32.0 sec  
 aPTT, therapeutic range     58.0 - 77.0 SECS  
PTT Collection Time: 19  3:12 AM  
Result Value Ref Range aPTT >130.0 (HH) 22.1 - 32.0 sec  
 aPTT, therapeutic range     58.0 - 77.0 SECS  
CBC WITH AUTOMATED DIFF Collection Time: 19  3:12 AM  
Result Value Ref Range WBC 11.8 (H) 3.6 - 11.0 K/uL  
 RBC 3.44 (L) 3.80 - 5.20 M/uL HGB 8.3 (L) 11.5 - 16.0 g/dL HCT 29.2 (L) 35.0 - 47.0 % MCV 84.9 80.0 - 99.0 FL  
 MCH 24.1 (L) 26.0 - 34.0 PG  
 MCHC 28.4 (L) 30.0 - 36.5 g/dL  
 RDW 17.0 (H) 11.5 - 14.5 % PLATELET 287 488 - 912 K/uL MPV 8.4 (L) 8.9 - 12.9 FL  
 NRBC 0.0 0  WBC ABSOLUTE NRBC 0.00 0.00 - 0.01 K/uL NEUTROPHILS 87 (H) 32 - 75 % LYMPHOCYTES 7 (L) 12 - 49 % MONOCYTES 4 (L) 5 - 13 % EOSINOPHILS 0 0 - 7 % BASOPHILS 0 0 - 1 % IMMATURE GRANULOCYTES 2 (H) 0.0 - 0.5 % ABS. NEUTROPHILS 10.3 (H) 1.8 - 8.0 K/UL  
 ABS. LYMPHOCYTES 0.8 0.8 - 3.5 K/UL  
 ABS. MONOCYTES 0.5 0.0 - 1.0 K/UL  
 ABS. EOSINOPHILS 0.0 0.0 - 0.4 K/UL ABS. BASOPHILS 0.0 0.0 - 0.1 K/UL  
 ABS. IMM. GRANS. 0.2 (H) 0.00 - 0.04 K/UL  
 DF SMEAR SCANNED    
 PLATELET COMMENTS Large Platelets RBC COMMENTS ANISOCYTOSIS 1+ 
    
 RBC COMMENTS OVALOCYTES PRESENT 
    
 RBC COMMENTS HYPOCHROMIA 1+ 
    
 RBC COMMENTS POLYCHROMASIA PRESENT 
    
METABOLIC PANEL, BASIC Collection Time: 01/27/19  3:12 AM  
Result Value Ref Range Sodium 140 136 - 145 mmol/L Potassium 3.8 3.5 - 5.1 mmol/L Chloride 104 97 - 108 mmol/L  
 CO2 30 21 - 32 mmol/L Anion gap 6 5 - 15 mmol/L Glucose 143 (H) 65 - 100 mg/dL BUN 4 (L) 6 - 20 MG/DL Creatinine 0.80 0.55 - 1.02 MG/DL  
 BUN/Creatinine ratio 5 (L) 12 - 20 GFR est AA >60 >60 ml/min/1.73m2 GFR est non-AA >60 >60 ml/min/1.73m2 Calcium 8.7 8.5 - 10.1 MG/DL  
PTT Collection Time: 01/27/19  6:38 AM  
Result Value Ref Range aPTT 36.4 (H) 22.1 - 32.0 sec  
 aPTT, therapeutic range     58.0 - 77.0 SECS  
PTT Collection Time: 01/27/19  2:29 PM  
Result Value Ref Range aPTT 43.3 (H) 22.1 - 32.0 sec  
 aPTT, therapeutic range     58.0 - 77.0 SECS Assessment:  
 
Hospital Problems  Date Reviewed: 3/23/2018 Codes Class Noted POA  
 [facility] ICD-10-CM: W05.163 ICD-9-CM: 560.9  1/24/2019 Unknown Plan/Recommendations/Medical Decision Making:  
 
- Small bowel obstruction:  No acute indication for operation. Slowly resolving 
- Continue NG tube decompression 
-Follow-up AXR 
- NPO with ice chips for now Signed By: Taryn Arango MD   
 January 27, 2019

## 2019-01-27 NOTE — PROGRESS NOTES
Progress Note Patient: Angella Valle MRN: [id number]  SSN: [id number] YOB: 1945  Age: 68 y.o. Sex: female Admit Date: 2019 Small bowel obstruction Subjective: No acute surgical issues. Pt reported abdominal discomfort but no significant pain. No flatus or BM yet. AXR showed possible partial obstruction Objective:  
 
Visit Vitals /65 (BP 1 Location: Right arm, BP Patient Position: At rest) Pulse (!) 109 Temp (!) 100.6 °F (38.1 °C) Resp 18 Ht 5' 4\" (1.626 m) Wt 196 lb 10.4 oz (89.2 kg) SpO2 (!) 89% Breastfeeding? No  
BMI 33.76 kg/m² Temp (24hrs), Av.7 °F (37.1 °C), Min:97.7 °F (36.5 °C), Max:100.6 °F (38.1 °C) Physical Exam:   
Gen:  NAD Pulm:  Unlabored Abd:  S/distended/mild TTP without guarding or rebound Recent Results (from the past 24 hour(s)) PTT Collection Time: 19  1:36 AM  
Result Value Ref Range aPTT 89.5 (HH) 22.1 - 32.0 sec  
 aPTT, therapeutic range     58.0 - 77.0 SECS  
CBC WITH AUTOMATED DIFF Collection Time: 19  1:36 AM  
Result Value Ref Range WBC 11.1 (H) 3.6 - 11.0 K/uL  
 RBC 3.20 (L) 3.80 - 5.20 M/uL HGB 7.7 (L) 11.5 - 16.0 g/dL HCT 27.2 (L) 35.0 - 47.0 % MCV 85.0 80.0 - 99.0 FL  
 MCH 24.1 (L) 26.0 - 34.0 PG  
 MCHC 28.3 (L) 30.0 - 36.5 g/dL  
 RDW 17.2 (H) 11.5 - 14.5 % PLATELET 573 154 - 757 K/uL MPV 8.6 (L) 8.9 - 12.9 FL  
 NRBC 0.2 (H) 0  WBC ABSOLUTE NRBC 0.02 (H) 0.00 - 0.01 K/uL NEUTROPHILS 71 32 - 75 % LYMPHOCYTES 14 12 - 49 % MONOCYTES 10 5 - 13 % EOSINOPHILS 4 0 - 7 % BASOPHILS 0 0 - 1 % IMMATURE GRANULOCYTES 1 (H) 0.0 - 0.5 % ABS. NEUTROPHILS 7.9 1.8 - 8.0 K/UL  
 ABS. LYMPHOCYTES 1.6 0.8 - 3.5 K/UL  
 ABS. MONOCYTES 1.1 (H) 0.0 - 1.0 K/UL  
 ABS. EOSINOPHILS 0.4 0.0 - 0.4 K/UL  
 ABS. BASOPHILS 0.0 0.0 - 0.1 K/UL  
 ABS. IMM. GRANS. 0.1 (H) 0.00 - 0.04 K/UL  
 DF SMEAR SCANNED    
 PLATELET COMMENTS Large Platelets RBC COMMENTS ANISOCYTOSIS 1+ 
    
 RBC COMMENTS HYPOCHROMIA 1+ 
    
 RBC COMMENTS OVALOCYTES PRESENT 
    
 RBC COMMENTS POLYCHROMASIA PRESENT 
    
METABOLIC PANEL, BASIC Collection Time: 01/26/19  1:36 AM  
Result Value Ref Range Sodium 138 136 - 145 mmol/L Potassium 3.2 (L) 3.5 - 5.1 mmol/L Chloride 104 97 - 108 mmol/L  
 CO2 30 21 - 32 mmol/L Anion gap 4 (L) 5 - 15 mmol/L Glucose 109 (H) 65 - 100 mg/dL BUN 5 (L) 6 - 20 MG/DL Creatinine 0.74 0.55 - 1.02 MG/DL  
 BUN/Creatinine ratio 7 (L) 12 - 20 GFR est AA >60 >60 ml/min/1.73m2 GFR est non-AA >60 >60 ml/min/1.73m2 Calcium 7.6 (L) 8.5 - 10.1 MG/DL  
PTT Collection Time: 01/26/19 10:43 AM  
Result Value Ref Range aPTT 55.8 (H) 22.1 - 32.0 sec  
 aPTT, therapeutic range     58.0 - 77.0 SECS  
PTT Collection Time: 01/26/19  5:45 PM  
Result Value Ref Range aPTT 35.8 (H) 22.1 - 32.0 sec  
 aPTT, therapeutic range     58.0 - 77.0 SECS Assessment:  
 
Hospital Problems  Date Reviewed: 3/23/2018 Codes Class Noted POA  
 [facility] ICD-10-CM: P81.466 ICD-9-CM: 560.9  1/24/2019 Unknown Plan/Recommendations/Medical Decision Making:  
 
- Small bowel obstruction:  No acute indication for operation 
- Continue NG tube decompression - Labs in am 
- Repeat AXR in am 
 
Signed By: Nuris Sanz MD   
 January 26, 2019

## 2019-01-27 NOTE — PROGRESS NOTES
Heparin Infusion Monitoring Recent Labs  
  01/27/19 
1429 01/27/19 
3051 01/27/19 
6018  01/26/19 
0136  01/25/19 
2664 PLT  --   --  302  --  258  --  209 HGB  --   --  8.3*  --  7.7*  --  11.2* APTT 43.3* 36.4* >130.0*   < > 89.5*   < >  --   
 < > = values in this interval not displayed. PTT: 43.3 sec @ 1429 (time) Current rate:  19 units/kg/hr New rate: 21 units/kg/hr Action taken: Rate change validated - spoke with GONZÁLEZ Lucio, every-time we have increased rate and given bolus she ended up supra-therapeutic. Plan to just increase rate w/o bolus.

## 2019-01-27 NOTE — PROGRESS NOTES
Bedside and Verbal shift change report given to GONZÁLEZ Root (oncoming nurse) by Genet Cortes (offgoing nurse). Report included the following information SBAR, Procedure Summary, Intake/Output, MAR, Recent Results and Med Rec Status.

## 2019-01-28 LAB
ANION GAP SERPL CALC-SCNC: 6 MMOL/L (ref 5–15)
APTT PPP: 52.3 SEC (ref 22.1–32)
APTT PPP: 62.7 SEC (ref 22.1–32)
BASOPHILS # BLD: 0 K/UL (ref 0–0.1)
BASOPHILS NFR BLD: 0 % (ref 0–1)
BUN SERPL-MCNC: 9 MG/DL (ref 6–20)
BUN/CREAT SERPL: 12 (ref 12–20)
CALCIUM SERPL-MCNC: 8.5 MG/DL (ref 8.5–10.1)
CHLORIDE SERPL-SCNC: 104 MMOL/L (ref 97–108)
CO2 SERPL-SCNC: 29 MMOL/L (ref 21–32)
CREAT SERPL-MCNC: 0.74 MG/DL (ref 0.55–1.02)
DIFFERENTIAL METHOD BLD: ABNORMAL
EOSINOPHIL # BLD: 0 K/UL (ref 0–0.4)
EOSINOPHIL NFR BLD: 0 % (ref 0–7)
ERYTHROCYTE [DISTWIDTH] IN BLOOD BY AUTOMATED COUNT: 17.2 % (ref 11.5–14.5)
GLUCOSE SERPL-MCNC: 114 MG/DL (ref 65–100)
HCT VFR BLD AUTO: 27.3 % (ref 35–47)
HGB BLD-MCNC: 7.6 G/DL (ref 11.5–16)
IMM GRANULOCYTES # BLD AUTO: 0.2 K/UL (ref 0–0.04)
IMM GRANULOCYTES NFR BLD AUTO: 2 % (ref 0–0.5)
LYMPHOCYTES # BLD: 1.1 K/UL (ref 0.8–3.5)
LYMPHOCYTES NFR BLD: 10 % (ref 12–49)
MCH RBC QN AUTO: 23.4 PG (ref 26–34)
MCHC RBC AUTO-ENTMCNC: 27.8 G/DL (ref 30–36.5)
MCV RBC AUTO: 84 FL (ref 80–99)
MONOCYTES # BLD: 0.9 K/UL (ref 0–1)
MONOCYTES NFR BLD: 9 % (ref 5–13)
NEUTS SEG # BLD: 8.3 K/UL (ref 1.8–8)
NEUTS SEG NFR BLD: 79 % (ref 32–75)
NRBC # BLD: 0 K/UL (ref 0–0.01)
NRBC BLD-RTO: 0 PER 100 WBC
PLATELET # BLD AUTO: 318 K/UL (ref 150–400)
PMV BLD AUTO: 8.6 FL (ref 8.9–12.9)
POTASSIUM SERPL-SCNC: 3.8 MMOL/L (ref 3.5–5.1)
RBC # BLD AUTO: 3.25 M/UL (ref 3.8–5.2)
RBC MORPH BLD: ABNORMAL
SODIUM SERPL-SCNC: 139 MMOL/L (ref 136–145)
THERAPEUTIC RANGE,PTTT: ABNORMAL SECS (ref 58–77)
THERAPEUTIC RANGE,PTTT: ABNORMAL SECS (ref 58–77)
WBC # BLD AUTO: 10.5 K/UL (ref 3.6–11)

## 2019-01-28 PROCEDURE — 74011250636 HC RX REV CODE- 250/636: Performed by: PHYSICIAN ASSISTANT

## 2019-01-28 PROCEDURE — 36415 COLL VENOUS BLD VENIPUNCTURE: CPT

## 2019-01-28 PROCEDURE — 85730 THROMBOPLASTIN TIME PARTIAL: CPT

## 2019-01-28 PROCEDURE — 74011250636 HC RX REV CODE- 250/636: Performed by: HOSPITALIST

## 2019-01-28 PROCEDURE — 74011250637 HC RX REV CODE- 250/637: Performed by: INTERNAL MEDICINE

## 2019-01-28 PROCEDURE — 74011250636 HC RX REV CODE- 250/636: Performed by: INTERNAL MEDICINE

## 2019-01-28 PROCEDURE — 65270000029 HC RM PRIVATE

## 2019-01-28 PROCEDURE — 85025 COMPLETE CBC W/AUTO DIFF WBC: CPT

## 2019-01-28 PROCEDURE — 74011000250 HC RX REV CODE- 250: Performed by: PHYSICIAN ASSISTANT

## 2019-01-28 PROCEDURE — 80048 BASIC METABOLIC PNL TOTAL CA: CPT

## 2019-01-28 RX ORDER — FACIAL-BODY WIPES
10 EACH TOPICAL DAILY PRN
Status: DISCONTINUED | OUTPATIENT
Start: 2019-01-28 | End: 2019-01-31 | Stop reason: HOSPADM

## 2019-01-28 RX ADMIN — MORPHINE SULFATE 2 MG: 2 INJECTION, SOLUTION INTRAMUSCULAR; INTRAVENOUS at 00:02

## 2019-01-28 RX ADMIN — ONDANSETRON 4 MG: 2 INJECTION INTRAMUSCULAR; INTRAVENOUS at 19:30

## 2019-01-28 RX ADMIN — AMITRIPTYLINE HYDROCHLORIDE 100 MG: 50 TABLET, FILM COATED ORAL at 21:50

## 2019-01-28 RX ADMIN — DEXTROSE MONOHYDRATE, SODIUM CHLORIDE, AND POTASSIUM CHLORIDE 75 ML/HR: 50; 4.5; 1.49 INJECTION, SOLUTION INTRAVENOUS at 13:35

## 2019-01-28 RX ADMIN — SODIUM CHLORIDE 10 ML: 9 INJECTION, SOLUTION INTRAMUSCULAR; INTRAVENOUS; SUBCUTANEOUS at 14:00

## 2019-01-28 RX ADMIN — MORPHINE SULFATE 2 MG: 2 INJECTION, SOLUTION INTRAMUSCULAR; INTRAVENOUS at 13:08

## 2019-01-28 RX ADMIN — ONDANSETRON 4 MG: 2 INJECTION INTRAMUSCULAR; INTRAVENOUS at 13:08

## 2019-01-28 RX ADMIN — MORPHINE SULFATE 2 MG: 2 INJECTION, SOLUTION INTRAMUSCULAR; INTRAVENOUS at 19:30

## 2019-01-28 RX ADMIN — FAMOTIDINE 20 MG: 10 INJECTION, SOLUTION INTRAVENOUS at 13:08

## 2019-01-28 RX ADMIN — SODIUM CHLORIDE 10 ML: 9 INJECTION, SOLUTION INTRAMUSCULAR; INTRAVENOUS; SUBCUTANEOUS at 21:52

## 2019-01-28 RX ADMIN — LEVOFLOXACIN 750 MG: 5 INJECTION, SOLUTION INTRAVENOUS at 00:00

## 2019-01-28 RX ADMIN — ONDANSETRON 4 MG: 2 INJECTION INTRAMUSCULAR; INTRAVENOUS at 09:20

## 2019-01-28 RX ADMIN — HEPARIN SODIUM AND DEXTROSE 23 UNITS/KG/HR: 10000; 5 INJECTION INTRAVENOUS at 20:08

## 2019-01-28 RX ADMIN — HEPARIN SODIUM AND DEXTROSE 23 UNITS/KG/HR: 10000; 5 INJECTION INTRAVENOUS at 17:15

## 2019-01-28 RX ADMIN — SODIUM CHLORIDE 10 ML: 9 INJECTION, SOLUTION INTRAMUSCULAR; INTRAVENOUS; SUBCUTANEOUS at 05:51

## 2019-01-28 RX ADMIN — SIMVASTATIN 20 MG: 20 TABLET, FILM COATED ORAL at 21:52

## 2019-01-28 RX ADMIN — HEPARIN SODIUM AND DEXTROSE 22 UNITS/KG/HR: 10000; 5 INJECTION INTRAVENOUS at 04:29

## 2019-01-28 RX ADMIN — MORPHINE SULFATE 2 MG: 2 INJECTION, SOLUTION INTRAMUSCULAR; INTRAVENOUS at 09:20

## 2019-01-28 RX ADMIN — FAMOTIDINE 20 MG: 10 INJECTION, SOLUTION INTRAVENOUS at 00:00

## 2019-01-28 RX ADMIN — METHYLPREDNISOLONE SODIUM SUCCINATE 20 MG: 40 INJECTION, POWDER, FOR SOLUTION INTRAMUSCULAR; INTRAVENOUS at 09:25

## 2019-01-28 RX ADMIN — GABAPENTIN 300 MG: 300 CAPSULE ORAL at 21:52

## 2019-01-28 NOTE — PROGRESS NOTES
[facility] Surgery Subjective Feeling better, passing flatus, NPO, NGT Objective Patient Vitals for the past 24 hrs: 
 Temp Pulse Resp BP SpO2  
01/28/19 0840 97.4 °F (36.3 °C) 93 15 151/82 94 % 01/28/19 0151 98.5 °F (36.9 °C) 95 20 126/67 93 % 01/27/19 2100 98.5 °F (36.9 °C) 98 16 145/74 93 % 01/27/19 1516 98.2 °F (36.8 °C) 99 15 162/81 92 % Date 01/27/19 0700 - 01/28/19 4891 01/28/19 0700 - 01/29/19 2455 Shift [id number] 7089-0575 24 Hour Total 6353-2360 4037-4803 24 Hour Total  
INTAKE  
NG/GT 0  0 Water Flush Volume (mL) (Nasogastric Tube 01/24/19) 0  0 Shift Total(mL/kg) 0(0)  0(0) OUTPUT Urine(mL/kg/hr) Urine Occurrence(s) 1 x 4 x 5 x Shift Total(mL/kg) NET 0  0 Weight (kg) 89.2 89.2 89.2 89.2 89.2 89.2 PE 
Pulm - CTAB 
CV - RRR Abd - soft, mild distention, BS present, NTTP Labs Recent Results (from the past 12 hour(s)) PTT Collection Time: 01/28/19  5:42 AM  
Result Value Ref Range aPTT 62.7 (H) 22.1 - 32.0 sec  
 aPTT, therapeutic range     58.0 - 77.0 SECS  
CBC WITH AUTOMATED DIFF Collection Time: 01/28/19  5:42 AM  
Result Value Ref Range WBC 10.5 3.6 - 11.0 K/uL  
 RBC 3.25 (L) 3.80 - 5.20 M/uL HGB 7.6 (L) 11.5 - 16.0 g/dL HCT 27.3 (L) 35.0 - 47.0 % MCV 84.0 80.0 - 99.0 FL  
 MCH 23.4 (L) 26.0 - 34.0 PG  
 MCHC 27.8 (L) 30.0 - 36.5 g/dL  
 RDW 17.2 (H) 11.5 - 14.5 % PLATELET 117 701 - 245 K/uL MPV 8.6 (L) 8.9 - 12.9 FL  
 NRBC 0.0 0  WBC ABSOLUTE NRBC 0.00 0.00 - 0.01 K/uL NEUTROPHILS 79 (H) 32 - 75 % LYMPHOCYTES 10 (L) 12 - 49 % MONOCYTES 9 5 - 13 % EOSINOPHILS 0 0 - 7 % BASOPHILS 0 0 - 1 % IMMATURE GRANULOCYTES 2 (H) 0.0 - 0.5 % ABS. NEUTROPHILS 8.3 (H) 1.8 - 8.0 K/UL  
 ABS. LYMPHOCYTES 1.1 0.8 - 3.5 K/UL  
 ABS. MONOCYTES 0.9 0.0 - 1.0 K/UL  
 ABS. EOSINOPHILS 0.0 0.0 - 0.4 K/UL ABS. BASOPHILS 0.0 0.0 - 0.1 K/UL  
 ABS. IMM. GRANS. 0.2 (H) 0.00 - 0.04 K/UL  
 DF SMEAR SCANNED    
 RBC COMMENTS HYPOCHROMIA 1+ 
    
 RBC COMMENTS ANISOCYTOSIS 1+ 
    
 RBC COMMENTS OVALOCYTES PRESENT 
    
 RBC COMMENTS POLYCHROMASIA PRESENT 
    
METABOLIC PANEL, BASIC Collection Time: 01/28/19  5:42 AM  
Result Value Ref Range Sodium 139 136 - 145 mmol/L Potassium 3.8 3.5 - 5.1 mmol/L Chloride 104 97 - 108 mmol/L  
 CO2 29 21 - 32 mmol/L Anion gap 6 5 - 15 mmol/L Glucose 114 (H) 65 - 100 mg/dL BUN 9 6 - 20 MG/DL Creatinine 0.74 0.55 - 1.02 MG/DL  
 BUN/Creatinine ratio 12 12 - 20 GFR est AA >60 >60 ml/min/1.73m2 GFR est non-AA >60 >60 ml/min/1.73m2 Calcium 8.5 8.5 - 10.1 MG/DL Assessment Meghann Peraza is a 68 y. o.yr old female with resolving pSBO Plan Clamping off the NGT today Start sips of clears May remove the NGT tomorrow if she tolerates the clamping trial 
Bowel function is returning Peter Daley MD

## 2019-01-28 NOTE — PROGRESS NOTES
Nurse Janae Galindo gave bedside report to oncoming nurse Susie Mata RN by [employer] and [facility]

## 2019-01-28 NOTE — PROGRESS NOTES
Hospitalist Progress Note Warden Alexander MD 
Answering service: [phone] OR [phone] from in house phone Date of Service:  2019 NAME:  Nyla Marie :  1945 MRN:  [id number] Admission Summary:  
Nyla Marie is a 68 y.o. female with extensive PMH of multiple hernia surgeries, MM, RA, DVT, hld, chronic anemia, who presents with abdominal pain. Interval history / Subjective:  
 Pt seen in follow up of bowel obstruction She reports passing gas Denies having a BM Assessment & Plan: SBO - on admission [place] from [facility] - pt had previous surgeries SeriaL abd checks Surgery consulted and following On Levaquin She has Multiple other allergies - flagyl and pcn being one of them D/W Dr Kaylin Barton NG tube Clamped today Sips of clears Bowel regimen ordered H/O MM Chronic Anemia noted Transfuse if Hg<7 Seeing Dr Nina Peralta as op On Chemotherapy as op Has a port pta Oncology Consult requested - Myeloma labs ordered 
cathflo if needed H/o Allergic rash Chronically on On Prednisone  Po 15mg chronically - on IV solumedrol for equivalency GERD On PPI Nasal Dryness Wean off oxygen if needed Flonase prn Neuropathy Resume Home Elavil PMH of Rheumatoid Arthritis, Hyperlipidemia and Chronic anemia Chronic and stable Has an Implanted pain Pump Right Sided Rib Pains Check XR ribs - Healed fracture is suggested in the right seventh rib posterolaterally. Continue pain control H/o Right Leg DVT PTA Duplex neg for DVT Switched from po xarelto to heparin for coverage if surgery planned  -on admission Defer to Oncology  for recommendations on anticoagulation Hypokalemia Repleted Check PRN Code status: Full DVT prophylaxis: SCD Care Plan discussed with: Patient/Family and Nurse - D/W  and daughter in room ,, - D/W pt and  Patient has given Verbal permission to discuss medical care with  
persons present in the room and and also with contact as listed on face sheet. Disposition: TBD Hospital Problems  Date Reviewed: 3/23/2018 Codes Class Noted POA  
 SBO (small bowel obstruction) ([address] ICD-10-CM: F40.566 ICD-9-CM: 560.9  1/24/2019 Unknown Review of Systems:  
Pertinent items are noted in HPI. Vital Signs:  
 Last 24hrs VS reviewed since prior progress note. Most recent are: 
Visit Vitals /67 (BP 1 Location: Left arm, BP Patient Position: At rest) Pulse 95 Temp 98.5 °F (36.9 °C) Resp 20 Ht 5' 4\" (1.626 m) Wt 89.2 kg (196 lb 10.4 oz) SpO2 93% Breastfeeding? No  
BMI 33.76 kg/m² Intake/Output Summary (Last 24 hours) at 1/28/2019 3664 Last data filed at 1/27/2019 0900 Gross per 24 hour Intake 0 ml Output  Net 0 ml Physical Examination:  
 
 
     
Constitutional:  Mild Distress. , cooperative, pleasant  NG in place ENT:  Oral mucous moist, oropharynx benign. Neck supple, Resp:  CTA bilaterally. No wheezing/rhonchi/rales. No accessory muscle use CV:  Regular rhythm, normal rate, no murmurs, gallops, rubs GI:  Distension decreasing, Sluggish Bowel sounds. Softer than yesterday. no hepatosplenomegaly Musculoskeletal:  No edema, warm, 2+ pulses throughout Neurologic:  Moves all extremities. AAOx3, CN II-XII reviewed Skin:  Good turgor, no rashes or ulcers Data Review:  
 Review and/or order of clinical lab test 
Review and/or order of tests in the radiology section of CPT Review and/or order of tests in the medicine section of CPT Labs:  
 
Recent Labs  
  01/28/19 
0542 01/27/19 
8065 WBC 10.5 11.8* HGB 7.6* 8.3* HCT 27.3* 29.2*  
 302 Recent Labs  
  01/28/19 
0542 01/27/19 
1005 01/26/19 
0136  140 138  
K 3.8 3.8 3.2*  
 104 104 CO2 29 30 30 BUN 9 4* 5*  
CREA 0.74 0.80 0.74  
 * 143* 109* CA 8.5 8.7 7.6* No results for input(s): SGOT, GPT, ALT, AP, TBIL, TBILI, TP, ALB, GLOB, GGT, AML, LPSE in the last 72 hours. No lab exists for component: AMYP, HLPSE Recent Labs  
  01/28/19 
0542 01/27/19 
2138 01/27/19 
1429 APTT 62.7* 50.0* 43.3* No results for input(s): FE, TIBC, PSAT, FERR in the last 72 hours. Lab Results Component Value Date/Time Folate 34.3 (H) 05/28/2010 04:30 AM  
  
No results for input(s): PH, PCO2, PO2 in the last 72 hours. No results for input(s): CPK, CKNDX, TROIQ in the last 72 hours. No lab exists for component: CPKMB Lab Results Component Value Date/Time Cholesterol, total 137 03/06/2017 08:01 AM  
 HDL Cholesterol 47 03/06/2017 08:01 AM  
 LDL, calculated 38 03/06/2017 08:01 AM  
 Triglyceride 260 (H) 03/06/2017 08:01 AM  
 CHOL/HDL Ratio 5.0 12/16/2011 05:05 AM  
 
Lab Results Component Value Date/Time Glucose (POC) 113 (H) 06/02/2016 11:06 AM  
 Glucose (POC) 87 09/15/2012 08:29 PM  
 Glucose (POC) 87 09/15/2012 11:58 AM  
 
Lab Results Component Value Date/Time Color YELLOW/STRAW 01/24/2019 08:09 PM  
 Appearance CLEAR 01/24/2019 08:09 PM  
 Specific gravity <1.005 01/24/2019 08:09 PM  
 Specific gravity 1.008 03/01/2016 03:44 PM  
 pH (UA) 7.5 01/24/2019 08:09 PM  
 Protein TRACE (A) 01/24/2019 08:09 PM  
 Glucose NEGATIVE  01/24/2019 08:09 PM  
 Ketone TRACE (A) 01/24/2019 08:09 PM  
 Bilirubin NEGATIVE  01/24/2019 08:09 PM  
 Urobilinogen 1.0 01/24/2019 08:09 PM  
 Nitrites NEGATIVE  01/24/2019 08:09 PM  
 Leukocyte Esterase NEGATIVE  01/24/2019 08:09 PM  
 Epithelial cells FEW 01/24/2019 08:09 PM  
 Bacteria NEGATIVE  01/24/2019 08:09 PM  
 WBC 0-4 01/24/2019 08:09 PM  
 RBC 10-20 01/24/2019 08:09 PM  
 
 
 
Medications Reviewed:  
 
Current Facility-Administered Medications Medication Dose Route Frequency  benzocaine-menthol (CEPACOL) lozenge 1 Lozenge  1 Lozenge Mucous Membrane PRN  
  dextrose 5% - 0.45% NaCl with KCl 20 mEq/L infusion  75 mL/hr IntraVENous CONTINUOUS  
 heparin 25,000 units in D5W 250 ml infusion  16-36 Units/kg/hr IntraVENous TITRATE  methylPREDNISolone (PF) (SOLU-MEDROL) injection 20 mg  20 mg IntraVENous DAILY  phenol throat spray (CHLORASEPTIC) 1 Spray  1 Spray Oral PRN  
 famotidine (PF) (PEPCID) 20 mg in sodium chloride 0.9% 10 mL injection  20 mg IntraVENous Q12H  
 triamcinolone acetonide (KENALOG) 0.1 % ointment   Topical BID  spironolactone (ALDACTONE) tablet 50 mg  50 mg Oral BID  fluticasone (FLONASE) 50 mcg/actuation nasal spray 2 Spray  2 Spray Both Nostrils BID  potassium chloride SR (KLOR-CON 10) tablet 20 mEq  20 mEq Oral DAILY  . PHARMACY TO SUBSTITUTE PER PROTOCOL (Reordered from: Milnacipran (SAVELLA) 50 mg tablet)    Per Protocol  amitriptyline (ELAVIL) tablet 100 mg  100 mg Oral QHS  gabapentin (NEURONTIN) capsule 300 mg  300 mg Oral QID  simvastatin (ZOCOR) tablet 20 mg  20 mg Oral QHS  sodium chloride (NS) flush 5-40 mL  5-40 mL IntraVENous Q8H  
 sodium chloride (NS) flush 5-40 mL  5-40 mL IntraVENous PRN  
 morphine injection 2 mg  2 mg IntraVENous Q4H PRN  
 ondansetron (ZOFRAN) injection 4 mg  4 mg IntraVENous Q4H PRN  
 levoFLOXacin (LEVAQUIN) 750 mg in D5W IVPB  750 mg IntraVENous Q24H  
 
______________________________________________________________________ EXPECTED LENGTH OF STAY: 3d 7h 
ACTUAL LENGTH OF STAY:          4 
 
            
Kvng Canales MD

## 2019-01-28 NOTE — PROGRESS NOTES
Spiritual Care Partner Volunteer visited patient in Rm 563 on 1/28/19. Documented by: Chaplain Briones MDiv, MACE 
[phone] [id number])

## 2019-01-29 LAB
ANION GAP SERPL CALC-SCNC: 8 MMOL/L (ref 5–15)
APTT PPP: 56.1 SEC (ref 22.1–32)
APTT PPP: 78.5 SEC (ref 22.1–32)
BACTERIA SPEC CULT: NORMAL
BASOPHILS # BLD: 0 K/UL (ref 0–0.1)
BASOPHILS NFR BLD: 0 % (ref 0–1)
BUN SERPL-MCNC: 14 MG/DL (ref 6–20)
BUN/CREAT SERPL: 17 (ref 12–20)
CALCIUM SERPL-MCNC: 8.7 MG/DL (ref 8.5–10.1)
CHLORIDE SERPL-SCNC: 103 MMOL/L (ref 97–108)
CO2 SERPL-SCNC: 30 MMOL/L (ref 21–32)
CREAT SERPL-MCNC: 0.83 MG/DL (ref 0.55–1.02)
DIFFERENTIAL METHOD BLD: ABNORMAL
EOSINOPHIL # BLD: 0 K/UL (ref 0–0.4)
EOSINOPHIL NFR BLD: 0 % (ref 0–7)
ERYTHROCYTE [DISTWIDTH] IN BLOOD BY AUTOMATED COUNT: 17.3 % (ref 11.5–14.5)
GLUCOSE SERPL-MCNC: 106 MG/DL (ref 65–100)
HCT VFR BLD AUTO: 29.2 % (ref 35–47)
HGB BLD-MCNC: 8.2 G/DL (ref 11.5–16)
IMM GRANULOCYTES # BLD AUTO: 0.2 K/UL (ref 0–0.04)
IMM GRANULOCYTES NFR BLD AUTO: 2 % (ref 0–0.5)
LYMPHOCYTES # BLD: 1.2 K/UL (ref 0.8–3.5)
LYMPHOCYTES NFR BLD: 12 % (ref 12–49)
MCH RBC QN AUTO: 23.4 PG (ref 26–34)
MCHC RBC AUTO-ENTMCNC: 28.1 G/DL (ref 30–36.5)
MCV RBC AUTO: 83.2 FL (ref 80–99)
MONOCYTES # BLD: 1.2 K/UL (ref 0–1)
MONOCYTES NFR BLD: 12 % (ref 5–13)
NEUTS SEG # BLD: 7.5 K/UL (ref 1.8–8)
NEUTS SEG NFR BLD: 74 % (ref 32–75)
NRBC # BLD: 0.03 K/UL (ref 0–0.01)
NRBC BLD-RTO: 0.3 PER 100 WBC
PLATELET # BLD AUTO: 344 K/UL (ref 150–400)
PMV BLD AUTO: 8.5 FL (ref 8.9–12.9)
POTASSIUM SERPL-SCNC: 3.7 MMOL/L (ref 3.5–5.1)
RBC # BLD AUTO: 3.51 M/UL (ref 3.8–5.2)
RBC MORPH BLD: ABNORMAL
SERVICE CMNT-IMP: NORMAL
SODIUM SERPL-SCNC: 141 MMOL/L (ref 136–145)
THERAPEUTIC RANGE,PTTT: ABNORMAL SECS (ref 58–77)
THERAPEUTIC RANGE,PTTT: ABNORMAL SECS (ref 58–77)
WBC # BLD AUTO: 10.1 K/UL (ref 3.6–11)

## 2019-01-29 PROCEDURE — 85025 COMPLETE CBC W/AUTO DIFF WBC: CPT

## 2019-01-29 PROCEDURE — 74011250637 HC RX REV CODE- 250/637: Performed by: NURSE PRACTITIONER

## 2019-01-29 PROCEDURE — 74011250636 HC RX REV CODE- 250/636: Performed by: INTERNAL MEDICINE

## 2019-01-29 PROCEDURE — 85730 THROMBOPLASTIN TIME PARTIAL: CPT

## 2019-01-29 PROCEDURE — 74011000250 HC RX REV CODE- 250: Performed by: PHYSICIAN ASSISTANT

## 2019-01-29 PROCEDURE — 74011250636 HC RX REV CODE- 250/636: Performed by: HOSPITALIST

## 2019-01-29 PROCEDURE — 74011250637 HC RX REV CODE- 250/637: Performed by: INTERNAL MEDICINE

## 2019-01-29 PROCEDURE — 36415 COLL VENOUS BLD VENIPUNCTURE: CPT

## 2019-01-29 PROCEDURE — 80048 BASIC METABOLIC PNL TOTAL CA: CPT

## 2019-01-29 PROCEDURE — 65270000029 HC RM PRIVATE

## 2019-01-29 PROCEDURE — 74011250636 HC RX REV CODE- 250/636: Performed by: PHYSICIAN ASSISTANT

## 2019-01-29 RX ORDER — AMOXICILLIN 250 MG
1 CAPSULE ORAL DAILY
Status: DISCONTINUED | OUTPATIENT
Start: 2019-01-29 | End: 2019-01-31 | Stop reason: HOSPADM

## 2019-01-29 RX ADMIN — SENNOSIDES AND DOCUSATE SODIUM 1 TABLET: 8.6; 5 TABLET ORAL at 17:52

## 2019-01-29 RX ADMIN — POTASSIUM CHLORIDE 20 MEQ: 750 TABLET, EXTENDED RELEASE ORAL at 09:44

## 2019-01-29 RX ADMIN — GABAPENTIN 300 MG: 300 CAPSULE ORAL at 13:05

## 2019-01-29 RX ADMIN — ONDANSETRON 4 MG: 2 INJECTION INTRAMUSCULAR; INTRAVENOUS at 03:00

## 2019-01-29 RX ADMIN — GABAPENTIN 300 MG: 300 CAPSULE ORAL at 09:44

## 2019-01-29 RX ADMIN — FAMOTIDINE 20 MG: 10 INJECTION, SOLUTION INTRAVENOUS at 00:30

## 2019-01-29 RX ADMIN — GABAPENTIN 300 MG: 300 CAPSULE ORAL at 17:52

## 2019-01-29 RX ADMIN — TRIAMCINOLONE ACETONIDE: 1 OINTMENT TOPICAL at 09:45

## 2019-01-29 RX ADMIN — SODIUM CHLORIDE 10 ML: 9 INJECTION, SOLUTION INTRAMUSCULAR; INTRAVENOUS; SUBCUTANEOUS at 21:52

## 2019-01-29 RX ADMIN — SIMVASTATIN 20 MG: 20 TABLET, FILM COATED ORAL at 21:52

## 2019-01-29 RX ADMIN — SODIUM CHLORIDE 10 ML: 9 INJECTION, SOLUTION INTRAMUSCULAR; INTRAVENOUS; SUBCUTANEOUS at 07:19

## 2019-01-29 RX ADMIN — AMITRIPTYLINE HYDROCHLORIDE 100 MG: 50 TABLET, FILM COATED ORAL at 21:51

## 2019-01-29 RX ADMIN — METHYLPREDNISOLONE SODIUM SUCCINATE 20 MG: 40 INJECTION, POWDER, FOR SOLUTION INTRAMUSCULAR; INTRAVENOUS at 09:44

## 2019-01-29 RX ADMIN — LEVOFLOXACIN 750 MG: 5 INJECTION, SOLUTION INTRAVENOUS at 00:24

## 2019-01-29 RX ADMIN — FLUTICASONE PROPIONATE 2 SPRAY: 50 SPRAY, METERED NASAL at 17:53

## 2019-01-29 RX ADMIN — TRIAMCINOLONE ACETONIDE: 1 OINTMENT TOPICAL at 17:53

## 2019-01-29 RX ADMIN — DEXTROSE MONOHYDRATE, SODIUM CHLORIDE, AND POTASSIUM CHLORIDE 75 ML/HR: 50; 4.5; 1.49 INJECTION, SOLUTION INTRAVENOUS at 14:51

## 2019-01-29 RX ADMIN — MORPHINE SULFATE 2 MG: 2 INJECTION, SOLUTION INTRAMUSCULAR; INTRAVENOUS at 09:44

## 2019-01-29 RX ADMIN — HEPARIN SODIUM AND DEXTROSE 24 UNITS/KG/HR: 10000; 5 INJECTION INTRAVENOUS at 07:12

## 2019-01-29 RX ADMIN — MORPHINE SULFATE 2 MG: 2 INJECTION, SOLUTION INTRAMUSCULAR; INTRAVENOUS at 03:08

## 2019-01-29 RX ADMIN — SPIRONOLACTONE 50 MG: 25 TABLET ORAL at 17:52

## 2019-01-29 RX ADMIN — RIVAROXABAN 10 MG: 10 TABLET, FILM COATED ORAL at 13:05

## 2019-01-29 RX ADMIN — SPIRONOLACTONE 50 MG: 25 TABLET ORAL at 09:44

## 2019-01-29 RX ADMIN — SODIUM CHLORIDE 10 ML: 9 INJECTION, SOLUTION INTRAMUSCULAR; INTRAVENOUS; SUBCUTANEOUS at 13:05

## 2019-01-29 RX ADMIN — FLUTICASONE PROPIONATE 2 SPRAY: 50 SPRAY, METERED NASAL at 09:45

## 2019-01-29 RX ADMIN — FAMOTIDINE 20 MG: 10 INJECTION, SOLUTION INTRAVENOUS at 13:04

## 2019-01-29 RX ADMIN — GABAPENTIN 300 MG: 300 CAPSULE ORAL at 21:51

## 2019-01-29 NOTE — PROGRESS NOTES
Problem: Falls - Risk of 
Goal: *Absence of Falls Document Viko Horn Fall Risk and appropriate interventions in the flowsheet. Outcome: Progressing Towards Goal 
Fall Risk Interventions: 
Mobility Interventions: Patient to call before getting OOB Mentation Interventions: Adequate sleep, hydration, pain control, Door open when patient unattended Medication Interventions: Patient to call before getting OOB Elimination Interventions: Call light in reach, Patient to call for help with toileting needs

## 2019-01-29 NOTE — PROGRESS NOTES
Hospitalist Progress Note Deepika Celis MD 
Answering service: [phone] OR [phone] from in house phone Date of Service:  2019 NAME:  Elena Munoz :  1945 MRN:  [id number] Admission Summary:  
Elena Munoz is a 68 y.o. female with extensive PMH of multiple hernia surgeries, MM, RA, DVT, hld, chronic anemia, who presents with abdominal pain. Interval history / Subjective:  
Pt seen in follow up of SBO Pulled NG out overnight, had been tolerating clamping, and some liquids up until then. Was not replaced, and doing well this AM. Still has not yet had a bowel movement, but reports passing gas (though more so yesterday). Has had some more liquids today, has some slight nausea, but no vomiting. Assessment & Plan: SBO - on admission, likely secondary to adhesions from prior surgeries. - continue serial abdominal exams - Surgery following, appreciate recs - No indication for antibiotics, will DC Levaquin for now, given likely mechanical obstruction. Pt with allergy to flagyl noted. - Advance diet as tolerated. - Continue bowel regimen H/o DVT - on Xarelto prior to admission, then switched to heparin gtt (in case of surgical intervention) - Given improvement and patient request, will switch back to Xarelto, and DC heparin gtt 
- Onc following as well. MM with chronic anemia - Transfuse if Hg<7 
- Seeing Dr Bc Cisneros as op 
- On Chemotherapy as op 
- Oncology following, myeloma labs ordered and pending H/o Allergic rash Chronically on On Prednisone  Po 15mg chronically - on IV solumedrol for equivalency, but plan to transition back to prednisone once taking PO consistently GERD - on PPI Nasal Dryness - wean O2, flonase Neuropathy - Resume Home Elavil HLD - home statin Rheumatoid Arthritis, and chronic pain  
- Has an Implanted pain Pump Right Sided Rib Pains Check XR ribs - Healed fracture is suggested in the right seventh rib posterolaterally. Continue pain control as above. Hypokalemia - Replete as needed, continue to monitor Code status: Full DVT prophylaxis: SCD Care Plan discussed with: Patient/Family and Nurse Disposition: [facility] Hospital Problems  Date Reviewed: 3/23/2018 Codes Class Noted POA  
 SBO (small bowel obstruction) ([facility] [address] ICD-10-CM: U73.027 ICD-9-CM: 560.9  1/24/2019 Unknown Review of Systems: A comprehensive review of systems was negative except for that written in the HPI. Vital Signs:  
 Last 24hrs VS reviewed since prior progress note. Most recent are: 
Visit Vitals /76 (BP 1 Location: Left arm, BP Patient Position: At rest) Pulse 95 Temp 98.4 °F (36.9 °C) Resp 18 Ht 5' 4\" (1.626 m) Wt 90.1 kg (198 lb 10.2 oz) SpO2 95% Breastfeeding? No  
BMI 34.10 kg/m² Intake/Output Summary (Last 24 hours) at 1/29/2019 1513 Last data filed at 1/28/2019 2058 Gross per 24 hour Intake 1100 ml Output 400 ml Net 700 ml Physical Examination:  
 
 
     
Constitutional:  No acute distress. , cooperative, pleasant ENT:  Oral mucous moist, oropharynx benign. Neck supple, Resp:  CTA bilaterally. No wheezing/rhonchi/rales. No accessory muscle use CV:  Regular rhythm, normal rate, no murmurs, gallops, rubs GI:  Distended, but soft, hypoactive bowel movements. no hepatosplenomegaly Musculoskeletal:  No edema, warm, 2+ pulses throughout Neurologic:  Moves all extremities. AAOx3, CN II-XII reviewed Skin:  Good turgor, no rashes or ulcers Data Review:  
Imaging and laboratory data reviewed. Labs:  
 
Recent Labs  
  01/29/19 
3409 01/28/19 
0542 WBC 10.1 10.5 HGB 8.2* 7.6* HCT 29.2* 27.3*  
 318 Recent Labs  
  01/29/19 
5688 01/28/19 
0542 01/27/19 
8043  139 140  
K 3.7 3.8 3.8  104 104 CO2 30 29 30 BUN 14 9 4*  
 CREA 0.83 0.74 0.80 * 114* 143* CA 8.7 8.5 8.7 No results for input(s): SGOT, GPT, ALT, AP, TBIL, TBILI, TP, ALB, GLOB, GGT, AML, LPSE in the last 72 hours. No lab exists for component: AMYP, HLPSE Recent Labs  
  01/29/19 
0605 01/28/19 
2323 01/28/19 
1331 APTT 78.5* 56.1* 52.3* No results for input(s): FE, TIBC, PSAT, FERR in the last 72 hours. Lab Results Component Value Date/Time Folate 34.3 (H) 05/28/2010 04:30 AM  
  
No results for input(s): PH, PCO2, PO2 in the last 72 hours. No results for input(s): CPK, CKNDX, TROIQ in the last 72 hours. No lab exists for component: CPKMB Lab Results Component Value Date/Time Cholesterol, total 137 03/06/2017 08:01 AM  
 HDL Cholesterol 47 03/06/2017 08:01 AM  
 LDL, calculated 38 03/06/2017 08:01 AM  
 Triglyceride 260 (H) 03/06/2017 08:01 AM  
 CHOL/HDL Ratio 5.0 12/16/2011 05:05 AM  
 
Lab Results Component Value Date/Time Glucose (POC) 113 (H) 06/02/2016 11:06 AM  
 Glucose (POC) 87 09/15/2012 08:29 PM  
 Glucose (POC) 87 09/15/2012 11:58 AM  
 
Lab Results Component Value Date/Time Color YELLOW/STRAW 01/24/2019 08:09 PM  
 Appearance CLEAR 01/24/2019 08:09 PM  
 Specific gravity <1.005 01/24/2019 08:09 PM  
 Specific gravity 1.008 03/01/2016 03:44 PM  
 pH (UA) 7.5 01/24/2019 08:09 PM  
 Protein TRACE (A) 01/24/2019 08:09 PM  
 Glucose NEGATIVE  01/24/2019 08:09 PM  
 Ketone TRACE (A) 01/24/2019 08:09 PM  
 Bilirubin NEGATIVE  01/24/2019 08:09 PM  
 Urobilinogen 1.0 01/24/2019 08:09 PM  
 Nitrites NEGATIVE  01/24/2019 08:09 PM  
 Leukocyte Esterase NEGATIVE  01/24/2019 08:09 PM  
 Epithelial cells FEW 01/24/2019 08:09 PM  
 Bacteria NEGATIVE  01/24/2019 08:09 PM  
 WBC 0-4 01/24/2019 08:09 PM  
 RBC 10-20 01/24/2019 08:09 PM  
 
 
 
Medications Reviewed:  
 
Current Facility-Administered Medications Medication Dose Route Frequency  [START ON 1/30/2019] rivaroxaban (XARELTO) tablet 10 mg  10 mg Oral DAILY  senna-docusate (PERICOLACE) 8.6-50 mg per tablet 1 Tab  1 Tab Oral DAILY  bisacodyl (DULCOLAX) suppository 10 mg  10 mg Rectal DAILY PRN  
 benzocaine-menthol (CEPACOL) lozenge 1 Lozenge  1 Lozenge Mucous Membrane PRN  
 dextrose 5% - 0.45% NaCl with KCl 20 mEq/L infusion  75 mL/hr IntraVENous CONTINUOUS  
 methylPREDNISolone (PF) (SOLU-MEDROL) injection 20 mg  20 mg IntraVENous DAILY  phenol throat spray (CHLORASEPTIC) 1 Spray  1 Spray Oral PRN  
 famotidine (PF) (PEPCID) 20 mg in sodium chloride 0.9% 10 mL injection  20 mg IntraVENous Q12H  
 triamcinolone acetonide (KENALOG) 0.1 % ointment   Topical BID  spironolactone (ALDACTONE) tablet 50 mg  50 mg Oral BID  fluticasone (FLONASE) 50 mcg/actuation nasal spray 2 Spray  2 Spray Both Nostrils BID  potassium chloride SR (KLOR-CON 10) tablet 20 mEq  20 mEq Oral DAILY  . PHARMACY TO SUBSTITUTE PER PROTOCOL (Reordered from: Milnacipran (SAVELLA) 50 mg tablet)    Per Protocol  amitriptyline (ELAVIL) tablet 100 mg  100 mg Oral QHS  gabapentin (NEURONTIN) capsule 300 mg  300 mg Oral QID  simvastatin (ZOCOR) tablet 20 mg  20 mg Oral QHS  sodium chloride (NS) flush 5-40 mL  5-40 mL IntraVENous Q8H  
 sodium chloride (NS) flush 5-40 mL  5-40 mL IntraVENous PRN  
 morphine injection 2 mg  2 mg IntraVENous Q4H PRN  
 ondansetron (ZOFRAN) injection 4 mg  4 mg IntraVENous Q4H PRN  
 levoFLOXacin (LEVAQUIN) 750 mg in D5W IVPB  750 mg IntraVENous Q24H  
 
______________________________________________________________________ EXPECTED LENGTH OF STAY: 3d 7h 
ACTUAL LENGTH OF STAY:          5 
 
            
Tricia Newsome MD

## 2019-01-29 NOTE — PROGRESS NOTES
Bedside and Verbal shift change report given to Torsten Euceda RN (oncoming nurse) by Aster Dickerson RN (offgoing nurse). Report included the following information SBAR, Kardex and MAR.

## 2019-01-29 NOTE — PROGRESS NOTES
Bedside shift change report given to Kit Garcia (oncoming nurse) by Ar Sharpe (offgoing nurse). Report included the following information SBAR, Kardex, Intake/Output, MAR and Recent Results.

## 2019-01-29 NOTE — PROGRESS NOTES
General Surgery Daily Progress Note Admit Date: 2019 Post-Operative Day: * No surgery found * from * No surgery found * Subjective:  
 
Last 24 hrs: Pt is sitting in the chair. Feels much better. Little abd pain. +flatus, no BM Tolerating clears - wants to move \"slowly\" w/ diet Says she takes 4 senna in the morning and evening along w/ Amitiza. Objective:  
 
Blood pressure 141/76, pulse 95, temperature 98.4 °F (36.9 °C), resp. rate 18, height 5' 4\" (1.626 m), weight 198 lb 10.2 oz (90.1 kg), SpO2 95 %, not currently breastfeeding. Temp (24hrs), Av.5 °F (36.9 °C), Min:98.3 °F (36.8 °C), Max:98.7 °F (37.1 °C) 
 
 
_____________________ Physical Exam:    
Alert and Oriented, x3, in no acute distress. Cardiovascular: RRR, no peripheral edema Abdomen: obese, soft, +BS, NT 
 
 KUB - nl Bowel-gas pattern Assessment:  
Active Problems: 
  SBO (small bowel obstruction) ([address] (2019) Plan:  
 
Start senna daily, may need larger dose Adv diet as tolerated - clears just started today Supportive care by medicine team 
following Data Review: 
 
Recent Labs  
  19 
1710 19 
0542 19 
8304 WBC 10.1 10.5 11.8* HGB 8.2* 7.6* 8.3* HCT 29.2* 27.3* 29.2*  
 318 302 Recent Labs  
  19 
5971 19 
0542 19 
5916  139 140  
K 3.7 3.8 3.8  104 104 CO2 30 29 30 * 114* 143* BUN 14 9 4* CREA 0.83 0.74 0.80 CA 8.7 8.5 8.7 No results for input(s): AML, LPSE in the last 72 hours. ______________________ Medications: 
 
Current Facility-Administered Medications Medication Dose Route Frequency  [START ON 2019] rivaroxaban (XARELTO) tablet 10 mg  10 mg Oral DAILY  senna-docusate (PERICOLACE) 8.6-50 mg per tablet 1 Tab  1 Tab Oral DAILY  bisacodyl (DULCOLAX) suppository 10 mg  10 mg Rectal DAILY PRN  
 benzocaine-menthol (CEPACOL) lozenge 1 Lozenge  1 Lozenge Mucous Membrane PRN  
  dextrose 5% - 0.45% NaCl with KCl 20 mEq/L infusion  75 mL/hr IntraVENous CONTINUOUS  
 methylPREDNISolone (PF) (SOLU-MEDROL) injection 20 mg  20 mg IntraVENous DAILY  phenol throat spray (CHLORASEPTIC) 1 Spray  1 Spray Oral PRN  
 famotidine (PF) (PEPCID) 20 mg in sodium chloride 0.9% 10 mL injection  20 mg IntraVENous Q12H  
 triamcinolone acetonide (KENALOG) 0.1 % ointment   Topical BID  spironolactone (ALDACTONE) tablet 50 mg  50 mg Oral BID  fluticasone (FLONASE) 50 mcg/actuation nasal spray 2 Spray  2 Spray Both Nostrils BID  potassium chloride SR (KLOR-CON 10) tablet 20 mEq  20 mEq Oral DAILY  . PHARMACY TO SUBSTITUTE PER PROTOCOL (Reordered from: Milnacipran (SAVELLA) 50 mg tablet)    Per Protocol  amitriptyline (ELAVIL) tablet 100 mg  100 mg Oral QHS  gabapentin (NEURONTIN) capsule 300 mg  300 mg Oral QID  simvastatin (ZOCOR) tablet 20 mg  20 mg Oral QHS  sodium chloride (NS) flush 5-40 mL  5-40 mL IntraVENous Q8H  
 sodium chloride (NS) flush 5-40 mL  5-40 mL IntraVENous PRN  
 morphine injection 2 mg  2 mg IntraVENous Q4H PRN  
 ondansetron (ZOFRAN) injection 4 mg  4 mg IntraVENous Q4H PRN  
 levoFLOXacin (LEVAQUIN) 750 mg in D5W IVPB  750 mg IntraVENous Q24H Gavin Lopez NP 
1/29/2019

## 2019-01-30 PROCEDURE — 74011000250 HC RX REV CODE- 250: Performed by: PHYSICIAN ASSISTANT

## 2019-01-30 PROCEDURE — 74011250636 HC RX REV CODE- 250/636: Performed by: INTERNAL MEDICINE

## 2019-01-30 PROCEDURE — 74011250636 HC RX REV CODE- 250/636: Performed by: HOSPITALIST

## 2019-01-30 PROCEDURE — 74011250637 HC RX REV CODE- 250/637: Performed by: INTERNAL MEDICINE

## 2019-01-30 PROCEDURE — 65270000029 HC RM PRIVATE

## 2019-01-30 PROCEDURE — 74011250636 HC RX REV CODE- 250/636: Performed by: PHYSICIAN ASSISTANT

## 2019-01-30 PROCEDURE — 94760 N-INVAS EAR/PLS OXIMETRY 1: CPT

## 2019-01-30 PROCEDURE — 74011250637 HC RX REV CODE- 250/637: Performed by: NURSE PRACTITIONER

## 2019-01-30 RX ORDER — FAMOTIDINE 40 MG/5ML
20 POWDER, FOR SUSPENSION ORAL 2 TIMES DAILY
Status: DISCONTINUED | OUTPATIENT
Start: 2019-01-30 | End: 2019-01-31 | Stop reason: HOSPADM

## 2019-01-30 RX ADMIN — METHYLPREDNISOLONE SODIUM SUCCINATE 20 MG: 40 INJECTION, POWDER, FOR SOLUTION INTRAMUSCULAR; INTRAVENOUS at 09:11

## 2019-01-30 RX ADMIN — DEXTROSE MONOHYDRATE, SODIUM CHLORIDE, AND POTASSIUM CHLORIDE 75 ML/HR: 50; 4.5; 1.49 INJECTION, SOLUTION INTRAVENOUS at 03:51

## 2019-01-30 RX ADMIN — SPIRONOLACTONE 50 MG: 25 TABLET ORAL at 09:24

## 2019-01-30 RX ADMIN — FLUTICASONE PROPIONATE 2 SPRAY: 50 SPRAY, METERED NASAL at 09:12

## 2019-01-30 RX ADMIN — TRIAMCINOLONE ACETONIDE: 1 OINTMENT TOPICAL at 09:12

## 2019-01-30 RX ADMIN — SODIUM CHLORIDE 10 ML: 9 INJECTION, SOLUTION INTRAMUSCULAR; INTRAVENOUS; SUBCUTANEOUS at 15:22

## 2019-01-30 RX ADMIN — FLUTICASONE PROPIONATE 2 SPRAY: 50 SPRAY, METERED NASAL at 17:34

## 2019-01-30 RX ADMIN — GABAPENTIN 300 MG: 300 CAPSULE ORAL at 17:33

## 2019-01-30 RX ADMIN — ONDANSETRON 4 MG: 2 INJECTION INTRAMUSCULAR; INTRAVENOUS at 21:48

## 2019-01-30 RX ADMIN — SIMVASTATIN 20 MG: 20 TABLET, FILM COATED ORAL at 21:48

## 2019-01-30 RX ADMIN — AMITRIPTYLINE HYDROCHLORIDE 100 MG: 50 TABLET, FILM COATED ORAL at 21:48

## 2019-01-30 RX ADMIN — GABAPENTIN 300 MG: 300 CAPSULE ORAL at 12:15

## 2019-01-30 RX ADMIN — FAMOTIDINE 20 MG: 10 INJECTION, SOLUTION INTRAVENOUS at 12:15

## 2019-01-30 RX ADMIN — POTASSIUM CHLORIDE 20 MEQ: 750 TABLET, EXTENDED RELEASE ORAL at 09:11

## 2019-01-30 RX ADMIN — SPIRONOLACTONE 50 MG: 25 TABLET ORAL at 17:33

## 2019-01-30 RX ADMIN — RIVAROXABAN 10 MG: 10 TABLET, FILM COATED ORAL at 09:11

## 2019-01-30 RX ADMIN — SODIUM CHLORIDE 10 ML: 9 INJECTION, SOLUTION INTRAMUSCULAR; INTRAVENOUS; SUBCUTANEOUS at 07:22

## 2019-01-30 RX ADMIN — GABAPENTIN 300 MG: 300 CAPSULE ORAL at 21:48

## 2019-01-30 RX ADMIN — TRIAMCINOLONE ACETONIDE: 1 OINTMENT TOPICAL at 17:34

## 2019-01-30 RX ADMIN — FAMOTIDINE 20 MG: 40 POWDER, FOR SUSPENSION ORAL at 22:52

## 2019-01-30 RX ADMIN — FAMOTIDINE 20 MG: 10 INJECTION, SOLUTION INTRAVENOUS at 00:15

## 2019-01-30 RX ADMIN — SENNOSIDES AND DOCUSATE SODIUM 1 TABLET: 8.6; 5 TABLET ORAL at 09:11

## 2019-01-30 RX ADMIN — ONDANSETRON 4 MG: 2 INJECTION INTRAMUSCULAR; INTRAVENOUS at 09:14

## 2019-01-30 RX ADMIN — GABAPENTIN 300 MG: 300 CAPSULE ORAL at 09:11

## 2019-01-30 RX ADMIN — MORPHINE SULFATE 2 MG: 2 INJECTION, SOLUTION INTRAMUSCULAR; INTRAVENOUS at 09:14

## 2019-01-30 RX ADMIN — MORPHINE SULFATE 2 MG: 2 INJECTION, SOLUTION INTRAMUSCULAR; INTRAVENOUS at 17:33

## 2019-01-30 RX ADMIN — DEXTROSE MONOHYDRATE, SODIUM CHLORIDE, AND POTASSIUM CHLORIDE 75 ML/HR: 50; 4.5; 1.49 INJECTION, SOLUTION INTRAVENOUS at 16:22

## 2019-01-30 RX ADMIN — ONDANSETRON 4 MG: 2 INJECTION INTRAMUSCULAR; INTRAVENOUS at 15:22

## 2019-01-30 NOTE — PROGRESS NOTES
Bedside and Verbal shift change report given to Alphonse Donohue (oncoming nurse) by Ravindra Maxwell (offgoing nurse). Report included the following information SBAR.

## 2019-01-30 NOTE — PROGRESS NOTES
Hospitalist Progress Note Savi Luo MD 
Answering service: [phone] OR [phone] from in house phone Date of Service:  2019 NAME:  Manuel Powers :  1945 MRN:  [id number] Admission Summary:  
Manuel Powers is a 68 y.o. female with extensive PMH of multiple hernia surgeries, MM, RA, DVT, hld, chronic anemia, who presents with abdominal pain. Interval history / Subjective:  
Pt seen in follow up of SBO Doing well, tolerating her full liquid diet, and had a bowel movement today. No abdominal pain, but does continue to have some distention. Per her  this appears to be chronic. Assessment & Plan: SBO - on admission Improving. Likely secondary to adhesions from prior surgeries. - continue serial abdominal exams - Surgery following, appreciate recs - No indication for antibiotics, will DC Levaquin for now, given likely mechanical obstruction. Pt with allergy to flagyl noted. - Advance diet as tolerated, will move to GI soft for now - Continue bowel regimen H/o DVT - on Xarelto prior to admission, then switched to heparin gtt (in case of surgical intervention), now back to home Xarelto 
- Onc following as well. MM with chronic anemia - Transfuse if Hg<7 
- Seeing Dr Camila Bynum as op 
- On Chemotherapy as op 
- Oncology following, myeloma labs ordered and pending H/o Allergic rash - transition back to prednisone 15mg daily GERD - on PPI Nasal Dryness -  flonase Neuropathy - Resume Home Elavil HLD - home statin Rheumatoid Arthritis, and chronic pain  
- Has an Implanted pain Pump - 15mg prednisone daily Right Sided Rib Pains Check XR ribs - Healed fracture is suggested in the right seventh rib posterolaterally. Continue pain control as above. Hypokalemia - Replete as needed, continue to monitor Code status: Full DVT prophylaxis: SCD 
 
 Care Plan discussed with: Patient/Family and Nurse Disposition: TBD, Hopefully home tomorrow Hospital Problems  Date Reviewed: 3/23/2018 Codes Class Noted POA  
 SBO (small bowel obstruction) ([facility] [place] [address] ICD-10-CM: C16.403 ICD-9-CM: 560.9  1/24/2019 Unknown Review of Systems: A comprehensive review of systems was negative except for that written in the HPI. Vital Signs:  
 Last 24hrs VS reviewed since prior progress note. Most recent are: 
Visit Vitals /68 (BP 1 Location: Left arm, BP Patient Position: At rest;Post activity) Pulse (!) 107 Temp 98.5 °F (36.9 °C) Resp 16 Ht 5' 4\" (1.626 m) Wt 90.1 kg (198 lb 10.2 oz) SpO2 95% Breastfeeding? No  
BMI 34.10 kg/m² Intake/Output Summary (Last 24 hours) at 1/30/2019 1547 Last data filed at 1/29/2019 2206 Gross per 24 hour Intake  Output 1200 ml Net -1200 ml Physical Examination:  
 
 
     
Constitutional:  No acute distress. , cooperative, pleasant ENT:  Oral mucous moist, oropharynx benign. Neck supple, Resp:  CTA bilaterally. No wheezing/rhonchi/rales. No accessory muscle use CV:  Regular rhythm, normal rate, no murmurs, gallops, rubs GI:  Distended, but soft, normal bowel sounds. no hepatosplenomegaly Musculoskeletal:  No edema, warm, 2+ pulses throughout Neurologic:  Moves all extremities. AAOx3, CN II-XII reviewed Skin:  Good turgor, no rashes or ulcers Data Review:  
Imaging and laboratory data reviewed. Labs:  
 
Recent Labs  
  01/29/19 
8396 01/28/19 
0542 WBC 10.1 10.5 HGB 8.2* 7.6* HCT 29.2* 27.3*  
 318 Recent Labs  
  01/29/19 
4537 01/28/19 
0542  139  
K 3.7 3.8  104 CO2 30 29 BUN 14 9 CREA 0.83 0.74 * 114* CA 8.7 8.5 No results for input(s): SGOT, GPT, ALT, AP, TBIL, TBILI, TP, ALB, GLOB, GGT, AML, LPSE in the last 72 hours. No lab exists for component: AMYP, HLPSE Recent Labs 01/29/19 1990 01/28/19 2323 01/28/19 
1331 APTT 78.5* 56.1* 52.3* No results for input(s): FE, TIBC, PSAT, FERR in the last 72 hours. Lab Results Component Value Date/Time Folate 34.3 (H) 05/28/2010 04:30 AM  
  
No results for input(s): PH, PCO2, PO2 in the last 72 hours. No results for input(s): CPK, CKNDX, TROIQ in the last 72 hours. No lab exists for component: CPKMB Lab Results Component Value Date/Time Cholesterol, total 137 03/06/2017 08:01 AM  
 HDL Cholesterol 47 03/06/2017 08:01 AM  
 LDL, calculated 38 03/06/2017 08:01 AM  
 Triglyceride 260 (H) 03/06/2017 08:01 AM  
 CHOL/HDL Ratio 5.0 12/16/2011 05:05 AM  
 
Lab Results Component Value Date/Time Glucose (POC) 113 (H) 06/02/2016 11:06 AM  
 Glucose (POC) 87 09/15/2012 08:29 PM  
 Glucose (POC) 87 09/15/2012 11:58 AM  
 
Lab Results Component Value Date/Time Color YELLOW/STRAW 01/24/2019 08:09 PM  
 Appearance CLEAR 01/24/2019 08:09 PM  
 Specific gravity <1.005 01/24/2019 08:09 PM  
 Specific gravity 1.008 03/01/2016 03:44 PM  
 pH (UA) 7.5 01/24/2019 08:09 PM  
 Protein TRACE (A) 01/24/2019 08:09 PM  
 Glucose NEGATIVE  01/24/2019 08:09 PM  
 Ketone TRACE (A) 01/24/2019 08:09 PM  
 Bilirubin NEGATIVE  01/24/2019 08:09 PM  
 Urobilinogen 1.0 01/24/2019 08:09 PM  
 Nitrites NEGATIVE  01/24/2019 08:09 PM  
 Leukocyte Esterase NEGATIVE  01/24/2019 08:09 PM  
 Epithelial cells FEW 01/24/2019 08:09 PM  
 Bacteria NEGATIVE  01/24/2019 08:09 PM  
 WBC 0-4 01/24/2019 08:09 PM  
 RBC 10-20 01/24/2019 08:09 PM  
 
 
 
Medications Reviewed:  
 
Current Facility-Administered Medications Medication Dose Route Frequency  rivaroxaban (XARELTO) tablet 10 mg  10 mg Oral DAILY  senna-docusate (PERICOLACE) 8.6-50 mg per tablet 1 Tab  1 Tab Oral DAILY  bisacodyl (DULCOLAX) suppository 10 mg  10 mg Rectal DAILY PRN  
 benzocaine-menthol (CEPACOL) lozenge 1 Lozenge  1 Lozenge Mucous Membrane PRN  
  dextrose 5% - 0.45% NaCl with KCl 20 mEq/L infusion  75 mL/hr IntraVENous CONTINUOUS  
 methylPREDNISolone (PF) (SOLU-MEDROL) injection 20 mg  20 mg IntraVENous DAILY  phenol throat spray (CHLORASEPTIC) 1 Spray  1 Spray Oral PRN  
 famotidine (PF) (PEPCID) 20 mg in sodium chloride 0.9% 10 mL injection  20 mg IntraVENous Q12H  
 triamcinolone acetonide (KENALOG) 0.1 % ointment   Topical BID  spironolactone (ALDACTONE) tablet 50 mg  50 mg Oral BID  fluticasone (FLONASE) 50 mcg/actuation nasal spray 2 Spray  2 Spray Both Nostrils BID  potassium chloride SR (KLOR-CON 10) tablet 20 mEq  20 mEq Oral DAILY  amitriptyline (ELAVIL) tablet 100 mg  100 mg Oral QHS  gabapentin (NEURONTIN) capsule 300 mg  300 mg Oral QID  simvastatin (ZOCOR) tablet 20 mg  20 mg Oral QHS  sodium chloride (NS) flush 5-40 mL  5-40 mL IntraVENous Q8H  
 sodium chloride (NS) flush 5-40 mL  5-40 mL IntraVENous PRN  
 morphine injection 2 mg  2 mg IntraVENous Q4H PRN  
 ondansetron (ZOFRAN) injection 4 mg  4 mg IntraVENous Q4H PRN  
 
______________________________________________________________________ EXPECTED LENGTH OF STAY: 3d 7h 
ACTUAL LENGTH OF STAY:          6 
 
            
Lata Mayes MD

## 2019-01-30 NOTE — PROGRESS NOTES
Dr. Galileo Sparks notified that patient is tolerating Full Liquid diet and has had a bowel movement today. Given telephone order to advance diet to GI Lite.

## 2019-01-30 NOTE — PROGRESS NOTES
Bedside shift change report given to Aury Carcamo (oncoming nurse) by Dolly Hargrove (offgoing nurse). Report included the following information SBAR, Kardex, Intake/Output, MAR and Recent Results.

## 2019-01-31 VITALS
HEART RATE: 89 BPM | TEMPERATURE: 98.3 F | OXYGEN SATURATION: 94 % | HEIGHT: 64 IN | DIASTOLIC BLOOD PRESSURE: 70 MMHG | BODY MASS INDEX: 33.91 KG/M2 | SYSTOLIC BLOOD PRESSURE: 121 MMHG | RESPIRATION RATE: 16 BRPM | WEIGHT: 198.63 LBS

## 2019-01-31 PROCEDURE — 74011250637 HC RX REV CODE- 250/637: Performed by: NURSE PRACTITIONER

## 2019-01-31 PROCEDURE — 74011636637 HC RX REV CODE- 636/637: Performed by: INTERNAL MEDICINE

## 2019-01-31 PROCEDURE — 94760 N-INVAS EAR/PLS OXIMETRY 1: CPT

## 2019-01-31 PROCEDURE — 74011250636 HC RX REV CODE- 250/636: Performed by: HOSPITALIST

## 2019-01-31 PROCEDURE — 74011250637 HC RX REV CODE- 250/637: Performed by: INTERNAL MEDICINE

## 2019-01-31 PROCEDURE — 74011250636 HC RX REV CODE- 250/636: Performed by: INTERNAL MEDICINE

## 2019-01-31 RX ORDER — FACIAL-BODY WIPES
10 EACH TOPICAL
Qty: 30 SUPPOSITORY | Refills: 0 | Status: SHIPPED | OUTPATIENT
Start: 2019-01-31

## 2019-01-31 RX ORDER — ONDANSETRON 4 MG/1
4 TABLET, ORALLY DISINTEGRATING ORAL
Qty: 30 TAB | Refills: 0 | Status: SHIPPED | OUTPATIENT
Start: 2019-01-31 | End: 2019-03-16

## 2019-01-31 RX ORDER — HEPARIN 100 UNIT/ML
300 SYRINGE INTRAVENOUS AS NEEDED
Status: DISCONTINUED | OUTPATIENT
Start: 2019-01-31 | End: 2019-01-31 | Stop reason: HOSPADM

## 2019-01-31 RX ORDER — POLYETHYLENE GLYCOL 3350 17 G/17G
17 POWDER, FOR SOLUTION ORAL
Qty: 60 PACKET | Refills: 0 | Status: SHIPPED | OUTPATIENT
Start: 2019-01-31 | End: 2019-03-02

## 2019-01-31 RX ADMIN — RIVAROXABAN 10 MG: 10 TABLET, FILM COATED ORAL at 09:28

## 2019-01-31 RX ADMIN — GABAPENTIN 300 MG: 300 CAPSULE ORAL at 09:28

## 2019-01-31 RX ADMIN — SPIRONOLACTONE 50 MG: 25 TABLET ORAL at 09:29

## 2019-01-31 RX ADMIN — POTASSIUM CHLORIDE 20 MEQ: 750 TABLET, EXTENDED RELEASE ORAL at 09:28

## 2019-01-31 RX ADMIN — Medication 300 UNITS: at 10:12

## 2019-01-31 RX ADMIN — PREDNISONE 15 MG: 10 TABLET ORAL at 09:28

## 2019-01-31 RX ADMIN — FAMOTIDINE 20 MG: 40 POWDER, FOR SUSPENSION ORAL at 10:11

## 2019-01-31 RX ADMIN — DEXTROSE MONOHYDRATE, SODIUM CHLORIDE, AND POTASSIUM CHLORIDE 75 ML/HR: 50; 4.5; 1.49 INJECTION, SOLUTION INTRAVENOUS at 07:47

## 2019-01-31 RX ADMIN — SENNOSIDES AND DOCUSATE SODIUM 1 TABLET: 8.6; 5 TABLET ORAL at 09:29

## 2019-01-31 NOTE — PROGRESS NOTES
Hospital follow-up PCP transitional care appointment has been scheduled with Dr. Shelia Olivia for Thursday, 2/7/19 at 10:00 a.m. Pending patient discharge.   Kayleen Langley, Care Management Specialist.

## 2019-01-31 NOTE — DISCHARGE INSTRUCTIONS
Discharge Instructions       PATIENT ID: Nyla Marie  MRN: [id number]   YOB: 1945    DATE OF ADMISSION: 1/24/2019  3:49 PM    DATE OF DISCHARGE: 1/31/2019    PRIMARY CARE PROVIDER: Praveen Rod MD     ATTENDING PHYSICIAN: Martha Lopez*  DISCHARGING PROVIDER: Vivienne Sandra MD    To contact this individual call [phone] and ask the  to page. If unavailable ask to be transferred the Adult Hospitalist Department. DISCHARGE DIAGNOSES   SBO - secondary to adhesions    CONSULTATIONS: IP CONSULT TO GENERAL SURGERY  IP CONSULT TO GENERAL SURGERY  IP CONSULT TO HEMATOLOGY    PROCEDURES/SURGERIES: * No surgery found *    PENDING TEST RESULTS:   At the time of discharge the following test results are still pending: None    FOLLOW UP APPOINTMENTS:   Follow-up Information     Follow up With Specialties Details Why Contact Info    Praveen Rod MD Internal Medicine In 1 week Hospital follow up  [address] [place] [address]   [address] [address]  [address]  [phone]      Please follow up with your oncologist as previously scheduled. ADDITIONAL CARE RECOMMENDATIONS:   1. Please take all medications as prescribed. Note changes as below. - Increase miralax frequency to as needed, you can go all the way up to 3 times per day to make sure you have regular bowel movements. If you start having any diarrhea, then please back off on some of the stool softeners (first docusate, then senna, then miralax). If you are having constipation, contact your PCP, there is an additional medication we could add. I have also provided you with suppository as needed. - Take your nausea medications as needed. 2. Please make sure to follow up with your primary care physician within 1-2 weeks of discharge for hospital follow up. Please also continue your scheduled follow up with your oncologist for the myeloma.    3. Please make continue to monitor for nausea, vomiting, constipation. If it is an emergency then please come back to the ER for evaluation. 4. Please avoid any harsh diets, and it is ok to not have an appetite back to normal. You should focus on remaining well hydrated. This will also help with your constipation. 5. Avoid any unnecessary narcotics, as this will only increase your chances of this happening again. DIET: Resume previous diet, focus on drinking fluids. ACTIVITY: Activity as tolerated    WOUND CARE: None    EQUIPMENT needed: None          DISCHARGE MEDICATIONS:   See Medication Reconciliation Form    · It is important that you take the medication exactly as they are prescribed. · Keep your medication in the bottles provided by the pharmacist and keep a list of the medication names, dosages, and times to be taken in your wallet. · Do not take other medications without consulting your doctor. NOTIFY YOUR PHYSICIAN FOR ANY OF THE FOLLOWING:   Fever over 101 degrees for 24 hours. Chest pain, shortness of breath, fever, chills, nausea, vomiting, diarrhea, change in mentation, falling, weakness, bleeding. Severe pain or pain not relieved by medications. Or, any other signs or symptoms that you may have questions about. DISPOSITION:  X  Home With:   OT  PT  HH  RN       SNF/Inpatient Rehab/LTAC    Independent/assisted living    Hospice    Other:     CDMP Checked: Yes X     PROBLEM LIST Updated: Yes X       Signed:   Gray Sam MD  1/31/2019  8:29 AM        DISCHARGE SUMMARY from Nurse  The discharge information has been reviewed with the patient and spouse. The patient and spouse verbalized understanding. Discharge medications reviewed with the patient and spouse and appropriate educational materials and side effects teaching were provided.   ___________________________________________________________________________________________________________________________________

## 2019-01-31 NOTE — PROGRESS NOTES
Bedside and Verbal shift change report given to Angelica Smith RN (oncoming nurse) by Mary Jane Hodge RN (offgoing nurse). Report included the following information SBAR.

## 2019-01-31 NOTE — DISCHARGE SUMMARY
Discharge Summary PATIENT ID: Claudia Chavarria MRN: [id number] YOB: 1945 DATE OF ADMISSION: 1/24/2019  3:49 PM   
DATE OF DISCHARGE: 01/31/2019 PRIMARY CARE PROVIDER: Kim Domingo MD  
 
ATTENDING PHYSICIAN: Lata Mayes MD 
DISCHARGING PROVIDER: Lata Mayes MD   
To contact this individual call [phone] and ask the  to page. If unavailable ask to be transferred the Adult Hospitalist Department. CONSULTATIONS: IP CONSULT TO GENERAL SURGERY 
IP CONSULT TO GENERAL SURGERY 
IP CONSULT TO HEMATOLOGY PROCEDURES/SURGERIES: * No surgery found * ADMITTING DIAGNOSES & HOSPITAL COURSE:  
SBO - secondary to adhesions Ninfa Grimm a 68 y. o. female with extensive PMH of multiple hernia surgeries, MM, RA and chronic pain with implanted pain pump, DVT, hld, chronic anemia, who presents with abdominal pain. CT of the abdomen was consistent with small bowel obstruction. General surgery was consulted who recommended medical management, and followed along. She had NG tube, was NPO, and was initially placed on Levaquin. No signs or symptoms of infection, so antibiotics were stopped. Once bowel sounds returned, patient's diet was slowly advanced. She was continued on an aggressive bowel regimen. She had a bowel movement, and is tolerating a GI lite diet piror to discharge.  
  
 
 
 
DISCHARGE DIAGNOSES / PLAN:   
 
SBO - Resolved. Likely secondary to adhesions from prior surgeries in setting of chronic opioid use with pain pump. - Surgery following, appreciate recs, no active surgical needs. - s/p short course of Levaquin, no need for signs or symptoms of infection. 
- Continue GI soft diet at home. - Continue bowel regimen, on aggressive regimen from home.  Will increase the frequency of miralax PRN, and add suppository PRN 
- Given she has had a bowel movement while in house, will not add lactulose on discharge, but PCP can add this if needed.  
  
H/o DVT - on Xarelto prior to admission, then switched to heparin gtt (in case of surgical intervention), now back to home Xarelto 
- Onc following as well.  
  
MM with chronic anemia - Transfuse if Hg<7 
- Seeing Dr Terrence Burk as op 
- On Chemotherapy as op 
- Oncology following, myeloma labs ordered and pending - F/U as previously scheduled.  
  
H/o Allergic rash - transition back to prednisone 15mg daily  
  
GERD - on PPI, ranitidine, and TUMS Nasal Dryness -  flonase Neuropathy - Resume Home Elavil HLD - home statin Rheumatoid Arthritis, and chronic pain  
- Has an Implanted pain Pump - 15mg prednisone daily  
- Has been off oral pain medications while in house, would avoid additional opioids given significant constipation and SBO. Ultimately defer to PCP.  
  
Right Sided Rib Pains Check XR ribs - Healed fracture is suggested in the right seventh rib posterolaterally. Continue pain control as above.  
  
Hypokalemia - Replete as needed, continue to monitor ADDITIONAL CARE RECOMMENDATIONS: 1. Please take all medications as prescribed. Note changes as below. - Increase miralax frequency to as needed, you can go all the way up to 3 times per day to make sure you have regular bowel movements. If you start having any diarrhea, then please back off on some of the stool softeners (first docusate, then senna, then miralax). If you are having constipation, contact your PCP, there is an additional medication we could add. I have also provided you with suppository as needed. - Take your nausea medications as needed. 2. Please make sure to follow up with your primary care physician within 1-2 weeks of discharge for hospital follow up. Please also continue your scheduled follow up with your oncologist for the myeloma. 3. Please make continue to monitor for nausea, vomiting, constipation.  If it is an emergency then please come back to the ER for evaluation. 4. Please avoid any harsh diets, and it is ok to not have an appetite back to normal. You should focus on remaining well hydrated. This will also help with your constipation. 5. Avoid any unnecessary narcotics, as this will only increase your chances of this happening again. PENDING TEST RESULTS:  
At the time of discharge the following test results are still pending: None FOLLOW UP APPOINTMENTS:   
Follow-up Information Follow up With Specialties Details Why Contact Info Vera Longoria MD Internal Medicine In 1 week Hospital follow up  [address] [facility] [address] 
[phone] Please follow up with your oncologist as previously scheduled. DIET: Resume previous diet ACTIVITY: Activity as tolerated WOUND CARE: None EQUIPMENT needed: None DISCHARGE MEDICATIONS: 
Current Discharge Medication List  
  
START taking these medications Details  
bisacodyl (DULCOLAX) 10 mg suppository Insert 10 mg into rectum daily as needed. Qty: 30 Suppository, Refills: 0 CONTINUE these medications which have CHANGED Details  
polyethylene glycol (MIRALAX) 17 gram packet Take 1 Packet by mouth two (2) times daily as needed for up to 30 days. Qty: 60 Packet, Refills: 0  
  
ondansetron (ZOFRAN ODT) 4 mg disintegrating tablet Take 1 Tab by mouth every six (6) hours as needed for Nausea. Qty: 30 Tab, Refills: 0 CONTINUE these medications which have NOT CHANGED Details  
gabapentin (NEURONTIN) 300 mg capsule TAKE ONE CAPSULE BY MOUTH FOUR TIMES A DAY Qty: 120 Cap, Refills: 11  
  
potassium chloride (K-DUR, KLOR-CON) 20 mEq tablet TAKE ONE TABLET BY MOUTH EVERY DAY Qty: 30 Tab, Refills: 11  
 Associated Diagnoses: Edema, unspecified type  
  
raNITIdine (ZANTAC) 150 mg tablet Take 150 mg by mouth two (2) times a day. spironolactone (ALDACTONE) 50 mg tablet Take 1 Tab by mouth two (2) times a day. Indications: EDEMA Qty: 180 Tab, Refills: 1  
  
cetirizine (ZYRTEC) 10 mg tablet Take 10 mg by mouth nightly. diazepam (VALIUM) 10 mg tablet Take 10 mg by mouth two (2) times a day. mirabegron ER (MYRBETRIQ) 50 mg ER tablet Take 50 mg by mouth daily. amitriptyline (ELAVIL) 50 mg tablet TAKE TWO TABLETS BY MOUTH AT BEDTIME AS NEEDED FOR SLEEP Qty: 60 Tab, Refills: 0  
  
ammonium lactate (LAC-HYDRIN) 12 % lotion   
  
simvastatin (ZOCOR) 20 mg tablet TAKE ONE TABLET BY MOUTH AT BEDTIME Qty: 30 Tab, Refills: 11  
  
lubiPROStone (AMITIZA) 24 mcg capsule TAKE 1 CAPSULE BY MOUTH TWICE DAILY Qty: 60 Cap, Refills: 11 Milnacipran (SAVELLA) 50 mg tablet TAKE 1 TABLET BY MOUTH TWICE DAILY Qty: 60 Tab, Refills: 11 Dexlansoprazole (DEXILANT) 60 mg CpDB TAKE 1 CAPSULE BY MOUTH EVERY DAY Qty: 30 Cap, Refills: 11  
  
fluticasone (FLONASE) 50 mcg/actuation nasal spray 2 Sprays by Both Nostrils route two (2) times a day. Qty: 3 Bottle, Refills: 3  
  
rivaroxaban (XARELTO) 10 mg tablet Take 10 mg by mouth daily. predniSONE (DELTASONE) 10 mg tablet Take 15 mg by mouth daily. B.infantis-B.ani-B.long-B.bifi 10-15 mg TbEC Take 1 Tab by mouth every morning. aluminum hydrox-magnesium carb (GAVISCON)  mg/15 mL suspension Take 15 mL by mouth every six (6) hours as needed for Indigestion. calcium carbonate (TUMS) 200 mg calcium (500 mg) chew Take 1 Tab by mouth as needed. calcium citrate-vitamin D3 (CITRACAL WITH VITAMIN D MAXIMUM) tablet Take  by mouth two (2) times a day. magnesium hydroxide (MILK OF MAGNESIA) 400 mg/5 mL suspension Take 30 mL by mouth daily as needed for Constipation. betamethasone dipropionate (DIPROLENE) 0.05 % ointment Apply  to affected area two (2) times a day.   
  
acetaminophen (TYLENOL) 500 mg tablet Take 1,000 mg by mouth every six (6) hours as needed for Pain.  
  
guaiFENesin SR (MUCINEX) 600 mg SR tablet Take 1,200 mg by mouth as needed. Qty: 60 Tab, Refills: 0  
  
OTHER,NON-FORMULARY, LLQ Morphine/bupivacaine pain pump for multiple myeloma. 4.287mg/3.125 mg per day  
  
docusate sodium (COLACE) 100 mg capsule Take 400 mg by mouth. 1 capsule in the morning and 3 capsule at night  
  
senna (SENOKOT) 8.6 mg tablet Take 4 Tabs by mouth two (2) times a day. 4 PILLS IN AM  4 PILLS IN PM  
  
prochlorperazine (COMPAZINE) 5 mg tablet Take 5 mg by mouth every six (6) hours as needed for Nausea. STOP taking these medications HYDROmorphone (DILAUDID) 4 mg tablet Comments:  
Reason for Stopping:   
   
  
 
 
 
NOTIFY YOUR PHYSICIAN FOR ANY OF THE FOLLOWING:  
Fever over 101 degrees for 24 hours. Chest pain, shortness of breath, fever, chills, nausea, vomiting, diarrhea, change in mentation, falling, weakness, bleeding. Severe pain or pain not relieved by medications. Or, any other signs or symptoms that you may have questions about. DISPOSITION: 
X  Home With: 
 OT  PT  HH  RN  
  
 Long term SNF/Inpatient Rehab Independent/assisted living Hospice Other:  
 
 
PATIENT CONDITION AT DISCHARGE:  
 
Functional status Poor Deconditioned X Independent Cognition X Lucid Forgetful Dementia Catheters/lines (plus indication) [place] PICC   
 PEG   
X None Code status X Full code DNR   
 
PHYSICAL EXAMINATION AT DISCHARGE: 
Visit Vitals /72 (BP 1 Location: Left arm, BP Patient Position: At rest) Pulse 92 Temp 98 °F (36.7 °C) Resp 16 Ht 5' 4\" (1.626 m) Wt 90.1 kg (198 lb 10.2 oz) SpO2 94% Breastfeeding? No  
BMI 34.10 kg/m² Gen: NAD, awake in bed HEENT: NC/AT, sclera anicteric, PERRL, EOMI 
CV: RRR no m/r/g Resp: CTA b/l no increased work of breathing Abd: Distended, but soft. Normal bowel sounds, + pain pump in place. Ext: 2+ pulses, no edema Skin: No rashes CHRONIC MEDICAL DIAGNOSES: 
Problem List as of 1/31/2019 Date Reviewed: 3/23/2018 Codes Class Noted - Resolved SBO (small bowel obstruction) ([facility] ICD-10-CM: N06.921 ICD-9-CM: 560.9  1/24/2019 - Present Recurrent ventral incisional hernia ICD-10-CM: D36.6 ICD-9-CM: 553.21  3/28/2016 - Present Advance directive on file ICD-10-CM: Z78.9 ICD-9-CM: V49.89  3/9/2016 - Present Acute bronchitis ICD-10-CM: J20.9 ICD-9-CM: 466.0  3/3/2016 - Present Sinusitis ICD-10-CM: J32.9 ICD-9-CM: 473.9  3/3/2016 - Present Dysphagia ICD-10-CM: R13.10 ICD-9-CM: 787.20  1/7/2016 - Present Umbilical hernia W-[id number]: K42.9 ICD-9-CM: 553.1  10/15/2014 - Present Recurrent umbilical hernia [id number]: K42.9 ICD-9-CM: 553.1  6/10/2013 - Present Other complication due to venous access device [facility]) ICD-10-CM: S49.865D ICD-9-CM: 996.74  1/29/2013 - Present Infected seroma, postoperative ICD-10-CM: [id number] ICD-9-CM: 998.51  9/13/2012 - Present Multiple myeloma (HCC) ICD-10-CM: C90.00 ICD-9-CM: 203.00  9/29/2009 - Present Fibromyalgia ICD-10-CM: M79.7 ICD-9-CM: 729.1  9/29/2009 - Present GERD (gastroesophageal reflux disease) ICD-10-CM: K21.9 ICD-9-CM: 530.81  9/29/2009 - Present Rheumatoid arthritis involving multiple sites with positive rheumatoid factor (HCC) ICD-10-CM: M05.79 ICD-9-CM: 714.0  9/29/2009 - Present RESOLVED: Hypoxia ICD-10-CM: R09.02 
ICD-9-CM: 799.02  3/1/2016 - 3/3/2016 RESOLVED: Acute pancreatitis ICD-10-CM: K85.90 ICD-9-CM: 277.6  12/15/2011 - 12/18/2011 Greater than 30 minutes were spent with the patient on counseling and coordination of care Signed:  
Mo Bedoya MD 
1/31/2019 
8:09 AM

## 2019-01-31 NOTE — PROGRESS NOTES
Patient discharged. IV removed. Port de-accessed. Discharge instructions reviewed with patient. Patient and  verbalized understanding of all. No s/s of distress noted.

## 2019-01-31 NOTE — PROGRESS NOTES
Bedside and Verbal shift change report given to Eduar Sandhu (oncoming nurse) by Jo-Ann Beverly (offgoing nurse). Report included the following information SBAR.

## 2019-02-01 ENCOUNTER — PATIENT OUTREACH (OUTPATIENT)
Dept: INTERNAL MEDICINE CLINIC | Age: 74
End: 2019-02-01

## 2019-02-01 RX ORDER — AMITRIPTYLINE HYDROCHLORIDE 50 MG/1
TABLET, FILM COATED ORAL
Qty: 60 TAB | Refills: 0 | Status: SHIPPED | OUTPATIENT
Start: 2019-02-01 | End: 2019-03-02 | Stop reason: SDUPTHER

## 2019-02-01 NOTE — Clinical Note
Dr Jose G Castro patient doing well. Reports cautiously eating small amounts of food. Urged adequate hydration.  Thanks, Toby Root

## 2019-02-01 NOTE — PROGRESS NOTES
Hospital Discharge Follow-Up Date/Time:  2019 8:32 AM 
 
[facility] and discharged on 19 for partial small bowel obstruction. The physician discharge summary was available at the time of outreach. Patient was contacted within one business days of discharge. Top Challenges reviewed with the provider SOB-Given she has had a bowel movement while in house, will not add lactulose on discharge, but PCP can add this if needed. Rheumatoid Arthritis, and chronic pain  
- Has an Implanted pain Pump  
- Has been off oral pain medications while in house, would avoid additional opioids given significant constipation and SBO. Ultimately defer to PCP Had BM today, 19 Hypokalemia- K- 3.2 on admission; K- 3.7 consider repeat CMP Consider repeat CBC Chronic anemia-2/2 myeloma Hgb 10.5 on admission; Hgb 8.2 on d/c 
-wbc 22 on admission; wbc-10.1 on d/c Method of communication with provider :chart routing Inpatient RRAT score: 16 Was this a readmission? no  
Patient stated reason for the readmission: NA 
 
Nurse Navigator (NN) contacted the patient by telephone to perform post hospital discharge assessment. Verified name and  with patient as identifiers. Provided introduction to self, and explanation of the Nurse Navigator role. Reviewed discharge instructions and red flags with patient who verbalized understanding. Patient given an opportunity to ask questions and does not have any further questions or concerns at this time. The patient agrees to contact the PCP office for questions related to their healthcare. NN provided contact information for future reference. Disease Specific:   N/A Summary of patient's top problems: 1. Partial small bowel obstruction likely from adhesions- presents to ED with abdominal pain x one day, n/v/d. NG tube placed due to vomiting/dilated stomach and esophagus; no surgery intervention,GI lite diet 2. Chronic pain- pain pump Morphine 8mg every 3 hours 3. Multiple Myeloma- on chemo, chronic anemia 4. H/o right leg DVT- on Xarelto Home Health orders at discharge: none Home Health company: NA 
Date of initial visit: NA 
 
Durable Medical Equipment ordered/company: NA 
Durable Medical Equipment received: NA Barriers to care? None identified Advance Care Planning:  
Does patient have an Advance Directive:  reviewed and current Medication(s):  
New Medications at Discharge: bisacodyl 10 mg suppository (DULCOLAX) 
linaclotide 145 mcg Cap capsule (LINZESS) Changed Medications at Discharge: polyethylene glycol 17 gram packet (MIRALAX Discontinued Medications at Discharge:HYDROmorphone 4 mg tablet (DILAUDID) lubiPROStone 24 mcg capsule (AMITIZA)- per patient, Medicare will no longer cover medication Medication reconciliation was performed with patient, who verbalizes understanding of administration of home medications. There were no barriers to obtaining medications identified at this time. Referral to Pharm D needed: no  
 
Current Outpatient Medications Medication Sig  polyethylene glycol (MIRALAX) 17 gram packet Take 1 Packet by mouth two (2) times daily as needed for up to 30 days.  bisacodyl (DULCOLAX) 10 mg suppository Insert 10 mg into rectum daily as needed.  ondansetron (ZOFRAN ODT) 4 mg disintegrating tablet Take 1 Tab by mouth every six (6) hours as needed for Nausea.  linaclotide (LINZESS) 145 mcg cap capsule Take 1 Cap by mouth Daily (before breakfast) for 30 days.  mirabegron ER (MYRBETRIQ) 50 mg ER tablet Take 50 mg by mouth daily.  amitriptyline (ELAVIL) 50 mg tablet TAKE TWO TABLETS BY MOUTH AT BEDTIME AS NEEDED FOR SLEEP  
 ammonium lactate (LAC-HYDRIN) 12 % lotion  gabapentin (NEURONTIN) 300 mg capsule TAKE ONE CAPSULE BY MOUTH FOUR TIMES A DAY  simvastatin (ZOCOR) 20 mg tablet TAKE ONE TABLET BY MOUTH AT BEDTIME  
  potassium chloride (K-DUR, KLOR-CON) 20 mEq tablet TAKE ONE TABLET BY MOUTH EVERY DAY  Milnacipran (SAVELLA) 50 mg tablet TAKE 1 TABLET BY MOUTH TWICE DAILY  Dexlansoprazole (DEXILANT) 60 mg CpDB TAKE 1 CAPSULE BY MOUTH EVERY DAY  fluticasone (FLONASE) 50 mcg/actuation nasal spray 2 Sprays by Both Nostrils route two (2) times a day.  rivaroxaban (XARELTO) 10 mg tablet Take 10 mg by mouth daily.  raNITIdine (ZANTAC) 150 mg tablet Take 150 mg by mouth two (2) times a day.  predniSONE (DELTASONE) 10 mg tablet Take 15 mg by mouth daily.  spironolactone (ALDACTONE) 50 mg tablet Take 1 Tab by mouth two (2) times a day. Indications: EDEMA  
 B.infantis-B.ani-B.long-B.bifi 10-15 mg TbEC Take 1 Tab by mouth every morning.  aluminum hydrox-magnesium carb (GAVISCON)  mg/15 mL suspension Take 15 mL by mouth every six (6) hours as needed for Indigestion.  calcium carbonate (TUMS) 200 mg calcium (500 mg) chew Take 1 Tab by mouth as needed.  calcium citrate-vitamin D3 (CITRACAL WITH VITAMIN D MAXIMUM) tablet Take  by mouth two (2) times a day.  magnesium hydroxide (MILK OF MAGNESIA) 400 mg/5 mL suspension Take 30 mL by mouth daily as needed for Constipation.  betamethasone dipropionate (DIPROLENE) 0.05 % ointment Apply  to affected area two (2) times a day.  acetaminophen (TYLENOL) 500 mg tablet Take 1,000 mg by mouth every six (6) hours as needed for Pain.  guaiFENesin SR (MUCINEX) 600 mg SR tablet Take 1,200 mg by mouth as needed.  OTHER,NON-FORMULARY, LLQ Morphine/bupivacaine pain pump for multiple myeloma. 4.287mg/3.125 mg per day  cetirizine (ZYRTEC) 10 mg tablet Take 10 mg by mouth nightly.  diazepam (VALIUM) 10 mg tablet Take 10 mg by mouth two (2) times a day.  docusate sodium (COLACE) 100 mg capsule Take 400 mg by mouth. 1 capsule in the morning and 3 capsule at night  senna (SENOKOT) 8.6 mg tablet Take 4 Tabs by mouth two (2) times a day. 4 PILLS IN AM  4 PILLS IN PM  
 prochlorperazine (COMPAZINE) 5 mg tablet Take 5 mg by mouth every six (6) hours as needed for Nausea. No current facility-administered medications for this visit. There are no discontinued medications. [facility] follow up appointment(s):  
Future Appointments Date Time Provider Maria Ines Hoyt 2/7/2019 10:00 AM Gucci Don MD [id number] [facility]  
2/8/2019  1:00 PM [facility] XR 1 [facility] follow up appointment(s):  Per patient, will f/u with Dr Bella Yao ? 2/14 for Xray results [facility]:  out of service area Goals  Prevent complications of partial small bowel obstruction 02/01/19 Patient will not have another pSBO x one month · Reports abdomen feels \"queezy\" this morning; denies abdominal pain · Tolerating diet-being cautious with the amount of food eaten · Denies N/V 
· Discussed adequate hydration 2/2 to constipation r/t implanted pain pump · Reports loose BM today · States prescriptions filled and taken as prescribed · Reviewed risk for bleeding while on Xarelto for DVT · Patient will monitor bowel/bladder for blood · Confirmed follow up appt scheduled 2/7/19 · She thanked me for calling · NN will follow up next week, Analy Andersen RN

## 2019-02-07 ENCOUNTER — OFFICE VISIT (OUTPATIENT)
Dept: INTERNAL MEDICINE CLINIC | Age: 74
End: 2019-02-07

## 2019-02-07 ENCOUNTER — PATIENT OUTREACH (OUTPATIENT)
Dept: INTERNAL MEDICINE CLINIC | Age: 74
End: 2019-02-07

## 2019-02-07 VITALS
HEART RATE: 102 BPM | OXYGEN SATURATION: 99 % | BODY MASS INDEX: 31.92 KG/M2 | HEIGHT: 64 IN | TEMPERATURE: 98.5 F | SYSTOLIC BLOOD PRESSURE: 139 MMHG | DIASTOLIC BLOOD PRESSURE: 80 MMHG | WEIGHT: 187 LBS | RESPIRATION RATE: 14 BRPM

## 2019-02-07 DIAGNOSIS — M05.79 RHEUMATOID ARTHRITIS INVOLVING MULTIPLE SITES WITH POSITIVE RHEUMATOID FACTOR (HCC): ICD-10-CM

## 2019-02-07 DIAGNOSIS — R53.1 GENERALIZED WEAKNESS: ICD-10-CM

## 2019-02-07 DIAGNOSIS — K56.609 SBO (SMALL BOWEL OBSTRUCTION) (HCC): Primary | ICD-10-CM

## 2019-02-07 DIAGNOSIS — C90.00 MULTIPLE MYELOMA NOT HAVING ACHIEVED REMISSION (HCC): ICD-10-CM

## 2019-02-07 RX ORDER — CHLORPHENIRAMINE MALEATE 4 MG
TABLET ORAL 2 TIMES DAILY
Qty: 60 G | Refills: 0 | Status: SHIPPED | OUTPATIENT
Start: 2019-02-07

## 2019-02-07 RX ORDER — LUBIPROSTONE 24 UG/1
CAPSULE, GELATIN COATED ORAL
COMMUNITY
End: 2019-03-16

## 2019-02-07 RX ORDER — FLUTICASONE PROPIONATE 50 MCG
2 SPRAY, SUSPENSION (ML) NASAL 2 TIMES DAILY
Qty: 3 BOTTLE | Refills: 3 | Status: SHIPPED | OUTPATIENT
Start: 2019-02-07 | End: 2020-01-01 | Stop reason: DRUGHIGH

## 2019-02-07 NOTE — PROGRESS NOTES
HPI:  Gerard Fuentes is a 68y.o. year old female who is here for a routine visit:    Presents for a transitional care visit after recent admission to [facility] from January 24-31 for small bowel obstruction. She is been home now for about a week. She had a bowel movement on the day that she arrived home but none since then that she recalls. She did not start taking the Linzess that was prescribed in the hospital as she was finishing up with the Amitiza that she had already. She continues to take for Senokot twice a day, MiraLAX daily, and has magnesium citrate but has not taken it. She is been eating well. She denies any vomiting. Denies melena or hematochezia. Denies change in bladder. Her hemoglobin did fall while she was hospitalized and her white blood cell count was down slightly. He has an appointment tomorrow with oncology for a follow-up and lab work. She does feel she has persistent weakness but has been trying to walk more for occurred during strength.       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    Anxiety     Chronic pain     spinal stenosis per pt    Fibromyalgia     GERD (gastroesophageal reflux disease)     High cholesterol     History of acute pancreatitis     History of blood transfusion     History of Salmonella infection 2009    [facility] myeloma [facility] 2007    Osteoporosis     Other complication due to venous access device ([facility] [address] 1/29/2013    Recurrent umbilical hernia 7/02/6678    Recurrent ventral incisional hernia 3/28/2016    Rheumatoid arthritis(714.0)     Sleep apnea     INTOLERATANT OF CPAP    Thromboembolus (HCC)     Right leg DVT    Urinary incontinence        Past Surgical History:   Procedure Laterality Date    HX CHOLECYSTECTOMY  2004    HX CRANIOTOMY      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 Alvaro Martínez    HX KYPHOPLASTY  09/14/2017    L4    HX ORTHOPAEDIC Right 2001    BONE SPURS SHOULDER    HX OTHER SURGICAL  2010    LLQ PAIN PUMP FOR MORPHINE INFUSION    HX OTHER SURGICAL  1-16-14    repair of recurrent umbilical hernia with ventralex pillow top mesh and extensive lysis of [id number]. Alvaro Martínez    HX OTHER SURGICAL  5-31-16    repair of recurrent ventral incisional hernia with underlay mesh-[facility]-Dr. Alvaro Martínez    HX VASCULAR ACCESS Right 1/2013    POWER PORT     HX VASCULAR ACCESS  2010    PAIN PUMP    NEUROLOGICAL PROCEDURE UNLISTED  2007    KYPHOPLASTY X2    NEUROLOGICAL PROCEDURE UNLISTED  10/26/15    Kyphoplasty t-5    PAIN PUMP DEVICE      implanted in [facility], MORPHINE       Prior to Admission medications    Medication Sig Start Date End Date Taking? Authorizing Provider   lubiPROStone (AMITIZA) 24 mcg capsule Take  by mouth. Yes Provider, Historical   fluticasone (FLONASE) 50 mcg/actuation nasal spray 2 Sprays by Both Nostrils route two (2) times a day. 2/7/19  Yes Praveen Rod MD   clotrimazole (LOTRIMIN) 1 % topical cream Apply  to affected area two (2) times a day. 2/7/19  Yes Praveen Rod MD   amitriptyline (ELAVIL) 50 mg tablet TAKE TWO TABLETS BY MOUTH AT BEDTIME AS NEEDED FOR SLEEP 2/1/19  Yes Praveen Rod MD   polyethylene glycol (MIRALAX) 17 gram packet Take 1 Packet by mouth two (2) times daily as needed for up to 30 days. 1/31/19 3/2/19 Yes Dylon RAM MD   bisacodyl (DULCOLAX) 10 mg suppository Insert 10 mg into rectum daily as needed. 1/31/19  Yes Dylon RAM MD   ondansetron (ZOFRAN ODT) 4 mg disintegrating tablet Take 1 Tab by mouth every six (6) hours as needed for Nausea.  1/31/19  Yes Dylon Wei MD   gabapentin (NEURONTIN) 300 mg capsule TAKE ONE CAPSULE BY MOUTH FOUR TIMES A DAY 3/23/18  Yes Radha Langston III, MD   simvastatin (ZOCOR) 20 mg tablet TAKE ONE TABLET BY MOUTH AT BEDTIME 3/23/18  Yes Vini Banks MD   potassium chloride (K-DUR, KLOR-CON) 20 mEq tablet TAKE ONE TABLET BY MOUTH EVERY DAY 3/23/18  Yes Vini Banks MD   Milnacipran (SAVELLA) 50 mg tablet TAKE 1 TABLET BY MOUTH TWICE DAILY 3/23/18  Yes Vini Banks MD   Dexlansoprazole (DEXILANT) 60 mg CpDB TAKE 1 CAPSULE BY MOUTH EVERY DAY 3/23/18  Yes Radha Langston III, MD   rivaroxaban (XARELTO) 10 mg tablet Take 10 mg by mouth daily. Yes Provider, Historical   raNITIdine (ZANTAC) 150 mg tablet Take 150 mg by mouth two (2) times a day. Yes Provider, Historical   predniSONE (DELTASONE) 10 mg tablet Take 15 mg by mouth daily. Yes Provider, Historical   spironolactone (ALDACTONE) 50 mg tablet Take 1 Tab by mouth two (2) times a day. Indications: EDEMA 9/23/16  Yes Vini Banks MD   aluminum hydrox-magnesium carb (GAVISCON)  mg/15 mL suspension Take 15 mL by mouth every six (6) hours as needed for Indigestion. Yes Provider, Historical   calcium carbonate (TUMS) 200 mg calcium (500 mg) chew Take 1 Tab by mouth as needed. Yes Provider, Historical   calcium citrate-vitamin D3 (CITRACAL WITH VITAMIN D MAXIMUM) tablet Take  by mouth two (2) times a day. Yes Provider, Historical   magnesium hydroxide (MILK OF MAGNESIA) 400 mg/5 mL suspension Take 30 mL by mouth daily as needed for Constipation. Yes Provider, Historical   betamethasone dipropionate (DIPROLENE) 0.05 % ointment Apply  to affected area two (2) times a day. Yes Provider, Historical   acetaminophen (TYLENOL) 500 mg tablet Take 1,000 mg by mouth every six (6) hours as needed for Pain. Yes Provider, Historical   OTHER,NON-FORMULARY, LLQ Morphine/bupivacaine pain pump for multiple myeloma.   4.287mg/3.125 mg per day   Yes Provider, Historical   diazepam (VALIUM) 10 mg tablet Take 10 mg by mouth two (2) times a day. Yes Provider, Historical   docusate sodium (COLACE) 100 mg capsule Take 400 mg by mouth. 1 capsule in the morning and 3 capsule at night   Yes Provider, Historical   senna (SENOKOT) 8.6 mg tablet Take 4 Tabs by mouth two (2) times a day. 4 PILLS IN AM  4 PILLS IN PM   Yes Provider, Historical   prochlorperazine (COMPAZINE) 5 mg tablet Take 5 mg by mouth every six (6) hours as needed for Nausea. Yes Provider, Historical   linaclotide (LINZESS) 145 mcg cap capsule Take 1 Cap by mouth Daily (before breakfast) for 30 days. 1/31/19 3/2/19  Ilean Baumgarten, MD       Social History     Socioeconomic History    Marital status:      Spouse name: Not on file    Number of children: Not on file    Years of education: Not on file    Highest education level: Not on file   Social Needs    Financial resource strain: Not on file    Food insecurity - worry: Not on file    Food insecurity - inability: Not on file    Transportation needs - medical: Not on file   [employer] needs - non-medical: Not on file   Occupational History    Not on file   Tobacco Use    Smoking status: Former Smoker     Years: 10.00     Last attempt to quit: 10/7/2005     Years since quittin.3    Smokeless tobacco: Never Used   Substance and Sexual Activity    Alcohol use: No    Drug use: No    Sexual activity: Not on file   Other Topics Concern    Not on file   Social History Narrative    Not on file          ROS  Per HPI.   Has some erythema in the groin bilaterally she attributes to the short she was wearing in the hospital.    Visit Vitals  /80   Pulse (!) 102   Temp 98.5 °F (36.9 °C) (Oral)   Resp 14   Ht 5' 4\" (1.626 m)   Wt 187 lb (84.8 kg)   SpO2 99%   BMI 32.10 kg/m²         Physical Exam   Physical Examination: General appearance - alert, well appearing, and in no distress  Chest - clear to auscultation, no wheezes, rales or rhonchi, symmetric air entry  Heart - normal rate, regular rhythm, normal S1, S2, no murmurs, rubs, clicks or gallops  Abdomen - soft, nontender, nondistended, no masses or organomegaly  Extremities - peripheral pulses normal, no pedal edema, no clubbing or cyanosis      Assessment/Plan:  Diagnoses and all orders for this visit:    1. SBO (small bowel obstruction) (HCC) -appears to have resolved. At this point will have her try a suppository. If not improved would consider taking the magnesium citrate. Our nurse navigator met with her today and will follow-up tomorrow to be sure she had success. -     METABOLIC PANEL, COMPREHENSIVE  -     REFERRAL TO PHYSICAL THERAPY    2. Multiple myeloma not having achieved remission ([facility] [address] -likely the cause of her low hemoglobin. We will repeat that with her oncologist.  -     CBC WITH AUTOMATED DIFF    3. Rheumatoid arthritis involving multiple sites with positive rheumatoid factor [facility]    4. Generalized weakness -due to multiple medical problems. Would consider physical therapy if she is agreeable. -     REFERRAL TO PHYSICAL THERAPY    Other orders  -     fluticasone (FLONASE) 50 mcg/actuation nasal spray; 2 Sprays by Both Nostrils route two (2) times a day. -     clotrimazole (LOTRIMIN) 1 % topical cream; Apply  to affected area two (2) times a day. Follow-up Disposition:  Return in about 3 months (around 5/7/2019). Advised her to call back or return to office if symptoms worsen/change/persist.  Discussed expected course/resolution/complications of diagnosis in detail with patient. Medication risks/benefits/costs/interactions/alternatives discussed with patient. She was given an after visit summary which includes diagnoses, current medications, & vitals. She expressed understanding with the diagnosis and plan.

## 2019-02-08 ENCOUNTER — HOSPITAL ENCOUNTER (OUTPATIENT)
Dept: LAB | Age: 74
Discharge: HOME | End: 2019-02-08
Payer: MEDICARE

## 2019-02-08 ENCOUNTER — HOSPITAL ENCOUNTER (OUTPATIENT)
Dept: GENERAL RADIOLOGY | Age: 74
Discharge: HOME | End: 2019-02-08
Attending: INTERNAL MEDICINE
Payer: MEDICARE

## 2019-02-08 DIAGNOSIS — C90.00 MYELOMA ASSOCIATED AMYLOIDOSIS (HCC): ICD-10-CM

## 2019-02-08 DIAGNOSIS — E85.9 MYELOMA ASSOCIATED AMYLOIDOSIS (HCC): ICD-10-CM

## 2019-02-08 PROCEDURE — 80053 COMPREHEN METABOLIC PANEL: CPT

## 2019-02-08 PROCEDURE — 85025 COMPLETE CBC W/AUTO DIFF WBC: CPT

## 2019-02-08 PROCEDURE — 77075 RADEX OSSEOUS SURVEY COMPL: CPT

## 2019-02-09 LAB
ALBUMIN SERPL-MCNC: 3.8 G/DL (ref 3.5–4.8)
ALBUMIN/GLOB SERPL: 1.3 {RATIO} (ref 1.2–2.2)
ALP SERPL-CCNC: 94 IU/L (ref 39–117)
ALT SERPL-CCNC: 7 IU/L (ref 0–32)
AST SERPL-CCNC: 30 IU/L (ref 0–40)
BASOPHILS # BLD AUTO: 0 X10E3/UL (ref 0–0.2)
BASOPHILS NFR BLD AUTO: 0 %
BILIRUB SERPL-MCNC: <0.2 MG/DL (ref 0–1.2)
BUN SERPL-MCNC: 10 MG/DL (ref 8–27)
BUN/CREAT SERPL: 11 (ref 12–28)
CALCIUM SERPL-MCNC: 8.8 MG/DL (ref 8.7–10.3)
CHLORIDE SERPL-SCNC: 99 MMOL/L (ref 96–106)
CO2 SERPL-SCNC: 19 MMOL/L (ref 20–29)
CREAT SERPL-MCNC: 0.88 MG/DL (ref 0.57–1)
EOSINOPHIL # BLD AUTO: 0 X10E3/UL (ref 0–0.4)
EOSINOPHIL NFR BLD AUTO: 0 %
ERYTHROCYTE [DISTWIDTH] IN BLOOD BY AUTOMATED COUNT: 17.5 % (ref 12.3–15.4)
GLOBULIN SER CALC-MCNC: 3 G/DL (ref 1.5–4.5)
GLUCOSE SERPL-MCNC: 167 MG/DL (ref 65–99)
HCT VFR BLD AUTO: 31.7 % (ref 34–46.6)
HGB BLD-MCNC: 9.3 G/DL (ref 11.1–15.9)
IMM GRANULOCYTES # BLD AUTO: 0.2 X10E3/UL (ref 0–0.1)
IMM GRANULOCYTES NFR BLD AUTO: 1 %
LYMPHOCYTES # BLD AUTO: 1.1 X10E3/UL (ref 0.7–3.1)
LYMPHOCYTES NFR BLD AUTO: 6 %
MCH RBC QN AUTO: 23.5 PG (ref 26.6–33)
MCHC RBC AUTO-ENTMCNC: 29.3 G/DL (ref 31.5–35.7)
MCV RBC AUTO: 80 FL (ref 79–97)
MONOCYTES # BLD AUTO: 0.6 X10E3/UL (ref 0.1–0.9)
MONOCYTES NFR BLD AUTO: 4 %
NEUTROPHILS # BLD AUTO: 15.3 X10E3/UL (ref 1.4–7)
NEUTROPHILS NFR BLD AUTO: 89 %
PLATELET # BLD AUTO: 414 X10E3/UL (ref 150–379)
POTASSIUM SERPL-SCNC: 4.6 MMOL/L (ref 3.5–5.2)
PROT SERPL-MCNC: 6.8 G/DL (ref 6–8.5)
RBC # BLD AUTO: 3.96 X10E6/UL (ref 3.77–5.28)
SODIUM SERPL-SCNC: 139 MMOL/L (ref 134–144)
WBC # BLD AUTO: 17.3 X10E3/UL (ref 3.4–10.8)

## 2019-02-15 ENCOUNTER — PATIENT OUTREACH (OUTPATIENT)
Dept: INTERNAL MEDICINE CLINIC | Age: 74
End: 2019-02-15

## 2019-02-15 NOTE — PROGRESS NOTES
Called patient. Reports she was at the dentist office and requested return call ~2 hours NN will plan to call her back Called patient Goals  Prevent complications of partial small bowel obstruction 02/01/19 Patient will not have another pSBO x one month · Reports abdomen feels \"queezy\" this morning; denies abdominal pain · Tolerating diet-being cautious with the amount of food eaten · Denies N/V 
· Discussed adequate hydration 2/2 to constipation r/t implanted pain pump · Reports loose BM today · States prescriptions filled and taken as prescribed · Reviewed risk for bleeding while on Xarelto for DVT · Patient will monitor bowel/bladder for blood · Confirmed follow up appt scheduled 2/7/19 · She thanked me for calling · NN will follow up next week, Sylvie Chaney RN 
 
02/15/19 · Reports she had BM 2/9/19 after suppository x2 days · No blood noted · Continues with Miralax daily · Records BM on calendar · NN will continue to follow

## 2019-03-03 RX ORDER — DEXLANSOPRAZOLE 60 MG/1
CAPSULE, DELAYED RELEASE ORAL
Qty: 30 CAP | Refills: 0 | Status: SHIPPED | OUTPATIENT
Start: 2019-03-03 | End: 2019-04-04 | Stop reason: SDUPTHER

## 2019-03-03 RX ORDER — AMITRIPTYLINE HYDROCHLORIDE 50 MG/1
TABLET, FILM COATED ORAL
Qty: 60 TAB | Refills: 0 | Status: SHIPPED | OUTPATIENT
Start: 2019-03-03 | End: 2019-04-10 | Stop reason: SDUPTHER

## 2019-03-05 ENCOUNTER — PATIENT OUTREACH (OUTPATIENT)
Dept: INTERNAL MEDICINE CLINIC | Age: 74
End: 2019-03-05

## 2019-03-05 NOTE — PROGRESS NOTES
Patient has graduated from the Transitions of Care Coordination  program on 3/5/19. Patient's symptoms are stable at this time. Patient/family has the ability to self-manage. Patient reports ? osteomylitis to foot. Reports podiatrist aware and prescribed Cipro. Pt completed 10 day course of  abx therapy and reports podiatrist will continue Cipro for 10 more days  Reports pain, swelling, increased redness,  low grade fever, 99.1; Denies chills  NN wanted to offer [facility], but they do not service [facility]. Advised if symptoms worsens to contact 911. Pt voiced understanding. Had f/u with podiatrist 3/12/19  Reports podiatrist ordered wound cultures, results pending  Pt appreciated the call and will keep NN updated  Care management goals have been completed at this time. No further nurse navigator follow up scheduled. Pt has nurse navigator's contact information for any further questions, concerns, or needs.   Patients upcoming visits:    Future Appointments   Date Time Provider Maria Ines Hoyt   3/8/2019  1:00 PM [facility] PET INJ 1 [facility] REG   3/8/2019  2:00 PM [facility] PET 1 [facility] REG

## 2019-03-08 ENCOUNTER — HOSPITAL ENCOUNTER (OUTPATIENT)
Dept: PET IMAGING | Age: 74
Discharge: HOME | End: 2019-03-08
Attending: INTERNAL MEDICINE
Payer: MEDICARE

## 2019-03-08 VITALS — HEIGHT: 63 IN | BODY MASS INDEX: 34.91 KG/M2 | WEIGHT: 197 LBS

## 2019-03-08 DIAGNOSIS — E85.9 MYELOMA ASSOCIATED AMYLOIDOSIS (HCC): ICD-10-CM

## 2019-03-08 DIAGNOSIS — C90.00 MYELOMA ASSOCIATED AMYLOIDOSIS (HCC): ICD-10-CM

## 2019-03-08 PROCEDURE — A9552 F18 FDG: HCPCS

## 2019-03-08 RX ORDER — SODIUM CHLORIDE 0.9 % (FLUSH) 0.9 %
10 SYRINGE (ML) INJECTION
Status: COMPLETED | OUTPATIENT
Start: 2019-03-08 | End: 2019-03-08

## 2019-03-08 RX ADMIN — Medication 10 ML: at 13:41

## 2019-03-16 ENCOUNTER — APPOINTMENT (OUTPATIENT)
Dept: CT IMAGING | Age: 74
DRG: 552 | End: 2019-03-16
Attending: EMERGENCY MEDICINE
Payer: MEDICARE

## 2019-03-16 ENCOUNTER — HOSPITAL ENCOUNTER (INPATIENT)
Age: 74
LOS: 5 days | Discharge: NURSING, REHAB OR LONG-TERM CARE | DRG: 552 | End: 2019-03-21
Attending: EMERGENCY MEDICINE | Admitting: INTERNAL MEDICINE
Payer: MEDICARE

## 2019-03-16 DIAGNOSIS — C90.00 MULTIPLE MYELOMA, REMISSION STATUS UNSPECIFIED (HCC): ICD-10-CM

## 2019-03-16 DIAGNOSIS — M79.605 LEFT LEG PAIN: Primary | ICD-10-CM

## 2019-03-16 DIAGNOSIS — R53.81 PHYSICAL DEBILITY: ICD-10-CM

## 2019-03-16 DIAGNOSIS — L03.116 CELLULITIS OF LEFT LOWER LEG: ICD-10-CM

## 2019-03-16 DIAGNOSIS — M54.5 ACUTE BILATERAL LOW BACK PAIN, WITH SCIATICA PRESENCE UNSPECIFIED: ICD-10-CM

## 2019-03-16 DIAGNOSIS — M79.672 LEFT FOOT PAIN: ICD-10-CM

## 2019-03-16 PROBLEM — R52 PAIN: Status: ACTIVE | Noted: 2019-03-16

## 2019-03-16 PROBLEM — R52 SEVERE PAIN: Status: ACTIVE | Noted: 2019-03-16

## 2019-03-16 LAB
ALBUMIN SERPL-MCNC: 3.2 G/DL (ref 3.5–5)
ALBUMIN/GLOB SERPL: 0.8 {RATIO} (ref 1.1–2.2)
ALP SERPL-CCNC: 87 U/L (ref 45–117)
ALT SERPL-CCNC: 10 U/L (ref 12–78)
ANION GAP SERPL CALC-SCNC: 4 MMOL/L (ref 5–15)
AST SERPL-CCNC: 31 U/L (ref 15–37)
BILIRUB SERPL-MCNC: 0.3 MG/DL (ref 0.2–1)
BUN SERPL-MCNC: 9 MG/DL (ref 6–20)
BUN/CREAT SERPL: 12 (ref 12–20)
CALCIUM SERPL-MCNC: 8.8 MG/DL (ref 8.5–10.1)
CHLORIDE SERPL-SCNC: 100 MMOL/L (ref 97–108)
CO2 SERPL-SCNC: 32 MMOL/L (ref 21–32)
COMMENT, HOLDF: NORMAL
CREAT SERPL-MCNC: 0.77 MG/DL (ref 0.55–1.02)
ERYTHROCYTE [DISTWIDTH] IN BLOOD BY AUTOMATED COUNT: 18.2 % (ref 11.5–14.5)
GLOBULIN SER CALC-MCNC: 4.1 G/DL (ref 2–4)
GLUCOSE SERPL-MCNC: 86 MG/DL (ref 65–100)
HCT VFR BLD AUTO: 30 % (ref 35–47)
HGB BLD-MCNC: 8.3 G/DL (ref 11.5–16)
MCH RBC QN AUTO: 23.2 PG (ref 26–34)
MCHC RBC AUTO-ENTMCNC: 27.7 G/DL (ref 30–36.5)
MCV RBC AUTO: 84 FL (ref 80–99)
NRBC # BLD: 0.05 K/UL (ref 0–0.01)
NRBC BLD-RTO: 0.4 PER 100 WBC
PLATELET # BLD AUTO: 367 K/UL (ref 150–400)
PMV BLD AUTO: 8.6 FL (ref 8.9–12.9)
POTASSIUM SERPL-SCNC: 3.3 MMOL/L (ref 3.5–5.1)
PROT SERPL-MCNC: 7.3 G/DL (ref 6.4–8.2)
RBC # BLD AUTO: 3.57 M/UL (ref 3.8–5.2)
SAMPLES BEING HELD,HOLD: NORMAL
SODIUM SERPL-SCNC: 136 MMOL/L (ref 136–145)
WBC # BLD AUTO: 12.9 K/UL (ref 3.6–11)

## 2019-03-16 PROCEDURE — 74011250637 HC RX REV CODE- 250/637: Performed by: EMERGENCY MEDICINE

## 2019-03-16 PROCEDURE — 99285 EMERGENCY DEPT VISIT HI MDM: CPT

## 2019-03-16 PROCEDURE — 96374 THER/PROPH/DIAG INJ IV PUSH: CPT

## 2019-03-16 PROCEDURE — 74011250636 HC RX REV CODE- 250/636: Performed by: INTERNAL MEDICINE

## 2019-03-16 PROCEDURE — 73700 CT LOWER EXTREMITY W/O DYE: CPT

## 2019-03-16 PROCEDURE — 77030003560 HC NDL HUBR BARD -A

## 2019-03-16 PROCEDURE — 74011250637 HC RX REV CODE- 250/637: Performed by: INTERNAL MEDICINE

## 2019-03-16 PROCEDURE — 65270000032 HC RM SEMIPRIVATE

## 2019-03-16 PROCEDURE — 36415 COLL VENOUS BLD VENIPUNCTURE: CPT

## 2019-03-16 PROCEDURE — 72131 CT LUMBAR SPINE W/O DYE: CPT

## 2019-03-16 PROCEDURE — 85027 COMPLETE CBC AUTOMATED: CPT

## 2019-03-16 PROCEDURE — 80053 COMPREHEN METABOLIC PANEL: CPT

## 2019-03-16 RX ORDER — HYDROMORPHONE HYDROCHLORIDE 1 MG/ML
1 INJECTION, SOLUTION INTRAMUSCULAR; INTRAVENOUS; SUBCUTANEOUS
Status: DISCONTINUED | OUTPATIENT
Start: 2019-03-16 | End: 2019-03-21 | Stop reason: HOSPADM

## 2019-03-16 RX ORDER — LEVOFLOXACIN 500 MG/1
500 TABLET, FILM COATED ORAL EVERY 24 HOURS
Status: COMPLETED | OUTPATIENT
Start: 2019-03-16 | End: 2019-03-20

## 2019-03-16 RX ORDER — ADHESIVE BANDAGE
30 BANDAGE TOPICAL DAILY PRN
Status: DISCONTINUED | OUTPATIENT
Start: 2019-03-16 | End: 2019-03-21 | Stop reason: HOSPADM

## 2019-03-16 RX ORDER — SPIRONOLACTONE 25 MG/1
50 TABLET ORAL
Status: DISCONTINUED | OUTPATIENT
Start: 2019-03-16 | End: 2019-03-21 | Stop reason: HOSPADM

## 2019-03-16 RX ORDER — HYDROMORPHONE HYDROCHLORIDE 2 MG/1
8 TABLET ORAL
Status: COMPLETED | OUTPATIENT
Start: 2019-03-16 | End: 2019-03-16

## 2019-03-16 RX ORDER — GABAPENTIN 300 MG/1
300 CAPSULE ORAL
Status: COMPLETED | OUTPATIENT
Start: 2019-03-16 | End: 2019-03-16

## 2019-03-16 RX ORDER — GENTAMICIN SULFATE 1 MG/G
CREAM TOPICAL DAILY
Status: DISCONTINUED | OUTPATIENT
Start: 2019-03-16 | End: 2019-03-21 | Stop reason: HOSPADM

## 2019-03-16 RX ORDER — FENTANYL 50 UG/1
1 PATCH TRANSDERMAL
Qty: 5 PATCH | Refills: 0 | Status: SHIPPED | OUTPATIENT
Start: 2019-03-19 | End: 2019-04-03

## 2019-03-16 RX ORDER — FLUTICASONE PROPIONATE 50 MCG
2 SPRAY, SUSPENSION (ML) NASAL DAILY
Status: DISCONTINUED | OUTPATIENT
Start: 2019-03-17 | End: 2019-03-21 | Stop reason: HOSPADM

## 2019-03-16 RX ORDER — POLYETHYLENE GLYCOL 3350 17 G/17G
17 POWDER, FOR SOLUTION ORAL
COMMUNITY
End: 2020-01-01

## 2019-03-16 RX ORDER — PROCHLORPERAZINE MALEATE 10 MG
5 TABLET ORAL
Status: DISCONTINUED | OUTPATIENT
Start: 2019-03-16 | End: 2019-03-21 | Stop reason: HOSPADM

## 2019-03-16 RX ORDER — CIPROFLOXACIN 500 MG/1
500 TABLET ORAL 2 TIMES DAILY
Qty: 14 TAB | Refills: 0 | Status: SHIPPED | OUTPATIENT
Start: 2019-03-16 | End: 2019-03-23

## 2019-03-16 RX ORDER — CYANOCOBALAMIN (VITAMIN B-12) 500 MCG
400 TABLET ORAL DAILY
Status: DISCONTINUED | OUTPATIENT
Start: 2019-03-17 | End: 2019-03-21 | Stop reason: HOSPADM

## 2019-03-16 RX ORDER — POTASSIUM CHLORIDE 750 MG/1
20 TABLET, FILM COATED, EXTENDED RELEASE ORAL
Status: COMPLETED | OUTPATIENT
Start: 2019-03-16 | End: 2019-03-16

## 2019-03-16 RX ORDER — DEXLANSOPRAZOLE 60 MG/1
60 CAPSULE, DELAYED RELEASE ORAL DAILY
Status: DISCONTINUED | OUTPATIENT
Start: 2019-03-17 | End: 2019-03-16 | Stop reason: CLARIF

## 2019-03-16 RX ORDER — DOCUSATE SODIUM 100 MG/1
300 CAPSULE, LIQUID FILLED ORAL
COMMUNITY
End: 2019-04-05 | Stop reason: SDUPTHER

## 2019-03-16 RX ORDER — FERROUS SULFATE, DRIED 160(50) MG
1 TABLET, EXTENDED RELEASE ORAL DAILY
Status: DISCONTINUED | OUTPATIENT
Start: 2019-03-17 | End: 2019-03-21 | Stop reason: HOSPADM

## 2019-03-16 RX ORDER — ACETAMINOPHEN 325 MG/1
650 TABLET ORAL
Status: DISCONTINUED | OUTPATIENT
Start: 2019-03-16 | End: 2019-03-21 | Stop reason: HOSPADM

## 2019-03-16 RX ORDER — ACETAMINOPHEN 325 MG/1
650 TABLET ORAL
Status: DISCONTINUED | OUTPATIENT
Start: 2019-03-16 | End: 2019-03-16 | Stop reason: SDUPTHER

## 2019-03-16 RX ORDER — SODIUM CHLORIDE 0.9 % (FLUSH) 0.9 %
5-40 SYRINGE (ML) INJECTION AS NEEDED
Status: DISCONTINUED | OUTPATIENT
Start: 2019-03-16 | End: 2019-03-21 | Stop reason: HOSPADM

## 2019-03-16 RX ORDER — CYANOCOBALAMIN (VITAMIN B-12) 500 MCG
400 TABLET ORAL DAILY
COMMUNITY

## 2019-03-16 RX ORDER — FACIAL-BODY WIPES
10 EACH TOPICAL DAILY PRN
Status: DISCONTINUED | OUTPATIENT
Start: 2019-03-16 | End: 2019-03-21 | Stop reason: HOSPADM

## 2019-03-16 RX ORDER — GABAPENTIN 300 MG/1
300 CAPSULE ORAL 4 TIMES DAILY
Status: DISCONTINUED | OUTPATIENT
Start: 2019-03-16 | End: 2019-03-21 | Stop reason: HOSPADM

## 2019-03-16 RX ORDER — POTASSIUM CHLORIDE 750 MG/1
20 TABLET, FILM COATED, EXTENDED RELEASE ORAL DAILY
Status: DISCONTINUED | OUTPATIENT
Start: 2019-03-17 | End: 2019-03-21 | Stop reason: HOSPADM

## 2019-03-16 RX ORDER — DOCUSATE SODIUM 100 MG/1
300 CAPSULE, LIQUID FILLED ORAL
Status: DISCONTINUED | OUTPATIENT
Start: 2019-03-16 | End: 2019-03-21 | Stop reason: HOSPADM

## 2019-03-16 RX ORDER — PANTOPRAZOLE SODIUM 40 MG/1
40 TABLET, DELAYED RELEASE ORAL
Status: DISCONTINUED | OUTPATIENT
Start: 2019-03-17 | End: 2019-03-21 | Stop reason: HOSPADM

## 2019-03-16 RX ORDER — DIAZEPAM 5 MG/1
10 TABLET ORAL 2 TIMES DAILY
Status: DISCONTINUED | OUTPATIENT
Start: 2019-03-16 | End: 2019-03-21 | Stop reason: HOSPADM

## 2019-03-16 RX ORDER — HYDROMORPHONE HYDROCHLORIDE 8 MG/1
8 TABLET ORAL
COMMUNITY
End: 2020-01-01

## 2019-03-16 RX ORDER — CALCIUM CARBONATE 200(500)MG
200 TABLET,CHEWABLE ORAL
Status: DISCONTINUED | OUTPATIENT
Start: 2019-03-16 | End: 2019-03-21 | Stop reason: HOSPADM

## 2019-03-16 RX ORDER — HYDROMORPHONE HYDROCHLORIDE 4 MG/1
8 TABLET ORAL
Status: DISCONTINUED | OUTPATIENT
Start: 2019-03-16 | End: 2019-03-19

## 2019-03-16 RX ORDER — DOCUSATE SODIUM 100 MG/1
100 CAPSULE, LIQUID FILLED ORAL DAILY
Status: DISCONTINUED | OUTPATIENT
Start: 2019-03-17 | End: 2019-03-21 | Stop reason: HOSPADM

## 2019-03-16 RX ORDER — POLYETHYLENE GLYCOL 3350 17 G/17G
17 POWDER, FOR SOLUTION ORAL DAILY PRN
Status: DISCONTINUED | OUTPATIENT
Start: 2019-03-16 | End: 2019-03-21 | Stop reason: HOSPADM

## 2019-03-16 RX ORDER — SODIUM CHLORIDE 0.9 % (FLUSH) 0.9 %
5-40 SYRINGE (ML) INJECTION EVERY 8 HOURS
Status: DISCONTINUED | OUTPATIENT
Start: 2019-03-16 | End: 2019-03-21 | Stop reason: HOSPADM

## 2019-03-16 RX ORDER — FAMOTIDINE 20 MG/1
40 TABLET, FILM COATED ORAL
Status: DISCONTINUED | OUTPATIENT
Start: 2019-03-16 | End: 2019-03-21 | Stop reason: HOSPADM

## 2019-03-16 RX ORDER — AMITRIPTYLINE HYDROCHLORIDE 50 MG/1
50 TABLET, FILM COATED ORAL
Status: DISCONTINUED | OUTPATIENT
Start: 2019-03-16 | End: 2019-03-21 | Stop reason: HOSPADM

## 2019-03-16 RX ORDER — CETIRIZINE HCL 10 MG
10 TABLET ORAL
Status: DISCONTINUED | OUTPATIENT
Start: 2019-03-16 | End: 2019-03-21 | Stop reason: HOSPADM

## 2019-03-16 RX ORDER — SENNOSIDES 8.6 MG/1
4 TABLET ORAL 2 TIMES DAILY
Status: DISCONTINUED | OUTPATIENT
Start: 2019-03-16 | End: 2019-03-21 | Stop reason: HOSPADM

## 2019-03-16 RX ORDER — FENTANYL 50 UG/1
1 PATCH TRANSDERMAL
Status: DISCONTINUED | OUTPATIENT
Start: 2019-03-16 | End: 2019-03-19

## 2019-03-16 RX ORDER — SIMVASTATIN 20 MG/1
20 TABLET, FILM COATED ORAL
Status: DISCONTINUED | OUTPATIENT
Start: 2019-03-16 | End: 2019-03-21 | Stop reason: HOSPADM

## 2019-03-16 RX ORDER — PREDNISONE 10 MG/1
10 TABLET ORAL DAILY
Status: DISCONTINUED | OUTPATIENT
Start: 2019-03-17 | End: 2019-03-21 | Stop reason: HOSPADM

## 2019-03-16 RX ADMIN — HYDROMORPHONE HYDROCHLORIDE 1 MG: 1 INJECTION, SOLUTION INTRAMUSCULAR; INTRAVENOUS; SUBCUTANEOUS at 20:06

## 2019-03-16 RX ADMIN — GABAPENTIN 300 MG: 300 CAPSULE ORAL at 10:12

## 2019-03-16 RX ADMIN — DOCUSATE SODIUM 300 MG: 100 CAPSULE, LIQUID FILLED ORAL at 20:06

## 2019-03-16 RX ADMIN — HYDROMORPHONE HYDROCHLORIDE 8 MG: 2 TABLET ORAL at 10:12

## 2019-03-16 RX ADMIN — SIMVASTATIN 20 MG: 20 TABLET, FILM COATED ORAL at 21:09

## 2019-03-16 RX ADMIN — GABAPENTIN 300 MG: 300 CAPSULE ORAL at 21:09

## 2019-03-16 RX ADMIN — RIVAROXABAN 10 MG: 10 TABLET, FILM COATED ORAL at 17:50

## 2019-03-16 RX ADMIN — Medication 10 ML: at 21:09

## 2019-03-16 RX ADMIN — HYDROMORPHONE HYDROCHLORIDE 1 MG: 1 INJECTION, SOLUTION INTRAMUSCULAR; INTRAVENOUS; SUBCUTANEOUS at 23:29

## 2019-03-16 RX ADMIN — HYDROMORPHONE HYDROCHLORIDE 1 MG: 1 INJECTION, SOLUTION INTRAMUSCULAR; INTRAVENOUS; SUBCUTANEOUS at 16:59

## 2019-03-16 RX ADMIN — DIAZEPAM 10 MG: 5 TABLET ORAL at 17:32

## 2019-03-16 RX ADMIN — LEVOFLOXACIN 500 MG: 500 TABLET, FILM COATED ORAL at 17:31

## 2019-03-16 RX ADMIN — CALCIUM CARBONATE (ANTACID) CHEW TAB 500 MG 200 MG: 500 CHEW TAB at 17:50

## 2019-03-16 RX ADMIN — SENNOSIDES 34.4 MG: 8.6 TABLET, FILM COATED ORAL at 17:31

## 2019-03-16 RX ADMIN — AMITRIPTYLINE HYDROCHLORIDE 50 MG: 50 TABLET, FILM COATED ORAL at 21:11

## 2019-03-16 RX ADMIN — GABAPENTIN 300 MG: 300 CAPSULE ORAL at 17:31

## 2019-03-16 RX ADMIN — FAMOTIDINE 40 MG: 20 TABLET ORAL at 17:32

## 2019-03-16 RX ADMIN — POTASSIUM CHLORIDE 20 MEQ: 750 TABLET, EXTENDED RELEASE ORAL at 20:06

## 2019-03-16 RX ADMIN — Medication 10 ML: at 17:32

## 2019-03-16 RX ADMIN — GENTAMICIN SULFATE: 1 CREAM TOPICAL at 18:33

## 2019-03-16 NOTE — ED NOTES
Patient assisted to bedside commode to void. Patient unable to bear any weight due to pain, max 2 person assist. MD aware.

## 2019-03-16 NOTE — ED NOTES
TRANSFER - OUT REPORT:    Verbal report given to Griselda Kaplan RN(name) on Nicole Scott  being transferred to (unit) for routine progression of care       Report consisted of patients Situation, Background, Assessment and   Recommendations(SBAR). Information from the following report(s) SBAR, ED Summary, Intake/Output, MAR, Recent Results and Cardiac Rhythm sr was reviewed with the receiving nurse. Lines:       Opportunity for questions and clarification was provided.       Patient transported with:   [employer]

## 2019-03-16 NOTE — PROGRESS NOTES
Admission Medication Reconciliation:    Information obtained from:  Patient's list from home/her report    Comments/Recommendations:  Reviewed our list with her list and made a few changes. She last took her home medications yesterday as usual.  The only question rocael is the current topical she is on:  Betamethasone 0.05% ointment vs. Clotrimazole 1% cream (I have left both on her list). She has two more days remaining of Cipro 500mg po BID - unclear of start date, but reported she has 4 tablets left. Prior to Admission Medications:   Prior to Admission Medications   Prescriptions Last Dose Informant Patient Reported? Taking? Cetirizine (ZYRTEC) 10 mg cap   Yes Yes   Sig: Take 10 mg by mouth daily as needed (allergies). HYDROmorphone (DILAUDID) 8 mg tablet   Yes Yes   Sig: Take 8 mg by mouth every four (4) hours as needed for Pain. OTHER,NON-FORMULARY,  Self Yes Yes   Sig: LLQ Morphine/bupivacaine pain pump for multiple myeloma. 4.287mg/3.215 mg per day   acetaminophen (TYLENOL) 500 mg tablet   Yes Yes   Sig: Take 1,000 mg by mouth every six (6) hours as needed for Pain. aluminum hydrox-magnesium carb (GAVISCON)  mg/15 mL suspension   Yes Yes   Sig: Take 15 mL by mouth every six (6) hours as needed for Indigestion. amitriptyline (ELAVIL) 50 mg tablet   No Yes   Sig: TAKE TWO TABLETS BY MOUTH AT BEDTIME AS NEEDED FOR SLEEP   betamethasone dipropionate (DIPROLENE) 0.05 % ointment   Yes No   Sig: Apply  to affected area two (2) times a day. bisacodyl (DULCOLAX) 10 mg suppository   No Yes   Sig: Insert 10 mg into rectum daily as needed. calcium carbonate (TUMS) 200 mg calcium (500 mg) chew   Yes Yes   Sig: Take 1 Tab by mouth as needed. calcium citrate-vitamin D3 (CITRACAL WITH VITAMIN D MAXIMUM) tablet   Yes No   Sig: Take 1 Tab by mouth daily. cholecalciferol (VITAMIN D3) 400 unit tab tablet   Yes Yes   Sig: Take 400 Units by mouth daily.    clotrimazole (LOTRIMIN) 1 % topical cream   No No   Sig: Apply  to affected area two (2) times a day. dexlansoprazole (DEXILANT) 60 mg CpDB capsule (delayed release)   No Yes   Sig: TAKE 1 CAPSULE BY MOUTH EVERY DAY   diazepam (VALIUM) 10 mg tablet   Yes Yes   Sig: Take 10 mg by mouth two (2) times a day. docusate sodium (COLACE) 100 mg capsule   Yes Yes   Sig: Take 100 mg by mouth daily. 1 capsule in the morning and 3 capsule at night   docusate sodium (COLACE) 100 mg capsule   Yes Yes   Sig: Take 300 mg by mouth nightly. 1 capsule in the morning and 3 capsule at night   fluticasone (FLONASE) 50 mcg/actuation nasal spray   No Yes   Si Sprays by Both Nostrils route two (2) times a day. Patient taking differently: 2 Sprays by Both Nostrils route two (2) times daily as needed for Allergies. gabapentin (NEURONTIN) 300 mg capsule   No Yes   Sig: TAKE ONE CAPSULE BY MOUTH FOUR TIMES A DAY   magnesium hydroxide (MILK OF MAGNESIA) 400 mg/5 mL suspension   Yes Yes   Sig: Take 30 mL by mouth daily as needed for Constipation. milnacipran (SAVELLA) 50 mg tablet   No Yes   Sig: TAKE 1 TABLET BY MOUTH TWICE DAILY   polyethylene glycol (MIRALAX) 17 gram/dose powder   Yes Yes   Sig: Take 17 g by mouth daily as needed (constipation). potassium chloride (K-DUR, KLOR-CON) 20 mEq tablet   No Yes   Sig: TAKE ONE TABLET BY MOUTH EVERY DAY   predniSONE (DELTASONE) 10 mg tablet   Yes Yes   Sig: Take 10 mg by mouth daily. prochlorperazine (COMPAZINE) 5 mg tablet  Self Yes Yes   Sig: Take 5 mg by mouth every six (6) hours as needed for Nausea. raNITIdine (ZANTAC) 150 mg tablet   Yes Yes   Sig: Take 300 mg by mouth Daily (before dinner). rivaroxaban (XARELTO) 10 mg tablet   Yes Yes   Sig: Take 10 mg by mouth daily (with dinner). senna (SENOKOT) 8.6 mg tablet   Yes Yes   Sig: Take 4 Tabs by mouth two (2) times a day.  4 PILLS IN AM  4 PILLS IN PM   simvastatin (ZOCOR) 20 mg tablet   No Yes   Sig: TAKE ONE TABLET BY MOUTH AT BEDTIME   spironolactone (ALDACTONE) 50 mg tablet   No Yes   Sig: Take 1 Tab by mouth two (2) times a day. Indications: EDEMA   Patient taking differently: Take 50 mg by mouth two (2) times daily as needed (edema).       Facility-Administered Medications: None

## 2019-03-16 NOTE — PROGRESS NOTES
Spiritual Care Assessment/Progress Note  [facility]      NAME: Mckinley Murphy      MRN: [id number]  AGE: 68 y.o.  SEX: female  Holiness Affiliation: Bahai   Language: English     3/16/2019     Total Time (in minutes): 10     Spiritual Assessment begun in [address] through conversation with:         [x]Patient        [] Family    [] Friend(s)        Reason for Consult: Palliative Care, Initial/Spiritual Assessment     Spiritual beliefs: (Please include comment if needed)     [x] Identifies with a martinez tradition:         [] Supported by a martinez community:            [] Claims no spiritual orientation:           [] Seeking spiritual identity:                [] Adheres to an individual form of spirituality:           [] Not able to assess:                           Identified resources for coping:      [x] Prayer                               [] Music                  [] Guided Imagery     [x] Family/friends                 [] Pet visits     [] Devotional reading                         [] Unknown     [] Other:                                              Interventions offered during this visit: (See comments for more details)    Patient Interventions: Affirmation of emotions/emotional suffering, Catharsis/review of pertinent events in supportive environment, Iconic (affirming the presence of God/Higher Power), Initial/Spiritual assessment, patient floor, Prayer (actual), Prayer (assurance of)     Family/Friend(s): Initial Assessment, Prayer (actual), Prayer (assurance of)     Plan of Care:     [x] Support spiritual and/or cultural needs    [] Support AMD and/or advance care planning process      [] Support grieving process   [] Coordinate Rites and/or Rituals    [] Coordination with community clergy   [] No spiritual needs identified at this time   [] Detailed Plan of Care below (See Comments)  [] Make referral to Music Therapy  [] Make referral to Pet Therapy     [] Make referral to Addiction services  [] Make referral to [facility]  [] Make referral to Spiritual Care Partner  [] No future visits requested        [x] Follow up visits as needed     Comments: Visited Mrs Juvencio Tinoco in ED-06 for initial Palliative Care spiritual assessment. Mrs Juvencio Tinoco was lying on the stretcher with a pensive expression on her face; patient's  and daughter were with her. Provided active listening as patient shared that she was glad she was being admitted to the hospital because she had been experiencing so much pain. Patient requested that  have a prayer on her behalf for healing. Had prayer as requested and assured her of continued prayers for her. Visit was interrupted by staff that arrived to provide care. Assured patient and family of ongoing  availability for support. : Rev. Andrea Washington.  Mariano Ragsdale; [facility], to contact [phone] call: [phone]

## 2019-03-16 NOTE — ED PROVIDER NOTES
68 y.o. female with past medical history significant for osteoporosis, anemia, blood transfusion, high cholesterol, urinary incontinence, acute pancreatitis, umbilical hernia, sleep apnea, chronic pain, rheumatoid arthritis, aneurysm, GERD, ventral incisional hernia, thromboembolus, fibromyalgia, anxiety, salmonella infection, and multiple myeloma who presents from home via EMS with chief complaint of left hip pain. Patient states on Wednesday (3 days ago) she received a bone biopsy of her right hip. Patient states following biopsy, later that day she had onset of left hip pain. Patient describes the pain as \"sharp\" and describes severity as a \"10\"/10. Patient states last night the pain started to radiate down her left leg, hindering her ability to walk. Patient denies any injury. Patient notes she has not taken any medications for pain PTA. Patient also reports she usually takes hydromorphone (8 mg) for pain, and she is currently taking ciprofloxacin for a right foot infection (4 tablets remaining). There are no other acute medical concerns at this time. Social hx: Former smoker  PCP: Kwesi Vela MD    Note written by Arnaldo Pendleton, as dictated by Andrea Choi MD 9:47 AM        The history is provided by the patient and the EMS personnel. No  was used.         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    Anxiety     Chronic pain     spinal stenosis per pt    Fibromyalgia     GERD (gastroesophageal reflux disease)     High cholesterol     History of acute pancreatitis     History of blood transfusion     History of Salmonella infection 2009    Multiple myeloma ([address] 2007    Osteoporosis     Other complication due to venous access device ([facility] 1/29/2013    Recurrent umbilical hernia 5/44/3807    Recurrent ventral incisional hernia 3/28/2016    Rheumatoid arthritis(714.0)     Sleep apnea INTOLERATANT OF CPAP    Thromboembolus (HCC)     Right leg DVT    Urinary incontinence        Past Surgical History:   Procedure Laterality Date    HX CHOLECYSTECTOMY  2004    HX CRANIOTOMY      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 Nika Crenshaw    HX KYPHOPLASTY  09/14/2017    L4    HX ORTHOPAEDIC Right 2001    BONE SPURS SHOULDER    HX OTHER SURGICAL  2010    LLQ PAIN PUMP FOR MORPHINE INFUSION    HX OTHER SURGICAL  1-16-14    repair of recurrent umbilical hernia with ventralex pillow top mesh and extensive lysis of [id number]-CVDR. Nika Crenshaw    HX OTHER SURGICAL  5-31-16    repair of recurrent ventral incisional hernia with underlay mesh-[facility]-Dr. Nika Crenshaw    HX VASCULAR ACCESS Right 1/2013    POWER PORT     HX VASCULAR ACCESS  2010    PAIN PUMP    NEUROLOGICAL PROCEDURE UNLISTED  2007    KYPHOPLASTY X2    NEUROLOGICAL PROCEDURE UNLISTED  10/26/15    Kyphoplasty t-5    PAIN PUMP DEVICE      implanted in LLQ, MORPHINE         Family History:   Problem Relation Age of Onset    Arthritis-osteo Mother     Anesth Problems Neg Hx        Social History     Socioeconomic History    Marital status:      Spouse name: Not on file    Number of children: Not on file    Years of education: Not on file    Highest education level: Not on file   Social Needs    Financial resource strain: Not on file    Food insecurity - worry: Not on file    Food insecurity - inability: Not on file   Wortal needs - medical: Not on file   Wortal needs - non-medical: Not on file   Occupational History    Not on file   Tobacco Use    Smoking status: Former Smoker     Years: 10.00     Last attempt to quit: 10/7/2005 Years since quittin.4    Smokeless tobacco: Never Used   Substance and Sexual Activity    Alcohol use: No    Drug use: No    Sexual activity: Not on file   Other Topics Concern    Not on file   Social History Narrative    Not on file         ALLERGIES: Aggrenox [aspirin-dipyridamole]; Broccoli; Bumex [bumetanide]; Doxil [doxorubicin, peg-liposomal]; Flagyl [metronidazole]; Keflex [cephalexin]; Lasix [furosemide]; Macrobid [nitrofurantoin monohyd/m-cryst]; Methotrexate; Pcn [penicillins]; Sulfa (sulfonamide antibiotics); Tetanus and diphtheria toxoids; and Thalidomide    Review of Systems   Constitutional: Negative for fever. HENT: Negative for facial swelling. Eyes: Negative for visual disturbance. Respiratory: Negative for chest tightness. Cardiovascular: Negative for chest pain. Gastrointestinal: Negative for abdominal pain. Genitourinary: Negative for difficulty urinating and dysuria. Musculoskeletal: Positive for arthralgias (\"left hip pain\"). Skin: Negative for rash. Neurological: Negative for dizziness. Hematological: Negative for adenopathy. Psychiatric/Behavioral: Negative for suicidal ideas. All other systems reviewed and are negative. Vitals:    19 0952   BP: 142/75   Pulse: 89   Resp: 20   Temp: 98.3 °F (36.8 °C)   SpO2: 97%   Weight: 89.4 kg (197 lb)   Height: 5' 3\" (1.6 m)            Physical Exam   Constitutional: She is oriented to person, place, and time. No distress. Obese. HENT:   Head: Normocephalic and atraumatic. Mouth/Throat: Oropharynx is clear and moist.   Eyes: Pupils are equal, round, and reactive to light. No scleral icterus. Neck: Normal range of motion. Neck supple. No thyromegaly present. Cardiovascular: Normal rate, regular rhythm, normal heart sounds and intact distal pulses. No murmur heard. Pulmonary/Chest: Effort normal and breath sounds normal. No respiratory distress. Abdominal: Soft.  Bowel sounds are normal. She exhibits no distension. There is no tenderness. Musculoskeletal:   Unable to range left hip due to pain. 1+ bilateral edema in lower extremities. Neurological: She is alert and oriented to person, place, and time. Skin: Skin is warm and dry. No rash noted. She is not diaphoretic. Mild erythema around her ankles bilaterally. Nursing note and vitals reviewed. Note written by parul Bustos, as dictated by Mary Alcantar MD 9:47 AM    MDM  Number of Diagnoses or Management Options  Cellulitis of left lower leg:   Left leg pain:   Multiple myeloma, remission status unspecified [facility]):        CT scans unremarkable. Minimal improvement with fentanyl patch. Stable for discharge home. Seen by Case Management in ED. Pt strongly encouraged to f/u with Palliative Care    Hospitalist Floyd for Admission  2:21 PM    ED Room Number: ER06/06  Patient Name and age:  Madeleine Dejesus 68 y.o.  female  Working Diagnosis:   1. Left leg pain    2. Multiple myeloma, remission status unspecified ([facility])    3. Cellulitis of left lower leg      Readmission: no  Isolation Requirements:  no  Recommended Level of Care:  med/surg  Code Status:  Full   Other:  Unable to ambulate. H/o multiple myeloma. Uncontrolled pain/symptoms. I tried very hard to get her home.     Procedures

## 2019-03-16 NOTE — ED TRIAGE NOTES
TRIAGE NOTE: Patient had bone biopsy on Wednesday on right hip/leg. Patient started with left lower back/hip pain last night. Pain radiates down left leg. Patient currently taking cipro for leg infection. Patient with hx of Multiple myeloma.

## 2019-03-16 NOTE — PROGRESS NOTES
Date of previous inpatient admission/ ED visit? N/A    What brought the patient back to ED? Sharp leg pain, had biopsy last week    Did patient decline recommended services during last admission/ ED visit (if yes, what)? N/A    Has patient seen a provider since their last inpatient admission/ED visit (if yes, when)? N/A    PCP:  Dr. Soni Gibson    NOK:  Spouse Wilma Fields [phone]    CM Interventions:    Care Management Interventions  PCP Verified by CM: Yes(Dr. Soni Gibson)  Last Visit to PCP: 02/07/19  Palliative Care Criteria Met (RRAT>21 & CHF Dx)?: No  Transition of Care Consult (CM Consult): Discharge Planning, Home Health(Consult received for home health, ACO, RRAT 13/0/0  )  [facility] - INPATIENT: No  Reason Outside [place]: Patient already serviced by other home care/hospice agency(Has used [facility])  [address] [place]: No  Discharge Durable Medical Equipment: No  Health Maintenance Reviewed: Yes  Physical Therapy Consult: No  Occupational Therapy Consult: No  Speech Therapy Consult: No  Current Support Network: Lives with Spouse(Lives with supportive spouse, neighbor involved in helping family. Daughter lives in [place].  )  Plan discussed with Pt/Family/Caregiver: Yes(Met with patient in ED, spouse, neighbor, and daughter at bedside.  )  Freedom of Choice Offered: Yes  Discharge Location  Discharge Placement: Home with home health    Freda Palumbo is a 68year old female to [facility] [place] ED with complaints of worsening leg pain and weakness. ED work up completed. Labs, CT of lumbar spine. Case Management consulted for home health:  RN, PT, OT, and aide. Discharging to home with home health. Patient, daughter, neighbor, and spouse in agreement with discharge plan. Recommended possible transport chair vs wheelchair to help patient get to and from MD office appointments. Verified face sheet/demographics.     Referral sent to [facility], they have used them in the past.  Referral sent via Halle Bonner, RN, BSN, [place]  ED Care Management  [phone]

## 2019-03-17 ENCOUNTER — APPOINTMENT (OUTPATIENT)
Dept: VASCULAR SURGERY | Age: 74
DRG: 552 | End: 2019-03-17
Attending: INTERNAL MEDICINE
Payer: MEDICARE

## 2019-03-17 PROCEDURE — 74011250637 HC RX REV CODE- 250/637: Performed by: INTERNAL MEDICINE

## 2019-03-17 PROCEDURE — 65270000032 HC RM SEMIPRIVATE

## 2019-03-17 PROCEDURE — 74011636637 HC RX REV CODE- 636/637: Performed by: INTERNAL MEDICINE

## 2019-03-17 RX ADMIN — DIAZEPAM 10 MG: 5 TABLET ORAL at 16:40

## 2019-03-17 RX ADMIN — GABAPENTIN 300 MG: 300 CAPSULE ORAL at 16:41

## 2019-03-17 RX ADMIN — AMITRIPTYLINE HYDROCHLORIDE 50 MG: 50 TABLET, FILM COATED ORAL at 21:36

## 2019-03-17 RX ADMIN — FLUTICASONE PROPIONATE 2 SPRAY: 50 SPRAY, METERED NASAL at 08:51

## 2019-03-17 RX ADMIN — HYDROMORPHONE HYDROCHLORIDE 8 MG: 4 TABLET ORAL at 16:37

## 2019-03-17 RX ADMIN — SIMVASTATIN 20 MG: 20 TABLET, FILM COATED ORAL at 21:37

## 2019-03-17 RX ADMIN — GABAPENTIN 300 MG: 300 CAPSULE ORAL at 08:51

## 2019-03-17 RX ADMIN — FAMOTIDINE 40 MG: 20 TABLET ORAL at 16:38

## 2019-03-17 RX ADMIN — DOCUSATE SODIUM 300 MG: 100 CAPSULE, LIQUID FILLED ORAL at 21:36

## 2019-03-17 RX ADMIN — SENNOSIDES 34.4 MG: 8.6 TABLET, FILM COATED ORAL at 08:51

## 2019-03-17 RX ADMIN — PANTOPRAZOLE SODIUM 40 MG: 40 TABLET, DELAYED RELEASE ORAL at 07:20

## 2019-03-17 RX ADMIN — POTASSIUM CHLORIDE 20 MEQ: 750 TABLET, EXTENDED RELEASE ORAL at 08:52

## 2019-03-17 RX ADMIN — GABAPENTIN 300 MG: 300 CAPSULE ORAL at 21:37

## 2019-03-17 RX ADMIN — SENNOSIDES 34.4 MG: 8.6 TABLET, FILM COATED ORAL at 16:41

## 2019-03-17 RX ADMIN — OYSTER SHELL CALCIUM WITH VITAMIN D 1 TABLET: 500; 200 TABLET, FILM COATED ORAL at 08:52

## 2019-03-17 RX ADMIN — DIAZEPAM 10 MG: 5 TABLET ORAL at 08:52

## 2019-03-17 RX ADMIN — CHOLECALCIFEROL TAB 10 MCG (400 UNIT) 400 UNITS: 10 TAB at 08:52

## 2019-03-17 RX ADMIN — HYDROMORPHONE HYDROCHLORIDE 8 MG: 4 TABLET ORAL at 12:10

## 2019-03-17 RX ADMIN — DOCUSATE SODIUM 100 MG: 100 CAPSULE, LIQUID FILLED ORAL at 08:52

## 2019-03-17 RX ADMIN — RIVAROXABAN 10 MG: 10 TABLET, FILM COATED ORAL at 16:39

## 2019-03-17 RX ADMIN — LEVOFLOXACIN 500 MG: 500 TABLET, FILM COATED ORAL at 16:39

## 2019-03-17 RX ADMIN — Medication 10 ML: at 06:17

## 2019-03-17 RX ADMIN — Medication 10 ML: at 21:37

## 2019-03-17 RX ADMIN — GENTAMICIN SULFATE: 1 CREAM TOPICAL at 08:54

## 2019-03-17 RX ADMIN — PREDNISONE 10 MG: 10 TABLET ORAL at 08:51

## 2019-03-17 RX ADMIN — GABAPENTIN 300 MG: 300 CAPSULE ORAL at 12:10

## 2019-03-17 NOTE — PROGRESS NOTES
Multiple medical problems, multiple myeloma with fracture s/p kyphoplasty. Long standing back pain with a pain pump in -place by Dr. Jordan Quintana. Now with pain in back and down left leg. Also infected right toe, on xaralto for DVT. Cannot get MRi due to pain pump. CT looks like disease at L4-5    Would try to conservatively treat her pain. Would do left L4-5 JOSE (through IR) as that looks like where a lot of her disease is. She will almost certainly need to be off xaralto for that injection.     If her pain becomes controllable, then can see Dr. Jordan Quintana as an outpatient

## 2019-03-17 NOTE — H&P
[address]  HISTORY AND PHYSICAL    Name:  Jose Guadalupe Coronado  MR#:  [id number]  :  1945  ACCOUNT #:  [de-identified]  ADMIT DATE:  2019      PRIMARY CARE PHYSICIAN:  Chip Gresham MD    CHIEF COMPLAINT:  Back pain. HISTORY OF PRESENT ILLNESS:  The patient is a 60-year-old lady with history of multiple myeloma, who presented to the emergency department complaining of severe left-sided back pain and left leg pain. The patient has been in her usual health until a couple of days ago when she started experiencing progressive low back pain. Three days ago, she underwent bone marrow biopsy on the right side. The procedure went well and she had no right-sided low back pain right after the procedure. The following day though she began to experience progressive pain in the low back, at first in the middle and right side then going to the left buttock and radiating down the left leg down to the foot. She described intermittent episodes of really sharp pain, 10/10 in severity radiating down the left leg. She has had difficulty walking mostly due to pain and it is not clear if she had actual weakness in her leg. She denies any difficulty urinating or difficulties with her bowels, but she does have chronic constipation. She has no fevers, chills, or sweats. No abdominal pain. She is currently finishing a course of Cipro for a bone infection in her right toe per her report. She has no chest pain, no cough, upper respiratory symptoms. She is chronically out of breath with exertion. Her pain is moderate at rest but does increase with attempts to move and today, she was barely able to bear any weight with assistance due to her severe pain. PAST MEDICAL HISTORY:  Include;  1.  Multiple myeloma. 2.  Osteoporosis. 3.  Anemia. 4.  Hyperlipidemia. 5.  Urinary incontinence. 6.  Pancreatitis. 7.  Sleep apnea. 8.  Chronic pain. 9.  Rheumatoid arthritis.   10.  DVT in her right leg.  11.  Fibromyalgia. 12.  GERD. 13.  Anxiety. 15.  She also has a history of Salmonella infection. 15.  Aneurysm. PAST SURGICAL HISTORY:  History of cholecystectomy. A pain pump implant several years ago in her left anterior abdomen area. This was controlling the spinal pain per her report. History of optic nerve aneurysm repair, hernia repair, kyphoplasty. ALLERGIES:  SHE HAS MULTIPLE ALLERGIES TO MEDICATIONS INCLUDING AGGRENOX, BROCCOLI, BUMEX, CORN, CORN STARCH, CALAN, ZINC OXIDE, DOXORUBICIN, FLAGYL, KEFLEX, LASIX, MACROBID, MONOHYD, METHOTREXATE, PENICILLIN, SULFA, TETANUS, AND THALIDOMIDE. CURRENT MEDICATIONS:  Medication list is reviewed. SOCIAL HISTORY:  The patient lives at home with her . She ambulates with a cane. FAMILY HISTORY:  Includes DJD. REVIEW OF SYSTEMS:  She had significant weight gain recently which she blames on her prednisone. She has had redness in her both calves anteriorly, more pronounced on the left side recently. She denies right-sided calf pain. Denies abdominal pain, blood in the stool or urine. She had no falls, though she has been feeling very weak. All other systems reviewed and unremarkable. PHYSICAL EXAMINATION:  GENERAL:  The patient is elderly, chronically ill-appearing lady in distress from pain. VITAL SIGNS:  Temperature 98.3, pulse 89, blood pressure 142/75, oxygen saturation is 97% on room air, respirations 20. HEENT:  Head is atraumatic. Pupils reactive to light. She has cataract surgery. Oral mucosa is dry. NECK:  Supple without JVD. LUNGS:  Clear lungs bilaterally. HEART:  Rhythm is regular. ABDOMEN:  Overweight, soft, and nontender. There is a left implanted pain pump on her left abdomen. PELVIC:  Deferred. BACK:  The site of the right sacroiliac bone marrow biopsy puncture is benign. EXTREMITIES:  Lower extremity examination shows 2+ edema bilaterally.   There is a dressing on her right toes, which was not completely removed. There are some lytic changes on both calves, more pronounced on the left anterior shin where there is more erythema. There is no posterior calf tenderness. NEUROLOGIC:  The patient is awake and alert x3. She has some marked proximal weakness. Gait was not tested. She does have more weakness on her left leg, but she has significant pain and it is difficult to discern how much is weakness versus limitations due to pain. LABORATORY DATA:  White blood cell count is 12.9, hemoglobin 8.3, platelet count 701. Sodium 136, potassium 3.3, BUN 9, creatinine 0.7. LFTs unremarkable. CT scan of the spine and left lower extremity shows significant DJD at multiple levels with progression of compression at L3 without acute bony abnormality to the lumbar spine. No acute findings in the pelvis or hip with significant DJD changes. ASSESSMENT:  1. Uncontrolled pain of new onset and unclear etiology. I suspect this is radicular pain related to her advanced osteoarthritis. The patient does not report bowel or bladder changes but does have significant gait impairment due to her pain. 2.  Multiple myeloma status post recent bone marrow biopsy. 3.  Right toe infection with presumed osteomyelitis, currently receiving treatment with ciprofloxacin as an outpatient. 4.  Lower extremity cellulitis. 5.  History of deep venous thrombosis. 6.  Multiple chronic comorbidities. PLAN:  The patient is admitted to the hospital for further treatment and evaluation. We will treat the pain and consult Physical Therapy. She may need orthopedic evaluation. We will reconcile home medications, obtain bladder scan, monitor neurologic status closely, obtain urinalysis. Code status is full code. Further evaluation as indicated. She is expected to return to home upon discharge, though brief rehab/SNF course is also possible given her overall debilitative state.   I discussed with her daughter and  at the bedside.         Quita Lauren MD      [id number]  D:  03/16/2019 15:17  T:  03/16/2019 20:25  JOB #:  [id number]  CC:  Thao Hilton MD

## 2019-03-17 NOTE — PROGRESS NOTES
Bedside shift change report given to Cj Monk RN (oncoming nurse) by Jacob Caceres RN (offgoing nurse). Report included the following information SBAR, Kardex, MAR and Recent Results.

## 2019-03-17 NOTE — PROGRESS NOTES
Hospitalist Progress Note      Hospital summary:  The patient is a 45-year-old lady with history of multiple myeloma, who presented to the emergency department complaining of severe left-sided back pain and left leg pain.     The patient has been in her usual health until a couple of days ago when she started experiencing progressive low back pain. Three days ago, she underwent bone marrow biopsy on the right side. The procedure went well and she had no right-sided low back pain right after the procedure. The following day though she began to experience progressive pain in the low back, at first in the middle and right side then going to the left buttock and radiating down the left leg down to the foot. She described intermittent episodes of really sharp pain, 10/10 in severity radiating down the left leg. She has had difficulty walking mostly due to pain and it is not clear if she had actual weakness in her leg. She denies any difficulty urinating or difficulties with her bowels, but she does have chronic constipation. She has no fevers, chills, or sweats. No abdominal pain. She is currently finishing a course of Cipro for a bone infection in her right toe per her report. she was barely able to bear any weight with assistance due to her severe pain. 3/16/2019      Assessment/Plan:  Severe acute on chronic back pain   -  suspect this is radicular pain related to her advanced osteoarthritis. - CT spine: Roughly stable multilevel degenerative change with stenosis, and compression  deformities as described above, except for mild progression of compression at L3 which has been treated with vertebroplasty or kyphoplasty.   - Ortho consulted  - pain management - po dilaudid, fentanyl patch, gabapentin, valium   - PT/OT    Multiple myeloma status post recent bone marrow biopsy.  - follows with Dr. Vicki Levi as outpt  - on pain pump morphine/ bupivacaine     Right toe infection with presumed osteomyelitis   - currently receiving treatment with ciprofloxacin as an outpatient. - c/w po levaquin    Lower extremity stasis  History of deep venous thrombosis - on xarelto  - venous duplex ordered     GERD - on PPI, pepcid  Neuropathy - Resume Home Elavil  HLD - home statin  Rheumatoid Arthritis   - Has an Implanted pain Pump   - 10mg prednisone daily     Code status: Full  DVT prophylaxis: xarelto  Disposition: TBD  ----------------------------------------------    CC: Back pain     S: Patient is seen and examined at bedside.  present. Still has severe back pain, unable to check ROM due to severe pain. States sharp shooting pain to left leg. Review of Systems:  A comprehensive review of systems was negative. O:  Visit Vitals  /71 (BP 1 Location: Left arm, BP Patient Position: At rest)   Pulse (!) 109   Temp 98.6 °F (37 °C)   Resp 16   Ht 5' 3\" (1.6 m)   Wt 89.4 kg (197 lb)   SpO2 92%   Breastfeeding? No   BMI 34.90 kg/m²       PHYSICAL EXAM:  Gen: moderate distress due to pain  HEENT: anicteric sclerae, normal conjunctiva, oropharynx clear, MM moist  Neck: supple, trachea midline, no adenopathy  Heart: RRR, no MRG, no JVD, no peripheral edema  Lungs: CTA b/l, non-labored respirations  Abd: soft, NT, ND, BS+, no organomegaly  Extr: left leg - erythema on shin. rom restricted due to pain. Can not examine back as patient unable to sit forward  Skin: dry, no rash  Neuro: CN II-XII grossly intact, normal speech, moves all extremities  Psych: normal mood, appropriate affect      Intake/Output Summary (Last 24 hours) at 3/17/2019 1333  Last data filed at 3/17/2019 0850  Gross per 24 hour   Intake 600 ml   Output 2550 ml   Net -1950 ml        Recent labs & imaging reviewed:  No results found for this or any previous visit (from the past 24 hour(s)).   Recent Labs     03/16/19  0958   WBC 12.9*   HGB 8.3*   HCT 30.0*        Recent Labs     03/16/19  0958      K 3.3*    CO2 32   BUN 9   CREA 0.77   GLU 86   CA 8.8     Recent Labs     03/16/19  0958   SGOT 31   ALT 10*   AP 87   TBILI 0.3   TP 7.3   ALB 3.2*   GLOB 4.1*     No results for input(s): INR, PTP, APTT in the last 72 hours. No lab exists for component: INREXT   No results for input(s): FE, TIBC, PSAT, FERR in the last 72 hours. Lab Results   Component Value Date/Time    Folate 34.3 (H) 05/28/2010 04:30 AM      No results for input(s): PH, PCO2, PO2 in the last 72 hours. No results for input(s): CPK, CKNDX, TROIQ in the last 72 hours.     No lab exists for component: CPKMB  Lab Results   Component Value Date/Time    Cholesterol, total 137 03/06/2017 08:01 AM    HDL Cholesterol 47 03/06/2017 08:01 AM    LDL, calculated 38 03/06/2017 08:01 AM    Triglyceride 260 (H) 03/06/2017 08:01 AM    CHOL/HDL Ratio 5.0 12/16/2011 05:05 AM     Lab Results   Component Value Date/Time    Glucose (POC) 113 (H) 06/02/2016 11:06 AM    Glucose (POC) 87 09/15/2012 08:29 PM    Glucose (POC) 87 09/15/2012 11:58 AM     Lab Results   Component Value Date/Time    Color YELLOW/STRAW 01/24/2019 08:09 PM    Appearance CLEAR 01/24/2019 08:09 PM    Specific gravity <1.005 01/24/2019 08:09 PM    Specific gravity 1.008 03/01/2016 03:44 PM    pH (UA) 7.5 01/24/2019 08:09 PM    Protein TRACE (A) 01/24/2019 08:09 PM    Glucose NEGATIVE  01/24/2019 08:09 PM    Ketone TRACE (A) 01/24/2019 08:09 PM    Bilirubin NEGATIVE  01/24/2019 08:09 PM    Urobilinogen 1.0 01/24/2019 08:09 PM    Nitrites NEGATIVE  01/24/2019 08:09 PM    Leukocyte Esterase NEGATIVE  01/24/2019 08:09 PM    Epithelial cells FEW 01/24/2019 08:09 PM    Bacteria NEGATIVE  01/24/2019 08:09 PM    WBC 0-4 01/24/2019 08:09 PM    RBC 10-20 01/24/2019 08:09 PM       Med list reviewed  Current Facility-Administered Medications   Medication Dose Route Frequency    fentaNYL (DURAGESIC) 50 mcg/hr patch 1 Patch  1 Patch TransDERmal Q72H    sodium chloride (NS) flush 5-40 mL  5-40 mL IntraVENous Q8H  sodium chloride (NS) flush 5-40 mL  5-40 mL IntraVENous PRN    acetaminophen (TYLENOL) tablet 650 mg  650 mg Oral Q4H PRN    HYDROmorphone (PF) (DILAUDID) injection 1 mg  1 mg IntraVENous Q3H PRN    aluminum hydrox-magnesium carb (GAVISCON) oral suspension 15 mL  15 mL Oral Q6H PRN    bisacodyl (DULCOLAX) suppository 10 mg  10 mg Rectal DAILY PRN    calcium carbonate (TUMS) chewable tablet 200 mg [elemental]  200 mg Oral BID PRN    calcium-vitamin D (OS-GLORIA) 500 mg-200 unit tablet  1 Tab Oral DAILY    cetirizine (ZYRTEC) tablet 10 mg  10 mg Oral DAILY PRN    cholecalciferol (VITAMIN D3) tablet 400 Units  400 Units Oral DAILY    diazePAM (VALIUM) tablet 10 mg  10 mg Oral BID    docusate sodium (COLACE) capsule 100 mg  100 mg Oral DAILY    docusate sodium (COLACE) capsule 300 mg  300 mg Oral QHS    fluticasone propionate (FLONASE) 50 mcg/actuation nasal spray 2 Spray  2 Spray Both Nostrils DAILY    gabapentin (NEURONTIN) capsule 300 mg  300 mg Oral QID    HYDROmorphone (DILAUDID) tablet 8 mg  8 mg Oral Q4H PRN    magnesium hydroxide (MILK OF MAGNESIA) 400 mg/5 mL oral suspension 30 mL  30 mL Oral DAILY PRN    milnacipran (SAVELLA) tablet 50 mg (Patient Supplied)  50 mg Oral BID    LLQ Morphine/bupivacaine pain pump    Other CONTINUOUS    polyethylene glycol (MIRALAX) packet 17 g  17 g Oral DAILY PRN    potassium chloride SR (KLOR-CON 10) tablet 20 mEq  20 mEq Oral DAILY    predniSONE (DELTASONE) tablet 10 mg  10 mg Oral DAILY    prochlorperazine (COMPAZINE) tablet 5 mg  5 mg Oral Q6H PRN    famotidine (PEPCID) tablet 40 mg  40 mg Oral ACD    rivaroxaban (XARELTO) tablet 10 mg  10 mg Oral DAILY WITH DINNER    senna (SENOKOT) tablet 34.4 mg  4 Tab Oral BID    simvastatin (ZOCOR) tablet 20 mg  20 mg Oral QHS    spironolactone (ALDACTONE) tablet 50 mg  50 mg Oral BID PRN    levoFLOXacin (LEVAQUIN) tablet 500 mg  500 mg Oral Q24H    pantoprazole (PROTONIX) tablet 40 mg  40 mg Oral ACB    gentamicin (GARAMYCIN) 0.1 % cream   Topical DAILY    amitriptyline (ELAVIL) tablet 50 mg  50 mg Oral QHS       Care Plan discussed with:  Patient/Family and Nurse    Nohemy Todd MD  Internal Medicine  Date of Service: 3/17/2019

## 2019-03-17 NOTE — PROGRESS NOTES
Problem: Falls - Risk of  Goal: *Absence of Falls  Document Yoel Fall Risk and appropriate interventions in the flowsheet.   Outcome: Progressing Towards Goal  Fall Risk Interventions:  Mobility Interventions: Communicate number of staff needed for ambulation/transfer, Patient to call before getting OOB         Medication Interventions: Evaluate medications/consider consulting pharmacy, Patient to call before getting OOB, Teach patient to arise slowly

## 2019-03-17 NOTE — PROGRESS NOTES
Bedside shift change report given to Abilio Rojo RN (oncoming nurse) by Mahi Tracy RN (offgoing nurse). Report included the following information SBAR and Kardex.

## 2019-03-18 ENCOUNTER — APPOINTMENT (OUTPATIENT)
Dept: VASCULAR SURGERY | Age: 74
DRG: 552 | End: 2019-03-18
Attending: INTERNAL MEDICINE
Payer: MEDICARE

## 2019-03-18 LAB
ANION GAP SERPL CALC-SCNC: 5 MMOL/L (ref 5–15)
BASOPHILS # BLD: 0.1 K/UL (ref 0–0.1)
BASOPHILS NFR BLD: 1 % (ref 0–1)
BUN SERPL-MCNC: 6 MG/DL (ref 6–20)
BUN/CREAT SERPL: 9 (ref 12–20)
CALCIUM SERPL-MCNC: 7.7 MG/DL (ref 8.5–10.1)
CHLORIDE SERPL-SCNC: 109 MMOL/L (ref 97–108)
CO2 SERPL-SCNC: 26 MMOL/L (ref 21–32)
CREAT SERPL-MCNC: 0.64 MG/DL (ref 0.55–1.02)
DIFFERENTIAL METHOD BLD: ABNORMAL
EOSINOPHIL # BLD: 0.3 K/UL (ref 0–0.4)
EOSINOPHIL NFR BLD: 4 % (ref 0–7)
ERYTHROCYTE [DISTWIDTH] IN BLOOD BY AUTOMATED COUNT: 18.5 % (ref 11.5–14.5)
GLUCOSE SERPL-MCNC: 87 MG/DL (ref 65–100)
HCT VFR BLD AUTO: 32.7 % (ref 35–47)
HGB BLD-MCNC: 8.9 G/DL (ref 11.5–16)
IMM GRANULOCYTES # BLD AUTO: 0.2 K/UL (ref 0–0.04)
IMM GRANULOCYTES NFR BLD AUTO: 2 % (ref 0–0.5)
LYMPHOCYTES # BLD: 1.3 K/UL (ref 0.8–3.5)
LYMPHOCYTES NFR BLD: 16 % (ref 12–49)
MCH RBC QN AUTO: 22.4 PG (ref 26–34)
MCHC RBC AUTO-ENTMCNC: 27.2 G/DL (ref 30–36.5)
MCV RBC AUTO: 82.4 FL (ref 80–99)
MONOCYTES # BLD: 0.8 K/UL (ref 0–1)
MONOCYTES NFR BLD: 10 % (ref 5–13)
NEUTS SEG # BLD: 5.2 K/UL (ref 1.8–8)
NEUTS SEG NFR BLD: 67 % (ref 32–75)
NRBC # BLD: 0.03 K/UL (ref 0–0.01)
NRBC BLD-RTO: 0.4 PER 100 WBC
PLATELET # BLD AUTO: 260 K/UL (ref 150–400)
PMV BLD AUTO: 8.8 FL (ref 8.9–12.9)
POTASSIUM SERPL-SCNC: 3.3 MMOL/L (ref 3.5–5.1)
RBC # BLD AUTO: 3.97 M/UL (ref 3.8–5.2)
RBC MORPH BLD: ABNORMAL
SODIUM SERPL-SCNC: 140 MMOL/L (ref 136–145)
WBC # BLD AUTO: 7.9 K/UL (ref 3.6–11)

## 2019-03-18 PROCEDURE — 74011250637 HC RX REV CODE- 250/637: Performed by: INTERNAL MEDICINE

## 2019-03-18 PROCEDURE — 80048 BASIC METABOLIC PNL TOTAL CA: CPT

## 2019-03-18 PROCEDURE — 85025 COMPLETE CBC W/AUTO DIFF WBC: CPT

## 2019-03-18 PROCEDURE — 93970 EXTREMITY STUDY: CPT

## 2019-03-18 PROCEDURE — 74011636637 HC RX REV CODE- 636/637: Performed by: INTERNAL MEDICINE

## 2019-03-18 PROCEDURE — 36415 COLL VENOUS BLD VENIPUNCTURE: CPT

## 2019-03-18 PROCEDURE — 65270000032 HC RM SEMIPRIVATE

## 2019-03-18 PROCEDURE — 77030027138 HC INCENT SPIROMETER -A

## 2019-03-18 PROCEDURE — 76450000000

## 2019-03-18 RX ORDER — POTASSIUM CHLORIDE 750 MG/1
40 TABLET, FILM COATED, EXTENDED RELEASE ORAL
Status: COMPLETED | OUTPATIENT
Start: 2019-03-18 | End: 2019-03-18

## 2019-03-18 RX ADMIN — GENTAMICIN SULFATE: 1 CREAM TOPICAL at 11:17

## 2019-03-18 RX ADMIN — HYDROMORPHONE HYDROCHLORIDE 8 MG: 4 TABLET ORAL at 17:49

## 2019-03-18 RX ADMIN — DOCUSATE SODIUM 300 MG: 100 CAPSULE, LIQUID FILLED ORAL at 21:02

## 2019-03-18 RX ADMIN — DIAZEPAM 10 MG: 5 TABLET ORAL at 11:34

## 2019-03-18 RX ADMIN — OYSTER SHELL CALCIUM WITH VITAMIN D 1 TABLET: 500; 200 TABLET, FILM COATED ORAL at 09:09

## 2019-03-18 RX ADMIN — HYDROMORPHONE HYDROCHLORIDE 8 MG: 4 TABLET ORAL at 12:53

## 2019-03-18 RX ADMIN — Medication 10 ML: at 07:16

## 2019-03-18 RX ADMIN — FLUTICASONE PROPIONATE 2 SPRAY: 50 SPRAY, METERED NASAL at 11:16

## 2019-03-18 RX ADMIN — Medication 10 ML: at 21:03

## 2019-03-18 RX ADMIN — GABAPENTIN 300 MG: 300 CAPSULE ORAL at 09:16

## 2019-03-18 RX ADMIN — PREDNISONE 10 MG: 10 TABLET ORAL at 09:17

## 2019-03-18 RX ADMIN — DOCUSATE SODIUM 100 MG: 100 CAPSULE, LIQUID FILLED ORAL at 09:17

## 2019-03-18 RX ADMIN — SENNOSIDES 34.4 MG: 8.6 TABLET, FILM COATED ORAL at 17:50

## 2019-03-18 RX ADMIN — GABAPENTIN 300 MG: 300 CAPSULE ORAL at 21:03

## 2019-03-18 RX ADMIN — GABAPENTIN 300 MG: 300 CAPSULE ORAL at 13:21

## 2019-03-18 RX ADMIN — Medication 10 ML: at 13:24

## 2019-03-18 RX ADMIN — DIAZEPAM 10 MG: 5 TABLET ORAL at 19:48

## 2019-03-18 RX ADMIN — PANTOPRAZOLE SODIUM 40 MG: 40 TABLET, DELAYED RELEASE ORAL at 07:16

## 2019-03-18 RX ADMIN — FAMOTIDINE 40 MG: 20 TABLET ORAL at 16:46

## 2019-03-18 RX ADMIN — POTASSIUM CHLORIDE 20 MEQ: 750 TABLET, EXTENDED RELEASE ORAL at 11:25

## 2019-03-18 RX ADMIN — GABAPENTIN 300 MG: 300 CAPSULE ORAL at 17:49

## 2019-03-18 RX ADMIN — SIMVASTATIN 20 MG: 20 TABLET, FILM COATED ORAL at 21:03

## 2019-03-18 RX ADMIN — SENNOSIDES 34.4 MG: 8.6 TABLET, FILM COATED ORAL at 09:06

## 2019-03-18 RX ADMIN — LEVOFLOXACIN 500 MG: 500 TABLET, FILM COATED ORAL at 16:45

## 2019-03-18 RX ADMIN — POTASSIUM CHLORIDE 40 MEQ: 750 TABLET, EXTENDED RELEASE ORAL at 09:12

## 2019-03-18 RX ADMIN — AMITRIPTYLINE HYDROCHLORIDE 50 MG: 50 TABLET, FILM COATED ORAL at 21:03

## 2019-03-18 RX ADMIN — CHOLECALCIFEROL TAB 10 MCG (400 UNIT) 400 UNITS: 10 TAB at 09:09

## 2019-03-18 RX ADMIN — HYDROMORPHONE HYDROCHLORIDE 8 MG: 4 TABLET ORAL at 07:16

## 2019-03-18 NOTE — CONSULTS
Ortho Consult    Patient being followed by Neurosurgery service for back pain related issues. Please see note by Dr Homero Urbano from 3/17/19 for plan of care.

## 2019-03-18 NOTE — PROGRESS NOTES
Orders acknowledged and chart reviewed. Noted per RN, patient ALFREDO for dopplers at this time. Will follow up for OT evaluation as able and appropriate. Thank you.

## 2019-03-18 NOTE — WOUND CARE
WOCN Note:     New consult for assessment of right 3rd toe.  at the bedside. Patient reports that the wound has been present 9-10 days. It is currently being treated with gentamycin cream.   does the wound care. Chart reviewed. Admitted DX:  Pain [R52]  Severe pain [R52]  Past Medical History: multiple myeloma    Assessment:   Patient is A&O x 3, communicative and requires assist of 1 with repositioning. Bed: [facility]  Patient wearing briefs for incontinence and has a purewick. Patient reports no pain. Heels offloaded on pillow. Sacrum, heels and buttocks intact without erythema. 1. Present on Admission Right plantar third toe = 4 x 5 x 0.2 cm  100% pink. no exudate, odor or erythema. Recommendations:    Continue gentamycin daily as ordered to the wound. Skin Care & Pressure Prevention:  Minimize layers of linen/pads under patient to optimize support surface. Turn/reposition approximately every 2 hours and offload heels. Manage incontinence / promote continence; Discussed above plan with patient and Jes Yao.     Transition of Care: Plan to follow as needed while admitted to hospital.    ALEX MaldonadoN RN [id number] [facility] [phone]  Pager [phone]

## 2019-03-18 NOTE — PROGRESS NOTES
Hospitalist Progress Note      Hospital summary:  The patient is a 43-year-old lady with history of multiple myeloma, who presented to the emergency department complaining of severe left-sided back pain and left leg pain.     The patient has been in her usual health until a couple of days ago when she started experiencing progressive low back pain. Three days ago, she underwent bone marrow biopsy on the right side. The procedure went well and she had no right-sided low back pain right after the procedure. The following day though she began to experience progressive pain in the low back, at first in the middle and right side then going to the left buttock and radiating down the left leg down to the foot. She described intermittent episodes of really sharp pain, 10/10 in severity radiating down the left leg. She has had difficulty walking mostly due to pain and it is not clear if she had actual weakness in her leg. She denies any difficulty urinating or difficulties with her bowels, but she does have chronic constipation. She has no fevers, chills, or sweats. No abdominal pain. She is currently finishing a course of Cipro for a bone infection in her right toe per her report. she was barely able to bear any weight with assistance due to her severe pain. 3/16/2019      Assessment/Plan:  Severe acute on chronic back pain   -  suspect this is radicular pain related to her advanced osteoarthritis. - CT spine: Roughly stable multilevel degenerative change with stenosis, and compression  deformities as described above, except for mild progression of compression at L3 which has been treated with vertebroplasty or kyphoplasty. - pain management - po dilaudid, fentanyl patch, gabapentin, valium   - PT/OT  - Neurosurgery recs: conservatively treatment. Left JOSE L4-5  - Discussed with IR, need to be off Xarelto for 1 day, possible tomorrow.      Multiple myeloma status post recent bone marrow biopsy.  - follows with Dr. Negro Ramos as outpt  - on pain pump morphine/ bupivacaine     Right toe infection with presumed osteomyelitis   - currently receiving treatment with ciprofloxacin as an outpatient. - c/w po levaquin    Lower extremity stasis  History of deep venous thrombosis - on xarelto  - venous duplex pending    Hypokalemia - replaced    GERD - on PPI, pepcid  Neuropathy - Resume Home Elavil  HLD - home statin  Rheumatoid Arthritis   - Has an Implanted pain Pump   - 10mg prednisone daily     Code status: Full  DVT prophylaxis: xarelto on hold  Disposition: TBD  ----------------------------------------------    CC: Back pain     S: Patient is seen and examined at bedside.  present. Still has severe back pain, no improvement since yesterday. Explained regarding neurosurgery recommendations of JOSE. Patient decided to go forward with it. Explained regarding holding Xarelto and she is ok with it. Review of Systems:  A comprehensive review of systems was negative. O:  Visit Vitals  /74 (BP 1 Location: Left arm, BP Patient Position: At rest)   Pulse 91   Temp 97.4 °F (36.3 °C)   Resp 18   Ht 5' 3\" (1.6 m)   Wt 89.4 kg (197 lb)   SpO2 97%   Breastfeeding? No   BMI 34.90 kg/m²       PHYSICAL EXAM:  Gen: moderate distress due to pain  HEENT: anicteric sclerae, normal conjunctiva, oropharynx clear, MM moist  Neck: supple, trachea midline, no adenopathy  Heart: RRR, no MRG, no JVD, no peripheral edema  Lungs: CTA b/l, non-labored respirations  Abd: soft, NT, ND, BS+, no organomegaly  Extr: left leg - erythema on shin. rom restricted due to pain.  Can not examine back as patient unable to sit forward  Skin: dry, no rash  Neuro: CN II-XII grossly intact, normal speech, moves all extremities  Psych: normal mood, appropriate affect      Intake/Output Summary (Last 24 hours) at 3/18/2019 1512  Last data filed at 3/18/2019 0724  Gross per 24 hour   Intake    Output 1400 ml   Net -1400 ml Recent labs & imaging reviewed:  Recent Results (from the past 24 hour(s))   CBC WITH AUTOMATED DIFF    Collection Time: 03/18/19  4:00 AM   Result Value Ref Range    WBC 7.9 3.6 - 11.0 K/uL    RBC 3.97 3.80 - 5.20 M/uL    HGB 8.9 (L) 11.5 - 16.0 g/dL    HCT 32.7 (L) 35.0 - 47.0 %    MCV 82.4 80.0 - 99.0 FL    MCH 22.4 (L) 26.0 - 34.0 PG    MCHC 27.2 (L) 30.0 - 36.5 g/dL    RDW 18.5 (H) 11.5 - 14.5 %    PLATELET 547 932 - 457 K/uL    MPV 8.8 (L) 8.9 - 12.9 FL    NRBC 0.4 (H) 0  WBC    ABSOLUTE NRBC 0.03 (H) 0.00 - 0.01 K/uL    NEUTROPHILS 67 32 - 75 %    LYMPHOCYTES 16 12 - 49 %    MONOCYTES 10 5 - 13 %    EOSINOPHILS 4 0 - 7 %    BASOPHILS 1 0 - 1 %    IMMATURE GRANULOCYTES 2 (H) 0.0 - 0.5 %    ABS. NEUTROPHILS 5.2 1.8 - 8.0 K/UL    ABS. LYMPHOCYTES 1.3 0.8 - 3.5 K/UL    ABS. MONOCYTES 0.8 0.0 - 1.0 K/UL    ABS. EOSINOPHILS 0.3 0.0 - 0.4 K/UL    ABS. BASOPHILS 0.1 0.0 - 0.1 K/UL    ABS. IMM.  GRANS. 0.2 (H) 0.00 - 0.04 K/UL    DF SMEAR SCANNED      RBC COMMENTS POLYCHROMASIA  1+        RBC COMMENTS ANISOCYTOSIS  1+        RBC COMMENTS OVALOCYTES  PRESENT        RBC COMMENTS TEARDROP CELLS  PRESENT       METABOLIC PANEL, BASIC    Collection Time: 03/18/19  4:00 AM   Result Value Ref Range    Sodium 140 136 - 145 mmol/L    Potassium 3.3 (L) 3.5 - 5.1 mmol/L    Chloride 109 (H) 97 - 108 mmol/L    CO2 26 21 - 32 mmol/L    Anion gap 5 5 - 15 mmol/L    Glucose 87 65 - 100 mg/dL    BUN 6 6 - 20 MG/DL    Creatinine 0.64 0.55 - 1.02 MG/DL    BUN/Creatinine ratio 9 (L) 12 - 20      GFR est AA >60 >60 ml/min/1.73m2    GFR est non-AA >60 >60 ml/min/1.73m2    Calcium 7.7 (L) 8.5 - 10.1 MG/DL     Recent Labs     03/18/19  0400 03/16/19  0958   WBC 7.9 12.9*   HGB 8.9* 8.3*   HCT 32.7* 30.0*    367     Recent Labs     03/18/19  0400 03/16/19  0958    136   K 3.3* 3.3*   * 100   CO2 26 32   BUN 6 9   CREA 0.64 0.77   GLU 87 86   CA 7.7* 8.8     Recent Labs     03/16/19  0958   SGOT 31   ALT 10*   AP 87 TBILI 0.3   TP 7.3   ALB 3.2*   GLOB 4.1*     No results for input(s): INR, PTP, APTT in the last 72 hours. No lab exists for component: INREXT, INREXT   No results for input(s): FE, TIBC, PSAT, FERR in the last 72 hours. Lab Results   Component Value Date/Time    Folate 34.3 (H) 05/28/2010 04:30 AM      No results for input(s): PH, PCO2, PO2 in the last 72 hours. No results for input(s): CPK, CKNDX, TROIQ in the last 72 hours.     No lab exists for component: CPKMB  Lab Results   Component Value Date/Time    Cholesterol, total 137 03/06/2017 08:01 AM    HDL Cholesterol 47 03/06/2017 08:01 AM    LDL, calculated 38 03/06/2017 08:01 AM    Triglyceride 260 (H) 03/06/2017 08:01 AM    CHOL/HDL Ratio 5.0 12/16/2011 05:05 AM     Lab Results   Component Value Date/Time    Glucose (POC) 113 (H) 06/02/2016 11:06 AM    Glucose (POC) 87 09/15/2012 08:29 PM    Glucose (POC) 87 09/15/2012 11:58 AM     Lab Results   Component Value Date/Time    Color YELLOW/STRAW 01/24/2019 08:09 PM    Appearance CLEAR 01/24/2019 08:09 PM    Specific gravity <1.005 01/24/2019 08:09 PM    Specific gravity 1.008 03/01/2016 03:44 PM    pH (UA) 7.5 01/24/2019 08:09 PM    Protein TRACE (A) 01/24/2019 08:09 PM    Glucose NEGATIVE  01/24/2019 08:09 PM    Ketone TRACE (A) 01/24/2019 08:09 PM    Bilirubin NEGATIVE  01/24/2019 08:09 PM    Urobilinogen 1.0 01/24/2019 08:09 PM    Nitrites NEGATIVE  01/24/2019 08:09 PM    Leukocyte Esterase NEGATIVE  01/24/2019 08:09 PM    Epithelial cells FEW 01/24/2019 08:09 PM    Bacteria NEGATIVE  01/24/2019 08:09 PM    WBC 0-4 01/24/2019 08:09 PM    RBC 10-20 01/24/2019 08:09 PM       Med list reviewed  Current Facility-Administered Medications   Medication Dose Route Frequency    LLQ Morphine/Bupivacaine Pain Pump (Patient Supplied)  1 Bag Other CONTINUOUS    fentaNYL (DURAGESIC) 50 mcg/hr patch 1 Patch  1 Patch TransDERmal Q72H    sodium chloride (NS) flush 5-40 mL  5-40 mL IntraVENous Q8H    sodium chloride (NS) flush 5-40 mL  5-40 mL IntraVENous PRN    acetaminophen (TYLENOL) tablet 650 mg  650 mg Oral Q4H PRN    HYDROmorphone (PF) (DILAUDID) injection 1 mg  1 mg IntraVENous Q3H PRN    aluminum hydrox-magnesium carb (GAVISCON) oral suspension 15 mL  15 mL Oral Q6H PRN    bisacodyl (DULCOLAX) suppository 10 mg  10 mg Rectal DAILY PRN    calcium carbonate (TUMS) chewable tablet 200 mg [elemental]  200 mg Oral BID PRN    calcium-vitamin D (OS-GLORIA) 500 mg-200 unit tablet  1 Tab Oral DAILY    cetirizine (ZYRTEC) tablet 10 mg  10 mg Oral DAILY PRN    cholecalciferol (VITAMIN D3) tablet 400 Units  400 Units Oral DAILY    diazePAM (VALIUM) tablet 10 mg  10 mg Oral BID    docusate sodium (COLACE) capsule 100 mg  100 mg Oral DAILY    docusate sodium (COLACE) capsule 300 mg  300 mg Oral QHS    fluticasone propionate (FLONASE) 50 mcg/actuation nasal spray 2 Spray  2 Spray Both Nostrils DAILY    gabapentin (NEURONTIN) capsule 300 mg  300 mg Oral QID    HYDROmorphone (DILAUDID) tablet 8 mg  8 mg Oral Q4H PRN    magnesium hydroxide (MILK OF MAGNESIA) 400 mg/5 mL oral suspension 30 mL  30 mL Oral DAILY PRN    milnacipran (SAVELLA) tablet 50 mg (Patient Supplied)  50 mg Oral BID    polyethylene glycol (MIRALAX) packet 17 g  17 g Oral DAILY PRN    potassium chloride SR (KLOR-CON 10) tablet 20 mEq  20 mEq Oral DAILY    predniSONE (DELTASONE) tablet 10 mg  10 mg Oral DAILY    prochlorperazine (COMPAZINE) tablet 5 mg  5 mg Oral Q6H PRN    famotidine (PEPCID) tablet 40 mg  40 mg Oral ACD    senna (SENOKOT) tablet 34.4 mg  4 Tab Oral BID    simvastatin (ZOCOR) tablet 20 mg  20 mg Oral QHS    spironolactone (ALDACTONE) tablet 50 mg  50 mg Oral BID PRN    levoFLOXacin (LEVAQUIN) tablet 500 mg  500 mg Oral Q24H    pantoprazole (PROTONIX) tablet 40 mg  40 mg Oral ACB    gentamicin (GARAMYCIN) 0.1 % cream   Topical DAILY    amitriptyline (ELAVIL) tablet 50 mg  50 mg Oral QHS       Care Plan discussed with: Patient/Family and Nurse    Nabor Velazquez MD  Internal Medicine  Date of Service: 3/18/2019

## 2019-03-18 NOTE — CONSULTS
Palliative medicine~    Patient out of room, may be getting LE dopplers.  Will see later today or tmrw AM.

## 2019-03-18 NOTE — PROGRESS NOTES
3/18/19; 10:30 -   CM participated in IDRs on patient. Patient has an infected right toe and is experiencing chronic back pain. Patient is being followed by neurosurgery, wound care, and ortho. Patient's plan includes: dopplers today, 3/18, and likely planned epidural injection with neurosurgery. Attending is to verify with IR how long patient needs to be off of xarelto prior to the injection. If requirement will be for 3 days, injection likely will not happen until middle of the week. NOTE: patient has been accepted to [facility] for SN, PT, OT, and care aide.   CRM: Silvia Cedeño, MPH, [address] [place] [address]; Z: [phone]

## 2019-03-18 NOTE — PROGRESS NOTES
Bedside shift change report given to Cecille Cabot, RN (oncoming nurse) by Melida Yanes RN (offgoing nurse). Report included the following information SBAR.

## 2019-03-18 NOTE — PROGRESS NOTES
Physical therapy:    Orders received and chart reviewed. Noted dopplers of bilateral LEs pending. Will hold and await results before mobilization.  Thank you    Georgie Justice, PT, DPT

## 2019-03-18 NOTE — PROGRESS NOTES
Bedside and Verbal shift change report given to Petra Funez RN (oncoming nurse) by Rubina Quezada RN (offgoing nurse). Report included the following information SBAR, Kardex, ED Summary, Intake/Output, MAR and Recent Results.

## 2019-03-19 ENCOUNTER — APPOINTMENT (OUTPATIENT)
Dept: INTERVENTIONAL RADIOLOGY/VASCULAR | Age: 74
DRG: 552 | End: 2019-03-19
Attending: INTERNAL MEDICINE
Payer: MEDICARE

## 2019-03-19 LAB
ANION GAP SERPL CALC-SCNC: 5 MMOL/L (ref 5–15)
BUN SERPL-MCNC: 8 MG/DL (ref 6–20)
BUN/CREAT SERPL: 11 (ref 12–20)
CALCIUM SERPL-MCNC: 8.8 MG/DL (ref 8.5–10.1)
CHLORIDE SERPL-SCNC: 104 MMOL/L (ref 97–108)
CO2 SERPL-SCNC: 29 MMOL/L (ref 21–32)
CREAT SERPL-MCNC: 0.75 MG/DL (ref 0.55–1.02)
GLUCOSE SERPL-MCNC: 84 MG/DL (ref 65–100)
INR PPP: 1.1 (ref 0.9–1.1)
POTASSIUM SERPL-SCNC: 3.9 MMOL/L (ref 3.5–5.1)
PROTHROMBIN TIME: 10.9 SEC (ref 9–11.1)
SODIUM SERPL-SCNC: 138 MMOL/L (ref 136–145)

## 2019-03-19 PROCEDURE — 97165 OT EVAL LOW COMPLEX 30 MIN: CPT

## 2019-03-19 PROCEDURE — 74011250636 HC RX REV CODE- 250/636: Performed by: INTERNAL MEDICINE

## 2019-03-19 PROCEDURE — 97535 SELF CARE MNGMENT TRAINING: CPT

## 2019-03-19 PROCEDURE — 74011250636 HC RX REV CODE- 250/636: Performed by: STUDENT IN AN ORGANIZED HEALTH CARE EDUCATION/TRAINING PROGRAM

## 2019-03-19 PROCEDURE — 97530 THERAPEUTIC ACTIVITIES: CPT

## 2019-03-19 PROCEDURE — 36415 COLL VENOUS BLD VENIPUNCTURE: CPT

## 2019-03-19 PROCEDURE — 97162 PT EVAL MOD COMPLEX 30 MIN: CPT

## 2019-03-19 PROCEDURE — 3E0R33Z INTRODUCTION OF ANTI-INFLAMMATORY INTO SPINAL CANAL, PERCUTANEOUS APPROACH: ICD-10-PCS | Performed by: STUDENT IN AN ORGANIZED HEALTH CARE EDUCATION/TRAINING PROGRAM

## 2019-03-19 PROCEDURE — 77030011255 HC DSG AQUACEL AG BMS -A

## 2019-03-19 PROCEDURE — 74011636637 HC RX REV CODE- 636/637: Performed by: INTERNAL MEDICINE

## 2019-03-19 PROCEDURE — 85610 PROTHROMBIN TIME: CPT

## 2019-03-19 PROCEDURE — 3E0R3BZ INTRODUCTION OF ANESTHETIC AGENT INTO SPINAL CANAL, PERCUTANEOUS APPROACH: ICD-10-PCS | Performed by: STUDENT IN AN ORGANIZED HEALTH CARE EDUCATION/TRAINING PROGRAM

## 2019-03-19 PROCEDURE — 74011250636 HC RX REV CODE- 250/636

## 2019-03-19 PROCEDURE — 74011636320 HC RX REV CODE- 636/320: Performed by: STUDENT IN AN ORGANIZED HEALTH CARE EDUCATION/TRAINING PROGRAM

## 2019-03-19 PROCEDURE — 74011250637 HC RX REV CODE- 250/637: Performed by: INTERNAL MEDICINE

## 2019-03-19 PROCEDURE — 65270000032 HC RM SEMIPRIVATE

## 2019-03-19 PROCEDURE — 74011250637 HC RX REV CODE- 250/637: Performed by: EMERGENCY MEDICINE

## 2019-03-19 PROCEDURE — 80048 BASIC METABOLIC PNL TOTAL CA: CPT

## 2019-03-19 PROCEDURE — 64483 NJX AA&/STRD TFRM EPI L/S 1: CPT

## 2019-03-19 PROCEDURE — 77010033678 HC OXYGEN DAILY

## 2019-03-19 RX ORDER — DEXAMETHASONE SODIUM PHOSPHATE 10 MG/ML
INJECTION INTRAMUSCULAR; INTRAVENOUS
Status: DISPENSED
Start: 2019-03-19 | End: 2019-03-20

## 2019-03-19 RX ORDER — HYDROMORPHONE HYDROCHLORIDE 4 MG/1
8 TABLET ORAL
Status: DISCONTINUED | OUTPATIENT
Start: 2019-03-19 | End: 2019-03-21 | Stop reason: HOSPADM

## 2019-03-19 RX ORDER — LIDOCAINE HYDROCHLORIDE 10 MG/ML
INJECTION, SOLUTION EPIDURAL; INFILTRATION; INTRACAUDAL; PERINEURAL
Status: COMPLETED
Start: 2019-03-19 | End: 2019-03-19

## 2019-03-19 RX ORDER — DEXAMETHASONE SODIUM PHOSPHATE 10 MG/ML
10 INJECTION INTRAMUSCULAR; INTRAVENOUS ONCE
Status: COMPLETED | OUTPATIENT
Start: 2019-03-19 | End: 2019-03-19

## 2019-03-19 RX ORDER — LIDOCAINE HYDROCHLORIDE 10 MG/ML
10 INJECTION, SOLUTION EPIDURAL; INFILTRATION; INTRACAUDAL; PERINEURAL ONCE
Status: COMPLETED | OUTPATIENT
Start: 2019-03-19 | End: 2019-03-19

## 2019-03-19 RX ADMIN — GABAPENTIN 300 MG: 300 CAPSULE ORAL at 10:07

## 2019-03-19 RX ADMIN — FAMOTIDINE 40 MG: 20 TABLET ORAL at 18:29

## 2019-03-19 RX ADMIN — GABAPENTIN 300 MG: 300 CAPSULE ORAL at 21:42

## 2019-03-19 RX ADMIN — FLUTICASONE PROPIONATE 2 SPRAY: 50 SPRAY, METERED NASAL at 10:10

## 2019-03-19 RX ADMIN — SIMVASTATIN 20 MG: 20 TABLET, FILM COATED ORAL at 21:42

## 2019-03-19 RX ADMIN — SENNOSIDES 34.4 MG: 8.6 TABLET, FILM COATED ORAL at 18:29

## 2019-03-19 RX ADMIN — HYDROMORPHONE HYDROCHLORIDE 8 MG: 4 TABLET ORAL at 10:08

## 2019-03-19 RX ADMIN — IOHEXOL 10 ML: 180 INJECTION INTRAVENOUS at 17:23

## 2019-03-19 RX ADMIN — POTASSIUM CHLORIDE 20 MEQ: 750 TABLET, EXTENDED RELEASE ORAL at 10:07

## 2019-03-19 RX ADMIN — HYDROMORPHONE HYDROCHLORIDE 8 MG: 4 TABLET ORAL at 14:58

## 2019-03-19 RX ADMIN — CHOLECALCIFEROL TAB 10 MCG (400 UNIT) 400 UNITS: 10 TAB at 10:07

## 2019-03-19 RX ADMIN — LIDOCAINE HYDROCHLORIDE 10 ML: 10 INJECTION, SOLUTION EPIDURAL; INFILTRATION; INTRACAUDAL; PERINEURAL at 17:21

## 2019-03-19 RX ADMIN — HYDROMORPHONE HYDROCHLORIDE 1 MG: 1 INJECTION, SOLUTION INTRAMUSCULAR; INTRAVENOUS; SUBCUTANEOUS at 16:08

## 2019-03-19 RX ADMIN — Medication 10 ML: at 06:00

## 2019-03-19 RX ADMIN — GABAPENTIN 300 MG: 300 CAPSULE ORAL at 18:29

## 2019-03-19 RX ADMIN — DEXAMETHASONE SODIUM PHOSPHATE 10 MG: 10 INJECTION INTRAMUSCULAR; INTRAVENOUS at 17:23

## 2019-03-19 RX ADMIN — DIAZEPAM 10 MG: 5 TABLET ORAL at 18:38

## 2019-03-19 RX ADMIN — GABAPENTIN 300 MG: 300 CAPSULE ORAL at 12:54

## 2019-03-19 RX ADMIN — SENNOSIDES 34.4 MG: 8.6 TABLET, FILM COATED ORAL at 10:07

## 2019-03-19 RX ADMIN — DIAZEPAM 10 MG: 5 TABLET ORAL at 11:40

## 2019-03-19 RX ADMIN — DOCUSATE SODIUM 300 MG: 100 CAPSULE, LIQUID FILLED ORAL at 21:42

## 2019-03-19 RX ADMIN — LIDOCAINE HYDROCHLORIDE 5 ML: 10 INJECTION, SOLUTION EPIDURAL; INFILTRATION; INTRACAUDAL; PERINEURAL at 17:23

## 2019-03-19 RX ADMIN — OYSTER SHELL CALCIUM WITH VITAMIN D 1 TABLET: 500; 200 TABLET, FILM COATED ORAL at 10:08

## 2019-03-19 RX ADMIN — AMITRIPTYLINE HYDROCHLORIDE 50 MG: 50 TABLET, FILM COATED ORAL at 21:42

## 2019-03-19 RX ADMIN — HYDROMORPHONE HYDROCHLORIDE 8 MG: 4 TABLET ORAL at 05:30

## 2019-03-19 RX ADMIN — PREDNISONE 10 MG: 10 TABLET ORAL at 10:08

## 2019-03-19 RX ADMIN — LEVOFLOXACIN 500 MG: 500 TABLET, FILM COATED ORAL at 18:39

## 2019-03-19 RX ADMIN — PANTOPRAZOLE SODIUM 40 MG: 40 TABLET, DELAYED RELEASE ORAL at 06:30

## 2019-03-19 RX ADMIN — GENTAMICIN SULFATE: 1 CREAM TOPICAL at 10:09

## 2019-03-19 RX ADMIN — DOCUSATE SODIUM 100 MG: 100 CAPSULE, LIQUID FILLED ORAL at 10:08

## 2019-03-19 NOTE — CONSULTS
Palliative Medicine Consult [place]: [phone]) Patient Name: Claire Patel YOB: 1945 Date of Initial Consult: March 19, 2019 Reason for Consult: Pain Management Requesting Provider: Dr. Chelsea Suarez Primary Care Physician: Gucci Don MD 
 
 SUMMARY:  
Claire Patel is a 68 y.o. with a past history listed below, who was admitted on 3/16/2019 from home due to worsening pain s/s after having a bone marrow biopsy a few days prior. Pt has an extensive pain history which includes a intrathecal pump. PAST MEDICAL HISTORY:  Include; 1.  Multiple myeloma. 2.  Osteoporosis. 3.  Anemia. 4.  Hyperlipidemia. 5.  Urinary incontinence. 6.  Pancreatitis. 7.  Sleep apnea. 8.  Chronic pain. 9.  Rheumatoid arthritis. 10.  DVT in her right leg. 11.  Fibromyalgia. 12.  GERD. 13.  Anxiety. 15.  She also has a history of Salmonella infection. 15.  Aneurysm. 
  
Current medical issues leading to Palliative Medicine involvement include: support with pain management. Current Regimen: tylenol 650mg q 4 prn,elavil 50mg hs, valium 10mg bid, neurontin 300mg 4 times/day, dilaudid 8mg po every 4 hour prn, dilaudid 1mg every 3 hours prn, morphine/bupivacaine pump, savella bid, fentanyl 50mcq patch PALLIATIVE DIAGNOSES:  
1. Lower back pain 2. Left leg pain 3. Left foot pain 4. Physical debility PLAN:  
1. Met with pt without family 2. Obtained pain history and orders placed. Pt is followed by Dr. Bella Yao for multiple myeloma and Dr. David Saleh for pain pump (morphine and bupivicaine) 3. Pt says her pain is overall controlled. \"It could be better\". She usually takes the 8mg of oral dilaudid every 4 hours 4. Recommendation: 1. Add oral dilaudid 10mg q 4 prn for severe pain. Continue 8mg for chronic pain 2. Give one dose of the iv dilaudid now prior to [facility] [place] (1mg of dilaudid is very low). She has not been receiving any dilaudid 3. Dc fentanyl patch (not covered by insurance and usually is not used in addition to pump) 4. Continue other meds 5. Hopefully injection will provide enough relief so that additional meds will not be needed 6. Would recommend pt following up with Dr. Wendy Olmos for adjustment in her pump if pain persists 5. Will follow up tomorrow 6. Initial consult note routed to primary continuity provider and/or primary health care team members 7. Communicated plan of care with: Palliative [place] [facility] 192 Team 
 
 GOALS OF CARE / TREATMENT PREFERENCES:  
 
GOALS OF CARE: 
Patient/Health Care Proxy Stated Goals: Cure TREATMENT PREFERENCES:  
Code Status: Full Code Advance Care Planning: 
[x] The Pall Med Interdisciplinary Team has updated the ACP Navigator with Que and Patient Capacity Primary Decision MakerDion Madan Spouse - [phone] Advance Care Planning 1/25/2019 Patient's Healthcare Decision Maker is: -  
Primary Decision Maker Name -  
Primary Decision Maker Phone Number -  
Primary Decision Maker Relationship to Patient -  
Confirm Advance Directive Yes, on file Does the patient have other document types - Medical Interventions: Full interventions Other Instructions: Other: As far as possible, the palliative care team has discussed with patient / health care proxy about goals of care / treatment preferences for patient. HISTORY:  
 
History obtained from: pt, chart, team 
 
CHIEF COMPLAINT: pain HPI/SUBJECTIVE: The patient is:  
[x] Verbal and participatory [] Non-participatory due to:  
Pt has been having an exacerbation of her pain ever since her bone marrow biopsy. Pain is  In the right foot, then goes from toes on left foot all the way up leg to entire lower back Clinical Pain Assessment (nonverbal scale for severity on nonverbal patients):  
Clinical Pain Assessment Severity: 5 Location: lower back, both feet, left leg Character: burning, weakness Duration: days Effect: unable to walk securely Factors: happened after BMB Frequency: all the time Duration: for how long has pt been experiencing pain (e.g., 2 days, 1 month, years) Frequency: how often pain is an issue (e.g., several times per day, once every few days, constant) FUNCTIONAL ASSESSMENT:  
 
Palliative Performance Scale (PPS): PPS: 60 PSYCHOSOCIAL/SPIRITUAL SCREENING:  
 
Palliative IDT has assessed this patient for cultural preferences / practices and a referral made as appropriate to needs (Cultural Services, Patient Advocacy, Ethics, etc.) Any spiritual / Yarsani concerns: 
[] Yes /  [x] No 
 
Caregiver Burnout: 
[] Yes /  [x] No /  [] No Caregiver Present Anticipatory grief assessment:  
[x] Normal  / [] Maladaptive ESAS Anxiety: Anxiety: 3 ESAS Depression: Depression: 0 REVIEW OF SYSTEMS:  
 
Positive and pertinent negative findings in ROS are noted above in HPI. The following systems were [x] reviewed / [] unable to be reviewed as noted in HPI Other findings are noted below. Systems: constitutional, ears/nose/mouth/throat, respiratory, gastrointestinal, genitourinary, musculoskeletal, integumentary, neurologic, psychiatric, endocrine. Positive findings noted below. Modified ESAS Completed by: provider Fatigue: 3 Drowsiness: 2 Depression: 0 Pain: 5 Anxiety: 3 Nausea: 0 Anorexia: 0 Dyspnea: 0 PHYSICAL EXAM:  
 
From RN flowsheet: 
Wt Readings from Last 3 Encounters:  
03/16/19 197 lb (89.4 kg) 03/08/19 197 lb (89.4 kg) 02/07/19 187 lb (84.8 kg) Blood pressure 127/73, pulse 98, temperature 98 °F (36.7 °C), resp. rate 18, height 5' 3\" (1.6 m), weight 197 lb (89.4 kg), SpO2 92 %, not currently breastfeeding. Pain Scale 1: Numeric (0 - 10) Pain Intensity 1: 8 Pain Onset 1: acute Pain Location 1: Back Pain Orientation 1: Left Pain Description 1: Alvin Daniels Pain Intervention(s) 1: Medication (see MAR) Last bowel movement, if known:  
 
Constitutional: drowsy, easily aroused, conversant, uncomfortable, Eyes: pupils equal, anicteric ENMT: no nasal discharge, moist mucous membranes Cardiovascular: regular rhythm, distal pulses intact Respiratory: breathing not labored, symmetric Gastrointestinal: gross, soft non-tender, +bowel sounds Musculoskeletal: no deformity, no tenderness to palpation Skin: warm, dry Neurologic: following commands, moving all extremities Psychiatric: full affect, no hallucinations Other: 
 
 
 HISTORY:  
 
Principal Problem: 
  Severe pain (3/16/2019) Active Problems: 
  Pain (3/16/2019) Past Medical History:  
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   
 spinal stenosis per pt  Fibromyalgia  GERD (gastroesophageal reflux disease)  High cholesterol  History of acute pancreatitis  History of blood transfusion  History of Salmonella infection 2009  Multiple myeloma ([address] 2007  Osteoporosis  Other complication due to venous access device ([address] 1/29/2013  Recurrent umbilical hernia 9/67/2367  Recurrent ventral incisional hernia 3/28/2016  Rheumatoid arthritis(714.0)  Sleep apnea INTOLERATANT OF CPAP  Thromboembolus ([address] Right leg DVT  Urinary incontinence Past Surgical History:  
Procedure Laterality Date  HX CHOLECYSTECTOMY  2004  HX CRANIOTOMY    
 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    
 COLONOSCOPIES, EGD  HX GI    
 ESOPHAGUS STREACHED  
 HX HEENT  10/2011  
 b/l cataract surgery in October 2011  HX HERNIA REPAIR  [id number] X6  
 HX HERNIA REPAIR  10-13-14  
 repair of recurrent ventral incisional hernia  with primary suture [id number]. Nika Crenshaw  HX KYPHOPLASTY  09/14/2017 L4  
 HX ORTHOPAEDIC Right 2001 BONE SPURS SHOULDER  
 HX OTHER SURGICAL  2010 LLQ PAIN PUMP FOR MORPHINE INFUSION  
 HX OTHER SURGICAL  14  
 repair of recurrent umbilical hernia with ventralex pillow top mesh and extensive lysis of [id number]. Kevon Lorenzana  HX OTHER SURGICAL  16  
 repair of recurrent ventral incisional hernia with underlay mesh-[facility]-Dr. Kevon Lorenzana  HX VASCULAR ACCESS Right 2013 POWER PORT   
 HX VASCULAR ACCESS  2010 PAIN PUMP  NEUROLOGICAL PROCEDURE UNLISTED   KYPHOPLASTY X2  
 NEUROLOGICAL PROCEDURE UNLISTED  10/26/15 Kyphoplasty t-5  PAIN PUMP DEVICE    
 implanted in LLQ, MORPHINE Family History Problem Relation Age of Onset 24 [facility] Arthritis-osteo Mother  Anesth Problems Neg Hx History reviewed, no pertinent family history. Social History Tobacco Use  Smoking status: Former Smoker Years: 10.00 Last attempt to quit: 10/7/2005 Years since quittin.4  Smokeless tobacco: Never Used Substance Use Topics  Alcohol use: No  
 
Allergies Allergen Reactions  Aggrenox [Aspirin-Dipyridamole] Rash Allergic to the dipyridamole and not aspirin.  Broccoli Other (comments) C/O GAS  Bumex [Bumetanide] Rash  Doxil [Doxorubicin, Peg-Liposomal] Rash Burning of skin  Flagyl [Metronidazole] Rash  Keflex [Cephalexin] Rash  Lasix [Furosemide] Rash  Macrobid [Nitrofurantoin Monohyd/M-Cryst] Shortness of Breath  Methotrexate Hives and Rash  Pcn [Penicillins] Rash  Sulfa (Sulfonamide Antibiotics) Hives  Tetanus And Diphtheria Toxoids Swelling  Thalidomide Rash Current Facility-Administered Medications Medication Dose Route Frequency  dexamethasone (PF) (DECADRON) injection 10 mg  10 mg IntraVENous ONCE  
 lidocaine (PF) (XYLOCAINE) 10 mg/mL (1 %) injection 10 mL  10 mL SubCUTAneous ONCE  
 iohexol (OMNIPAQUE) 180 mg iodine/mL injection 10 mL  10 mL Intrathecal ONCE  
  HYDROmorphone (DILAUDID) tablet 10 mg  10 mg Oral Q4H PRN  
 HYDROmorphone (DILAUDID) tablet 8 mg  8 mg Oral Q4H PRN  
 LLQ Morphine/Bupivacaine Pain Pump (Patient Supplied)  1 Bag Other CONTINUOUS  
 fentaNYL (DURAGESIC) 50 mcg/hr patch 1 Patch  1 Patch TransDERmal Q72H  sodium chloride (NS) flush 5-40 mL  5-40 mL IntraVENous Q8H  
 sodium chloride (NS) flush 5-40 mL  5-40 mL IntraVENous PRN  
 acetaminophen (TYLENOL) tablet 650 mg  650 mg Oral Q4H PRN  
 HYDROmorphone (PF) (DILAUDID) injection 1 mg  1 mg IntraVENous Q3H PRN  
 aluminum hydrox-magnesium carb (GAVISCON) oral suspension 15 mL  15 mL Oral Q6H PRN  
 bisacodyl (DULCOLAX) suppository 10 mg  10 mg Rectal DAILY PRN  
 calcium carbonate (TUMS) chewable tablet 200 mg [elemental]  200 mg Oral BID PRN  
 calcium-vitamin D (OS-GLORIA) 500 mg-200 unit tablet  1 Tab Oral DAILY  cetirizine (ZYRTEC) tablet 10 mg  10 mg Oral DAILY PRN  
 cholecalciferol (VITAMIN D3) tablet 400 Units  400 Units Oral DAILY  diazePAM (VALIUM) tablet 10 mg  10 mg Oral BID  docusate sodium (COLACE) capsule 100 mg  100 mg Oral DAILY  docusate sodium (COLACE) capsule 300 mg  300 mg Oral QHS  fluticasone propionate (FLONASE) 50 mcg/actuation nasal spray 2 Spray  2 Spray Both Nostrils DAILY  gabapentin (NEURONTIN) capsule 300 mg  300 mg Oral QID  magnesium hydroxide (MILK OF MAGNESIA) 400 mg/5 mL oral suspension 30 mL  30 mL Oral DAILY PRN  
 milnacipran (SAVELLA) tablet 50 mg (Patient Supplied)  50 mg Oral BID  polyethylene glycol (MIRALAX) packet 17 g  17 g Oral DAILY PRN  potassium chloride SR (KLOR-CON 10) tablet 20 mEq  20 mEq Oral DAILY  predniSONE (DELTASONE) tablet 10 mg  10 mg Oral DAILY  prochlorperazine (COMPAZINE) tablet 5 mg  5 mg Oral Q6H PRN  
 famotidine (PEPCID) tablet 40 mg  40 mg Oral ACD  senna (SENOKOT) tablet 34.4 mg  4 Tab Oral BID  simvastatin (ZOCOR) tablet 20 mg  20 mg Oral QHS  spironolactone (ALDACTONE) tablet 50 mg  50 mg Oral BID PRN  
 levoFLOXacin (LEVAQUIN) tablet 500 mg  500 mg Oral Q24H  pantoprazole (PROTONIX) tablet 40 mg  40 mg Oral ACB  gentamicin (GARAMYCIN) 0.1 % cream   Topical DAILY  amitriptyline (ELAVIL) tablet 50 mg  50 mg Oral QHS  
 
 
 
 LAB AND IMAGING FINDINGS:  
 
Lab Results Component Value Date/Time WBC 7.9 03/18/2019 04:00 AM  
 HGB 8.9 (L) 03/18/2019 04:00 AM  
 PLATELET 324 55/33/6921 04:00 AM  
 
Lab Results Component Value Date/Time Sodium 138 03/19/2019 05:00 AM  
 Potassium 3.9 03/19/2019 05:00 AM  
 Chloride 104 03/19/2019 05:00 AM  
 CO2 29 03/19/2019 05:00 AM  
 BUN 8 03/19/2019 05:00 AM  
 Creatinine 0.75 03/19/2019 05:00 AM  
 Calcium 8.8 03/19/2019 05:00 AM  
 Magnesium 2.0 06/06/2016 06:33 AM  
 Phosphorus 3.4 09/20/2012 06:05 AM  
  
Lab Results Component Value Date/Time AST (SGOT) 31 03/16/2019 09:58 AM  
 Alk. phosphatase 87 03/16/2019 09:58 AM  
 Protein, total 7.3 03/16/2019 09:58 AM  
 Albumin 3.2 (L) 03/16/2019 09:58 AM  
 Globulin 4.1 (H) 03/16/2019 09:58 AM  
 
Lab Results Component Value Date/Time INR 1.1 03/19/2019 05:00 AM  
 Prothrombin time 10.9 03/19/2019 05:00 AM  
 aPTT 78.5 (H) 01/29/2019 06:05 AM  
  
Lab Results Component Value Date/Time Iron 24 (L) 08/25/2011 01:00 PM  
 TIBC 353 08/25/2011 01:00 PM  
 Iron % saturation 7 (L) 08/25/2011 01:00 PM  
 Ferritin 151 09/16/2012 02:24 PM  
  
No results found for: PH, PCO2, PO2 No components found for: [place] Lab Results Component Value Date/Time CK 62 03/01/2016 12:48 PM  
 CK - MB 0.8 03/01/2016 12:48 PM  
  
 
 
   
 
Total time: 70 minutes Counseling / coordination time, spent as noted above: 60 minutes 
> 50% counseling / coordination?: y 
 
Prolonged service was provided for  []30 min   []75 min in face to face time in the presence of the patient, spent as noted above. Time Start:  
Time End: Note: this can only be billed with 19218 (initial) or 01281 (follow up). If multiple start / stop times, list each separately.

## 2019-03-19 NOTE — DISCHARGE INSTRUCTIONS
Patient Education        Leg Pain: Care Instructions  Your Care Instructions  Many things can cause leg pain. Too much exercise or overuse can cause a muscle cramp (or charley horse). You can get leg cramps from not eating a balanced diet that has enough potassium, calcium, and other minerals. If you do not drink enough fluids or are taking certain medicines, you may develop leg cramps. Other causes of leg pain include injuries, blood flow problems, nerve damage, and twisted and enlarged veins (varicose veins). You can usually ease pain with self-care. Your doctor may recommend that you rest your leg and keep it elevated. Follow-up care is a key part of your treatment and safety. Be sure to make and go to all appointments, and call your doctor if you are having problems. It's also a good idea to know your test results and keep a list of the medicines you take. How can you care for yourself at home? · Take pain medicines exactly as directed. ? If the doctor gave you a prescription medicine for pain, take it as prescribed. ? If you are not taking a prescription pain medicine, ask your doctor if you can take an over-the-counter medicine. · Take any other medicines exactly as prescribed. Call your doctor if you think you are having a problem with your medicine. · Rest your leg while you have pain, and avoid standing for long periods of time. · Prop up your leg at or above the level of your heart when possible. · Make sure you are eating a balanced diet that is rich in calcium, potassium, and magnesium, especially if you are pregnant. · If directed by your doctor, put ice or a cold pack on the area for 10 to 20 minutes at a time. Put a thin cloth between the ice and your skin. · Your leg may be in a splint, a brace, or an elastic bandage, and you may have crutches to help you walk. Follow your doctor's directions about how long to wear supports and how to use the crutches.   When should you call for help?  Call 911 anytime you think you may need emergency care. For example, call if:    · You have sudden chest pain and shortness of breath, or you cough up blood.     · Your leg is cool or pale or changes color.    Call your doctor now or seek immediate medical care if:    · You have increasing or severe pain.     · Your leg suddenly feels weak and you cannot move it.     · You have signs of a blood clot, such as:  ? Pain in your calf, back of the knee, thigh, or groin. ? Redness and swelling in your leg or groin.     · You have signs of infection, such as:  ? Increased pain, swelling, warmth, or redness. ? Red streaks leading from the sore area. ? Pus draining from a place on your leg. ? A fever.     · You cannot bear weight on your leg.    Watch closely for changes in your health, and be sure to contact your doctor if:    · You do not get better as expected. Where can you learn more? Go to http://hadley-clemencia.info/. Enter H269 in the search box to learn more about \"Leg Pain: Care Instructions. \"  Current as of: September 23, 2018  Content Version: 11.9  © 1067-0083 [employer]. Care instructions adapted under license by [employer] (which disclaims liability or warranty for this information). If you have questions about a medical condition or this instruction, always ask your healthcare professional. [place] [address] any warranty or liability for your use of this information. Discharge SNF/Rehab Instructions/LTAC       PATIENT ID: Adri Narayan  MRN: [id number]   YOB: 1945    DATE OF ADMISSION: 3/16/2019  9:45 AM    DATE OF DISCHARGE: 3/21/2019    PRIMARY CARE PROVIDER: Bubba Pulido MD       ATTENDING PHYSICIAN: Kelby Chávez MD  DISCHARGING PROVIDER: Alisha Estrella MD     To contact this individual call [phone] and ask the  to page.    If unavailable ask to be transferred the Adult Hospitalist Department. CONSULTATIONS: IP CONSULT TO PALLIATIVE CARE - PROVIDER  IP CONSULT TO INTERVENTIONAL RADIOLOGY  IP CONSULT TO ORTHOPEDIC SURGERY    PROCEDURES/SURGERIES: * No surgery found *    ADMITTING DIAGNOSES & HOSPITAL COURSE:   Severe acute on chronic back pain   -  suspect this is radicular pain related to her advanced osteoarthritis. - CT spine: Roughly stable multilevel degenerative change with stenosis, and compression  deformities as described above, except for mild progression of compression at L3 which has been treated with vertebroplasty or kyphoplasty. - pain management - po dilaudid, fentanyl patch, gabapentin, valium   - PT/OT inpatient rehab  - Neurosurgery recs: conservative treatment. -s/p Left JOSE L4-5 3/19  -Appreciate discussion with the patient and the , along with palliative as well as     Multiple myeloma status post recent bone marrow biopsy.  - follows with Dr. Cyn Mohr as outpt  - on pain pump morphine/ bupivacaine, outpatient followed by Dr Brittani Reinoso discussion with Palliative    Right toe infection with presumed osteomyelitis   - currently receiving treatment with ciprofloxacin as an outpatient. - Was getting po levaquin 3/16 to 3/20. Now off, no need for any more antibiotic    Lower extremity stasis  History of deep venous thrombosis - on xarelto  - dopplers negative of DVT    Hypokalemia - replaced    GERD - on PPI, pepcid  Neuropathy - Resume Home Elavil  HLD - home statin  Rheumatoid Arthritis   - Has an Implanted pain Pump   - 10mg prednisone daily     PT/OT inpatient rehab    Code status: Full  DVT prophylaxis: xarelto on hold  PTA: home        DISCHARGE DIAGNOSES / PLAN:      1.   Severe acute on chronic back pain       PENDING TEST RESULTS:   At the time of discharge the following test results are still pending: none    FOLLOW UP APPOINTMENTS:    Follow-up Information     Follow up With Specialties Details Why Contact Oni Campuzano III, MD Internal Medicine In 1 week  [address]   [address] [address] [phone]  Schedule an appointment as soon as possible for a visit  [address]    [facility] EMERGENCY DEP Emergency Medicine  If symptoms worsen [address]  [phone]           ADDITIONAL CARE RECOMMENDATIONS:   Follow up with PMD  Follow up with Dr Vincenzo Castro  Follow up with Peyman Peter    DIET: Cardiac Diet    ACTIVITY: Activity as tolerated      DISCHARGE MEDICATIONS:   See Medication Reconciliation Form      NOTIFY YOUR PHYSICIAN FOR ANY OF THE FOLLOWING:   Fever over 101 degrees for 24 hours. Chest pain, shortness of breath, fever, chills, nausea, vomiting, diarrhea, change in mentation, falling, weakness, bleeding. Severe pain or pain not relieved by medications. Or, any other signs or symptoms that you may have questions about.     DISPOSITION:    Home With:   OT  PT  HH  RN      x SNF/Inpatient Rehab/LTAC    Independent/assisted living    Hospice    Other:       PATIENT CONDITION AT DISCHARGE:     Functional status    Poor    x Deconditioned     Independent      Cognition   x Lucid     Forgetful     Dementia      Catheters/lines (plus indication)    [place]     PICC     PEG    x None      Code status    x Full code     DNR      PHYSICAL EXAMINATION AT DISCHARGE:  Please see progress note      CHRONIC MEDICAL DIAGNOSES:  Problem List as of 3/21/2019 Date Reviewed: 3/19/2019          Codes Class Noted - Resolved    Pain ICD-10-CM: R52  ICD-9-CM: 780.96  3/16/2019 - Present        * (Principal) Severe pain ICD-10-CM: R52  ICD-9-CM: 780.96  3/16/2019 - Present        SBO (small [facility] obstruction) [facility] ICD-10-CM: Q89.102  ICD-9-CM: 560.9  1/24/2019 - Present        Recurrent ventral incisional hernia ICD-10-CM: K43.2  ICD-9-CM: 553.21  3/28/2016 - Present        Advance directive on file ICD-10-CM: Z78.9  ICD-9-CM: V49.89 3/9/2016 - Present        Acute bronchitis ICD-10-CM: J20.9  ICD-9-CM: 466.0  3/3/2016 - Present        Sinusitis ICD-10-CM: J32.9  ICD-9-CM: 473.9  3/3/2016 - Present        Dysphagia ICD-10-CM: R13.10  ICD-9-CM: 787.20  1/7/2016 - Present        Umbilical hernia WLQ-34-QU: K42.9  ICD-9-CM: 553.1  10/15/2014 - Present        Recurrent umbilical hernia UYN-48-VT: K42.9  ICD-9-CM: 553.1  6/10/2013 - Present        Other complication due to venous access device [facility]) ICD-10-CM: G23.062X  ICD-9-CM: 996.74  1/29/2013 - Present        Infected seroma, postoperative ICD-10-CM: IEM2171  ICD-9-CM: 998.51  9/13/2012 - Present        Multiple myeloma ([facility] ICD-10-CM: C90.00  ICD-9-CM: 203.00  9/29/2009 - Present        Fibromyalgia ICD-10-CM: M79.7  ICD-9-CM: 729.1  9/29/2009 - Present        GERD (gastroesophageal reflux disease) ICD-10-CM: K21.9  ICD-9-CM: 530.81  9/29/2009 - Present        Rheumatoid arthritis involving multiple sites with positive rheumatoid factor ([facility] ICD-10-CM: M05.79  ICD-9-CM: 714.0  9/29/2009 - Present        RESOLVED: Hypoxia ICD-10-CM: R09.02  ICD-9-CM: 799.02  3/1/2016 - 3/3/2016        RESOLVED: Acute pancreatitis ICD-10-CM: K85.90  ICD-9-CM: 470.4  12/15/2011 - 12/18/2011                CDMP Checked:   Yes x     PROBLEM LIST Updated:  Yes x         Signed:   Harsha Abraham MD  3/21/2019  10:49 AM

## 2019-03-19 NOTE — PROGRESS NOTES
Physical therapy: Attempted PT evaluation. RN reported pt with increased pain and is to receive pain medication now. Recommended to wait and follow back later. Thank you Nava Arora, PT, DPT

## 2019-03-19 NOTE — PROGRESS NOTES
Patient received PO Dilaudid at 3pm.  Verbal order received from Wilma Rich NP (Palliative) to give dose of IV Dilaudid now before patient goes down to Angio. RN gave med as instructed and will continue to monitor patient.

## 2019-03-19 NOTE — PROGRESS NOTES
Hospitalist Progress Note Hospital summary:  The patient is a 70-year-old lady with history of multiple myeloma, who presented to the emergency department complaining of severe left-sided back pain and left leg pain. 
  
The patient has been in her usual health until a couple of days ago when she started experiencing progressive low back pain. Three days ago, she underwent bone marrow biopsy on the right side. The procedure went well and she had no right-sided low back pain right after the procedure. The following day though she began to experience progressive pain in the low back, at first in the middle and right side then going to the left buttock and radiating down the left leg down to the foot. She described intermittent episodes of really sharp pain, 10/10 in severity radiating down the left leg. She has had difficulty walking mostly due to pain and it is not clear if she had actual weakness in her leg. She denies any difficulty urinating or difficulties with her bowels, but she does have chronic constipation. She has no fevers, chills, or sweats. No abdominal pain. She is currently finishing a course of Cipro for a bone infection in her right toe per her report. she was barely able to bear any weight with assistance due to her severe pain. 3/16/2019 Assessment/Plan: 
Severe acute on chronic back pain  
-  suspect this is radicular pain related to her advanced osteoarthritis. - CT spine: Roughly stable multilevel degenerative change with stenosis, and compression 
deformities as described above, except for mild progression of compression at L3 which has been treated with vertebroplasty or kyphoplasty. - pain management - po dilaudid, fentanyl patch, gabapentin, valium  
- PT/OT 
- Neurosurgery recs: conservative treatment. Left JOSE L4-5 to be done ?today Multiple myeloma status post recent bone marrow biopsy. 
- follows with Dr. Marta Osborne as outpt - on pain pump morphine/ bupivacaine Right toe infection with presumed osteomyelitis - currently receiving treatment with ciprofloxacin as an outpatient. - c/w po levaquin Lower extremity stasis History of deep venous thrombosis - on xarelto 
- dopplers negative of DVT Hypokalemia - replaced GERD - on PPI, pepcid Neuropathy - Resume Home Elavil HLD - home statin Rheumatoid Arthritis  
- Has an Implanted pain Pump  
- 10mg prednisone daily  
 
Code status: Full DVT prophylaxis: xarelto on hold Disposition: TBD 
---------------------------------------------- 
 
CC: Back pain S: Pain control with medications JOSE ?today Review of Systems: A comprehensive review of systems was negative. O: 
Visit Vitals /69 (BP 1 Location: Left arm, BP Patient Position: At rest) Pulse (!) 102 Temp 98.8 °F (37.1 °C) Resp 18 Ht 5' 3\" (1.6 m) Wt 89.4 kg (197 lb) SpO2 96% Breastfeeding? No  
BMI 34.90 kg/m² PHYSICAL EXAM: 
Gen: moderate distress due to pain HEENT: anicteric sclerae, normal conjunctiva, oropharynx clear, MM moist 
Neck: supple, trachea midline, no adenopathy Heart: RRR, no MRG, no JVD, no peripheral edema Lungs: CTA b/l, non-labored respirations Abd: soft, NT, ND, BS+, no organomegaly Extr: left leg - erythema on shin. rom restricted due to pain. Can not examine back as patient unable to sit forward Skin: dry, no rash Neuro: CN II-XII grossly intact, normal speech, moves all extremities Psych: normal mood, appropriate affect Intake/Output Summary (Last 24 hours) at 3/19/2019 1117 Last data filed at 3/19/2019 0530 Gross per 24 hour Intake  Output 1400 ml Net -1400 ml Recent labs & imaging reviewed: 
Recent Results (from the past 24 hour(s)) METABOLIC PANEL, BASIC Collection Time: 03/19/19  5:00 AM  
Result Value Ref Range Sodium 138 136 - 145 mmol/L Potassium 3.9 3.5 - 5.1 mmol/L  Chloride 104 97 - 108 mmol/L  
 CO2 29 21 - 32 mmol/L Anion gap 5 5 - 15 mmol/L Glucose 84 65 - 100 mg/dL BUN 8 6 - 20 MG/DL Creatinine 0.75 0.55 - 1.02 MG/DL  
 BUN/Creatinine ratio 11 (L) 12 - 20 GFR est AA >60 >60 ml/min/1.73m2 GFR est non-AA >60 >60 ml/min/1.73m2 Calcium 8.8 8.5 - 10.1 MG/DL PROTHROMBIN TIME + INR Collection Time: 03/19/19  5:00 AM  
Result Value Ref Range INR 1.1 0.9 - 1.1 Prothrombin time 10.9 9.0 - 11.1 sec Recent Labs  
  03/18/19 
0400 WBC 7.9 HGB 8.9* HCT 32.7*  
 Recent Labs  
  03/19/19 
0500 03/18/19 
0400  140  
K 3.9 3.3*  
 109* CO2 29 26 BUN 8 6 CREA 0.75 0.64 GLU 84 87  
CA 8.8 7.7* No results for input(s): SGOT, GPT, ALT, AP, TBIL, TBILI, TP, ALB, GLOB, GGT, AML, LPSE in the last 72 hours. No lab exists for component: AMYP, HLPSE Recent Labs  
  03/19/19 
0500 INR 1.1 PTP 10.9 No results for input(s): FE, TIBC, PSAT, FERR in the last 72 hours. Lab Results Component Value Date/Time Folate 34.3 (H) 05/28/2010 04:30 AM  
  
No results for input(s): PH, PCO2, PO2 in the last 72 hours. No results for input(s): CPK, CKNDX, TROIQ in the last 72 hours. No lab exists for component: CPKMB Lab Results Component Value Date/Time Cholesterol, total 137 03/06/2017 08:01 AM  
 HDL Cholesterol 47 03/06/2017 08:01 AM  
 LDL, calculated 38 03/06/2017 08:01 AM  
 Triglyceride 260 (H) 03/06/2017 08:01 AM  
 CHOL/HDL Ratio 5.0 12/16/2011 05:05 AM  
 
Lab Results Component Value Date/Time Glucose (POC) 113 (H) 06/02/2016 11:06 AM  
 Glucose (POC) 87 09/15/2012 08:29 PM  
 Glucose (POC) 87 09/15/2012 11:58 AM  
 
Lab Results Component Value Date/Time  Color YELLOW/STRAW 01/24/2019 08:09 PM  
 Appearance CLEAR 01/24/2019 08:09 PM  
 Specific gravity <1.005 01/24/2019 08:09 PM  
 Specific gravity 1.008 03/01/2016 03:44 PM  
 pH (UA) 7.5 01/24/2019 08:09 PM  
 Protein TRACE (A) 01/24/2019 08:09 PM  
 Glucose NEGATIVE  01/24/2019 08:09 PM  
 Ketone TRACE (A) 01/24/2019 08:09 PM  
 Bilirubin NEGATIVE  01/24/2019 08:09 PM  
 Urobilinogen 1.0 01/24/2019 08:09 PM  
 Nitrites NEGATIVE  01/24/2019 08:09 PM  
 Leukocyte Esterase NEGATIVE  01/24/2019 08:09 PM  
 Epithelial cells FEW 01/24/2019 08:09 PM  
 Bacteria NEGATIVE  01/24/2019 08:09 PM  
 WBC 0-4 01/24/2019 08:09 PM  
 RBC 10-20 01/24/2019 08:09 PM  
 
 
Med list reviewed Current Facility-Administered Medications Medication Dose Route Frequency  LLQ Morphine/Bupivacaine Pain Pump (Patient Supplied)  1 Bag Other CONTINUOUS  
 fentaNYL (DURAGESIC) 50 mcg/hr patch 1 Patch  1 Patch TransDERmal Q72H  sodium chloride (NS) flush 5-40 mL  5-40 mL IntraVENous Q8H  
 sodium chloride (NS) flush 5-40 mL  5-40 mL IntraVENous PRN  
 acetaminophen (TYLENOL) tablet 650 mg  650 mg Oral Q4H PRN  
 HYDROmorphone (PF) (DILAUDID) injection 1 mg  1 mg IntraVENous Q3H PRN  
 aluminum hydrox-magnesium carb (GAVISCON) oral suspension 15 mL  15 mL Oral Q6H PRN  
 bisacodyl (DULCOLAX) suppository 10 mg  10 mg Rectal DAILY PRN  
 calcium carbonate (TUMS) chewable tablet 200 mg [elemental]  200 mg Oral BID PRN  
 calcium-vitamin D (OS-GLORIA) 500 mg-200 unit tablet  1 Tab Oral DAILY  cetirizine (ZYRTEC) tablet 10 mg  10 mg Oral DAILY PRN  
 cholecalciferol (VITAMIN D3) tablet 400 Units  400 Units Oral DAILY  diazePAM (VALIUM) tablet 10 mg  10 mg Oral BID  docusate sodium (COLACE) capsule 100 mg  100 mg Oral DAILY  docusate sodium (COLACE) capsule 300 mg  300 mg Oral QHS  fluticasone propionate (FLONASE) 50 mcg/actuation nasal spray 2 Spray  2 Spray Both Nostrils DAILY  gabapentin (NEURONTIN) capsule 300 mg  300 mg Oral QID  
 HYDROmorphone (DILAUDID) tablet 8 mg  8 mg Oral Q4H PRN  
 magnesium hydroxide (MILK OF MAGNESIA) 400 mg/5 mL oral suspension 30 mL  30 mL Oral DAILY PRN  
 milnacipran (SAVELLA) tablet 50 mg (Patient Supplied)  50 mg Oral BID  
  polyethylene glycol (MIRALAX) packet 17 g  17 g Oral DAILY PRN  potassium chloride SR (KLOR-CON 10) tablet 20 mEq  20 mEq Oral DAILY  predniSONE (DELTASONE) tablet 10 mg  10 mg Oral DAILY  prochlorperazine (COMPAZINE) tablet 5 mg  5 mg Oral Q6H PRN  
 famotidine (PEPCID) tablet 40 mg  40 mg Oral ACD  senna (SENOKOT) tablet 34.4 mg  4 Tab Oral BID  simvastatin (ZOCOR) tablet 20 mg  20 mg Oral QHS  spironolactone (ALDACTONE) tablet 50 mg  50 mg Oral BID PRN  
 levoFLOXacin (LEVAQUIN) tablet 500 mg  500 mg Oral Q24H  pantoprazole (PROTONIX) tablet 40 mg  40 mg Oral ACB  gentamicin (GARAMYCIN) 0.1 % cream   Topical DAILY  amitriptyline (ELAVIL) tablet 50 mg  50 mg Oral QHS Care Plan discussed with:  Patient/Family and Nurse Maralyn Prader, MD 
Internal Medicine Date of Service: 3/19/2019

## 2019-03-19 NOTE — PROGRESS NOTES
Problem: Self Care Deficits Care Plan (Adult) Goal: *Acute Goals and Plan of Care (Insert Text) Occupational Therapy Goals Initiated 3/19/2019 1. Patient will perform lower body dressing with supervision/set-up within 7 day(s) using AE PRN. 2.  Patient will perform bathing with supervision/set-up within 7 day(s) using AE PRN. 3.  Patient will perform grooming with supervision standing within 7 day(s). 4.  Patient will perform toilet transfers with modified independence within 7 day(s). 5.  Patient will perform all aspects of toileting with modified independence within 7 day(s). Occupational Therapy EVALUATION Patient: Adri Narayan (73 y.o. female) Date: 3/19/2019 Primary Diagnosis: Pain [R52] Severe pain [R52] Precautions:   Fall ASSESSMENT : 
Based on the objective data described below, the patient presents with largely MAX A for LB ADLS, setup for UB ADLs, and MOD A for ADL transfers secondary to patient limited by significant low back pain and RLE pain, impaired dynamic standing balance, generalized weakness, drowsiness in session (likely due to pain medication), and patient report of generally decreased FM coordination. PTA, patient reports she was largely IND for ADL tasks however no longer driving and receiving assistance from her  for the majority of IADL tasks. Plan for patient to receive an JOSE for L4-L5 today/tomorrow. Patient motivated to participate in therapy and to return to functional baseline. Anticipate need for inpatient rehab post discharge to maximize patient safety and independence with self-care tasks. Patient will benefit from skilled intervention to address the above impairments. Patients rehabilitation potential is considered to be Good Factors which may influence rehabilitation potential include:  
[x]             None noted []             Mental ability/status []             Medical condition []             Home/family situation and support systems []             Safety awareness []             Pain tolerance/management 
[]             Other: PLAN : 
Recommendations and Planned Interventions: 
[x]               Self Care Training                  [x]        Therapeutic Activities [x]               Functional Mobility Training    []        Cognitive Retraining 
[x]               Therapeutic Exercises           [x]        Endurance Activities [x]               Balance Training                   []        Neuromuscular Re-Education []               Visual/Perceptual Training     [x]   Home Safety Training 
[]               Patient Education                 [x]        Family Training/Education []               Other (comment): Frequency/Duration: Patient will be followed by occupational therapy 5 times a week to address goals. Discharge Recommendations: Inpatient Rehab Further Equipment Recommendations for Discharge: TBD SUBJECTIVE:  
Patient stated I don't think I can stand for much longer.  OBJECTIVE DATA SUMMARY:  
HISTORY:  
Past Medical History:  
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   
 spinal stenosis per pt  Fibromyalgia  GERD (gastroesophageal reflux disease)  High cholesterol  History of acute pancreatitis  History of blood transfusion  History of Salmonella infection 2009  Multiple myeloma ([address] 2007  Osteoporosis  Other complication due to venous access device ([address] 1/29/2013  Recurrent umbilical hernia 7/76/9218  Recurrent ventral incisional hernia 3/28/2016  Rheumatoid arthritis(714.0)  Sleep apnea INTOLERATANT OF CPAP  Thromboembolus ([address] Right leg DVT  Urinary incontinence Past Surgical History:  
Procedure Laterality Date  HX CHOLECYSTECTOMY  2004  HX CRANIOTOMY    
 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    
 COLONOSCOPIES, EGD  HX GI    
 ESOPHAGUS STREACHED  
 HX HEENT  10/2011  
 b/l cataract surgery in October 2011  HX HERNIA REPAIR  [id number] X6  
 HX HERNIA REPAIR  10-13-14  
 repair of recurrent ventral incisional hernia  with primary suture [id number]-FqDr. Arnaldo Moreno  HX KYPHOPLASTY  09/14/2017 L4  
 HX ORTHOPAEDIC Right 2001 BONE SPURS SHOULDER  
 HX OTHER SURGICAL  2010 LLQ PAIN PUMP FOR MORPHINE INFUSION  
 HX OTHER SURGICAL  1-16-14  
 repair of recurrent umbilical hernia with ventralex pillow top mesh and extensive lysis of [id number]-FWI-LRDR. Arnaldo Moreno  HX OTHER SURGICAL  5-31-16  
 repair of recurrent ventral incisional hernia with underlay mesh-[facility]-Dr. Arnaldo Moreno  HX VASCULAR ACCESS Right 1/2013 POWER PORT   
 HX VASCULAR ACCESS  2010 PAIN PUMP  NEUROLOGICAL PROCEDURE UNLISTED  2007 KYPHOPLASTY X2  
 NEUROLOGICAL PROCEDURE UNLISTED  10/26/15 Kyphoplasty t-5  PAIN PUMP DEVICE    
 implanted in LLQ, MORPHINE Prior Level of Function/Environment/Context: PTA, patient was largely IND for ADL tasks however using AD for mobility and receiving assistance for all IADL tasks. Patient also reports he  no longer lets her drive. Expanded or extensive additional review of patient history:  
 
Home Situation Home Environment: Private residence # Steps to Enter: 2 Rails to Enter: Yes Hand Rails : Left One/Two Story Residence: One story Living Alone: No 
Support Systems: Spouse/Significant Other/Partner Patient Expects to be Discharged to[de-identified] Rehabilitation facility Current DME Used/Available at Home: Commode, bedside, Cane, straight, Walker, rollator, Walker, rolling Tub or Shower Type: Shower Hand dominance: RightEXAMINATION OF PERFORMANCE DEFICITS: 
Cognitive/Behavioral Status: 
Neurologic State: Drowsy Orientation Level: Oriented X4 Cognition: Decreased attention/concentration Perception: Appears intact Perseveration: No perseveration noted Safety/Judgement: Decreased awareness of need for safetySkin: R toe bandage; all other exposed areas grossly intact Edema: none noted Hearing: Auditory Auditory Impairment: NoneVision/Perceptual:   
    
    
    
  
    
    
Corrective Lenses: Glasses Range of Motion: BUE 
AROM: Generally decreased, functional 
  
  
  
  
  
  
  
Strength: BUE Strength: Generally decreased, functional 
  
  
  
 Coordination: 
Coordination: Generally decreased, functional 
Fine Motor Skills-Upper: Left Impaired;Right Impaired(difficulty opening small containers) Gross Motor Skills-Upper: Left Intact; Right Intact Tone & Sensation: BUE Tone: Normal 
Sensation: Intact Balance: 
Sitting: Impaired; Without support Sitting - Static: Good (unsupported) Sitting - Dynamic: Fair (occasional) Standing: Impaired; Without support Standing - Static: Fair Standing - Dynamic : FairFunctional Mobility and Transfers for ADLs:Bed Mobility: 
Rolling: Minimum assistance Supine to Sit: Moderate assistance Sit to Supine: Moderate assistance Transfers: 
Sit to Stand: Moderate assistance;Assist x1 Stand to Sit: Minimum assistance;Assist x1 Toilet Transfer : Moderate assistance;Assist x1;Additional time(>BSC) ADL Assessment: 
Feeding: Setup(reports difficulty opening small containers) Oral Facial Hygiene/Grooming: Supervision;Setup(infer seated 2* pain) Bathing: Moderate assistance(infer A for LB and periarea) Upper Body Dressing: Setup(infer 2* BUE ROM) Lower Body Dressing: Maximum assistance(simulated) Toileting: Moderate assistance(A for clothing management) ADL Intervention and task modifications: OT facilitated functional supine <> sit transfers using log roll technique for pain management. OT educated patient on importance of activating abdominal muscles during functional transfers to protect the spine.  OT educated patient on safety with BSC transfers with patient limited by pain. OT educated patient on tailor sitting for LB dressing however patient unable-needs education on AE for LB dressing for pain management next session. Toileting Toileting Assistance: Moderate assistance Bowel Hygiene: Minimum assistance Clothing Management: Total assistance (dependent) Cognitive Retraining Safety/Judgement: Decreased awareness of need for safety Functional Measure: 
Barthel Index: 
 
Bathin Bladder: 5 Bowels: 5 Groomin Dressin Feedin Mobility: 0 Stairs: 0 Toilet Use: 5 Transfer (Bed to Chair and Back): 10 Total: 40/100 Percentage of impairment  
0% 1-19% 20-39% 40-59% 60-79% 80-99% 100% Barthel Score 0-100 100 99-80 79-60 59-40 20-39 1-19 
 0 The Barthel ADL Index: Guidelines 1. The index should be used as a record of what a patient does, not as a record of what a patient could do. 2. The main aim is to establish degree of independence from any help, physical or verbal, however minor and for whatever reason. 3. The need for supervision renders the patient not independent. 4. A patient's performance should be established using the best available evidence. Asking the patient, friends/relatives and nurses are the usual sources, but direct observation and common sense are also important. However direct testing is not needed. 5. Usually the patient's performance over the preceding 24-48 hours is important, but occasionally longer periods will be relevant. 6. Middle categories imply that the patient supplies over 50 per cent of the effort. 7. Use of aids to be independent is allowed. Tricia Rutherford., Barthel, D.W. (6125). Functional evaluation: the Barthel Index. [address] [facility] (14)2. Ken Shelton dago [place] Pizano, Rito Villatoro., Judi Nazario, Stu, [address] [facility] ().  Measuring the change indisability after inpatient rehabilitation; comparison of the responsiveness of the Barthel Index and Functional Bells Measure. Journal of Neurology, Neurosurgery, and Psychiatry, 664), [phone]. SHIRIN Infante, LUBA Coon, & Ciara Sparks M.A. (2004.) Assessment of post-stroke quality of life in cost-effectiveness studies: The usefulness of the Barthel Index and the EuroQoL-5D. [facility], 13, [phone] Occupational Therapy Evaluation Charge Determination History Examination Decision-Making LOW Complexity : Brief history review  MEDIUM Complexity : 3-5 performance deficits relating to physical, cognitive , or psychosocial skils that result in activity limitations and / or participation restrictions MEDIUM Complexity : Patient may present with comorbidities that affect occupational performnce. Miniml to moderate modification of tasks or assistance (eg, physical or verbal ) with assesment(s) is necessary to enable patient to complete evaluation Based on the above components, the patient evaluation is determined to be of the following complexity level: LOW Pain: 
Pain Scale 1: Numeric (0 - 10) Pain Intensity 1: 4 Pain Location 1: Back;Hip;Leg 
Pain Orientation 1: Left Pain Description 1: Sharp; Throbbing Pain Intervention(s) 1: Medication (see MAR) Activity Tolerance: VSS Please refer to the flowsheet for vital signs taken during this treatment. After treatment:  
[] Patient left in no apparent distress sitting up in chair 
[x] Patient left in no apparent distress in bed 
[x] Call bell left within reach [x] Nursing notified 
[] Caregiver present 
[] Bed alarm activated COMMUNICATION/EDUCATION:  
The patients plan of care was discussed with: Physical Therapist and Registered Nurse. [x] Home safety education was provided and the patient/caregiver indicated understanding. [x] Patient/family have participated as able in goal setting and plan of care. [x] Patient/family agree to work toward stated goals and plan of care. [] Patient understands intent and goals of therapy, but is neutral about his/her participation. [] Patient is unable to participate in goal setting and plan of care. This patients plan of care is appropriate for delegation to \[facility] [place] [facility]\"". Thank you for this referral. 
[employer] Time Calculation: 28 mins

## 2019-03-19 NOTE — PROGRESS NOTES
3/19/19 -  
 completed independent chart review. Patient had a bone marrow biopsy four days ago. This morning, PT was deferred for patient by nursing due to increased pain. Patient's plan for today includes: likely left JOSE at L4-5 via IR and palliative care consult. CRM: Jesus Armstrong, MPH, [address] [facility] [place]; Z: [phone]

## 2019-03-19 NOTE — PROGRESS NOTES
TRANSFER - OUT REPORT: 
 
Verbal report given to 5E RN(name) on Manuel Powers  being transferred to [facility](unit) for routine progression of care Report consisted of patients Situation, Background, Assessment and  
Recommendations(SBAR). Information from the following report(s) SBAR and Procedure Summary was reviewed with the receiving nurse. Lines:  
Venous Access Device power port 03/16/19 Upper chest (subclavicular area, right (Active) Central Line Being Utilized Yes 3/19/2019 10:04 AM  
Criteria for Appropriate Use Limited/no vessel suitable for conventional peripheral access 3/19/2019 10:04 AM  
Site Assessment Clean, dry, & intact 3/19/2019 10:04 AM  
Date of Last Dressing Change 03/16/19 3/19/2019 10:04 AM  
Dressing Status Clean, dry, & intact 3/19/2019 10:04 AM  
Dressing Type Transparent 3/19/2019 10:04 AM  
Action Taken Open ports on tubing capped 3/19/2019 10:04 AM  
Access Medial Site Assessment Warmth 3/18/2019  5:21 PM  
Date Accessed (Medial Site) 03/16/19 3/16/2019  6:59 PM  
Positive Blood Return (Medial Site) No 3/19/2019 10:04 AM  
Action Taken (Medial Site) Flushed 3/18/2019 12:17 PM  
Date Needle Changed (Medial Site) 03/16/19 3/18/2019  3:51 AM  
Positive Blood Return (Lateral Site) Yes 3/18/2019 12:17 PM  
Action Taken (Lateral Site) Flushed 3/18/2019 12:17 PM  
Alcohol Cap Used Yes 3/19/2019 10:04 AM  
  
 
Opportunity for questions and clarification was provided.

## 2019-03-19 NOTE — PROGRESS NOTES
Problem: Mobility Impaired (Adult and Pediatric) Goal: *Acute Goals and Plan of Care (Insert Text) Physical Therapy Goals Initiated 3/19/2019 1. Patient will move from supine to sit and sit to supine  in bed with minimal assistance/contact guard assist within 7 day(s). 2.  Patient will transfer from bed to chair and chair to bed with minimal assistance/contact guard assist using the least restrictive device within 7 day(s). 3.  Patient will perform sit to stand with minimal assistance/contact guard assist within 7 day(s). 4.  Patient will ambulate with minimal assistance/contact guard assist for 50 feet with the least restrictive device within 7 day(s). 5.  Patient will ascend/descend 2 stairs with single handrail(s) with minimal assistance/contact guard assist within 7 day(s). physical Therapy EVALUATION Patient: Yordan Franco (89 y.o. female) Date: 3/19/2019 Primary Diagnosis: Pain [R52] Severe pain [R52] Precautions:   Fall ASSESSMENT : 
Based on the objective data described below, the patient presents with decreased functional mobiltiy, impaired balance and gait, mobility limited by pain in bilateral LEs and back. Pt received supine in bed and agreeable to therapy. Pt with recent R hip bon biopsy on 3/13/19 and had progressive pain that resulted in poor mobility and requiring increased assistance at home. At baseline, pt was ambulatory with a cane in the community and RW or rollator in the home, lives with spouse in a 1 story home. Pt required mod A for supine to sit and educated on log roll technique. Pt performed sit<>stand with min A x 2 and additional time and effort to assume standing position. Pt able to side step along the bedside with min A. Pt with increased pain in back, hips, and LEs with all mobility, especially with bed mobility. Pt is scheduled for an epidural steroid injection soon.  Pt may need inpatient rehab upon discharge, pending progress with acute therapies and pain control. Will continue to assess. Patient will benefit from skilled intervention to address the above impairments. Patients rehabilitation potential is considered to be Good Factors which may influence rehabilitation potential include:  
[]         None noted 
[]         Mental ability/status [x]         Medical condition 
[]         Home/family situation and support systems 
[]         Safety awareness [x]         Pain tolerance/management 
[]         Other: PLAN : 
Recommendations and Planned Interventions: 
[x]           Bed Mobility Training             [x]    Neuromuscular Re-Education 
[x]           Transfer Training                   []    Orthotic/Prosthetic Training 
[x]           Gait Training                         []    Modalities [x]           Therapeutic Exercises           []    Edema Management/Control 
[x]           Therapeutic Activities            [x]    Patient and Family Training/Education 
[]           Other (comment): Frequency/Duration: Patient will be followed by physical therapy  5 times a week to address goals. Discharge Recommendations: Inpatient Rehab Further Equipment Recommendations for Discharge: none SUBJECTIVE:  
Patient stated It hurts a lot.  OBJECTIVE DATA SUMMARY:  
HISTORY:   
Past Medical History:  
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   
 spinal stenosis per pt  Fibromyalgia  GERD (gastroesophageal reflux disease)  High cholesterol  History of acute pancreatitis  History of blood transfusion  History of Salmonella infection 2009  Multiple myeloma ([facility] [address] 2007  Osteoporosis  Other complication due to venous access device ([facility] [address] 1/29/2013  Recurrent umbilical hernia 7/76/8155  Recurrent ventral incisional hernia 3/28/2016  Rheumatoid arthritis(714.0)  Sleep apnea  INTOLERATANT OF CPAP  
  Thromboembolus ([facility] [address] Right leg DVT  Urinary incontinence Past Surgical History:  
Procedure Laterality Date  HX CHOLECYSTECTOMY  2004  HX CRANIOTOMY    
 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    
 COLONOSCOPIES, EGD  HX GI    
 ESOPHAGUS STREACHED  
 HX HEENT  10/2011  
 b/l cataract surgery in October 2011  HX HERNIA REPAIR  [id number] X6  
 HX HERNIA REPAIR  10-13-14  
 repair of recurrent ventral incisional hernia  with primary suture [id number]. Kevon Lorenzana  HX KYPHOPLASTY  09/14/2017 L4  
 HX ORTHOPAEDIC Right 2001 BONE SPURS SHOULDER  
 HX OTHER SURGICAL  2010 LLQ PAIN PUMP FOR MORPHINE INFUSION  
 HX OTHER SURGICAL  1-16-14  
 repair of recurrent umbilical hernia with ventralex pillow top mesh and extensive lysis of [id number]. Kevon Lorenzana   OTHER SURGICAL  5-31-16  
 repair of recurrent ventral incisional hernia with underlay mesh-[facility]-Dr. Kevon Lorenzana  HX VASCULAR ACCESS Right 1/2013 POWER PORT   
 HX VASCULAR ACCESS  2010 PAIN PUMP  NEUROLOGICAL PROCEDURE UNLISTED  2007 KYPHOPLASTY X2  
 NEUROLOGICAL PROCEDURE UNLISTED  10/26/15 Kyphoplasty t-5  PAIN PUMP DEVICE    
 implanted in LLQ, MORPHINE Prior Level of Function/Home Situation: ambulatory with a cane in the community, RW or rollator in the home. Lives with spouse in a 1 story home with 2 steps to enter. Personal factors and/or comorbidities impacting plan of care:  
 
Home Situation Home Environment: Private residence # Steps to Enter: 2 One/Two Story Residence: One story Living Alone: No 
Support Systems: Spouse/Significant Other/Partner Current DME Used/Available at Home: [address] [facility], [place], straight, Walker, rollator, Walker, rolling EXAMINATION/PRESENTATION/DECISION MAKING: Critical Behavior: 
  
  
  
  
Hearing: Auditory Auditory Impairment: NoneSkin:   
Edema:  
Range Of Motion: 
AROM: Generally decreased, functional 
  
  
  
  
  
  
  
Strength:   
 Strength: Generally decreased, functional 
  
  
  
  
  
  
Tone & Sensation: Pt with reports of numbness in RLE, however intact to light touch to bilateral LEs Functional Mobility: 
Bed Mobility: 
  
Supine to Sit: Moderate assistance Sit to Supine: Moderate assistance Transfers: 
Sit to Stand: Minimum assistance;Assist x2 Stand to Sit: Minimum assistance;Assist x2 Balance:  
Sitting: Intact Standing: Impaired; With support Standing - Static: Fair Standing - Dynamic : FairAmbulation/Gait Training:Distance (ft): 2 Feet (ft)(side step along bedside) Assistive Device: Gait belt(HHA bilaterally) Ambulation - Level of Assistance: Minimal assistance;Assist x1 Gait Abnormalities: Decreased step clearance;Shuffling gait Base of Support: Widened Speed/Tiki: Shuffled;Pace decreased (<100 feet/min) Step Length: Right shortened;Left shortened Stairs: Therapeutic Exercises:  
 
 
Functional Measure: 
Tinetti test: 
 
Sitting Balance: 1 Arises: 0 Attempts to Rise: 0 Immediate Standing Balance: 0 Standing Balance: 0 Nudged: 0 Eyes Closed: 0 Turn 360 Degrees - Continuous/Discontinuous: 0 Turn 360 Degrees - Steady/Unsteady: 0 Sitting Down: 1 Balance Score: 2 Indication of Gait: 0 
R Step Length/Height: 0 
L Step Length/Height: 0 
R Foot Clearance: 1 L Foot Clearance: 1 Step Symmetry: 1 Step Continuity: 0 Path: 0 Trunk: 0 Walking Time: 0 Gait Score: 3 Total Score: 5 Tinetti Tool Score Risk of Falls 
<19 = High Fall Risk 19-24 = Moderate Fall Risk 25-28 = Low Fall Risk Tinetti ME. Performance-Oriented Assessment of Mobility Problems in Elderly Patients. [address]; [id number]. (Scoring Description: PT Bulletin Feb. 10, 1993) Older adults: Geronimo Espinoza et al, 2009; n = 1601 [address] [employer] elderly evaluated with ABC, AYE, ADL, and IADL) · Mean AYE score for males aged 69-68 years = 26.21(3.40) · Mean AYE score for females age 69-68 years = 25.16(4.30) · Mean AYE score for males over 80 years = 23.29(6.02) · Mean AYE score for females over 80 years = 17.20(8.32) Based on the above components, the patient evaluation is determined to be of the following complexity level: MEDIUM Pain: 
Pain Scale 1: Numeric (0 - 10) Pain Intensity 1: 4 Pain Location 1: Back;Hip;Leg 
Pain Orientation 1: Left Pain Description 1: Sharp; Throbbing Pain Intervention(s) 1: Medication (see MAR) Activity Tolerance:  
Fair. Limited by pain. VSS Please refer to the flowsheet for vital signs taken during this treatment. After treatment:  
[]         Patient left in no apparent distress sitting up in chair 
[x]         Patient left in no apparent distress in bed 
[x]         Call bell left within reach [x]         Nursing notified 
[x]         Caregiver present 
[]         Bed alarm activated COMMUNICATION/EDUCATION:  
The patients plan of care was discussed with: Occupational Therapist and Registered Nurse. [x]         Fall prevention education was provided and the patient/caregiver indicated understanding. [x]         Patient/family have participated as able in goal setting and plan of care. [x]         Patient/family agree to work toward stated goals and plan of care. []         Patient understands intent and goals of therapy, but is neutral about his/her participation. []         Patient is unable to participate in goal setting and plan of care. Thank you for this referral. 
Pillo Cortes, PT, DPT Time Calculation: 22 mins

## 2019-03-20 PROCEDURE — 97535 SELF CARE MNGMENT TRAINING: CPT

## 2019-03-20 PROCEDURE — 74011636637 HC RX REV CODE- 636/637: Performed by: INTERNAL MEDICINE

## 2019-03-20 PROCEDURE — 65270000032 HC RM SEMIPRIVATE

## 2019-03-20 PROCEDURE — 74011250637 HC RX REV CODE- 250/637: Performed by: INTERNAL MEDICINE

## 2019-03-20 PROCEDURE — 74011250637 HC RX REV CODE- 250/637: Performed by: NURSE PRACTITIONER

## 2019-03-20 PROCEDURE — 97530 THERAPEUTIC ACTIVITIES: CPT

## 2019-03-20 RX ADMIN — GABAPENTIN 300 MG: 300 CAPSULE ORAL at 17:33

## 2019-03-20 RX ADMIN — POTASSIUM CHLORIDE 20 MEQ: 750 TABLET, EXTENDED RELEASE ORAL at 09:36

## 2019-03-20 RX ADMIN — GENTAMICIN SULFATE: 1 CREAM TOPICAL at 09:39

## 2019-03-20 RX ADMIN — SENNOSIDES 34.4 MG: 8.6 TABLET, FILM COATED ORAL at 09:36

## 2019-03-20 RX ADMIN — Medication 10 ML: at 15:00

## 2019-03-20 RX ADMIN — GABAPENTIN 300 MG: 300 CAPSULE ORAL at 09:36

## 2019-03-20 RX ADMIN — OYSTER SHELL CALCIUM WITH VITAMIN D 1 TABLET: 500; 200 TABLET, FILM COATED ORAL at 09:36

## 2019-03-20 RX ADMIN — DOCUSATE SODIUM 100 MG: 100 CAPSULE, LIQUID FILLED ORAL at 09:36

## 2019-03-20 RX ADMIN — PANTOPRAZOLE SODIUM 40 MG: 40 TABLET, DELAYED RELEASE ORAL at 07:30

## 2019-03-20 RX ADMIN — Medication 10 ML: at 06:00

## 2019-03-20 RX ADMIN — GABAPENTIN 300 MG: 300 CAPSULE ORAL at 14:00

## 2019-03-20 RX ADMIN — PREDNISONE 10 MG: 10 TABLET ORAL at 09:36

## 2019-03-20 RX ADMIN — DOCUSATE SODIUM 300 MG: 100 CAPSULE, LIQUID FILLED ORAL at 20:40

## 2019-03-20 RX ADMIN — CHOLECALCIFEROL TAB 10 MCG (400 UNIT) 400 UNITS: 10 TAB at 09:36

## 2019-03-20 RX ADMIN — GABAPENTIN 300 MG: 300 CAPSULE ORAL at 20:43

## 2019-03-20 RX ADMIN — HYDROMORPHONE HYDROCHLORIDE 8 MG: 4 TABLET ORAL at 11:13

## 2019-03-20 RX ADMIN — Medication 10 ML: at 21:00

## 2019-03-20 RX ADMIN — DIAZEPAM 10 MG: 5 TABLET ORAL at 17:32

## 2019-03-20 RX ADMIN — AMITRIPTYLINE HYDROCHLORIDE 50 MG: 50 TABLET, FILM COATED ORAL at 20:42

## 2019-03-20 RX ADMIN — DIAZEPAM 10 MG: 5 TABLET ORAL at 09:36

## 2019-03-20 RX ADMIN — SIMVASTATIN 20 MG: 20 TABLET, FILM COATED ORAL at 20:44

## 2019-03-20 RX ADMIN — FAMOTIDINE 40 MG: 20 TABLET ORAL at 17:32

## 2019-03-20 RX ADMIN — FLUTICASONE PROPIONATE 2 SPRAY: 50 SPRAY, METERED NASAL at 09:41

## 2019-03-20 RX ADMIN — HYDROMORPHONE HYDROCHLORIDE 8 MG: 4 TABLET ORAL at 19:46

## 2019-03-20 RX ADMIN — SENNOSIDES 34.4 MG: 8.6 TABLET, FILM COATED ORAL at 17:32

## 2019-03-20 RX ADMIN — LEVOFLOXACIN 500 MG: 500 TABLET, FILM COATED ORAL at 17:32

## 2019-03-20 NOTE — PROGRESS NOTES
3/20/19 -  
CM met with patient with spouse at bedside. CM discussed current therapy recommendation of IPR. Per patient and spouse, the patient has hx of SNF at [facility] and does not want to have any rehab placement. Patient has hx of HH with [facility] and would be open to [facility], PT, OT. Additionally, patient and spouse have extensive supportive DME in place, including rollator, grab bars, shower stool, raised toilet seat. Family and friends, including [employer] community, are available to act as secondary support for patient. Patient would have 24/7 support at home. Patient is in agreement to work with PT again this afternoon to attempt ambulation in the ramos. Patient is aware of need for a safe discharge plan. CM discussed with patient's bedside nurse, who will page PT to work with patient again. CRM: Mckenna Barton, MPH, [address] [facility] [place]; Z: [phone]

## 2019-03-20 NOTE — PROGRESS NOTES
Problem: Mobility Impaired (Adult and Pediatric) Goal: *Acute Goals and Plan of Care (Insert Text) Description Physical Therapy Goals Initiated 3/19/2019 1. Patient will move from supine to sit and sit to supine  in bed with minimal assistance/contact guard assist within 7 day(s). 2.  Patient will transfer from bed to chair and chair to bed with minimal assistance/contact guard assist using the least restrictive device within 7 day(s). 3.  Patient will perform sit to stand with minimal assistance/contact guard assist within 7 day(s). 4.  Patient will ambulate with minimal assistance/contact guard assist for 50 feet with the least restrictive device within 7 day(s). 5.  Patient will ascend/descend 2 stairs with single handrail(s) with minimal assistance/contact guard assist within 7 day(s). Outcome: Progressing Towards Goal 
PHYSICAL THERAPY TREATMENT Patient: Aaliyah Potts (40 y.o. female) Date: 3/20/2019 Diagnosis: Pain [R52] Severe pain [R52] Severe pain Precautions: Fall Chart, physical therapy assessment, plan of care and goals were reviewed. ASSESSMENT: 
Pt received supine in bed and agreeable to therapy. Pt reported she did not feel the steroid injection has helped any of her pain in her lower back and LLE. Pt tolerated session fairly well, but is still limited by severe pain. Pt completed supine with min A and additional time. Pt performed sit<>stand  x 3 trials from the bed with min A x 2. Pt side stepped along the bed with mod A x 2 and then pt requesting to use the BSC. Pt needed mod A x 2 for stand pivot transfer to the [facility] and needed assist guiding hips safely onto the [facility]. Pt then returned to supine position with all needs met. Pt continues to require 2 person assist for minimal mobility and limited by pain. Pt will benefit from inpatient rehab upon discharge to continue therapy efforts. Progression toward goals: ?    Improving appropriately and progressing toward goals ? Improving slowly and progressing toward goals ? Not making progress toward goals and plan of care will be adjusted PLAN: 
Patient continues to benefit from skilled intervention to address the above impairments. Continue treatment per established plan of care. Discharge Recommendations:  Inpatient Rehab Further Equipment Recommendations for Discharge:  tbd SUBJECTIVE:  
Patient stated ? I was hoping to go home today. ? OBJECTIVE DATA SUMMARY:  
Critical Behavior: 
Neurologic State: Appropriate for age, Alert Orientation Level: Oriented X4 Cognition: Appropriate safety awareness, Follows commands Safety/Judgement: Decreased awareness of need for safety Functional Mobility Training: 
Bed Mobility: 
  
Supine to Sit: Minimum assistance Sit to Supine: Minimum assistance Transfers: 
Sit to Stand: Minimum assistance;Assist x2 Stand to Sit: Minimum assistance;Assist x2 Bed to Chair: Moderate assistance;Assist x2;Minimum assistance Balance: 
Sitting: Impaired Sitting - Static: Good (unsupported) Sitting - Dynamic: Fair (occasional) Standing: Impaired;Pull to stand; With support Standing - Static: Constant support; Fair 
Standing - Dynamic : Poor Ambulation/Gait Training: 
Distance (ft): 3 Feet (ft) Assistive Device: Gait belt(HHA) Ambulation - Level of Assistance: Minimal assistance;Assist x2; Moderate assistance Gait Abnormalities: Antalgic;Decreased step clearance;Shuffling gait Base of Support: Widened Speed/Tiki: Pace decreased (<100 feet/min); Shuffled Step Length: Right shortened;Left shortened Stairs: 
  
  
   
 
Neuro Re-Education: 
Therapeutic Exercises:  
 
Pain: 
Pain Scale 1: Numeric (0 - 10) Pain Intensity 1: 7 Pain Intervention(s) 1: Medication (see MAR) Activity Tolerance:  
Good. VSS. Limited by pain Please refer to the flowsheet for vital signs taken during this treatment. After treatment:  
?    Patient left in no apparent distress sitting up in chair ? Patient left in no apparent distress in bed 
? Call bell left within reach ? Nursing notified ? Caregiver present ? Bed alarm activated COMMUNICATION/COLLABORATION:  
The patient?s plan of care was discussed with: Occupational Therapist and Registered Nurse Cristobal De La Rosa PT, DPT Time Calculation: 24 mins

## 2019-03-20 NOTE — PROGRESS NOTES
Bedside shift change report given to Kendall Ayala RN (oncoming nurse) by Carlotta Villa RN (offgoing nurse). Report included the following information SBAR.

## 2019-03-20 NOTE — PROGRESS NOTES
Hospitalist Progress Note Hospital summary:  The patient is a 77-year-old lady with history of multiple myeloma, who presented to the emergency department complaining of severe left-sided back pain and left leg pain. 
  
The patient has been in her usual health until a couple of days ago when she started experiencing progressive low back pain. Three days ago, she underwent bone marrow biopsy on the right side. The procedure went well and she had no right-sided low back pain right after the procedure. The following day though she began to experience progressive pain in the low back, at first in the middle and right side then going to the left buttock and radiating down the left leg down to the foot. She described intermittent episodes of really sharp pain, 10/10 in severity radiating down the left leg. She has had difficulty walking mostly due to pain and it is not clear if she had actual weakness in her leg. She denies any difficulty urinating or difficulties with her bowels, but she does have chronic constipation. She has no fevers, chills, or sweats. No abdominal pain. She is currently finishing a course of Cipro for a bone infection in her right toe per her report. she was barely able to bear any weight with assistance due to her severe pain. 3/16/2019 Assessment/Plan: 
Severe acute on chronic back pain  
-  suspect this is radicular pain related to her advanced osteoarthritis. - CT spine: Roughly stable multilevel degenerative change with stenosis, and compression 
deformities as described above, except for mild progression of compression at L3 which has been treated with vertebroplasty or kyphoplasty. - pain management - po dilaudid, fentanyl patch, gabapentin, valium  
- PT/OT 
- Neurosurgery recs: conservative treatment. -s/p Left JOSE L4-5 3/19 Multiple myeloma status post recent bone marrow biopsy. 
- follows with Dr. Darrin Hilliard as outpt - on pain pump morphine/ bupivacaine, outpatient followed by Dr Negro Wyman Right toe infection with presumed osteomyelitis - currently receiving treatment with ciprofloxacin as an outpatient. - c/w po levaquin Lower extremity stasis History of deep venous thrombosis - on xarelto 
- dopplers negative of DVT Hypokalemia - replaced GERD - on PPI, pepcid Neuropathy - Resume Home Elavil HLD - home statin Rheumatoid Arthritis  
- Has an Implanted pain Pump  
- 10mg prednisone daily  
 
Code status: Full DVT prophylaxis: xarelto on hold Plan: Probable discharge today vs tomorrow Disposition: TBD 
---------------------------------------------- 
 
CC: Back pain S: Pain control with medications Continues to be on pain pump Review of Systems: A comprehensive review of systems was negative. O: 
Visit Vitals /66 (BP 1 Location: Left arm, BP Patient Position: At rest) Pulse 100 Temp 97.4 °F (36.3 °C) Resp 14 Ht 5' 3\" (1.6 m) Wt 89.4 kg (197 lb) SpO2 92% Breastfeeding? No  
BMI 34.90 kg/m² PHYSICAL EXAM: 
Gen: moderate distress due to pain HEENT: anicteric sclerae, normal conjunctiva, oropharynx clear, MM moist 
Neck: supple, trachea midline, no adenopathy Heart: RRR, no MRG, no JVD, no peripheral edema Lungs: CTA b/l, non-labored respirations Abd: soft, NT, ND, BS+, no organomegaly Extr: left leg - erythema on shin. rom restricted due to pain. Can not examine back as patient unable to sit forward Skin: dry, no rash Neuro: CN II-XII grossly intact, normal speech, moves all extremities Psych: normal mood, appropriate affect Intake/Output Summary (Last 24 hours) at 3/20/2019 1349 Last data filed at 3/20/2019 2469 Gross per 24 hour Intake 730 ml Output 1200 ml Net -470 ml Recent labs & imaging reviewed: 
No results found for this or any previous visit (from the past 24 hour(s)). Recent Labs  
  03/18/19 
0400 WBC 7.9 HGB 8.9* HCT 32.7*  
  Recent Labs  
  03/19/19 
0500 03/18/19 
0400  140  
K 3.9 3.3*  
 109* CO2 29 26 BUN 8 6 CREA 0.75 0.64 GLU 84 87  
CA 8.8 7.7* No results for input(s): SGOT, GPT, ALT, AP, TBIL, TBILI, TP, ALB, GLOB, GGT, AML, LPSE in the last 72 hours. No lab exists for component: AMYP, HLPSE Recent Labs  
  03/19/19 
0500 INR 1.1 PTP 10.9 No results for input(s): FE, TIBC, PSAT, FERR in the last 72 hours. Lab Results Component Value Date/Time Folate 34.3 (H) 05/28/2010 04:30 AM  
  
No results for input(s): PH, PCO2, PO2 in the last 72 hours. No results for input(s): CPK, CKNDX, TROIQ in the last 72 hours. No lab exists for component: CPKMB Lab Results Component Value Date/Time Cholesterol, total 137 03/06/2017 08:01 AM  
 HDL Cholesterol 47 03/06/2017 08:01 AM  
 LDL, calculated 38 03/06/2017 08:01 AM  
 Triglyceride 260 (H) 03/06/2017 08:01 AM  
 CHOL/HDL Ratio 5.0 12/16/2011 05:05 AM  
 
Lab Results Component Value Date/Time Glucose (POC) 113 (H) 06/02/2016 11:06 AM  
 Glucose (POC) 87 09/15/2012 08:29 PM  
 Glucose (POC) 87 09/15/2012 11:58 AM  
 
Lab Results Component Value Date/Time Color YELLOW/STRAW 01/24/2019 08:09 PM  
 Appearance CLEAR 01/24/2019 08:09 PM  
 Specific gravity <1.005 01/24/2019 08:09 PM  
 Specific gravity 1.008 03/01/2016 03:44 PM  
 pH (UA) 7.5 01/24/2019 08:09 PM  
 Protein TRACE (A) 01/24/2019 08:09 PM  
 Glucose NEGATIVE  01/24/2019 08:09 PM  
 Ketone TRACE (A) 01/24/2019 08:09 PM  
 Bilirubin NEGATIVE  01/24/2019 08:09 PM  
 Urobilinogen 1.0 01/24/2019 08:09 PM  
 Nitrites NEGATIVE  01/24/2019 08:09 PM  
 Leukocyte Esterase NEGATIVE  01/24/2019 08:09 PM  
 Epithelial cells FEW 01/24/2019 08:09 PM  
 Bacteria NEGATIVE  01/24/2019 08:09 PM  
 WBC 0-4 01/24/2019 08:09 PM  
 RBC 10-20 01/24/2019 08:09 PM  
 
 
Med list reviewed Current Facility-Administered Medications Medication Dose Route Frequency  HYDROmorphone (DILAUDID) tablet 10 mg  10 mg Oral Q4H PRN  
 HYDROmorphone (DILAUDID) tablet 8 mg  8 mg Oral Q4H PRN  
 LLQ Morphine/Bupivacaine Pain Pump (Patient Supplied)  1 Bag Other CONTINUOUS  
 sodium chloride (NS) flush 5-40 mL  5-40 mL IntraVENous Q8H  
 sodium chloride (NS) flush 5-40 mL  5-40 mL IntraVENous PRN  
 acetaminophen (TYLENOL) tablet 650 mg  650 mg Oral Q4H PRN  
 HYDROmorphone (PF) (DILAUDID) injection 1 mg  1 mg IntraVENous Q3H PRN  
 aluminum hydrox-magnesium carb (GAVISCON) oral suspension 15 mL  15 mL Oral Q6H PRN  
 bisacodyl (DULCOLAX) suppository 10 mg  10 mg Rectal DAILY PRN  
 calcium carbonate (TUMS) chewable tablet 200 mg [elemental]  200 mg Oral BID PRN  
 calcium-vitamin D (OS-GLORIA) 500 mg-200 unit tablet  1 Tab Oral DAILY  cetirizine (ZYRTEC) tablet 10 mg  10 mg Oral DAILY PRN  
 cholecalciferol (VITAMIN D3) tablet 400 Units  400 Units Oral DAILY  diazePAM (VALIUM) tablet 10 mg  10 mg Oral BID  docusate sodium (COLACE) capsule 100 mg  100 mg Oral DAILY  docusate sodium (COLACE) capsule 300 mg  300 mg Oral QHS  fluticasone propionate (FLONASE) 50 mcg/actuation nasal spray 2 Spray  2 Spray Both Nostrils DAILY  gabapentin (NEURONTIN) capsule 300 mg  300 mg Oral QID  magnesium hydroxide (MILK OF MAGNESIA) 400 mg/5 mL oral suspension 30 mL  30 mL Oral DAILY PRN  
 milnacipran (SAVELLA) tablet 50 mg (Patient Supplied)  50 mg Oral BID  polyethylene glycol (MIRALAX) packet 17 g  17 g Oral DAILY PRN  potassium chloride SR (KLOR-CON 10) tablet 20 mEq  20 mEq Oral DAILY  predniSONE (DELTASONE) tablet 10 mg  10 mg Oral DAILY  prochlorperazine (COMPAZINE) tablet 5 mg  5 mg Oral Q6H PRN  
 famotidine (PEPCID) tablet 40 mg  40 mg Oral ACD  senna (SENOKOT) tablet 34.4 mg  4 Tab Oral BID  simvastatin (ZOCOR) tablet 20 mg  20 mg Oral QHS  spironolactone (ALDACTONE) tablet 50 mg  50 mg Oral BID PRN  
  levoFLOXacin (LEVAQUIN) tablet 500 mg  500 mg Oral Q24H  pantoprazole (PROTONIX) tablet 40 mg  40 mg Oral ACB  gentamicin (GARAMYCIN) 0.1 % cream   Topical DAILY  amitriptyline (ELAVIL) tablet 50 mg  50 mg Oral QHS Care Plan discussed with:  Patient/Family and Nurse Vamsi Dorado MD 
Internal Medicine Date of Service: 3/20/2019

## 2019-03-20 NOTE — PROGRESS NOTES
Bedside shift change report given to Rodrigo Ramirez (oncoming nurse) by Nick (offgoing nurse). Report included the following information SBAR.

## 2019-03-20 NOTE — PROGRESS NOTES
Problem: Self Care Deficits Care Plan (Adult) Goal: *Acute Goals and Plan of Care (Insert Text) Description Occupational Therapy Goals Initiated 3/19/2019 1. Patient will perform lower body dressing with supervision/set-up within 7 day(s) using AE PRN. 2.  Patient will perform bathing with supervision/set-up within 7 day(s) using AE PRN. 3.  Patient will perform grooming with supervision standing within 7 day(s). 4.  Patient will perform toilet transfers with modified independence within 7 day(s). 5.  Patient will perform all aspects of toileting with modified independence within 7 day(s). Outcome: Progressing Towards Goal 
 OCCUPATIONAL THERAPY TREATMENT Patient: Adri Narayan (33 y.o. female) Date: 3/20/2019 Diagnosis: Pain [R52] Severe pain [R52] Severe pain Precautions: Fall Chart, occupational therapy assessment, plan of care, and goals were reviewed. ASSESSMENT: 
Patient continues to present with significant back pain limiting all functional mobility at this time. Patient also limited by bladder issues with patient MOD A for SPT from bed <> BSC however patient incontinent of bladder x 3 following initial toileting tasks requiring multiple brief changes. Patient also presenting with confusion during session today requiring repetitive verbal cues for sequencing and problem-solving through basic tasks. Patient significantly below functional baseline at this time. Recommend inpatient rehab to maximize patient safety and independence with ADL transfers and tasks. Progression toward goals: 
?       Improving appropriately and progressing toward goals ? Improving slowly and progressing toward goals ? Not making progress toward goals and plan of care will be adjusted PLAN: 
Patient continues to benefit from skilled intervention to address the above impairments. Continue treatment per established plan of care. Discharge Recommendations:  Inpatient Rehab Further Equipment Recommendations for Discharge:  AE for LB ADLs and toileting (TBD at rehab) SUBJECTIVE:  
Patient stated ? My  helps take care of me at home. ? OBJECTIVE DATA SUMMARY:  
Cognitive/Behavioral Status: 
Neurologic State: Drowsy; Confused Orientation Level: Oriented to person;Oriented to place;Oriented to situation Cognition: Appropriate for age attention/concentration Perception: Appears intact Perseveration: No perseveration noted Safety/Judgement: Decreased insight into deficits; Decreased awareness of need for safety;Decreased awareness of need for assistance Functional Mobility and Transfers for ADLs: 
Bed Mobility: 
Supine to Sit: Minimum assistance Sit to Supine: Minimum assistance Transfers: 
Sit to Stand: Moderate assistance;Assist x1;Additional time Functional Transfers Toilet Transfer : Moderate assistance(BSC) Bed to Chair: Moderate assistance;Assist x2;Minimum assistance Balance: 
Sitting: Impaired; Without support Sitting - Static: Good (unsupported) Sitting - Dynamic: Fair (occasional) Standing: Impaired; Without support Standing - Static: Fair;Constant support Standing - Dynamic : Poor ADL Intervention: 
  
Patient is largely MOD A for ADL transfers requiring repetitive verbal cues and physical assistance. Patient educated on reacher for LB dressing today with patient requiring increased time and verbal cues for utilizing AE. OT introduced toilet tongs for pain management with toileting tasks however patient needs practice. Lower Body Dressing Assistance Pants With Elastic Waist: Minimum assistance Adaptive Equipment Used: Reacher Toileting Toileting Assistance: Moderate assistance Cognitive Retraining Safety/Judgement: Decreased insight into deficits; Decreased awareness of need for safety;Decreased awareness of need for assistance Pain: 
Pain Scale 1: Numeric (0 - 10) Pain Intensity 1: 7 Pain Intervention(s) 1: Medication (see MAR) Activity Tolerance: VSS Please refer to the flowsheet for vital signs taken during this treatment. After treatment:  
? Patient left in no apparent distress sitting up in chair ? Patient left in no apparent distress in bed 
? Call bell left within reach ? Nursing notified ? Caregiver present ? Bed alarm activated COMMUNICATION/COLLABORATION:  
The patient?s plan of care was discussed with: Physical Therapist and Registered Nurse Lazaro Perish Time Calculation: 30 mins

## 2019-03-21 VITALS
HEIGHT: 63 IN | SYSTOLIC BLOOD PRESSURE: 112 MMHG | WEIGHT: 197 LBS | OXYGEN SATURATION: 96 % | DIASTOLIC BLOOD PRESSURE: 70 MMHG | TEMPERATURE: 97.6 F | RESPIRATION RATE: 16 BRPM | BODY MASS INDEX: 34.91 KG/M2 | HEART RATE: 101 BPM

## 2019-03-21 PROCEDURE — 97535 SELF CARE MNGMENT TRAINING: CPT

## 2019-03-21 PROCEDURE — 74011250636 HC RX REV CODE- 250/636: Performed by: HOSPITALIST

## 2019-03-21 PROCEDURE — 74011250637 HC RX REV CODE- 250/637: Performed by: INTERNAL MEDICINE

## 2019-03-21 PROCEDURE — 97530 THERAPEUTIC ACTIVITIES: CPT

## 2019-03-21 PROCEDURE — 74011250637 HC RX REV CODE- 250/637: Performed by: NURSE PRACTITIONER

## 2019-03-21 PROCEDURE — 74011636637 HC RX REV CODE- 636/637: Performed by: INTERNAL MEDICINE

## 2019-03-21 PROCEDURE — 97116 GAIT TRAINING THERAPY: CPT

## 2019-03-21 RX ORDER — GENTAMICIN SULFATE 1 MG/G
CREAM TOPICAL
Qty: 15 G | Refills: 0 | Status: SHIPPED | OUTPATIENT
Start: 2019-03-22 | End: 2019-07-31 | Stop reason: ALTCHOICE

## 2019-03-21 RX ORDER — HEPARIN 100 UNIT/ML
300 SYRINGE INTRAVENOUS AS NEEDED
Status: DISCONTINUED | OUTPATIENT
Start: 2019-03-21 | End: 2019-03-21 | Stop reason: HOSPADM

## 2019-03-21 RX ADMIN — OYSTER SHELL CALCIUM WITH VITAMIN D 1 TABLET: 500; 200 TABLET, FILM COATED ORAL at 08:26

## 2019-03-21 RX ADMIN — CHOLECALCIFEROL TAB 10 MCG (400 UNIT) 400 UNITS: 10 TAB at 08:25

## 2019-03-21 RX ADMIN — GABAPENTIN 300 MG: 300 CAPSULE ORAL at 08:25

## 2019-03-21 RX ADMIN — SENNOSIDES 34.4 MG: 8.6 TABLET, FILM COATED ORAL at 08:25

## 2019-03-21 RX ADMIN — SENNOSIDES 34.4 MG: 8.6 TABLET, FILM COATED ORAL at 17:24

## 2019-03-21 RX ADMIN — DOCUSATE SODIUM 100 MG: 100 CAPSULE, LIQUID FILLED ORAL at 08:25

## 2019-03-21 RX ADMIN — FAMOTIDINE 40 MG: 20 TABLET ORAL at 17:24

## 2019-03-21 RX ADMIN — Medication 300 UNITS: at 18:57

## 2019-03-21 RX ADMIN — PANTOPRAZOLE SODIUM 40 MG: 40 TABLET, DELAYED RELEASE ORAL at 07:17

## 2019-03-21 RX ADMIN — POTASSIUM CHLORIDE 20 MEQ: 750 TABLET, EXTENDED RELEASE ORAL at 08:25

## 2019-03-21 RX ADMIN — HYDROMORPHONE HYDROCHLORIDE 8 MG: 4 TABLET ORAL at 17:24

## 2019-03-21 RX ADMIN — HYDROMORPHONE HYDROCHLORIDE 8 MG: 4 TABLET ORAL at 12:16

## 2019-03-21 RX ADMIN — PREDNISONE 10 MG: 10 TABLET ORAL at 08:25

## 2019-03-21 RX ADMIN — DIAZEPAM 10 MG: 5 TABLET ORAL at 08:25

## 2019-03-21 RX ADMIN — GENTAMICIN SULFATE: 1 CREAM TOPICAL at 08:26

## 2019-03-21 RX ADMIN — GABAPENTIN 300 MG: 300 CAPSULE ORAL at 17:24

## 2019-03-21 RX ADMIN — Medication 10 ML: at 06:00

## 2019-03-21 RX ADMIN — FLUTICASONE PROPIONATE 2 SPRAY: 50 SPRAY, METERED NASAL at 08:25

## 2019-03-21 RX ADMIN — HYDROMORPHONE HYDROCHLORIDE 8 MG: 4 TABLET ORAL at 07:19

## 2019-03-21 RX ADMIN — DIAZEPAM 10 MG: 5 TABLET ORAL at 17:24

## 2019-03-21 RX ADMIN — GABAPENTIN 300 MG: 300 CAPSULE ORAL at 12:16

## 2019-03-21 NOTE — PROGRESS NOTES
Hospitalist Progress Note Hospital summary:  The patient is a 78-year-old lady with history of multiple myeloma, who presented to the emergency department complaining of severe left-sided back pain and left leg pain. 
  
The patient has been in her usual health until a couple of days ago when she started experiencing progressive low back pain. Three days ago, she underwent bone marrow biopsy on the right side. The procedure went well and she had no right-sided low back pain right after the procedure. The following day though she began to experience progressive pain in the low back, at first in the middle and right side then going to the left buttock and radiating down the left leg down to the foot. She described intermittent episodes of really sharp pain, 10/10 in severity radiating down the left leg. She has had difficulty walking mostly due to pain and it is not clear if she had actual weakness in her leg. She denies any difficulty urinating or difficulties with her bowels, but she does have chronic constipation. She has no fevers, chills, or sweats. No abdominal pain. She is currently finishing a course of Cipro for a bone infection in her right toe per her report. she was barely able to bear any weight with assistance due to her severe pain. 3/16/2019 Assessment/Plan: 
Severe acute on chronic back pain  
-  suspect this is radicular pain related to her advanced osteoarthritis. - CT spine: Roughly stable multilevel degenerative change with stenosis, and compression 
deformities as described above, except for mild progression of compression at L3 which has been treated with vertebroplasty or kyphoplasty. - pain management - po dilaudid, fentanyl patch, gabapentin, valium  
- PT/OT inpatient rehab 
- Neurosurgery recs: conservative treatment.   
-s/p Left JOSE L4-5 3/19 
-Appreciate discussion with the patient and the , along with palliative as well as  Multiple myeloma status post recent bone marrow biopsy. 
- follows with Dr. Yair Hurd as outpt 
- on pain pump morphine/ bupivacaine, outpatient followed by Dr Tatyana Funes discussion with Palliative Right toe infection with presumed osteomyelitis - currently receiving treatment with ciprofloxacin as an outpatient. - Was getting po levaquin 3/16 to 3/20. Now off, no need for any more antibiotic Lower extremity stasis History of deep venous thrombosis - on xarelto 
- dopplers negative of DVT Hypokalemia - replaced GERD - on PPI, pepcid Neuropathy - Resume Home Elavil HLD - home statin Rheumatoid Arthritis  
- Has an Implanted pain Pump  
- 10mg prednisone daily  
 
PT/OT inpatient rehab Code status: Full DVT prophylaxis: xarelto on hold PTA: home Plan: The patient is medically stable, awaiting rehab. CM aware Disposition: as above 
---------------------------------------------- 
 
CC: Back pain S: Pain controlled with medications Continues to be on pain pump No new issues Review of Systems: A comprehensive review of systems was negative. O: 
Visit Vitals /73 (BP 1 Location: Left arm, BP Patient Position: At rest) Pulse 94 Temp 97.9 °F (36.6 °C) Resp 16 Ht 5' 3\" (1.6 m) Wt 89.4 kg (197 lb) SpO2 97% Breastfeeding? No  
BMI 34.90 kg/m² PHYSICAL EXAM: 
Gen: moderate distress due to pain HEENT: anicteric sclerae, normal conjunctiva, oropharynx clear, MM moist 
Neck: supple, trachea midline, no adenopathy Heart: RRR, no MRG, no JVD, no peripheral edema Lungs: CTA b/l, non-labored respirations Abd: soft, NT, ND, BS+, no organomegaly Extr: left leg - erythema on shin. rom restricted due to pain. Can not examine back as patient unable to sit forward Skin: dry, no rash Neuro: CN II-XII grossly intact, normal speech, moves all extremities Psych: normal mood, appropriate affect Intake/Output Summary (Last 24 hours) at 3/21/2019 0920 Last data filed at 3/21/2019 2413 Gross per 24 hour Intake 240 ml Output 1400 ml Net -1160 ml Recent labs & imaging reviewed: 
No results found for this or any previous visit (from the past 24 hour(s)). No results for input(s): WBC, HGB, HCT, PLT, HGBEXT, HCTEXT, PLTEXT, HGBEXT, HCTEXT, PLTEXT in the last 72 hours. Recent Labs  
  03/19/19 
0500   
K 3.9  CO2 29 BUN 8  
CREA 0.75 GLU 84  
CA 8.8 No results for input(s): SGOT, GPT, ALT, AP, TBIL, TBILI, TP, ALB, GLOB, GGT, AML, LPSE in the last 72 hours. No lab exists for component: AMYP, HLPSE Recent Labs  
  03/19/19 
0500 INR 1.1 PTP 10.9 No results for input(s): FE, TIBC, PSAT, FERR in the last 72 hours. Lab Results Component Value Date/Time Folate 34.3 (H) 05/28/2010 04:30 AM  
  
No results for input(s): PH, PCO2, PO2 in the last 72 hours. No results for input(s): CPK, CKNDX, TROIQ in the last 72 hours. No lab exists for component: CPKMB Lab Results Component Value Date/Time Cholesterol, total 137 03/06/2017 08:01 AM  
 HDL Cholesterol 47 03/06/2017 08:01 AM  
 LDL, calculated 38 03/06/2017 08:01 AM  
 Triglyceride 260 (H) 03/06/2017 08:01 AM  
 CHOL/HDL Ratio 5.0 12/16/2011 05:05 AM  
 
Lab Results Component Value Date/Time Glucose (POC) 113 (H) 06/02/2016 11:06 AM  
 Glucose (POC) 87 09/15/2012 08:29 PM  
 Glucose (POC) 87 09/15/2012 11:58 AM  
 
Lab Results Component Value Date/Time  Color YELLOW/STRAW 01/24/2019 08:09 PM  
 Appearance CLEAR 01/24/2019 08:09 PM  
 Specific gravity <1.005 01/24/2019 08:09 PM  
 Specific gravity 1.008 03/01/2016 03:44 PM  
 pH (UA) 7.5 01/24/2019 08:09 PM  
 Protein TRACE (A) 01/24/2019 08:09 PM  
 Glucose NEGATIVE  01/24/2019 08:09 PM  
 Ketone TRACE (A) 01/24/2019 08:09 PM  
 Bilirubin NEGATIVE  01/24/2019 08:09 PM  
 Urobilinogen 1.0 01/24/2019 08:09 PM  
 Nitrites NEGATIVE  01/24/2019 08:09 PM  
 Leukocyte Esterase NEGATIVE  01/24/2019 08:09 PM  
 Epithelial cells FEW 01/24/2019 08:09 PM  
 Bacteria NEGATIVE  01/24/2019 08:09 PM  
 WBC 0-4 01/24/2019 08:09 PM  
 RBC 10-20 01/24/2019 08:09 PM  
 
 
Med list reviewed Current Facility-Administered Medications Medication Dose Route Frequency  HYDROmorphone (DILAUDID) tablet 10 mg  10 mg Oral Q4H PRN  
 HYDROmorphone (DILAUDID) tablet 8 mg  8 mg Oral Q4H PRN  
 LLQ Morphine/Bupivacaine Pain Pump (Patient Supplied)  1 Bag Other CONTINUOUS  
 sodium chloride (NS) flush 5-40 mL  5-40 mL IntraVENous Q8H  
 sodium chloride (NS) flush 5-40 mL  5-40 mL IntraVENous PRN  
 acetaminophen (TYLENOL) tablet 650 mg  650 mg Oral Q4H PRN  
 HYDROmorphone (PF) (DILAUDID) injection 1 mg  1 mg IntraVENous Q3H PRN  
 aluminum hydrox-magnesium carb (GAVISCON) oral suspension 15 mL  15 mL Oral Q6H PRN  
 bisacodyl (DULCOLAX) suppository 10 mg  10 mg Rectal DAILY PRN  
 calcium carbonate (TUMS) chewable tablet 200 mg [elemental]  200 mg Oral BID PRN  
 calcium-vitamin D (OS-GLORIA) 500 mg-200 unit tablet  1 Tab Oral DAILY  cetirizine (ZYRTEC) tablet 10 mg  10 mg Oral DAILY PRN  
 cholecalciferol (VITAMIN D3) tablet 400 Units  400 Units Oral DAILY  diazePAM (VALIUM) tablet 10 mg  10 mg Oral BID  docusate sodium (COLACE) capsule 100 mg  100 mg Oral DAILY  docusate sodium (COLACE) capsule 300 mg  300 mg Oral QHS  fluticasone propionate (FLONASE) 50 mcg/actuation nasal spray 2 Spray  2 Spray Both Nostrils DAILY  gabapentin (NEURONTIN) capsule 300 mg  300 mg Oral QID  magnesium hydroxide (MILK OF MAGNESIA) 400 mg/5 mL oral suspension 30 mL  30 mL Oral DAILY PRN  
 milnacipran (SAVELLA) tablet 50 mg (Patient Supplied)  50 mg Oral BID  polyethylene glycol (MIRALAX) packet 17 g  17 g Oral DAILY PRN  potassium chloride SR (KLOR-CON 10) tablet 20 mEq  20 mEq Oral DAILY  predniSONE (DELTASONE) tablet 10 mg  10 mg Oral DAILY  prochlorperazine (COMPAZINE) tablet 5 mg  5 mg Oral Q6H PRN  
 famotidine (PEPCID) tablet 40 mg  40 mg Oral ACD  senna (SENOKOT) tablet 34.4 mg  4 Tab Oral BID  simvastatin (ZOCOR) tablet 20 mg  20 mg Oral QHS  spironolactone (ALDACTONE) tablet 50 mg  50 mg Oral BID PRN  pantoprazole (PROTONIX) tablet 40 mg  40 mg Oral ACB  gentamicin (GARAMYCIN) 0.1 % cream   Topical DAILY  amitriptyline (ELAVIL) tablet 50 mg  50 mg Oral QHS Care Plan discussed with:  Patient/Family and Nurse Alisson King MD 
Internal Medicine Date of Service: 3/21/2019

## 2019-03-21 NOTE — ROUTINE PROCESS
Bedside and Verbal shift change report given to Willam (oncoming nurse) by Nalini Forrest (offgoing nurse). Report included the following information SBAR, Kardex, Intake/Output, MAR, Accordion and Recent Results.

## 2019-03-21 NOTE — PROGRESS NOTES
Problem: Self Care Deficits Care Plan (Adult) Goal: *Acute Goals and Plan of Care (Insert Text) Description Occupational Therapy Goals Initiated 3/19/2019 1. Patient will perform lower body dressing with supervision/set-up within 7 day(s) using AE PRN. 2.  Patient will perform bathing with supervision/set-up within 7 day(s) using AE PRN. 3.  Patient will perform grooming with supervision standing within 7 day(s). 4.  Patient will perform toilet transfers with modified independence within 7 day(s). 5.  Patient will perform all aspects of toileting with modified independence within 7 day(s). Outcome: Progressing Towards Goal 
 OCCUPATIONAL THERAPY TREATMENT Patient: Deep Frazier (57 y.o. female) Date: 3/21/2019 Diagnosis: Pain [R52] Severe pain [R52] Severe pain Precautions: Fall Chart, occupational therapy assessment, plan of care, and goals were reviewed. ASSESSMENT: 
Patient presenting with increased difficulty performing log roll for bed mobility today requiring MOD A from OT for supine <> sit transfers as well as sit <> stand transfers for LB bathing 2* pain. Patient setup for bathing seated EOB however requiring A for periarea and bilateral lower legs. Patient continues to be limited by back pain, decreased endurance, and intermittent confusion. Patient is working toward tolerating 3 hours of therapy a day. Continue to recommend inpatient rehab to maximize patient safety and independence with ADL transfers and tasks. Progression toward goals: 
?       Improving appropriately and progressing toward goals ? Improving slowly and progressing toward goals ? Not making progress toward goals and plan of care will be adjusted PLAN: 
Patient continues to benefit from skilled intervention to address the above impairments. Continue treatment per established plan of care. Discharge Recommendations:  Inpatient Rehab Further Equipment Recommendations for Discharge:  TBD at rehab SUBJECTIVE:  
Patient stated ? I am sorry if I have been a pain in the butt. ? OBJECTIVE DATA SUMMARY:  
Cognitive/Behavioral Status: 
Neurologic State: Alert Orientation Level: Oriented to person;Oriented to place;Oriented to situation Cognition: Decreased attention/concentration;Decreased command following Perception: Appears intact Perseveration: No perseveration noted Safety/Judgement: Decreased awareness of need for safety Functional Mobility and Transfers for ADLs: 
Bed Mobility: 
Supine to Sit: Moderate assistance;Assist x1;Additional time Sit to Supine: Maximum assistance;Assist x1;Additional time Transfers: 
Sit to Stand: Moderate assistance;Assist x1;Additional time Balance: 
Sitting: Impaired; Without support Sitting - Static: Good (unsupported) Sitting - Dynamic: Fair (occasional) Standing: Impaired; Without support Standing - Static: Fair;Constant support Standing - Dynamic : Poor ADL Intervention: 
  
Patient able to tolerate sitting EOB for ~15 minutes for UB and LB bathing and LB dressing to include underwear and pad due to occasional urinary incontinence. Patient required MIN A for managing hospital gown. Patient requiring increased level of A today for bed mobility with generalized weakness and pain noted. Patient setup for self-feeding tasks following OT intervention and was left supine with call bell in reach. Upper Body Bathing Bathing Assistance: Set-up Lower Body Bathing Bathing Assistance: Moderate assistance Upper Body Dressing Assistance Hospital Gown: Minimum  assistance Cognitive Retraining Safety/Judgement: Decreased awareness of need for safety Pain: 
Pain Scale 1: Numeric (0 - 10) Pain Intensity 1: 8 Pain Location 1: Back;Hip;Leg 
Pain Orientation 1: Left Pain Description 1: Aching;Constant; Lysbeth Denise Pain Intervention(s) 1: Medication (see MAR) Activity Tolerance:  
VSS (2L NC O2) Please refer to the flowsheet for vital signs taken during this treatment. After treatment:  
? Patient left in no apparent distress sitting up in chair ? Patient left in no apparent distress in bed 
? Call bell left within reach ? Nursing notified ? Caregiver present ? Bed alarm activated COMMUNICATION/COLLABORATION:  
The patient?s plan of care was discussed with: Physical Therapist and Registered Nurse Subhash Glynn Time Calculation: 23 mins

## 2019-03-21 NOTE — DISCHARGE SUMMARY
Discharge Summary PATIENT ID: Gerry Willoughby MRN: [id number] YOB: 1945 DATE OF ADMISSION: 3/16/2019  9:45 AM   
DATE OF DISCHARGE: 3/21/2019 PRIMARY CARE PROVIDER: Vini Banks MD  
 
ATTENDING PHYSICIAN: Dr Oliva Noriega DISCHARGING PROVIDER: Oliva Noriega MD   
To contact this individual call [phone] and ask the  to page. If unavailable ask to be transferred the Adult Hospitalist Department. CONSULTATIONS: IP CONSULT TO PALLIATIVE CARE - PROVIDER 
IP CONSULT TO INTERVENTIONAL RADIOLOGY 
IP CONSULT TO ORTHOPEDIC SURGERY 
 
PROCEDURES/SURGERIES: * No surgery found * [address] COURSE:  
Severe acute on chronic back pain  
-  suspect this is radicular pain related to her advanced osteoarthritis. - CT spine: Roughly stable multilevel degenerative change with stenosis, and compression 
deformities as described above, except for mild progression of compression at L3 which has been treated with vertebroplasty or kyphoplasty. - pain management - po dilaudid, fentanyl patch, gabapentin, valium  
- PT/OT inpatient rehab 
- Neurosurgery recs: conservative treatment. -s/p Left JOSE L4-5 3/19 
-Appreciate discussion with the patient and the , along with palliative as well as  Multiple myeloma status post recent bone marrow biopsy. 
- follows with Dr. Mark Ruff as outpt 
- on pain pump morphine/ bupivacaine, outpatient followed by Dr Jeovanny Valencia discussion with Palliative Right toe infection with presumed osteomyelitis - currently receiving treatment with ciprofloxacin as an outpatient. - Was getting po levaquin 3/16 to 3/20. Now off, no need for any more antibiotic Lower extremity stasis History of deep venous thrombosis - on xarelto 
- dopplers negative of DVT Hypokalemia - replaced GERD - on PPI, pepcid Neuropathy - Resume Home Elavil HLD - home statin Rheumatoid Arthritis - Has an Implanted pain Pump  
- 10mg prednisone daily  
 
PT/OT inpatient rehab Code status: Full DVT prophylaxis: xarelto on hold PTA: home DISCHARGE DIAGNOSES / PLAN:   
 
1. Severe acute on chronic back pain PENDING TEST RESULTS:  
At the time of discharge the following test results are still pending: none FOLLOW UP APPOINTMENTS:   
Follow-up Information Follow up With Specialties Details Why Contact Info Jordan Serrato MD Internal Medicine In 1 week  [address] [facility] [address] 
[phone] [facility] PALLIATIVE CARE Pallative Care  Schedule an appointment as soon as possible for a visit  [address] [facility] [address] 
[phone] [facility] EMERGENCY DEP Emergency Medicine  If symptoms worsen [address] [facility] [address] 
[phone] ADDITIONAL CARE RECOMMENDATIONS:  
Follow up with PMD 
Follow up with Dr Dino Oliva Follow up with Teddy Crisostomo DIET: Cardiac Diet ACTIVITY: Activity as tolerated DISCHARGE MEDICATIONS: 
 See Medication Reconciliation Form NOTIFY YOUR PHYSICIAN FOR ANY OF THE FOLLOWING:  
Fever over 101 degrees for 24 hours. Chest pain, shortness of breath, fever, chills, nausea, vomiting, diarrhea, change in mentation, falling, weakness, bleeding. Severe pain or pain not relieved by medications. Or, any other signs or symptoms that you may have questions about. DISPOSITION: 
  Home With: 
 OT  PT  HH  RN  
  
x SNF/Inpatient Rehab/LTAC Independent/assisted living Hospice Other:  
 
 
PATIENT CONDITION AT DISCHARGE:  
 
Functional status Poor   
x Deconditioned Independent Cognition  
x Lucid Forgetful Dementia Catheters/lines (plus indication) [place] PICC   
 PEG   
x None Code status  
 x Full code DNR   
 
PHYSICAL EXAMINATION AT DISCHARGE: 
Please see progress note CHRONIC MEDICAL DIAGNOSES: 
 Problem List as of 3/21/2019 Date Reviewed: 3/19/2019 Codes Class Noted - Resolved Pain ICD-10-CM: R52 ICD-9-CM: 780.96  3/16/2019 - Present * (Principal) Severe pain ICD-10-CM: R52 ICD-9-CM: 780.96  3/16/2019 - Present [facility] obstruction) [facility] ICD-10-CM: S33.741 ICD-9-CM: 560.9  1/24/2019 - Present Recurrent ventral incisional hernia ICD-10-CM: K11.2 ICD-9-CM: 553.21  3/28/2016 - Present Advance directive on file ICD-10-CM: Z78.9 ICD-9-CM: V49.89  3/9/2016 - Present Acute bronchitis ICD-10-CM: J20.9 ICD-9-CM: 466.0  3/3/2016 - Present Sinusitis ICD-10-CM: J32.9 ICD-9-CM: 473.9  3/3/2016 - Present Dysphagia ICD-10-CM: R13.10 ICD-9-CM: 787.20  1/7/2016 - Present Umbilical hernia CJF-87-XF: K42.9 ICD-9-CM: 553.1  10/15/2014 - Present Recurrent umbilical hernia KML-60-QK: K42.9 ICD-9-CM: 553.1  6/10/2013 - Present Other complication due to venous access device [facility]) ICD-10-CM: J04.350W ICD-9-CM: 996.74  1/29/2013 - Present Infected seroma, postoperative ICD-10-CM: [id number] ICD-9-CM: 998.51  9/13/2012 - Present Multiple myeloma (HCC) ICD-10-CM: C90.00 ICD-9-CM: 203.00  9/29/2009 - Present Fibromyalgia ICD-10-CM: M79.7 ICD-9-CM: 729.1  9/29/2009 - Present GERD (gastroesophageal reflux disease) ICD-10-CM: K21.9 ICD-9-CM: 530.81  9/29/2009 - Present Rheumatoid arthritis involving multiple sites with positive rheumatoid factor (HCC) ICD-10-CM: M05.79 ICD-9-CM: 714.0  9/29/2009 - Present RESOLVED: Hypoxia ICD-10-CM: R09.02 
ICD-9-CM: 799.02  3/1/2016 - 3/3/2016 RESOLVED: Acute pancreatitis ICD-10-CM: K85.90 ICD-9-CM: 657.4  12/15/2011 - 12/18/2011 Greater than 37 minutes were spent with the patient on counseling and coordination of care Signed:  
Harsha Abraham MD 
3/21/2019 
10:51 AM

## 2019-03-21 NOTE — WOUND CARE
WOCN Note:  
  
Follow assessment of right 3rd toe. Wound has been present for about 2 weeks.  at the bedside. Chart reviewed. Admitted DX:  Pain [R52] Severe pain [R52] Past Medical History: multiple myeloma 
  
Assessment:  
Patient is A&O x 3, communicative and requires assist of 1 with repositioning. Bed: [facility] Patient reports no pain. Heels offloaded on pillow. 
   
1. Present on Admission Right plantar third toe = 3 x 4 x 0.1 cm  100% pink. no exudate, odor or erythema. Recommendations:   
Continue gentamycin daily as ordered to the wound. 
  
Skin Care & Pressure Prevention: 
Minimize layers of linen/pads under patient to optimize support surface. Turn/reposition approximately every 2 hours and offload heels. Manage incontinence / promote continence;  
  
Discussed above plan with Dr Beata Cunningham and patient. 
  
Transition of Care: Plan to follow as needed while admitted to hospital. 
  
ALEX GomezN GONZÁLEZ Suarez Wound Care Office [phone] Pager [phone]

## 2019-03-21 NOTE — PROGRESS NOTES
3/21/19; 09:45 -  
CM rounded on patient with attending and palliative care with spouse at bedside. Patient is to follow up with her pain specialist OP for adjustment to her pain pump. Medical team reiterated that patient will not be a safe discharge to home. Patient is in agreement to referral being sent to [facility]. CM submitted referral via [employer]. CRM: Shaq Cantu, MPH, [address] [place] [address]; Z: [phone]

## 2019-03-21 NOTE — PROGRESS NOTES
Problem: Mobility Impaired (Adult and Pediatric) Goal: *Acute Goals and Plan of Care (Insert Text) Description Physical Therapy Goals Initiated 3/19/2019 1. Patient will move from supine to sit and sit to supine  in bed with minimal assistance/contact guard assist within 7 day(s). 2.  Patient will transfer from bed to chair and chair to bed with minimal assistance/contact guard assist using the least restrictive device within 7 day(s). 3.  Patient will perform sit to stand with minimal assistance/contact guard assist within 7 day(s). 4.  Patient will ambulate with minimal assistance/contact guard assist for 50 feet with the least restrictive device within 7 day(s). 5.  Patient will ascend/descend 2 stairs with single handrail(s) with minimal assistance/contact guard assist within 7 day(s). Outcome: Progressing Towards Goal 
PHYSICAL THERAPY TREATMENT Patient: Nyla Marie (82 y.o. female) Date: 3/21/2019 Diagnosis: Pain [R52] Severe pain [R52] Severe pain Precautions: Fall Chart, physical therapy assessment, plan of care and goals were reviewed. ASSESSMENT: 
Pt received supine in bed and agreeable to therapy. Pt tolerated session fairly. Pt continues to be limited by increased pain in low back, and R hip. Pt completed supine to sit with mod A and additional time and effort. Pt performed sit<>stand x 3 trials from the bed with mod A and RW, sit<>stand from the [facility] with mod A and HHA. Gait training completed over 15 feet with RW with min A x 2. Pt with antalgic gait, decreased step length bilaterally, step to gait pattern. Pt returned to supine position with max A. Pt will continue to benefit from inpatient rehab upon discharge to continue therapy efforts due to patient being well below baseline mobility status and at risk for falls. Progression toward goals: 
?    Improving appropriately and progressing toward goals ? Improving slowly and progressing toward goals ?    Not making progress toward goals and plan of care will be adjusted PLAN: 
Patient continues to benefit from skilled intervention to address the above impairments. Continue treatment per established plan of care. Discharge Recommendations:  Inpatient Rehab Further Equipment Recommendations for Discharge:  tbd SUBJECTIVE:  
Patient stated ? It's hurting a lot right now.? OBJECTIVE DATA SUMMARY:  
Critical Behavior: 
Neurologic State: Alert Orientation Level: Oriented to person, Oriented to place, Oriented to situation Cognition: Decreased attention/concentration, Decreased command following Safety/Judgement: Decreased awareness of need for safety Functional Mobility Training: 
Bed Mobility: 
  
Supine to Sit: Moderate assistance;Assist x1;Additional time Sit to Supine: Maximum assistance;Assist x1;Additional time Transfers: 
Sit to Stand: Moderate assistance;Assist x1;Additional time Stand to Sit: Moderate assistance;Assist x1;Additional time Balance: 
Sitting: Impaired; Without support Sitting - Static: Good (unsupported) Sitting - Dynamic: Fair (occasional) Standing: Impaired; Without support Standing - Static: Fair;Constant support Standing - Dynamic : Poor Ambulation/Gait Training: 
Distance (ft): 15 Feet (ft) Assistive Device: Gait belt;Walker, rolling Ambulation - Level of Assistance: Minimal assistance;Assist x2 Gait Abnormalities: Decreased step clearance; Step to gait; Antalgic Base of Support: Widened Speed/Tiki: Pace decreased (<100 feet/min); Shuffled Step Length: Right shortened;Left shortened Pain: 
Pain Scale 1: Numeric (0 - 10) Pain Intensity 1: 8 Pain Location 1: Back;Hip;Leg 
Pain Orientation 1: Left Pain Description 1: Aching;Constant; [place] Preemption Pain Intervention(s) 1: Medication (see MAR) Activity Tolerance:  
Fair. VSS. Limited by pain Please refer to the flowsheet for vital signs taken during this treatment. After treatment:  
?    Patient left in no apparent distress sitting up in chair ? Patient left in no apparent distress in bed 
? Call bell left within reach ? Nursing notified ? Caregiver present ? Bed alarm activated COMMUNICATION/COLLABORATION:  
The patient?s plan of care was discussed with: Occupational Therapist and Registered Nurse Cristobal De La Rosa PT, DPT Time Calculation: 32 mins

## 2019-03-21 NOTE — PROGRESS NOTES
Palliative Medicine Consult [place]: [phone] Patient Name: Aaliyah Potts YOB: 1945 Date of Initial Consult: March 19, 2019 Reason for Consult: Pain Management Requesting Provider: Dr. Velvet Rogers Primary Care Physician: Dipesh Marks MD 
 
 SUMMARY:  
Aaliyah Potts is a 68 y.o. with a past history listed below, who was admitted on 3/16/2019 from home due to worsening pain s/s after having a bone marrow biopsy a few days prior. Pt has an extensive pain history which includes a intrathecal pump. PAST MEDICAL HISTORY:  Include; 1.  Multiple myeloma. 2.  Osteoporosis. 3.  Anemia. 4.  Hyperlipidemia. 5.  Urinary incontinence. 6.  Pancreatitis. 7.  Sleep apnea. 8.  Chronic pain. 9.  Rheumatoid arthritis. 10.  DVT in her right leg. 11.  Fibromyalgia. 12.  GERD. 13.  Anxiety. 15.  She also has a history of Salmonella infection. 15.  Aneurysm. 
  
Current medical issues leading to Palliative Medicine involvement include: support with pain management. Current Regimen 3/19/19: tylenol 650mg q 4 prn,elavil 50mg hs, valium 10mg bid, neurontin 300mg 4 times/day, dilaudid 8mg po every 4 hour prn, dilaudid 1mg every 3 hours prn, morphine/bupivacaine pump, savella bid, fentanyl 50mcq patch 
3/20/19: fentanyl patch discontinued, dilaudid 10mg tab added for severe pain q 4 prn 
3/21/29: dilaudid 10mg q 4 prn discontinued Interval History: 
3/19/19: JOSE L4-5 PALLIATIVE DIAGNOSES:  
1. Lower back pain 2. Left leg pain 3. Left foot pain 4. Physical debility PLAN:  
1. Met with pt, CM, Dr. Ghazala Plummer, and spouse at the bedside 2. Pt clinically appears to have improved overnight. Taking less oral dilaudid and off fentanyl patch that was started in the ED 3. Pt had 3 doses of 8mg oral dilaudid past 24 hours which her chronic dose usually taken q 4 hours at home. She prefers not to take the 10mg of oral dilaudid I ordered for acute pain so I will dc it. 4. Spoke with Dr. Sincere Loera this morning and he is amenable to titrating her meds in the pump but the pt would need to come to the office. 5. Pt may be dc to [facility] tomorrow so she will not be able to follow up with Dr. Sincere Loera until she is released from rehab 6. Will sign off. Contact information provided. Continue current regimen 7. Pt is followed by Dr. Ernie Kent [phone] for pain pump adjustments and fills. Dr. Alvaro Goode is the surgeon who placed the pump. Called Dr. Ainsley Akins office to discuss the pt. I left my information for a call back 8. Recommendation continue current regimen below 1. Continue 8mg po q 4 prn for chronic pain 2. Continue tylenol 650mg prn 3. Neurontin  300mg 4 times a day 4. Elavil 50mg q hs 5. Savella 50mg bid 6. Prednisone 10mg daily 7. Dilaudid 1mg iv prn. Pt not really using 8. Hopefully injection will provide enough relief so that additional meds will not be needed 9. Would recommend pt following up with Dr. Sincere Loera for adjustment in her pump if pain persists 9. Initial consult note routed to primary continuity provider and/or primary health care team members 10. Communicated plan of care with: Palliative [place] [facility] 192 Team 
 
 GOALS OF CARE / TREATMENT PREFERENCES:  
 
GOALS OF CARE: 
Patient/Health Care Proxy Stated Goals: Cure TREATMENT PREFERENCES:  
Code Status: Full Code Advance Care Planning: 
[x] The Pall Med Interdisciplinary Team has updated the ACP Navigator with Que and Patient Capacity Primary Decision MakerJacquekavya Figueroa Spouse - [phone] Advance Care Planning 3/19/2019 Patient's Healthcare Decision Maker is: Named in scanned ACP document Primary Decision Maker Name -  
Primary Decision Maker Phone Number -  
Primary Decision Maker Relationship to Patient -  
Confirm Advance Directive Yes, on file Does the patient have other document types -  
 
 
 Medical Interventions: Full interventions Other Instructions: Other: As far as possible, the palliative care team has discussed with patient / health care proxy about goals of care / treatment preferences for patient. HISTORY:  
 
History obtained from: pt, chart, team 
 
CHIEF COMPLAINT: pain HPI/SUBJECTIVE: The patient is:  
[x] Verbal and participatory [] Non-participatory due to:  
Overall improved today. No new complaints Clinical Pain Assessment (nonverbal scale for severity on nonverbal patients):  
Clinical Pain Assessment Severity: 3 Location: lower back, both feet, left leg Character: throbbing feet Duration: days Effect: unable to walk securely Factors: happened after BMB Frequency: all the time Duration: for how long has pt been experiencing pain (e.g., 2 days, 1 month, years) Frequency: how often pain is an issue (e.g., several times per day, once every few days, constant) FUNCTIONAL ASSESSMENT:  
 
Palliative Performance Scale (PPS): PPS: 60 PSYCHOSOCIAL/SPIRITUAL SCREENING:  
 
Palliative IDT has assessed this patient for cultural preferences / practices and a referral made as appropriate to needs (Cultural Services, Patient Advocacy, Ethics, etc.) Any spiritual / Gnosticism concerns: 
[] Yes /  [x] No 
 
Caregiver Burnout: 
[] Yes /  [x] No /  [] No Caregiver Present Anticipatory grief assessment:  
[x] Normal  / [] Maladaptive ESAS Anxiety: Anxiety: 0 
 
ESAS Depression: Depression: 0 REVIEW OF SYSTEMS:  
 
Positive and pertinent negative findings in ROS are noted above in HPI. The following systems were [x] reviewed / [] unable to be reviewed as noted in HPI Other findings are noted below. Systems: constitutional, ears/nose/mouth/throat, respiratory, gastrointestinal, genitourinary, musculoskeletal, integumentary, neurologic, psychiatric, endocrine. Positive findings noted below. Modified ESAS Completed by: provider Fatigue: 0 Drowsiness: 0 Depression: 0 Pain: 3 Anxiety: 0 Nausea: 0 Anorexia: 0 Dyspnea: 0 Constipation: No  
  Stool Occurrence(s): 1 PHYSICAL EXAM:  
 
From RN flowsheet: 
Wt Readings from Last 3 Encounters:  
03/16/19 197 lb (89.4 kg) 03/08/19 197 lb (89.4 kg) 02/07/19 187 lb (84.8 kg) Blood pressure 120/73, pulse 94, temperature 97.9 °F (36.6 °C), resp. rate 16, height 5' 3\" (1.6 m), weight 197 lb (89.4 kg), SpO2 97 %, not currently breastfeeding. Pain Scale 1: Numeric (0 - 10) Pain Intensity 1: 8 Pain Onset 1: acute Pain Location 1: Back, Hip, Leg 
Pain Orientation 1: Left Pain Description 1: Aching, Constant, Sharp Pain Intervention(s) 1: Medication (see MAR) Last bowel movement, if known:  
 
Constitutional: alert, verbal, working with PT Eyes: pupils equal, anicteric ENMT: no nasal discharge, moist mucous membranes Cardiovascular: regular rhythm, distal pulses intact Respiratory: breathing not labored, symmetric Gastrointestinal: gross, soft non-tender, +bowel sounds Musculoskeletal: no deformity, no tenderness to palpation Skin: warm, dry Neurologic: following commands, moving all extremities Psychiatric: full affect, no hallucinations Other: 
 
 
 HISTORY:  
 
Principal Problem: 
  Severe pain (3/16/2019) Active Problems: 
  Pain (3/16/2019) Past Medical History:  
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   
 spinal stenosis per pt  Fibromyalgia  GERD (gastroesophageal reflux disease)  High cholesterol  History of acute pancreatitis  History of blood transfusion  History of Salmonella infection 2009  Multiple myeloma ([address] 2007  Osteoporosis  Other complication due to venous access device [address] [address] 1/29/2013  Recurrent umbilical hernia 5/52/2812  Recurrent ventral incisional hernia 3/28/2016  Rheumatoid arthritis(714.0)  Sleep apnea INTOLERATANT OF CPAP  Thromboembolus ([address] Right leg DVT  Urinary incontinence Past Surgical History:  
Procedure Laterality Date  HX CHOLECYSTECTOMY    HX CRANIOTOMY    
 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    
 COLONOSCOPIES, EGD  HX GI    
 ESOPHAGUS STREACHED  
 HX HEENT  10/2011  
 b/l cataract surgery in 2011  HX HERNIA REPAIR  [id number] X6  
 HX HERNIA REPAIR  10-13-14  
 repair of recurrent ventral incisional hernia  with primary suture [id number]. Laree Query  HX KYPHOPLASTY  2017 L4  
 HX ORTHOPAEDIC Right  BONE SPURS SHOULDER  
 HX OTHER SURGICAL   LLQ PAIN PUMP FOR MORPHINE INFUSION  
 HX OTHER SURGICAL  14  
 repair of recurrent umbilical hernia with ventralex pillow top mesh and extensive lysis of [id number]. Laree Query  HX OTHER SURGICAL  16  
 repair of recurrent ventral incisional hernia with underlay mesh-[facility]-Dr. Wynne Query  HX VASCULAR ACCESS Right 2013 POWER PORT   
 HX VASCULAR ACCESS   PAIN PUMP  IR INJ FORAMIN EPID LUMB ANES/STER SNGL  3/19/2019  
 NEUROLOGICAL PROCEDURE UNLISTED   KYPHOPLASTY X2  
 NEUROLOGICAL PROCEDURE UNLISTED  10/26/15 Kyphoplasty t-5  PAIN PUMP DEVICE    
 implanted in LLQ, MORPHINE Family History Problem Relation Age of Onset 24 [facility] Arthritis-osteo Mother  Anesth Problems Neg Hx History reviewed, no pertinent family history. Social History Tobacco Use  Smoking status: Former Smoker Years: 10.00 Last attempt to quit: 10/7/2005 Years since quittin.4  Smokeless tobacco: Never Used Substance Use Topics  Alcohol use: No  
 
Allergies Allergen Reactions  Aggrenox [Aspirin-Dipyridamole] Rash Allergic to the dipyridamole and not aspirin.  Broccoli Other (comments) C/O GAS  Bumex [Bumetanide] Rash  Doxil [Doxorubicin, Peg-Liposomal] Rash Burning of skin  Flagyl [Metronidazole] Rash  Keflex [Cephalexin] Rash  Lasix [Furosemide] Rash  Macrobid [Nitrofurantoin Monohyd/M-Cryst] Shortness of Breath  Methotrexate Hives and Rash  Pcn [Penicillins] Rash  Sulfa (Sulfonamide Antibiotics) Hives  Tetanus And Diphtheria Toxoids Swelling  Thalidomide Rash Current Facility-Administered Medications Medication Dose Route Frequency  HYDROmorphone (DILAUDID) tablet 10 mg  10 mg Oral Q4H PRN  
 HYDROmorphone (DILAUDID) tablet 8 mg  8 mg Oral Q4H PRN  
 LLQ Morphine/Bupivacaine Pain Pump (Patient Supplied)  1 Bag Other CONTINUOUS  
 sodium chloride (NS) flush 5-40 mL  5-40 mL IntraVENous Q8H  
 sodium chloride (NS) flush 5-40 mL  5-40 mL IntraVENous PRN  
 acetaminophen (TYLENOL) tablet 650 mg  650 mg Oral Q4H PRN  
 HYDROmorphone (PF) (DILAUDID) injection 1 mg  1 mg IntraVENous Q3H PRN  
 aluminum hydrox-magnesium carb (GAVISCON) oral suspension 15 mL  15 mL Oral Q6H PRN  
 bisacodyl (DULCOLAX) suppository 10 mg  10 mg Rectal DAILY PRN  
 calcium carbonate (TUMS) chewable tablet 200 mg [elemental]  200 mg Oral BID PRN  
 calcium-vitamin D (OS-GLORIA) 500 mg-200 unit tablet  1 Tab Oral DAILY  cetirizine (ZYRTEC) tablet 10 mg  10 mg Oral DAILY PRN  
 cholecalciferol (VITAMIN D3) tablet 400 Units  400 Units Oral DAILY  diazePAM (VALIUM) tablet 10 mg  10 mg Oral BID  docusate sodium (COLACE) capsule 100 mg  100 mg Oral DAILY  docusate sodium (COLACE) capsule 300 mg  300 mg Oral QHS  fluticasone propionate (FLONASE) 50 mcg/actuation nasal spray 2 Spray  2 Spray Both Nostrils DAILY  gabapentin (NEURONTIN) capsule 300 mg  300 mg Oral QID  magnesium hydroxide (MILK OF MAGNESIA) 400 mg/5 mL oral suspension 30 mL  30 mL Oral DAILY PRN  
 milnacipran (SAVELLA) tablet 50 mg (Patient Supplied)  50 mg Oral BID  
  polyethylene glycol (MIRALAX) packet 17 g  17 g Oral DAILY PRN  potassium chloride SR (KLOR-CON 10) tablet 20 mEq  20 mEq Oral DAILY  predniSONE (DELTASONE) tablet 10 mg  10 mg Oral DAILY  prochlorperazine (COMPAZINE) tablet 5 mg  5 mg Oral Q6H PRN  
 famotidine (PEPCID) tablet 40 mg  40 mg Oral ACD  senna (SENOKOT) tablet 34.4 mg  4 Tab Oral BID  simvastatin (ZOCOR) tablet 20 mg  20 mg Oral QHS  spironolactone (ALDACTONE) tablet 50 mg  50 mg Oral BID PRN  pantoprazole (PROTONIX) tablet 40 mg  40 mg Oral ACB  gentamicin (GARAMYCIN) 0.1 % cream   Topical DAILY  amitriptyline (ELAVIL) tablet 50 mg  50 mg Oral QHS  
 
 
 
 LAB AND IMAGING FINDINGS:  
 
Lab Results Component Value Date/Time WBC 7.9 03/18/2019 04:00 AM  
 HGB 8.9 (L) 03/18/2019 04:00 AM  
 PLATELET 704 32/94/9820 04:00 AM  
 
Lab Results Component Value Date/Time Sodium 138 03/19/2019 05:00 AM  
 Potassium 3.9 03/19/2019 05:00 AM  
 Chloride 104 03/19/2019 05:00 AM  
 CO2 29 03/19/2019 05:00 AM  
 BUN 8 03/19/2019 05:00 AM  
 Creatinine 0.75 03/19/2019 05:00 AM  
 Calcium 8.8 03/19/2019 05:00 AM  
 Magnesium 2.0 06/06/2016 06:33 AM  
 Phosphorus 3.4 09/20/2012 06:05 AM  
  
Lab Results Component Value Date/Time AST (SGOT) 31 03/16/2019 09:58 AM  
 Alk. phosphatase 87 03/16/2019 09:58 AM  
 Protein, total 7.3 03/16/2019 09:58 AM  
 Albumin 3.2 (L) 03/16/2019 09:58 AM  
 Globulin 4.1 (H) 03/16/2019 09:58 AM  
 
Lab Results Component Value Date/Time INR 1.1 03/19/2019 05:00 AM  
 Prothrombin time 10.9 03/19/2019 05:00 AM  
 aPTT 78.5 (H) 01/29/2019 06:05 AM  
  
Lab Results Component Value Date/Time Iron 24 (L) 08/25/2011 01:00 PM  
 TIBC 353 08/25/2011 01:00 PM  
 Iron % saturation 7 (L) 08/25/2011 01:00 PM  
 Ferritin 151 09/16/2012 02:24 PM  
  
No results found for: PH, PCO2, PO2 No components found for: [place] Lab Results Component Value Date/Time  CK 62 03/01/2016 12:48 PM  
 CK - MB 0.8 03/01/2016 12:48 PM  
  
 
 
   
 
Total time: 35 minutes Counseling / coordination time, spent as noted above: 30 minutes 
> 50% counseling / coordination?: y 
 
Prolonged service was provided for  []30 min   []75 min in face to face time in the presence of the patient, spent as noted above. Time Start:  
Time End:  
Note: this can only be billed with 85786 (initial) or 61028 (follow up). If multiple start / stop times, list each separately.

## 2019-03-21 NOTE — PROGRESS NOTES
3/21/19; 16:45 -  
CM received notice from [facility] that patient is accepted for IPR placement. CM conferenced with attending. Attending is in agreement to patient's discharge. Evening CM notified unit of pending discharge. CM Team to submit transport request to [facility] via [employer] [facility]. Accepting Provider: Dr. Helaine Schaumann Accepting Liaison: Linda Sandoval, [phone] CM to complete: PCS, INITIATE EMTALA, FACE SHEET, SBAR, KARDEX, DNR, and H&P. Packet is on patient's hard chart. NURSING TO COMPLETE PRINTING AND COMPLETING EMTALA DISCHARGE AND MAR, CALL REPORT TO: [phone] AND FAX MAR AND DISCHARGE TO F: [phone] CRM: Ival Nissen, MPH, [address] [place] [address]; Z: [phone]

## 2019-03-22 ENCOUNTER — PATIENT OUTREACH (OUTPATIENT)
Dept: INTERNAL MEDICINE CLINIC | Age: 74
End: 2019-03-22

## 2019-03-22 NOTE — PROGRESS NOTES
Patient discharged to [facility]. Report called to [facility]. Opportunities for questions given at this time. Portacath de-accessed and flushed with Heparin. Pt off unit via stretcher. [facility] to transport pt to facility.

## 2019-03-22 NOTE — PROGRESS NOTES
Transition of Care Coordination/Hospital to Post Acute Facility: 
  
Date/Time:  3/22/2019 10:24 AM 
 
Patient was admitted to [facility] on 3/16/19 for treatment of severe back pain. Patient was discharged 3/21/19 to [facility] for continuation of care. Inpatient RRAT score: 22 Top Challenges reviewed Infected right toe-gentamycin cream daily Multiple myeloma- pain pump managed by Dr Denise Ragsdale Method of communication with care team :face to face Called KORIN Khan spoke to Taylor. Reports patients nurse was not available. Taylor asked if the call can be placed on hold to speak to Sly Louis Nurse manager. NN agreed. Taylor came back to phone after approximately 8 minutes to ask if I can call back Nurse Navigator(KORIN) spoke with patient/Rowena nurse manager, to provide introduction to self and explanation of the Nurse Navigator Role. Verified name and  as patient identifiers. Discussed and reviewed  anticipated length of stay, anticipated needs at time of discharge, follow up appointments, medication reconciliation, wound care orders ACP:  
Does the patient have a current ACP (including DDNR):  yes; however, no DDNR Does the post acute facility have a copy of the patients ACP:  N/A Medication(s):  
New Medications at Discharge:ciprofloxacin HCl 500 mg tablet (CIPRO) 
fentaNYL 50 mcg/hr PATCH (DURAGESIC) 
gentamicin 0.1 % topical cream (GARAMYCIN) Start taking on: 3/22/2019 Changed Medications at Discharge: NA 
Discontinued Medications at Discharge: NA 
 
Cipro and fentanyl not needed per hospitalist/palliative note respectively. Per [facility], reports she does not see an order for Cipro. PCP/Specialist follow up: No future appointments. Opportunity to ask questions was provided. Contact information was provided for future reference or further questions. Will continue to monitor.

## 2019-03-22 NOTE — PROGRESS NOTES
NN received request from Dr Katie Arriaga to contact Dr Arvin Denver, [facility] [address] Called Dr Arvin Denver reviewed medications The following medications will be discontinued: 1. Pepcid (substitution for zantac) patient taking protonix 2. Cipro 3. Potassium (taking aldactone PRN) 4. Augmentin ( apparently received prescription at d/c however not reflected on AVS) 5. Fentanyl patch NN contact information along with email address given to Dr Arvin Denver for discharge disposition Med rec will be completed again post discharge from [facility] NN will continue to follow

## 2019-03-29 ENCOUNTER — PATIENT OUTREACH (OUTPATIENT)
Dept: INTERNAL MEDICINE CLINIC | Age: 74
End: 2019-03-29

## 2019-03-29 NOTE — PROGRESS NOTES
NN received message to contact Raymundo Gibson, [phone], with [facility] to advised patient will be discharge on 4/2 and needing to reschedule f/u appt Called Tera Poole. Incoming call received from Jaylin Sow, patient CRISTOBAL BETANCOURT appt rescheduled for April 10, 2019 at 2pm 
April 8 appt canceled

## 2019-04-03 ENCOUNTER — PATIENT OUTREACH (OUTPATIENT)
Dept: INTERNAL MEDICINE CLINIC | Age: 74
End: 2019-04-03

## 2019-04-03 NOTE — PROGRESS NOTES
Transition of Care Coordination/Hospital to Post Acute/Home Facility: 
  
Date/Time:  4/3/2019 9:52 AM 
 
Patient was admitted to [facility] on 3/16/19 for treatment of severe back pain. Patient was discharged 3/21/19 to [facility] for continuation of care. Patient was discharged from [facility] rehab. The physician discharge summary was not available at the time of outreach. Patient was contacted within one business days of discharge. Called patient. Reports she is still in rehab and plans to be discharged on 4/4/19.  plans to follow up on 4/5/19.

## 2019-04-04 RX ORDER — DEXLANSOPRAZOLE 60 MG/1
CAPSULE, DELAYED RELEASE ORAL
Qty: 30 CAP | Refills: 0 | Status: SHIPPED | OUTPATIENT
Start: 2019-04-04 | End: 2019-06-03 | Stop reason: SDUPTHER

## 2019-04-05 ENCOUNTER — PATIENT OUTREACH (OUTPATIENT)
Dept: INTERNAL MEDICINE CLINIC | Age: 74
End: 2019-04-05

## 2019-04-05 RX ORDER — LEVOFLOXACIN 750 MG/1
750 TABLET ORAL DAILY
COMMUNITY
Start: 2019-04-05 | End: 2019-04-09

## 2019-04-05 NOTE — PROGRESS NOTES
Transition of Care Coordination/Hospital to Post Acute Facility/Home 
  
Date/Time:  2019 2:41 PM 
 
Patient was admitted to [facility] 3/16/19 for treatment of severe back pain.  Patient was discharged 3/21/19 to [facility] for continuation of care. Patient was discharged from [facility] rehab on 19. The physician discharge summary was not available at the time of outreach. Patient was contacted within one business days of discharge. Top Challenges reviewed with the provider There are a number of medications that were discontinued and the new medications were of the same family that were d/c. NN advised patient to bring in ALL her medications along with discharge summary to [address] [place] [address] scheduled 4/10/19. Patient conveyed understanding Method of communication with provider :face to face Inpatient RRAT score: 22 Was this a readmission? no  
Patient stated reason for the readmission: N/A Nurse Navigator (NN) contacted the patient by telephone to perform post hospital discharge assessment. Verified name and  with patient as identifiers. Provided introduction to self, and explanation of the Nurse Navigator role. Reviewed discharge instructions and red flags with patient who verbalized understanding. Patient given an opportunity to ask questions and does not have any further questions or concerns at this time. The patient agrees to contact the PCP office for questions related to their healthcare. NN provided contact information for future reference. Disease Specific:   N/A Summary of patient's top problems: 1. Severe back pain-has pain pump Home Health orders at discharge: PT, OT, SN Home Health company: [facility] Date of initial visit: 19 Durable Medical Equipment ordered/company: N/A Durable Medical Equipment received: NA Barriers to care? medication management Advance Care Planning: Does patient have an Advance Directive:  reviewed and current The following medications were updated based on patients discharge summary from [facility]. NN called and left message for Maverick Sanon CM with [facility], requesting for discharge summary to be faxed [phone]. NN will review medications in depth during OV scheduled 4/10/19. NN advised patient to continue with dexilant, amitriptyline until medications can be reviewed with Dr Sharon Hodge. Medication(s):  
New Medications at Discharge: Levaquin, Seroquel, Protonix Changed Medications at Discharge: NA 
Discontinued Medications at Discharge: *aluminum hydroxide, amoxicillin, betamethasone ointment, zyrtec, cipro, diazepam, gentamycin, milnacipran, potassium chloride, prochlorperazine, zantac, tums, citracal, clotrimazole Medication reconciliation was performed with patient, who verbalizes understanding of administration of home medications. There were no barriers to obtaining medications identified at this time. Referral to Pharm D needed: no  
 
Current Outpatient Medications Medication Sig  levoFLOXacin (LEVAQUIN) 750 mg tablet Take 750 mg by mouth daily.  dexlansoprazole (DEXILANT) 60 mg CpDB capsule (delayed release) TAKE 1 CAPSULE BY MOUTH EVERY DAY  
 HYDROmorphone (DILAUDID) 8 mg tablet Take 8 mg by mouth every four (4) hours as needed for Pain.  polyethylene glycol (MIRALAX) 17 gram/dose powder Take 17 g by mouth daily as needed (constipation).  fluticasone (FLONASE) 50 mcg/actuation nasal spray 2 Sprays by Both Nostrils route two (2) times a day. (Patient taking differently: 2 Sprays by Both Nostrils route two (2) times daily as needed for Allergies.)  simvastatin (ZOCOR) 20 mg tablet TAKE ONE TABLET BY MOUTH AT BEDTIME  rivaroxaban (XARELTO) 10 mg tablet Take 10 mg by mouth daily (with dinner).  predniSONE (DELTASONE) 10 mg tablet Take 10 mg by mouth daily.  spironolactone (ALDACTONE) 50 mg tablet Take 1 Tab by mouth two (2) times a day. Indications: EDEMA (Patient taking differently: Take 50 mg by mouth two (2) times daily as needed (edema). )  acetaminophen (TYLENOL) 500 mg tablet Take 1,000 mg by mouth every six (6) hours as needed for Pain.  OTHER,NON-FORMULARY, LLQ Morphine/bupivacaine pain pump for multiple myeloma. 4.287mg/3.215 mg per day  docusate sodium (COLACE) 100 mg capsule Take 100 mg by mouth daily. 1 capsule in the morning and 3 capsule at night  senna (SENOKOT) 8.6 mg tablet Take 4 Tabs by mouth two (2) times a day. 4 PILLS IN AM  4 PILLS IN PM  
 milnacipran (SAVELLA) 50 mg tablet TAKE 1 TABLET BY MOUTH TWICE DAILY  gentamicin (GARAMYCIN) 0.1 % topical cream Topically daily  Cetirizine (ZYRTEC) 10 mg cap Take 10 mg by mouth daily as needed (allergies).  cholecalciferol (VITAMIN D3) 400 unit tab tablet Take 400 Units by mouth daily.  amitriptyline (ELAVIL) 50 mg tablet TAKE TWO TABLETS BY MOUTH AT BEDTIME AS NEEDED FOR SLEEP  clotrimazole (LOTRIMIN) 1 % topical cream Apply  to affected area two (2) times a day.  bisacodyl (DULCOLAX) 10 mg suppository Insert 10 mg into rectum daily as needed.  gabapentin (NEURONTIN) 300 mg capsule TAKE ONE CAPSULE BY MOUTH FOUR TIMES A DAY  potassium chloride (K-DUR, KLOR-CON) 20 mEq tablet TAKE ONE TABLET BY MOUTH EVERY DAY  raNITIdine (ZANTAC) 150 mg tablet Take 300 mg by mouth Daily (before dinner).  aluminum hydrox-magnesium carb (GAVISCON)  mg/15 mL suspension Take 15 mL by mouth every six (6) hours as needed for Indigestion.  calcium carbonate (TUMS) 200 mg calcium (500 mg) chew Take 1 Tab by mouth as needed.  calcium citrate-vitamin D3 (CITRACAL WITH VITAMIN D MAXIMUM) tablet Take 1 Tab by mouth daily.  magnesium hydroxide (MILK OF MAGNESIA) 400 mg/5 mL suspension Take 30 mL by mouth daily as needed for Constipation.  betamethasone dipropionate (DIPROLENE) 0.05 % ointment Apply  to affected area two (2) times a day.  diazepam (VALIUM) 10 mg tablet Take 10 mg by mouth two (2) times a day.  prochlorperazine (COMPAZINE) 5 mg tablet Take 5 mg by mouth every six (6) hours as needed for Nausea. No current facility-administered medications for this visit. Medications Discontinued During This Encounter Medication Reason  docusate sodium (COLACE) 995 mg capsule Duplicate Order [facility] follow up appointment(s):  
Future Appointments Date Time Provider Maria Ines Hoyt 4/10/2019  2:00 PM [place]selmo Ugalde [place] [id number] [facility] follow up appointment(s): NA 
[facility]:  out of service area Goals  Prevent complications post hospitalization. 04/05/19 · Dr Manolo Powell, manages pain pump;  
· Reports she follows up with Dr Manolo Powell once per year; ~every two months pain pump refilled · Reports 7/10 pain, takes Dilaudid every 6 hours · Reports dry cough-will try cough drops

## 2019-04-10 ENCOUNTER — OFFICE VISIT (OUTPATIENT)
Dept: INTERNAL MEDICINE CLINIC | Age: 74
End: 2019-04-10

## 2019-04-10 ENCOUNTER — HOSPITAL ENCOUNTER (OUTPATIENT)
Dept: LAB | Age: 74
Discharge: HOME | End: 2019-04-10
Payer: MEDICARE

## 2019-04-10 ENCOUNTER — PATIENT OUTREACH (OUTPATIENT)
Dept: INTERNAL MEDICINE CLINIC | Age: 74
End: 2019-04-10

## 2019-04-10 VITALS
OXYGEN SATURATION: 96 % | DIASTOLIC BLOOD PRESSURE: 74 MMHG | RESPIRATION RATE: 18 BRPM | TEMPERATURE: 99.2 F | HEART RATE: 107 BPM | SYSTOLIC BLOOD PRESSURE: 136 MMHG

## 2019-04-10 DIAGNOSIS — D64.9 ANEMIA, UNSPECIFIED TYPE: ICD-10-CM

## 2019-04-10 DIAGNOSIS — J20.9 ACUTE BRONCHITIS, UNSPECIFIED ORGANISM: Primary | ICD-10-CM

## 2019-04-10 DIAGNOSIS — R52 SEVERE PAIN: ICD-10-CM

## 2019-04-10 DIAGNOSIS — M05.79 RHEUMATOID ARTHRITIS INVOLVING MULTIPLE SITES WITH POSITIVE RHEUMATOID FACTOR (HCC): ICD-10-CM

## 2019-04-10 DIAGNOSIS — C90.00 MULTIPLE MYELOMA NOT HAVING ACHIEVED REMISSION (HCC): ICD-10-CM

## 2019-04-10 PROCEDURE — 85025 COMPLETE CBC W/AUTO DIFF WBC: CPT

## 2019-04-10 PROCEDURE — 80048 BASIC METABOLIC PNL TOTAL CA: CPT

## 2019-04-10 PROCEDURE — 36415 COLL VENOUS BLD VENIPUNCTURE: CPT

## 2019-04-10 RX ORDER — QUETIAPINE FUMARATE 25 MG/1
TABLET, FILM COATED ORAL
COMMUNITY
End: 2019-04-10 | Stop reason: ALTCHOICE

## 2019-04-10 RX ORDER — PANTOPRAZOLE SODIUM 40 MG/1
40 TABLET, DELAYED RELEASE ORAL DAILY
COMMUNITY
End: 2019-06-03 | Stop reason: ALTCHOICE

## 2019-04-10 RX ORDER — LORAZEPAM 1 MG/1
TABLET ORAL
COMMUNITY
End: 2020-04-23 | Stop reason: ALTCHOICE

## 2019-04-10 RX ORDER — AMITRIPTYLINE HYDROCHLORIDE 50 MG/1
TABLET, FILM COATED ORAL
Qty: 60 TAB | Refills: 0 | COMMUNITY
Start: 2019-04-10 | End: 2019-04-18 | Stop reason: SDUPTHER

## 2019-04-10 NOTE — PROGRESS NOTES
NN met with patient and spouse in office today to introduce self and to review medications After discussion with Dr Doe Perdue, Seroquel, Potassium, and Lorazepam have been discontinued CBC/BMP ordered NN wiill follow up with patient next week

## 2019-04-10 NOTE — PROGRESS NOTES
HPI: 
Pako Nunn is a 68y.o. year old female who is here for a routine visit: 
 
Presents for a transitional care visit after recent admission to [facility] on March 16 - March 21 for severe back pain. She also suffered from acute bronchitis while they are with fever. But she had some low potassium levels as well as a low hemoglobin. She was discharged to rehab and from [facility] rehab to home on April 4. Some medications were adjusted while there. She had her amitriptyline discontinued and Seroquel was added. Her diazepam was discontinued and lorazepam was added. Katrin Nightingale was changed to Protonix. Her pain medications were also altered such that she is now taking Dilaudid 8 mg tablets every 8 hours as needed. She has not been taking it that much due to sedation. Her pain continues to be severe in the 7-8 range. She is scheduled to see her oncologist for follow-up week. She is receiving home health service with physical therapy and occupational therapy. She has not had any recurrent fever since she is been home. No chills or sweats. No nausea or vomiting. No change in bowel or bladder habits. Past Medical History:  
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   
 spinal stenosis per pt  Fibromyalgia  GERD (gastroesophageal reflux disease)  High cholesterol  History of acute pancreatitis  History of blood transfusion  History of Salmonella infection 2009  Multiple myeloma ([address] 2007  Osteoporosis  Other complication due to venous access device ([address] 1/29/2013  Recurrent umbilical hernia 0/77/6673  Recurrent ventral incisional hernia 3/28/2016  Rheumatoid arthritis(714.0)  Sleep apnea INTOLERATANT OF CPAP  Thromboembolus ([address] Right leg DVT  Urinary incontinence Past Surgical History:  
Procedure Laterality Date  HX CHOLECYSTECTOMY  2004  HX CRANIOTOMY    
 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    
 COLONOSCOPIES, EGD  HX GI    
 ESOPHAGUS STREACHED  
 HX HEENT  10/2011  
 b/l cataract surgery in October 2011  HX HERNIA REPAIR  [id number] X6  
 HX HERNIA REPAIR  10-13-14  
 repair of recurrent ventral incisional hernia  with primary suture copqass-DDG-RcDr. Cindy Mcdowell  HX KYPHOPLASTY  09/14/2017 L4  
 HX ORTHOPAEDIC Right 2001 BONE SPURS SHOULDER  
 HX OTHER SURGICAL  2010 LLQ PAIN PUMP FOR MORPHINE INFUSION  
 HX OTHER SURGICAL  1-16-14  
 repair of recurrent umbilical hernia with ventralex pillow top mesh and extensive lysis of [id number]. Cindy Mcdowell  HX OTHER SURGICAL  5-31-16  
 repair of recurrent ventral incisional hernia with underlay mesh-[facility]-Dr. Cindy Mcdowell  HX VASCULAR ACCESS Right 1/2013 POWER PORT   
 HX VASCULAR ACCESS  2010 PAIN PUMP  IR INJ FORAMIN EPID LUMB ANES/STER SNGL  3/19/2019  
 NEUROLOGICAL PROCEDURE UNLISTED  2007 KYPHOPLASTY X2  
 NEUROLOGICAL PROCEDURE UNLISTED  10/26/15 Kyphoplasty t-5  PAIN PUMP DEVICE    
 implanted in LLQ, MORPHINE Prior to Admission medications Medication Sig Start Date End Date Taking? Authorizing Provider  
pantoprazole (PROTONIX) 40 mg tablet Take 40 mg by mouth daily. Yes Provider, Historical  
LORazepam (ATIVAN) 1 mg tablet Take  by mouth every four (4) hours as needed for Anxiety. Yes Provider, Historical  
amitriptyline (ELAVIL) 50 mg tablet TAKE TWO TABLETS BY MOUTH AT BEDTIME AS NEEDED FOR SLEEP 4/10/19  Yes Juan Diego Treadwell III, MD  
HYDROmorphone (DILAUDID) 8 mg tablet Take 8 mg by mouth every four (4) hours as needed for Pain. Yes Other, MD Sherif  
polyethylene glycol (MIRALAX) 17 gram/dose powder Take 17 g by mouth daily as needed (constipation). Yes Other, MD Sherif  
cholecalciferol (VITAMIN D3) 400 unit tab tablet Take 400 Units by mouth daily.    Yes Provider, Historical  
 fluticasone (FLONASE) 50 mcg/actuation nasal spray 2 Sprays by Both Nostrils route two (2) times a day. Patient taking differently: 2 Sprays by Both Nostrils route two (2) times daily as needed for Allergies. 2/7/19  Yes Doc Levy MD  
bisacodyl (DULCOLAX) 10 mg suppository Insert 10 mg into rectum daily as needed. 1/31/19  Yes Kevin RAM MD  
gabapentin (NEURONTIN) 300 mg capsule TAKE ONE CAPSULE BY MOUTH FOUR TIMES A DAY Patient taking differently: three (3) times daily. TAKE ONE CAPSULE BY MOUTH FOUR TIMES A DAY 3/23/18  Yes Doc Levy MD  
simvastatin (ZOCOR) 20 mg tablet TAKE ONE TABLET BY MOUTH AT BEDTIME 3/23/18  Yes Doc Levy MD  
rivaroxaban (XARELTO) 10 mg tablet Take 10 mg by mouth daily (with dinner). Yes Provider, Historical  
predniSONE (DELTASONE) 10 mg tablet Take 10 mg by mouth daily. Yes Provider, Historical  
spironolactone (ALDACTONE) 50 mg tablet Take 1 Tab by mouth two (2) times a day. Indications: EDEMA Patient taking differently: Take 50 mg by mouth two (2) times daily as needed (edema). 9/23/16  Yes Doc Levy MD  
calcium carbonate (TUMS) 200 mg calcium (500 mg) chew Take 1 Tab by mouth as needed. Yes Provider, Historical  
calcium citrate-vitamin D3 (CITRACAL WITH VITAMIN D MAXIMUM) tablet Take 1 Tab by mouth daily. Yes Provider, Historical  
magnesium hydroxide (MILK OF MAGNESIA) 400 mg/5 mL suspension Take 30 mL by mouth daily as needed for Constipation. Yes Provider, Historical  
acetaminophen (TYLENOL) 500 mg tablet Take 1,000 mg by mouth every six (6) hours as needed for Pain. Yes Provider, Historical  
OTHER,NON-FORMULARY, LLQ Morphine/bupivacaine pain pump for multiple myeloma. 4.287mg/3.215 mg per day   Yes Provider, Historical  
levoFLOXacin (LEVAQUIN) 750 mg tablet Take 750 mg by mouth daily.  4/5/19 4/9/19  Provider, Historical  
dexlansoprazole (DEXILANT) 60 mg CpDB capsule (delayed release) TAKE 1 CAPSULE BY MOUTH EVERY DAY 19   Artur Tracey MD  
milnacipran (SAVELLA) 50 mg tablet TAKE 1 TABLET BY MOUTH TWICE DAILY 19   Sergo Smith III, MD  
gentamicin (GARAMYCIN) 0.1 % topical cream Topically daily 3/22/19   Noam Cummings MD  
Cetirizine (ZYRTEC) 10 mg cap Take 10 mg by mouth daily as needed (allergies). Other, MD Sherif  
clotrimazole (LOTRIMIN) 1 % topical cream Apply  to affected area two (2) times a day. 19   Artur Tracey MD  
raNITIdine (ZANTAC) 150 mg tablet Take 300 mg by mouth Daily (before dinner). Provider, Historical  
aluminum hydrox-magnesium carb (GAVISCON)  mg/15 mL suspension Take 15 mL by mouth every six (6) hours as needed for Indigestion. Provider, Historical  
diazepam (VALIUM) 10 mg tablet Take 10 mg by mouth two (2) times a day. Provider, Historical  
docusate sodium (COLACE) 100 mg capsule Take 100 mg by mouth daily. 1 capsule in the morning and 3 capsule at night    Provider, Historical  
senna (SENOKOT) 8.6 mg tablet Take 4 Tabs by mouth two (2) times a day. 4 PILLS IN AM  4 PILLS IN PM    Provider, Historical  
prochlorperazine (COMPAZINE) 5 mg tablet Take 5 mg by mouth every six (6) hours as needed for Nausea. Provider, Historical  
 
 
Social History Socioeconomic History  Marital status:  Spouse name: Not on file  Number of children: Not on file  Years of education: Not on file  Highest education level: Not on file Occupational History  Not on file Social Needs  Financial resource strain: Not on file  Food insecurity:  
  Worry: Not on file Inability: Not on file  Transportation needs:  
  Medical: Not on file Non-medical: Not on file Tobacco Use  Smoking status: Former Smoker Years: 10.00 Last attempt to quit: 10/7/2005 Years since quittin.5  Smokeless tobacco: Never Used Substance and Sexual Activity  Alcohol use: No  
 Drug use:  No  
  Sexual activity: Not on file Lifestyle  Physical activity:  
  Days per week: Not on file Minutes per session: Not on file  Stress: Not on file Relationships  Social connections:  
  Talks on phone: Not on file Gets together: Not on file Attends [employer] service: Not on file Active member of club or organization: Not on file Attends meetings of clubs or organizations: Not on file Relationship status: Not on file  Intimate partner violence:  
  Fear of current or ex partner: Not on file Emotionally abused: Not on file Physically abused: Not on file Forced sexual activity: Not on file Other Topics Concern  Not on file Social History Narrative  Not on file ROS Per HPI Visit Vitals /74 Pulse (!) 107 Temp 99.2 °F (37.3 °C) (Oral) Resp 18 SpO2 96% Physical Exam  
Physical Examination: General appearance - alert, well appearing, and in no distress Chest - clear to auscultation, no wheezes, rales or rhonchi, symmetric air entry Heart - normal rate, regular rhythm, normal S1, S2, no murmurs, rubs, clicks or gallops Abdomen - soft, nontender, nondistended, no masses or organomegaly Extremities - peripheral pulses normal, no pedal edema, no clubbing or cyanosis Assessment/Plan: 
Diagnoses and all orders for this visit: 
 
1. Acute bronchitis, unspecified organism-appears resolved. Will monitor for fever. 2. Rheumatoid arthritis involving multiple sites with positive rheumatoid factor (HCC)-Per rheumatology. 3. Multiple myeloma not having achieved remission (HCC)-Per oncology. Will recheck her electrolytes to be sure that there is stable. -     METABOLIC PANEL, BASIC 4. Anemia, unspecified type-related to her myeloma likely. Will repeat blood test today to be sure it stable. -     CBC WITH AUTOMATED DIFF 5. Severe pain-continue pain meds per oncology. 6.  Fibromyalgiawill return to the amitriptyline at nighttime and stop the Seroquel. 7.  Anxietygo back to her diazepam but she is aware to not take this with the pain medication. She does have a prescription for a Narcan that his been given in the past and she was instructed today and when to use it. Follow-up and Dispositions · Return in about 6 weeks (around 5/22/2019). Advised her to call back or return to office if symptoms worsen/change/persist. 
Discussed expected course/resolution/complications of diagnosis in detail with patient. Medication risks/benefits/costs/interactions/alternatives discussed with patient. She was given an after visit summary which includes diagnoses, current medications, & vitals. She expressed understanding with the diagnosis and plan.

## 2019-04-10 NOTE — PROGRESS NOTES
Nurse Pre-chart Note: Pt was admitted to [facility] for severe back pain. D/c to [facility] and home 4/4/19. Pt was instructed by NN to bring in ALL medications for review at appt. Pt has pain pump.

## 2019-04-11 LAB
BASOPHILS # BLD AUTO: 0 X10E3/UL (ref 0–0.2)
BASOPHILS NFR BLD AUTO: 0 %
BUN SERPL-MCNC: 8 MG/DL (ref 8–27)
BUN/CREAT SERPL: 11 (ref 12–28)
CALCIUM SERPL-MCNC: 9.2 MG/DL (ref 8.7–10.3)
CHLORIDE SERPL-SCNC: 96 MMOL/L (ref 96–106)
CO2 SERPL-SCNC: 20 MMOL/L (ref 20–29)
CREAT SERPL-MCNC: 0.76 MG/DL (ref 0.57–1)
EOSINOPHIL # BLD AUTO: 0.4 X10E3/UL (ref 0–0.4)
EOSINOPHIL NFR BLD AUTO: 4 %
ERYTHROCYTE [DISTWIDTH] IN BLOOD BY AUTOMATED COUNT: 18.6 % (ref 12.3–15.4)
GLUCOSE SERPL-MCNC: 165 MG/DL (ref 65–99)
HCT VFR BLD AUTO: 33.5 % (ref 34–46.6)
HGB BLD-MCNC: 9.8 G/DL (ref 11.1–15.9)
IMM GRANULOCYTES # BLD AUTO: 0.1 X10E3/UL (ref 0–0.1)
IMM GRANULOCYTES NFR BLD AUTO: 1 %
LYMPHOCYTES # BLD AUTO: 0.8 X10E3/UL (ref 0.7–3.1)
LYMPHOCYTES NFR BLD AUTO: 8 %
MCH RBC QN AUTO: 22.2 PG (ref 26.6–33)
MCHC RBC AUTO-ENTMCNC: 29.3 G/DL (ref 31.5–35.7)
MCV RBC AUTO: 76 FL (ref 79–97)
MONOCYTES # BLD AUTO: 0.6 X10E3/UL (ref 0.1–0.9)
MONOCYTES NFR BLD AUTO: 6 %
NEUTROPHILS # BLD AUTO: 8.2 X10E3/UL (ref 1.4–7)
NEUTROPHILS NFR BLD AUTO: 81 %
PLATELET # BLD AUTO: 313 X10E3/UL (ref 150–379)
POTASSIUM SERPL-SCNC: 3.8 MMOL/L (ref 3.5–5.2)
RBC # BLD AUTO: 4.41 X10E6/UL (ref 3.77–5.28)
SODIUM SERPL-SCNC: 134 MMOL/L (ref 134–144)
WBC # BLD AUTO: 10.1 X10E3/UL (ref 3.4–10.8)

## 2019-04-15 ENCOUNTER — TELEPHONE (OUTPATIENT)
Dept: INTERNAL MEDICINE CLINIC | Age: 74
End: 2019-04-15

## 2019-04-15 NOTE — TELEPHONE ENCOUNTER
----- Message from Clyde Sutton MD sent at 4/11/2019  8:30 PM EDT -----  Notify OK.  Labs per oncology

## 2019-04-15 NOTE — TELEPHONE ENCOUNTER
Spoke with pt and advised that per [facility], her labs look okay and she is to remain on the potassium. Verbalized understanding.

## 2019-04-18 RX ORDER — SIMVASTATIN 20 MG/1
TABLET, FILM COATED ORAL
Qty: 30 TAB | Refills: 0 | Status: SHIPPED | OUTPATIENT
Start: 2019-04-18 | End: 2019-05-17 | Stop reason: SDUPTHER

## 2019-04-18 RX ORDER — AMITRIPTYLINE HYDROCHLORIDE 50 MG/1
TABLET, FILM COATED ORAL
Qty: 60 TAB | Refills: 0 | Status: SHIPPED | OUTPATIENT
Start: 2019-04-18 | End: 2019-05-17 | Stop reason: SDUPTHER

## 2019-04-22 ENCOUNTER — PATIENT OUTREACH (OUTPATIENT)
Dept: INTERNAL MEDICINE CLINIC | Age: 74
End: 2019-04-22

## 2019-04-22 NOTE — PROGRESS NOTES
Patient has graduated from the Transitions of Care Coordination  program on 4/22/19. Patient's symptoms are stable at this time. Patient/family has the ability to self-manage. Care management goals have been completed at this time. No further nurse navigator follow up scheduled. Goals Addressed This Visit's Progress  COMPLETED: Prevent complications post hospitalization. 04/05/19 · Dr Teresa Santos, manages pain pump;  
· Reports she follows up with Dr Teresa Santos once per year; ~every two months pain pump refilled · Reports 7/10 pain, takes Dilaudid every 6 hours · Reports dry cough-will try cough drops Lexii Acevedo RN 
 
04/22/19 · Reports pain to back/feet · Reports she will contact Dr Teresa Santos, manages pump, about increase in pain · Continues to receive HH PT/SN twice per week · Reports HH OT has not been out to evaluate her · NN advised to contact [facility] · Patient conveyed understanding · Denies falls, chest pain, shortness of breath · Tolerating diet · Denies bowel/bladder concerns · Reports sleeping better that she is back on Elavil Denies cough Discussed fall prevention/safety precaution Pt has nurse navigator's contact information for any further questions, concerns, or needs. Patients upcoming visits:  No future appointments.

## 2019-05-17 RX ORDER — AMITRIPTYLINE HYDROCHLORIDE 50 MG/1
TABLET, FILM COATED ORAL
Qty: 60 TAB | Refills: 0 | Status: SHIPPED | OUTPATIENT
Start: 2019-05-17 | End: 2019-06-15 | Stop reason: SDUPTHER

## 2019-05-17 RX ORDER — SIMVASTATIN 20 MG/1
TABLET, FILM COATED ORAL
Qty: 30 TAB | Refills: 0 | Status: SHIPPED | OUTPATIENT
Start: 2019-05-17 | End: 2019-06-15 | Stop reason: SDUPTHER

## 2019-06-03 RX ORDER — DEXLANSOPRAZOLE 60 MG/1
CAPSULE, DELAYED RELEASE ORAL
Qty: 30 CAP | Refills: 0 | Status: SHIPPED | OUTPATIENT
Start: 2019-06-03 | End: 2019-07-03 | Stop reason: SDUPTHER

## 2019-06-16 RX ORDER — AMITRIPTYLINE HYDROCHLORIDE 50 MG/1
TABLET, FILM COATED ORAL
Qty: 60 TAB | Refills: 0 | Status: SHIPPED | OUTPATIENT
Start: 2019-06-16 | End: 2019-07-17 | Stop reason: SDUPTHER

## 2019-06-16 RX ORDER — SIMVASTATIN 20 MG/1
TABLET, FILM COATED ORAL
Qty: 30 TAB | Refills: 0 | Status: SHIPPED | OUTPATIENT
Start: 2019-06-16 | End: 2019-07-17 | Stop reason: SDUPTHER

## 2019-06-20 RX ORDER — GABAPENTIN 300 MG/1
300 CAPSULE ORAL 4 TIMES DAILY
Qty: 120 CAP | Refills: 5 | Status: SHIPPED | OUTPATIENT
Start: 2019-06-20 | End: 2020-01-01 | Stop reason: DRUGHIGH

## 2019-07-03 RX ORDER — DEXLANSOPRAZOLE 60 MG/1
CAPSULE, DELAYED RELEASE ORAL
Qty: 30 CAP | Refills: 0 | Status: SHIPPED | OUTPATIENT
Start: 2019-07-03 | End: 2019-08-02 | Stop reason: SDUPTHER

## 2019-07-08 RX ORDER — POTASSIUM CHLORIDE 20 MEQ/1
TABLET, EXTENDED RELEASE ORAL
Qty: 30 TAB | Refills: 0 | Status: SHIPPED | OUTPATIENT
Start: 2019-07-08 | End: 2019-08-06 | Stop reason: SDUPTHER

## 2019-07-17 RX ORDER — AMITRIPTYLINE HYDROCHLORIDE 50 MG/1
TABLET, FILM COATED ORAL
Qty: 60 TAB | Refills: 0 | Status: SHIPPED | OUTPATIENT
Start: 2019-07-17 | End: 2019-08-14 | Stop reason: SDUPTHER

## 2019-07-17 RX ORDER — SIMVASTATIN 20 MG/1
TABLET, FILM COATED ORAL
Qty: 30 TAB | Refills: 0 | Status: SHIPPED | OUTPATIENT
Start: 2019-07-17 | End: 2019-08-14 | Stop reason: SDUPTHER

## 2019-07-31 ENCOUNTER — OFFICE VISIT (OUTPATIENT)
Dept: INTERNAL MEDICINE CLINIC | Age: 74
End: 2019-07-31

## 2019-07-31 VITALS
DIASTOLIC BLOOD PRESSURE: 67 MMHG | BODY MASS INDEX: 31.01 KG/M2 | WEIGHT: 175 LBS | HEIGHT: 63 IN | SYSTOLIC BLOOD PRESSURE: 114 MMHG | RESPIRATION RATE: 12 BRPM | TEMPERATURE: 98.4 F | HEART RATE: 88 BPM | OXYGEN SATURATION: 99 %

## 2019-07-31 DIAGNOSIS — R22.2 LUMP IN CHEST: Primary | ICD-10-CM

## 2019-07-31 NOTE — PROGRESS NOTES
HPI: 
Marilee Rowe is a 68y.o. year old female who is here for a lump on her left shoulder blade area that is been present for several weeks now. It seems to have gotten increasing in size in the last week or so. Some tenderness. No redness or warmth. No drainage. No injury. She did have her spine pain pump refilled last week. Denies any nausea or vomiting. Denies any change in bowel or bladder habits. No trauma. Past Medical History:  
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   
 spinal stenosis per pt  Fibromyalgia  GERD (gastroesophageal reflux disease)  High cholesterol  History of acute pancreatitis  History of blood transfusion  History of Salmonella infection 2009  Multiple myeloma ([address] 2007  Osteoporosis  Other complication due to venous access device ([address] 1/29/2013  Recurrent umbilical hernia 4/64/4259  Recurrent ventral incisional hernia 3/28/2016  Rheumatoid arthritis(714.0)  Sleep apnea INTOLERATANT OF CPAP  Thromboembolus ([address] Right leg DVT  Urinary incontinence Past Surgical History:  
Procedure Laterality Date  HX CHOLECYSTECTOMY  2004  HX CRANIOTOMY    
 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    
 COLONOSCOPIES, EGD  HX GI    
 ESOPHAGUS STREACHED  
 HX HEENT  10/2011  
 b/l cataract surgery in October 2011  HX HERNIA REPAIR  [id number] X6  
 HX HERNIA REPAIR  10-13-14  
 repair of recurrent ventral incisional hernia  with primary suture [id number]-RnDr. Casilda Curling  HX KYPHOPLASTY  09/14/2017 L4  
 HX ORTHOPAEDIC Right 2001 BONE SPURS SHOULDER  
 HX OTHER SURGICAL  2010 [facility]Q PAIN PUMP FOR MORPHINE INFUSION  
 HX OTHER SURGICAL  1-16-14  
 repair of recurrent umbilical hernia with ventralex pillow top mesh and extensive lysis of [id number]-ZTH-DNDR. Casilda Curling  HX OTHER SURGICAL  5-31-16 repair of recurrent ventral incisional hernia with underlay mesh-[facility]-Dr. Guadalupe Sink  HX VASCULAR ACCESS Right 1/2013 POWER PORT   
 HX VASCULAR ACCESS  2010 PAIN PUMP  IR INJ FORAMIN EPID LUMB ANES/STER SNGL  3/19/2019  
 NEUROLOGICAL PROCEDURE UNLISTED  2007 KYPHOPLASTY X2  
 NEUROLOGICAL PROCEDURE UNLISTED  10/26/15 Kyphoplasty t-5  PAIN PUMP DEVICE    
 implanted in LLQ, MORPHINE Prior to Admission medications Medication Sig Start Date End Date Taking? Authorizing Provider  
simvastatin (ZOCOR) 20 mg tablet TAKE ONE TABLET BY MOUTH AT BEDTIME. 7/17/19  Yes Cristy Corbin MD  
amitriptyline (ELAVIL) 50 mg tablet TAKE TWO TABLETS BY MOUTH AT BEDTIME AS NEEDED FOR SLEEP. 7/17/19  Yes Cristy Corbin MD  
potassium chloride (K-DUR, KLOR-CON) 20 mEq tablet TAKE ONE TABLET BY MOUTH EVERY DAY 7/8/19  Yes Cristy Corbin MD  
dexlansoprazole (DEXILANT) 60 mg CpDB capsule (delayed release) TAKE 1 CAPSULE BY MOUTH EVERY DAY 7/3/19  Yes Cristy Corbin MD  
gabapentin (NEURONTIN) 300 mg capsule Take 1 Cap by mouth four (4) times daily. TAKE ONE CAPSULE BY MOUTH FOUR TIMES A DAY 6/20/19  Yes Maria Ines Esquivel III, MD  
LORazepam (ATIVAN) 1 mg tablet Take  by mouth every four (4) hours as needed for Anxiety. Yes Provider, Historical  
milnacipran (SAVELLA) 50 mg tablet TAKE 1 TABLET BY MOUTH TWICE DAILY 4/4/19  Yes Maria Ines Esquivel III, MD  
HYDROmorphone (DILAUDID) 8 mg tablet Take 8 mg by mouth every four (4) hours as needed for Pain. Yes Other, MD Sherif  
Cetirizine (ZYRTEC) 10 mg cap Take 10 mg by mouth daily as needed (allergies). Yes Other, MD Sherif  
polyethylene glycol (MIRALAX) 17 gram/dose powder Take 17 g by mouth daily as needed (constipation). Yes Other, MD Sherif  
cholecalciferol (VITAMIN D3) 400 unit tab tablet Take 400 Units by mouth daily.    Yes Provider, Historical  
fluticasone (FLONASE) 50 mcg/actuation nasal spray 2 Sprays by Both Nostrils route two (2) times a day. Patient taking differently: 2 Sprays by Both Nostrils route two (2) times daily as needed for Allergies. 2/7/19  Yes Desmond De Jesus MD  
clotrimazole (LOTRIMIN) 1 % topical cream Apply  to affected area two (2) times a day. 2/7/19  Yes Desmond De Jesus MD  
bisacodyl (DULCOLAX) 10 mg suppository Insert 10 mg into rectum daily as needed. 1/31/19  Yes Yamel RAM MD  
rivaroxaban Carlos Bernard) 10 mg tablet Take 10 mg by mouth daily (with dinner). Yes Provider, Historical  
raNITIdine (ZANTAC) 150 mg tablet Take 300 mg by mouth Daily (before dinner). Yes Provider, Historical  
predniSONE (DELTASONE) 10 mg tablet Take 10 mg by mouth daily. 2.5 mg alternating with 5 mg. Yes Provider, Historical  
spironolactone (ALDACTONE) 50 mg tablet Take 1 Tab by mouth two (2) times a day. Indications: EDEMA Patient taking differently: Take 50 mg by mouth two (2) times daily as needed (edema). 9/23/16  Yes Desmond De Jesus MD  
aluminum hydrox-magnesium carb (GAVISCON)  mg/15 mL suspension Take 15 mL by mouth every six (6) hours as needed for Indigestion. Yes Provider, Historical  
calcium carbonate (TUMS) 200 mg calcium (500 mg) chew Take 1 Tab by mouth as needed. Yes Provider, Historical  
calcium citrate-vitamin D3 (CITRACAL WITH VITAMIN D MAXIMUM) tablet Take 1 Tab by mouth daily. Yes Provider, Historical  
magnesium hydroxide (MILK OF MAGNESIA) 400 mg/5 mL suspension Take 30 mL by mouth daily as needed for Constipation. Yes Provider, Historical  
acetaminophen (TYLENOL) 500 mg tablet Take 1,000 mg by mouth every six (6) hours as needed for Pain. Yes Provider, Historical  
OTHER,NON-FORMULARY, LLQ Morphine/bupivacaine pain pump for multiple myeloma. 4.287mg/3.215 mg per day   Yes Provider, Historical  
diazepam (VALIUM) 10 mg tablet Take 10 mg by mouth two (2) times a day.    Yes Provider, Historical  
 docusate sodium (COLACE) 100 mg capsule Take 100 mg by mouth daily. 1 capsule in the morning and 3 capsule at night   Yes Provider, Historical  
senna (SENOKOT) 8.6 mg tablet Take 4 Tabs by mouth two (2) times a day. 4 PILLS IN AM  4 PILLS IN PM   Yes Provider, Historical  
prochlorperazine (COMPAZINE) 5 mg tablet Take 5 mg by mouth every six (6) hours as needed for Nausea. Yes Provider, Historical  
 
 
Social History Socioeconomic History  Marital status:  Spouse name: Not on file  Number of children: Not on file  Years of education: Not on file  Highest education level: Not on file Occupational History  Not on file Social Needs  Financial resource strain: Not on file  Food insecurity:  
  Worry: Not on file Inability: Not on file  Transportation needs:  
  Medical: Not on file Non-medical: Not on file Tobacco Use  Smoking status: Former Smoker Years: 10.00 Last attempt to quit: 10/7/2005 Years since quittin.8  Smokeless tobacco: Never Used Substance and Sexual Activity  Alcohol use: No  
 Drug use: No  
 Sexual activity: Not on file Lifestyle  Physical activity:  
  Days per week: Not on file Minutes per session: Not on file  Stress: Not on file Relationships  Social connections:  
  Talks on phone: Not on file Gets together: Not on file Attends Spiritism service: Not on file Active member of club or organization: Not on file Attends meetings of clubs or organizations: Not on file Relationship status: Not on file  Intimate partner violence:  
  Fear of current or ex partner: Not on file Emotionally abused: Not on file Physically abused: Not on file Forced sexual activity: Not on file Other Topics Concern  Not on file Social History Narrative  Not on file ROS Per HPI Visit Vitals /67 Pulse 88 Temp 98.4 °F (36.9 °C) (Oral) Resp 12 Ht 5' 3\" (1.6 m) Wt 175 lb (79.4 kg) SpO2 99% BMI 31.00 kg/m² Physical Exam  
Physical Examination: General appearance - alert, well appearing, and in no distress Chest - clear to auscultation, no wheezes, rales or rhonchi, symmetric air entry Heart - normal rate, regular rhythm, normal S1, S2, no murmurs, rubs, clicks or gallops Abdomen - soft, nontender, nondistended, no masses or organomegaly Musculoskeletal - abnormal exam of left shoulder with a large fleshy lesion that appears to be medial to the left scapula. No redness. No drainage. No port in the center. Assessment/Plan: 
Diagnoses and all orders for this visit: 1. Lump in chest-in the left shoulder blade area. A? Lipoma versus other etiology. Given her history of multiple myeloma, will evaluate with an ultrasound and then make a decision regarding treatment. I do not feel that this is a sebaceous cyst as it is not inflamed or irritated today. Consider surgery evaluation pending those results. Advised her to call back or return to office if symptoms worsen/change/persist. 
Discussed expected course/resolution/complications of diagnosis in detail with patient. Medication risks/benefits/costs/interactions/alternatives discussed with patient. She was given an after visit summary which includes diagnoses, current medications, & vitals. She expressed understanding with the diagnosis and plan.

## 2019-08-02 RX ORDER — DEXLANSOPRAZOLE 60 MG/1
CAPSULE, DELAYED RELEASE ORAL
Qty: 30 CAP | Refills: 0 | Status: SHIPPED | OUTPATIENT
Start: 2019-08-02 | End: 2019-09-01 | Stop reason: SDUPTHER

## 2019-08-05 ENCOUNTER — TELEPHONE (OUTPATIENT)
Dept: INTERNAL MEDICINE CLINIC | Age: 74
End: 2019-08-05

## 2019-08-05 DIAGNOSIS — C90.00 MULTIPLE MYELOMA NOT HAVING ACHIEVED REMISSION (HCC): ICD-10-CM

## 2019-08-05 DIAGNOSIS — R22.2 LUMP IN CHEST: Primary | ICD-10-CM

## 2019-08-05 NOTE — TELEPHONE ENCOUNTER
Pt said Dr Shahla Downing was going to order an Ultra sound for her shoulder.  Please call to discuss

## 2019-08-05 NOTE — TELEPHONE ENCOUNTER
Adalid Martin (Self) [phone] (M)     Pt is now calling regarding this. She say that it should be an ultra sound not an mri. Needs to have done asap.

## 2019-08-05 NOTE — TELEPHONE ENCOUNTER
Spoke with Joni Hua from [facility] and advised that J did not order the MRI.

## 2019-08-05 NOTE — TELEPHONE ENCOUNTER
[address] [facility]  [facility]       Should the patient be getting an order for a MRI of her shoulder

## 2019-08-06 RX ORDER — POTASSIUM CHLORIDE 20 MEQ/1
TABLET, EXTENDED RELEASE ORAL
Qty: 30 TAB | Refills: 0 | Status: SHIPPED | OUTPATIENT
Start: 2019-08-06 | End: 2019-09-04 | Stop reason: SDUPTHER

## 2019-08-06 NOTE — TELEPHONE ENCOUNTER
Orders Placed This Encounter    US OTHER SOFT TISSUE     Standing Status:   Future     Standing Expiration Date:   9/5/2020     Order Specific Question:   Reason for Exam     Answer:   lump in chest in the left shoulder blade area, Hx of multiple myeloma        Per Aaron Moncada

## 2019-08-08 ENCOUNTER — TELEPHONE (OUTPATIENT)
Dept: INTERNAL MEDICINE CLINIC | Age: 74
End: 2019-08-08

## 2019-08-08 DIAGNOSIS — C90.00 MULTIPLE MYELOMA NOT HAVING ACHIEVED REMISSION (HCC): Primary | ICD-10-CM

## 2019-08-08 NOTE — TELEPHONE ENCOUNTER
yuval with coordinaion of care [phone]     Needs new order put in computer for ultra sound non vascular and what extremity.

## 2019-08-09 ENCOUNTER — TELEPHONE (OUTPATIENT)
Dept: INTERNAL MEDICINE CLINIC | Age: 74
End: 2019-08-09

## 2019-08-09 NOTE — TELEPHONE ENCOUNTER
Spoke with Nahomi Staton from [address] [place] [address] to let her know that the order has been corrected.

## 2019-08-09 NOTE — TELEPHONE ENCOUNTER
Orders Placed This Encounter    US EXT NONVAS LT COMP     Standing Status:   Future     Standing Expiration Date:   9/9/2020     Order Specific Question:   Reason for Exam     Answer:   lump left shoulder blade     Order Specific Question:   Specific Body Part     Answer:   lt shoulder blade

## 2019-08-09 NOTE — TELEPHONE ENCOUNTER
Patient called [facility] Sched[facility] to schedule an US and was told there was no order in the system. Can you please put in the order and let the patient know when it has been done so she can schedule?

## 2019-08-13 ENCOUNTER — HOSPITAL ENCOUNTER (OUTPATIENT)
Dept: ULTRASOUND IMAGING | Age: 74
Discharge: HOME | End: 2019-08-13
Attending: INTERNAL MEDICINE
Payer: MEDICARE

## 2019-08-13 DIAGNOSIS — C90.00 MULTIPLE MYELOMA NOT HAVING ACHIEVED REMISSION (HCC): ICD-10-CM

## 2019-08-13 PROCEDURE — 76882 US LMTD JT/FCL EVL NVASC XTR: CPT

## 2019-08-14 ENCOUNTER — TELEPHONE (OUTPATIENT)
Dept: INTERNAL MEDICINE CLINIC | Age: 74
End: 2019-08-14

## 2019-08-14 RX ORDER — SIMVASTATIN 20 MG/1
TABLET, FILM COATED ORAL
Qty: 30 TAB | Refills: 0 | Status: SHIPPED | OUTPATIENT
Start: 2019-08-14 | End: 2019-09-12 | Stop reason: SDUPTHER

## 2019-08-14 RX ORDER — AMITRIPTYLINE HYDROCHLORIDE 50 MG/1
TABLET, FILM COATED ORAL
Qty: 60 TAB | Refills: 0 | Status: SHIPPED | OUTPATIENT
Start: 2019-08-14 | End: 2019-09-12 | Stop reason: SDUPTHER

## 2019-08-14 NOTE — TELEPHONE ENCOUNTER
Called to discuss her ultrasound results, ? Hematoma versus related to CA. Discussed case with Dr. Yared Sood and he suggested a biopsy scheduled with ultrasound. She agrees to have a biopsy and we will arrange.

## 2019-08-16 ENCOUNTER — TELEPHONE (OUTPATIENT)
Dept: INTERNAL MEDICINE CLINIC | Age: 74
End: 2019-08-16

## 2019-08-16 DIAGNOSIS — M75.92 LESION OF SHOULDER, LEFT: Primary | ICD-10-CM

## 2019-08-16 DIAGNOSIS — R93.89 ABNORMAL ULTRASOUND: ICD-10-CM

## 2019-08-16 NOTE — TELEPHONE ENCOUNTER
----- Message from Riley Conn MD sent at 8/14/2019  5:45 PM EDT -----  Ultrasound guided biopsy of the lesion ASAP.

## 2019-08-19 NOTE — TELEPHONE ENCOUNTER
Orders Placed This Encounter    US GUIDE FINE NDL ASP W IMAGE     WITH BIOPSY     Standing Status:   Future     Standing Expiration Date:   9/16/2020     Order Specific Question:   Reason for Exam     Answer:   abnormal us,

## 2019-08-20 ENCOUNTER — HOSPITAL ENCOUNTER (OUTPATIENT)
Dept: INTERVENTIONAL RADIOLOGY/VASCULAR | Age: 74
Discharge: HOME | End: 2019-08-20
Attending: INTERNAL MEDICINE | Admitting: INTERNAL MEDICINE
Payer: MEDICARE

## 2019-08-20 VITALS
HEART RATE: 81 BPM | SYSTOLIC BLOOD PRESSURE: 106 MMHG | OXYGEN SATURATION: 94 % | RESPIRATION RATE: 18 BRPM | DIASTOLIC BLOOD PRESSURE: 56 MMHG

## 2019-08-20 DIAGNOSIS — E85.9 MYELOMA ASSOCIATED AMYLOIDOSIS (HCC): ICD-10-CM

## 2019-08-20 DIAGNOSIS — D50.9 IRON DEFICIENCY ANEMIA, UNSPECIFIED: ICD-10-CM

## 2019-08-20 DIAGNOSIS — C90.00 MYELOMA ASSOCIATED AMYLOIDOSIS (HCC): ICD-10-CM

## 2019-08-20 PROCEDURE — 74011636320 HC RX REV CODE- 636/320: Performed by: STUDENT IN AN ORGANIZED HEALTH CARE EDUCATION/TRAINING PROGRAM

## 2019-08-20 PROCEDURE — 36598 INJ W/FLUOR EVAL CV DEVICE: CPT

## 2019-08-20 PROCEDURE — 77030003560 HC NDL HUBR BARD -A

## 2019-08-20 RX ADMIN — IOPAMIDOL 50 ML: 612 INJECTION, SOLUTION INTRAVENOUS at 09:00

## 2019-08-20 NOTE — DISCHARGE INSTRUCTIONS
[address] Radiology [facility] Evaluation  Discharge Instructions      General Instructions:   A port is like an implanted IV. They are usually ordered for patients who will be getting chemotherapy, but can also be used as an IV for long term antibiotics, large amounts of fluids, and/or blood products. Your blood can be drawn from your port for labs also. Those patients who do not have good veins find the ports convenient as they can get the IV they need with one stick. The port can be used long term, and the care is easy. The device is under the skin, and once the skin heals, care is minimal. All that is required is the nurse who accesses the port will need to flush it with heparinized saline after each use. Ports are usually placed in the chest wall, usually on the right side. Home Care Instructions: You may resume your normal diet and medications. Follow-Up Instructions: Dr Sarah Graff evaluated your port in the Interventional Radiology Department, and found a fibrin sheath on the catheter. There was no extravasation (no leaking of contrast into the tissues) and found the port to be in good condition. He will contact your doctor for a plan to take care of the sheath so you can continue using the port as long as you wish. To Reach Us: Side effects of sedation medications and other medications used today have been reviewed. Notify us of nausea, itching, hives, dizziness, or anything else out of the ordinary. Should you experience any of these significant changes, please call [phone] between the hours of 7:30 am and 10 pm or [phone] after hours.  After hours, ask the  to page the [address] Technologist, and describe the problem to the technologist.     Patient Signature:  Date: 8/20/2019  Discharging Nurse: Katlyn Bardales RN

## 2019-08-28 ENCOUNTER — HOSPITAL ENCOUNTER (OUTPATIENT)
Dept: ULTRASOUND IMAGING | Age: 74
Discharge: HOME | End: 2019-08-28
Attending: INTERNAL MEDICINE
Payer: MEDICARE

## 2019-08-28 DIAGNOSIS — M75.92 LESION OF SHOULDER, LEFT: ICD-10-CM

## 2019-08-28 DIAGNOSIS — R93.89 ABNORMAL ULTRASOUND: ICD-10-CM

## 2019-08-28 PROCEDURE — 77030003503 HC NDL BIOP TISS BD -B

## 2019-08-28 PROCEDURE — 88305 TISSUE EXAM BY PATHOLOGIST: CPT

## 2019-08-28 PROCEDURE — 88172 CYTP DX EVAL FNA 1ST EA SITE: CPT

## 2019-08-28 PROCEDURE — 77030003666 HC NDL SPINAL BD -A

## 2019-08-28 PROCEDURE — 77030014115

## 2019-08-28 PROCEDURE — 10005 FNA BX W/US GDN 1ST LES: CPT

## 2019-08-28 PROCEDURE — 88173 CYTOPATH EVAL FNA REPORT: CPT

## 2019-08-28 RX ORDER — LIDOCAINE HYDROCHLORIDE 10 MG/ML
10 INJECTION, SOLUTION EPIDURAL; INFILTRATION; INTRACAUDAL; PERINEURAL
Status: COMPLETED | OUTPATIENT
Start: 2019-08-28 | End: 2019-08-28

## 2019-08-28 RX ADMIN — LIDOCAINE HYDROCHLORIDE 10 ML: 10 INJECTION, SOLUTION EPIDURAL; INFILTRATION; INTRACAUDAL; PERINEURAL at 10:37

## 2019-08-30 ENCOUNTER — TELEPHONE (OUTPATIENT)
Dept: SURGERY | Age: 74
End: 2019-08-30

## 2019-08-30 NOTE — TELEPHONE ENCOUNTER
Patient identified with two patient identifiers. Patient questioning if she should return to see Dr. Geetha Pittman regarding recent IR check of port-a-cath placed 3+ years ago. Patient states test was ordered by Dr. Allyson Gallagher. Patient states they can not get blood return from port but it flushing and medications are pushed with no issues. Patient states she received letter from IR that port was intact, no leakage, or clogs and she doesn't need it replaced buts she has a fibrin sheath? Patient instructed to follow up with ordering provider first and he can instruct her to follow up with Dr. Geetha Pittman if needed. Patient also offered office follow up with Dr. Geetha Pittman If needed. Patient in agreement will return call if any other questions or concerns.

## 2019-09-02 RX ORDER — DEXLANSOPRAZOLE 60 MG/1
CAPSULE, DELAYED RELEASE ORAL
Qty: 30 CAP | Refills: 0 | Status: SHIPPED | OUTPATIENT
Start: 2019-09-02 | End: 2019-09-28 | Stop reason: SDUPTHER

## 2019-09-04 ENCOUNTER — TELEPHONE (OUTPATIENT)
Dept: INTERNAL MEDICINE CLINIC | Age: 74
End: 2019-09-04

## 2019-09-04 RX ORDER — POTASSIUM CHLORIDE 20 MEQ/1
TABLET, EXTENDED RELEASE ORAL
Qty: 30 TAB | Refills: 0 | Status: SHIPPED | OUTPATIENT
Start: 2019-09-04 | End: 2019-10-03 | Stop reason: SDUPTHER

## 2019-09-04 NOTE — TELEPHONE ENCOUNTER
----- Message from Myles Bagley MD sent at 9/2/2019  7:55 PM EDT -----  Notify OK - copy to Dr. Jam Thurston.

## 2019-09-04 NOTE — TELEPHONE ENCOUNTER
Spoke with pt and advised that the pathology was negative for any malignancy on her biopsy. Will route results to Dr. Tamera Valle her onc as well.

## 2019-09-12 RX ORDER — SIMVASTATIN 20 MG/1
TABLET, FILM COATED ORAL
Qty: 30 TAB | Refills: 0 | Status: SHIPPED | OUTPATIENT
Start: 2019-09-12 | End: 2019-10-12 | Stop reason: SDUPTHER

## 2019-09-12 RX ORDER — AMITRIPTYLINE HYDROCHLORIDE 50 MG/1
TABLET, FILM COATED ORAL
Qty: 60 TAB | Refills: 0 | Status: SHIPPED | OUTPATIENT
Start: 2019-09-12 | End: 2019-10-12 | Stop reason: SDUPTHER

## 2019-09-24 ENCOUNTER — HOSPITAL ENCOUNTER (OUTPATIENT)
Dept: INTERVENTIONAL RADIOLOGY/VASCULAR | Age: 74
Discharge: HOME | End: 2019-09-24
Attending: INTERNAL MEDICINE | Admitting: STUDENT IN AN ORGANIZED HEALTH CARE EDUCATION/TRAINING PROGRAM
Payer: MEDICARE

## 2019-09-24 VITALS
DIASTOLIC BLOOD PRESSURE: 61 MMHG | BODY MASS INDEX: 30.83 KG/M2 | OXYGEN SATURATION: 98 % | RESPIRATION RATE: 19 BRPM | SYSTOLIC BLOOD PRESSURE: 115 MMHG | HEART RATE: 86 BPM | WEIGHT: 174 LBS | HEIGHT: 63 IN | TEMPERATURE: 98.5 F

## 2019-09-24 DIAGNOSIS — E85.9 MYELOMA ASSOCIATED AMYLOIDOSIS (HCC): ICD-10-CM

## 2019-09-24 DIAGNOSIS — D50.9 IRON DEFICIENCY ANEMIA, UNSPECIFIED: ICD-10-CM

## 2019-09-24 DIAGNOSIS — C90.00 MYELOMA ASSOCIATED AMYLOIDOSIS (HCC): ICD-10-CM

## 2019-09-24 LAB
ALBUMIN SERPL-MCNC: 3.3 G/DL (ref 3.5–5)
ALBUMIN/GLOB SERPL: 1.1 {RATIO} (ref 1.1–2.2)
ALP SERPL-CCNC: 66 U/L (ref 45–117)
ALT SERPL-CCNC: 11 U/L (ref 12–78)
ANION GAP SERPL CALC-SCNC: 3 MMOL/L (ref 5–15)
AST SERPL-CCNC: 27 U/L (ref 15–37)
BASOPHILS # BLD: 0.1 K/UL (ref 0–0.1)
BASOPHILS NFR BLD: 1 % (ref 0–1)
BILIRUB SERPL-MCNC: 0.3 MG/DL (ref 0.2–1)
BUN SERPL-MCNC: 8 MG/DL (ref 6–20)
BUN/CREAT SERPL: 11 (ref 12–20)
CALCIUM SERPL-MCNC: 8.6 MG/DL (ref 8.5–10.1)
CHLORIDE SERPL-SCNC: 104 MMOL/L (ref 97–108)
CO2 SERPL-SCNC: 33 MMOL/L (ref 21–32)
CREAT SERPL-MCNC: 0.75 MG/DL (ref 0.55–1.02)
DIFFERENTIAL METHOD BLD: ABNORMAL
EOSINOPHIL # BLD: 0.6 K/UL (ref 0–0.4)
EOSINOPHIL NFR BLD: 6 % (ref 0–7)
ERYTHROCYTE [DISTWIDTH] IN BLOOD BY AUTOMATED COUNT: 13.5 % (ref 11.5–14.5)
GLOBULIN SER CALC-MCNC: 3 G/DL (ref 2–4)
GLUCOSE SERPL-MCNC: 113 MG/DL (ref 65–100)
HCT VFR BLD AUTO: 38.4 % (ref 35–47)
HGB BLD-MCNC: 11.9 G/DL (ref 11.5–16)
IMM GRANULOCYTES # BLD AUTO: 0.1 K/UL (ref 0–0.04)
IMM GRANULOCYTES NFR BLD AUTO: 1 % (ref 0–0.5)
LYMPHOCYTES # BLD: 1.4 K/UL (ref 0.8–3.5)
LYMPHOCYTES NFR BLD: 16 % (ref 12–49)
MCH RBC QN AUTO: 31.2 PG (ref 26–34)
MCHC RBC AUTO-ENTMCNC: 31 G/DL (ref 30–36.5)
MCV RBC AUTO: 100.5 FL (ref 80–99)
MONOCYTES # BLD: 0.7 K/UL (ref 0–1)
MONOCYTES NFR BLD: 8 % (ref 5–13)
NEUTS SEG # BLD: 6 K/UL (ref 1.8–8)
NEUTS SEG NFR BLD: 68 % (ref 32–75)
NRBC # BLD: 0 K/UL (ref 0–0.01)
NRBC BLD-RTO: 0 PER 100 WBC
PLATELET # BLD AUTO: 240 K/UL (ref 150–400)
PMV BLD AUTO: 8.5 FL (ref 8.9–12.9)
POTASSIUM SERPL-SCNC: 3.2 MMOL/L (ref 3.5–5.1)
PROT SERPL-MCNC: 6.3 G/DL (ref 6.4–8.2)
RBC # BLD AUTO: 3.82 M/UL (ref 3.8–5.2)
SODIUM SERPL-SCNC: 140 MMOL/L (ref 136–145)
WBC # BLD AUTO: 8.9 K/UL (ref 3.6–11)

## 2019-09-24 PROCEDURE — 74011250636 HC RX REV CODE- 250/636: Performed by: STUDENT IN AN ORGANIZED HEALTH CARE EDUCATION/TRAINING PROGRAM

## 2019-09-24 PROCEDURE — 83883 ASSAY NEPHELOMETRY NOT SPEC: CPT

## 2019-09-24 PROCEDURE — 85025 COMPLETE CBC W/AUTO DIFF WBC: CPT

## 2019-09-24 PROCEDURE — 36415 COLL VENOUS BLD VENIPUNCTURE: CPT

## 2019-09-24 PROCEDURE — 36591 DRAW BLOOD OFF VENOUS DEVICE: CPT

## 2019-09-24 PROCEDURE — 80053 COMPREHEN METABOLIC PANEL: CPT

## 2019-09-24 RX ORDER — LIDOCAINE HYDROCHLORIDE 10 MG/ML
10 INJECTION, SOLUTION EPIDURAL; INFILTRATION; INTRACAUDAL; PERINEURAL ONCE
Status: DISCONTINUED | OUTPATIENT
Start: 2019-09-24 | End: 2019-09-24 | Stop reason: HOSPADM

## 2019-09-24 RX ORDER — MIDAZOLAM HYDROCHLORIDE 1 MG/ML
5 INJECTION, SOLUTION INTRAMUSCULAR; INTRAVENOUS
Status: DISCONTINUED | OUTPATIENT
Start: 2019-09-24 | End: 2019-09-24

## 2019-09-24 RX ORDER — FENTANYL CITRATE 50 UG/ML
200 INJECTION, SOLUTION INTRAMUSCULAR; INTRAVENOUS
Status: DISCONTINUED | OUTPATIENT
Start: 2019-09-24 | End: 2019-09-24

## 2019-09-24 RX ORDER — SODIUM CHLORIDE 9 MG/ML
50 INJECTION, SOLUTION INTRAVENOUS CONTINUOUS
Status: DISCONTINUED | OUTPATIENT
Start: 2019-09-24 | End: 2019-09-24 | Stop reason: HOSPADM

## 2019-09-24 RX ADMIN — SODIUM CHLORIDE 50 ML/HR: 900 INJECTION, SOLUTION INTRAVENOUS at 10:38

## 2019-09-24 NOTE — PROGRESS NOTES
Pt. Assisted with dressing and toileting. Discharged to home and transported to d/c lot via w/c. To have follow up with  for further care.

## 2019-09-24 NOTE — PROGRESS NOTES
Ruth Lazar made aware that right chest port accessed with positive blood return. MD spoke to pt. regarding the procedure related to now current positive blood return. Decision made to not proceed with angio case. Pt. Asking for lab work ordered by Dr. Lisa Armstrong for next week to be drawn while port is accessed. Spoke to Poliana , 's RN and CBC, CMP, and [place] Lambda free light chains drawn from port and sent to lab as requested. Also spoke to Tea Owens RN regarding experienced RN to access port and possibly rest pt. In more supine position as this was the positioning today.

## 2019-09-24 NOTE — H&P
Radiology History and Physical 
 
Patient: Sandip Reyes 68 y.o. female Chief Complaint: No chief complaint on file. History of Present Illness: Port catheter with fibrin sheath. Presenting for fibrin sheath stripping History: 
 
Past Medical History:  
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   
 spinal stenosis per pt  Fibromyalgia  GERD (gastroesophageal reflux disease)  High cholesterol  History of acute pancreatitis  History of blood transfusion  History of Salmonella infection   Multiple myeloma ([address]   Osteoporosis  Other complication due to venous access device ([address] 2013  Recurrent umbilical hernia   Recurrent ventral incisional hernia 3/28/2016  Rheumatoid arthritis(714.0)  Sleep apnea INTOLERATANT OF CPAP  Thromboembolus ([address] Right leg DVT  Urinary incontinence Family History Problem Relation Age of Onset Therese Weaver Arthritis-osteo Mother  Anesth Problems Neg Hx Social History Socioeconomic History  Marital status:  Spouse name: Not on file  Number of children: Not on file  Years of education: Not on file  Highest education level: Not on file Occupational History  Not on file Social Needs  Financial resource strain: Not on file  Food insecurity:  
  Worry: Not on file Inability: Not on file  Transportation needs:  
  Medical: Not on file Non-medical: Not on file Tobacco Use  Smoking status: Former Smoker Years: 10.00 Last attempt to quit: 10/7/2005 Years since quittin.9  Smokeless tobacco: Never Used Substance and Sexual Activity  Alcohol use: No  
 Drug use: No  
 Sexual activity: Not on file Lifestyle  Physical activity:  
  Days per week: Not on file Minutes per session: Not on file  Stress: Not on file Relationships  Social connections:  
  Talks on phone: Not on file Gets together: Not on file Attends Pent[employer]ostal service: Not on file Active member of club or organization: Not on file Attends meetings of clubs or organizations: Not on file Relationship status: Not on file  Intimate partner violence:  
  Fear of current or ex partner: Not on file Emotionally abused: Not on file Physically abused: Not on file Forced sexual activity: Not on file Other Topics Concern  Not on file Social History Narrative  Not on file Allergies: Allergies Allergen Reactions  Aggrenox [Aspirin-Dipyridamole] Rash Allergic to the dipyridamole and not aspirin.  Broccoli Other (comments) C/O GAS  Bumex [Bumetanide] Rash  Doxil [Doxorubicin, Peg-Liposomal] Rash Burning of skin  Flagyl [Metronidazole] Rash  Keflex [Cephalexin] Rash  Lasix [Furosemide] Rash  Macrobid [Nitrofurantoin Monohyd/M-Cryst] Shortness of Breath  Methotrexate Hives and Rash  Pcn [Penicillins] Rash  Sulfa (Sulfonamide Antibiotics) Hives  Tetanus And Diphtheria Toxoids Swelling  Thalidomide Rash Current Medications: 
Current Facility-Administered Medications Medication Dose Route Frequency  lidocaine (PF) (XYLOCAINE) 10 mg/mL (1 %) injection 10 mL  10 mL SubCUTAneous ONCE  
 iopamidol (ISOVUE 300) 61 % contrast injection 100 mL  100 mL IntraCATHeter RAD ONCE Alerts:   
Hospital Problems  Date Reviewed: 3/19/2019 None Laboratory:    No results for input(s): HGB, HCT, WBC, PLT, INR, BUN, CREA, K, CRCLT, HGBEXT, HCTEXT, PLTEXT in the last 72 hours. No lab exists for component: PTT, PT, INREXT Plan of Care/Planned Procedure: 
Risks, benefits, and alternatives reviewed with patient and she agrees to proceed with the procedure. Deemed appropriate or moderate sedation with versed and fentanyl.  
 
 
Drew De Paz MD

## 2019-09-25 LAB
KAPPA LC FREE SER-MCNC: 14.8 MG/L (ref 3.3–19.4)
KAPPA LC FREE/LAMBDA FREE SER: 0.15 {RATIO} (ref 0.26–1.65)
LAMBDA LC FREE SERPL-MCNC: 95.9 MG/L (ref 5.7–26.3)

## 2019-09-29 RX ORDER — DEXLANSOPRAZOLE 60 MG/1
CAPSULE, DELAYED RELEASE ORAL
Qty: 30 CAP | Refills: 0 | Status: SHIPPED | OUTPATIENT
Start: 2019-09-29 | End: 2019-10-26 | Stop reason: SDUPTHER

## 2019-10-03 RX ORDER — POTASSIUM CHLORIDE 20 MEQ/1
TABLET, EXTENDED RELEASE ORAL
Qty: 30 TAB | Refills: 0 | Status: SHIPPED | OUTPATIENT
Start: 2019-10-03 | End: 2019-11-13 | Stop reason: SDUPTHER

## 2019-10-09 ENCOUNTER — CLINICAL SUPPORT (OUTPATIENT)
Dept: INTERNAL MEDICINE CLINIC | Age: 74
End: 2019-10-09

## 2019-10-09 DIAGNOSIS — Z23 ENCOUNTER FOR IMMUNIZATION: ICD-10-CM

## 2019-10-09 NOTE — PATIENT INSTRUCTIONS
Vaccine Information Statement    Influenza (Flu) Vaccine (Inactivated or Recombinant): What You Need to Know    Many Vaccine Information Statements are available in [place] and other languages. See www.immunize.org/vis  Hojas de información sobre vacunas están disponibles en español y en muchos otros idiomas. Visite www.immunize.org/vis    1. Why get vaccinated? Influenza vaccine can prevent influenza (flu). Flu is a contagious disease that spreads around the [facility] every year, usually between October and May. Anyone can get the flu, but it is more dangerous for some people. Infants and young children, people 72years of age and older, pregnant women, and people with certain health conditions or a weakened immune system are at greatest risk of flu complications. Pneumonia, bronchitis, sinus infections and ear infections are examples of flu-related complications. If you have a medical condition, such as heart disease, cancer or diabetes, flu can make it worse. Flu can cause fever and chills, sore throat, muscle aches, fatigue, cough, headache, and runny or stuffy nose. Some people may have vomiting and diarrhea, though this is more common in children than adults. Each year thousands of people in the [facility] die from flu, and many more are hospitalized. Flu vaccine prevents millions of illnesses and flu-related visits to the doctor each year. 2. Influenza vaccines     CDC recommends everyone 10months of age and older get vaccinated every flu season. Children 6 months through 6years of age may need 2 doses during a single flu season. Everyone else needs only 1 dose each flu season. It takes about 2 weeks for protection to develop after vaccination. There are many flu viruses, and they are always changing. Each year a new flu vaccine is made to protect against three or four viruses that are likely to cause disease in the upcoming flu season.  Even when the vaccine doesnt exactly match these viruses, it may still provide some protection. Influenza vaccine does not cause flu. Influenza vaccine may be given at the same time as other vaccines. 3. Talk with your health care provider    Tell your vaccine provider if the person getting the vaccine:   Has had an allergic reaction after a previous dose of influenza vaccine, or has any severe, life-threatening allergies.  Has ever had Guillain-Barré Syndrome (also called GBS). In some cases, your health care provider may decide to postpone influenza vaccination to a future visit. People with minor illnesses, such as a cold, may be vaccinated. People who are moderately or severely ill should usually wait until they recover before getting influenza vaccine. Your health care provider can give you more information. 4. Risks of a reaction     Soreness, redness, and swelling where shot is given, fever, muscle aches, and headache can happen after influenza vaccine.  There may be a very small increased risk of Guillain-Barré Syndrome (GBS) after inactivated influenza vaccine (the flu shot). [facility] children who get the flu shot along with pneumococcal vaccine (PCV13), and/or DTaP vaccine at the same time might be slightly more likely to have a seizure caused by fever. Tell your health care provider if a child who is getting flu vaccine has ever had a seizure. People sometimes faint after medical procedures, including vaccination. Tell your provider if you feel dizzy or have vision changes or ringing in the ears. As with any medicine, there is a very remote chance of a vaccine causing a severe allergic reaction, other serious injury, or death. 5. What if there is a serious problem? An allergic reaction could occur after the vaccinated person leaves the clinic.  If you see signs of a severe allergic reaction (hives, swelling of the face and throat, difficulty breathing, a fast heartbeat, dizziness, or weakness), call 9-1-1 and get the person to the nearest hospital.    For other signs that concern you, call your health care provider. Adverse reactions should be reported to the Vaccine Adverse Event Reporting System (VAERS). Your health care provider will usually file this report, or you can do it yourself. Visit the VAERS website at www.vaers. [facility].gov or call 1-168.663.2370. VAERS is only for reporting reactions, and VAERS staff do not give medical advice. 6. The National Vaccine Injury Compensation Program    The [facility] Vaccine Injury Compensation Program (VICP) is a federal program that was created to compensate people who may have been injured by certain vaccines. Visit the VICP website at www.[facility].gov/vaccinecompensation or call 8-262.963.7039 to learn about the program and about filing a claim. There is a time limit to file a claim for compensation. 7. How can I learn more?  Ask your health care provider.  Call your local or state health department.  Contact the Centers for Disease Control and Prevention (CDC):  - Call 7-508.415.7399 (1-800-CDC-INFO) or  - Visit CDCs influenza website at www.cdc.gov/flu    Vaccine Information Statement (Interim)  Inactivated Influenza Vaccine   8/15/2019  42 [address] [id number]   Department of Health and Human Services  Centers for Disease Control and Prevention    Office Use Only

## 2019-10-10 NOTE — PROGRESS NOTES
Pharmacy Note - Immunizations    Asa Piper is a 68 y.o.  female  who present for a flu shot. She denies any symptoms, reactions or allergies that would exclude them from being immunized today. Risks and adverse reactions were discussed and the VIS was given to them. All questions were addressed. Verbal order received from Dr. Joe Carrillo    Patient was observed for 10 min post injection. There were no reactions observed.     Tracie Fonseca, PHARMD BCACP

## 2019-10-13 RX ORDER — SIMVASTATIN 20 MG/1
TABLET, FILM COATED ORAL
Qty: 30 TAB | Refills: 0 | Status: SHIPPED | OUTPATIENT
Start: 2019-10-13 | End: 2019-11-15 | Stop reason: SDUPTHER

## 2019-10-13 RX ORDER — AMITRIPTYLINE HYDROCHLORIDE 50 MG/1
TABLET, FILM COATED ORAL
Qty: 60 TAB | Refills: 0 | Status: SHIPPED | OUTPATIENT
Start: 2019-10-13 | End: 2019-11-15 | Stop reason: SDUPTHER

## 2019-10-27 RX ORDER — DEXLANSOPRAZOLE 60 MG/1
CAPSULE, DELAYED RELEASE ORAL
Qty: 30 CAP | Refills: 0 | Status: SHIPPED | OUTPATIENT
Start: 2019-10-27 | End: 2019-11-22 | Stop reason: SDUPTHER

## 2019-11-05 ENCOUNTER — APPOINTMENT (OUTPATIENT)
Dept: CT IMAGING | Age: 74
End: 2019-11-05
Attending: EMERGENCY MEDICINE
Payer: MEDICARE

## 2019-11-05 ENCOUNTER — HOSPITAL ENCOUNTER (EMERGENCY)
Age: 74
Discharge: HOME | End: 2019-11-05
Attending: EMERGENCY MEDICINE
Payer: MEDICARE

## 2019-11-05 ENCOUNTER — APPOINTMENT (OUTPATIENT)
Dept: GENERAL RADIOLOGY | Age: 74
End: 2019-11-05
Attending: EMERGENCY MEDICINE
Payer: MEDICARE

## 2019-11-05 ENCOUNTER — TELEPHONE (OUTPATIENT)
Dept: INTERNAL MEDICINE CLINIC | Age: 74
End: 2019-11-05

## 2019-11-05 VITALS
TEMPERATURE: 97.6 F | BODY MASS INDEX: 32.7 KG/M2 | RESPIRATION RATE: 18 BRPM | WEIGHT: 184.53 LBS | HEART RATE: 80 BPM | HEIGHT: 63 IN | SYSTOLIC BLOOD PRESSURE: 125 MMHG | OXYGEN SATURATION: 95 % | DIASTOLIC BLOOD PRESSURE: 68 MMHG

## 2019-11-05 DIAGNOSIS — G93.89 ENCEPHALOMALACIA: ICD-10-CM
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
## 2019-11-05 DIAGNOSIS — S09.90XA TRAUMATIC INJURY OF HEAD, INITIAL ENCOUNTER: ICD-10-CM

## 2019-11-05 DIAGNOSIS — S80.00XA CONTUSION OF KNEE, UNSPECIFIED LATERALITY, INITIAL ENCOUNTER: ICD-10-CM

## 2019-11-05 PROCEDURE — 73522 X-RAY EXAM HIPS BI 3-4 VIEWS: CPT

## 2019-11-05 PROCEDURE — 73562 X-RAY EXAM OF KNEE 3: CPT

## 2019-11-05 PROCEDURE — 74011250637 HC RX REV CODE- 250/637: Performed by: EMERGENCY MEDICINE

## 2019-11-05 PROCEDURE — 72125 CT NECK SPINE W/O DYE: CPT

## 2019-11-05 PROCEDURE — 70450 CT HEAD/BRAIN W/O DYE: CPT

## 2019-11-05 PROCEDURE — 99283 EMERGENCY DEPT VISIT LOW MDM: CPT

## 2019-11-05 RX ORDER — ACETAMINOPHEN 325 MG/1
650 TABLET ORAL
Status: COMPLETED | OUTPATIENT
Start: 2019-11-05 | End: 2019-11-05

## 2019-11-05 RX ADMIN — ACETAMINOPHEN 650 MG: 325 TABLET, FILM COATED ORAL at 14:53

## 2019-11-05 NOTE — DISCHARGE INSTRUCTIONS
Patient Education     Contusion: Care Instructions  Your Care Instructions  Contusion is the medical term for a bruise. It is the result of a direct blow or an impact, such as a fall. Contusions are common sports injuries. Most people think of a bruise as a black-and-blue spot. This happens when small blood vessels get torn and leak blood under the skin. But bones, muscles, and organs can also get bruised. This may damage deep tissues but not cause a bruise you can see. The doctor will do a physical exam to find the location of your contusion. You may also have tests to make sure you do not have a more serious injury, such as a broken bone or nerve damage. These may include X-rays or other imaging tests like a CT scan or MRI. Deep-tissue contusions may cause pain and swelling. But if there is no serious damage, they will often get better in a few weeks with home treatment. The doctor has checked you carefully, but problems can develop later. If you notice any problems or new symptoms, get medical treatment right away. Follow-up care is a key part of your treatment and safety. Be sure to make and go to all appointments, and call your doctor if you are having problems. It's also a good idea to know your test results and keep a list of the medicines you take. How can you care for yourself at home? · Put ice or a cold pack on the sore area for 10 to 20 minutes at a time to stop swelling. Put a thin cloth between the ice pack and your skin. · Be safe with medicines. Read and follow all instructions on the label. ¨ If the doctor gave you a prescription medicine for pain, take it as prescribed. ¨ If you are not taking a prescription pain medicine, ask your doctor if you can take an over-the-counter medicine. · If you can, prop up the sore area on pillows as much as possible for the next few days. Try to keep the sore area above the level of your heart. When should you call for help?   Call your doctor now or seek immediate medical care if:  · Your pain gets worse. · You have new or worse swelling. · You have tingling, weakness, or numbness in the area near the contusion. · The area near the contusion is cold or pale. Watch closely for changes in your health, and be sure to contact your doctor if:  · You do not get better as expected. Where can you learn more? Go to [employer].be  Enter [id number] in the search box to learn more about \"Contusion: Care Instructions. \"   © 2284-3599 Healthwise, Incorporated. Care instructions adapted under license by [employer] (which disclaims liability or warranty for this information). This care instruction is for use with your licensed healthcare professional. If you have questions about a medical condition or this instruction, always ask your healthcare professional. [address] any warranty or liability for your use of this information. Content Version: 12.7.773886; Current as of: May 22, 2015           Patient Education        Learning About a Closed Head Injury  What is a closed head injury? A closed head injury happens when your head gets hit hard. The strong force of the blow causes your brain to shake in your skull. This movement can cause the brain to bruise, swell, or tear. Sometimes nerves or blood vessels also get damaged. This can cause bleeding in or around the brain. A concussion is a type of closed head injury. What are the symptoms? If you have a mild concussion, you may have a mild headache or feel \"not quite right. \" These symptoms are common. They usually go away over a few days to 4 weeks. But sometimes after a concussion, you feel like you can't function as well as before the injury. And you have new symptoms. This is called postconcussive syndrome. You may:  · Find it harder to solve problems, think, concentrate, or remember. · Have headaches.   · Have changes in your sleep patterns, such as not being able to sleep or sleeping all the time. · Have changes in your personality. · Not be interested in your usual activities. · Feel angry or anxious without a clear reason. · Lose your sense of taste or smell. · Be dizzy, lightheaded, or unsteady. It may be hard to stand or walk. How is a closed head injury treated? Any person who may have a concussion needs to see a doctor. Some people have to stay in the hospital to be watched. Others can go home safely. If you go home, follow your doctor's instructions. He or she will tell you if you need someone to watch you closely for the next 24 hours or longer. Rest is the best treatment. Get plenty of sleep at night. And try to rest during the day. · Avoid activities that are physically or mentally demanding. These include housework, exercise, and schoolwork. And don't play video games, send text messages, or use the computer. You may need to change your school or work schedule to be able to avoid these activities. · Ask your doctor when it's okay to drive, ride a bike, or operate machinery. · Take an over-the-counter pain medicine, such as acetaminophen (Tylenol), ibuprofen (Advil, Motrin), or naproxen (Aleve). Be safe with medicines. Read and follow all instructions on the label. · Check with your doctor before you use any other medicines for pain. · Do not drink alcohol or use illegal drugs. They can slow recovery. They can also increase your risk of getting a second head injury. Follow-up care is a key part of your treatment and safety. Be sure to make and go to all appointments, and call your doctor if you are having problems. It's also a good idea to know your test results and keep a list of the medicines you take. Where can you learn more? Go to http://hadley-clemencia.info/. Enter E235 in the search box to learn more about \"Learning About a Closed Head Injury. \"  Current as of: March 28, 2019  Content Version: 12.2  © 6753-6542 Healthwise, Incorporated. Care instructions adapted under license by [employer] (which disclaims liability or warranty for this information). If you have questions about a medical condition or this instruction, always ask your healthcare professional. [address] any warranty or liability for your use of this information.

## 2019-11-05 NOTE — ED PROVIDER NOTES
72-year-old female with history of anemia, anxiety, chronic pain with spinal stenosis, fibromyalgia, GERD, multiple myeloma, and rheumatoid arthritis has had increasing difficulty ambulating lately and is now using a Rollator and today had a fall onto the linoleum while trying to get ready putting on a jacket and getting her Rollator situated. It sounds like she lost her footing and tripped. She says she landed on her knees fell backwards onto her hips, neck, and head. Home health was concerned enough that they recommended she come here for evaluation since she is on Xarelto. She did not lose consciousness and does not feel confused and has not been vomiting. She was getting ready to vote today and was in her normal state of health when this happened. She does not believe she had any fever or infection. She is planning to see Dr. Cierra Cowan, neurosurgery, tomorrow for her chronic pain and issues with walking. Past Medical History:  
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   
 spinal stenosis per pt  Fibromyalgia  GERD (gastroesophageal reflux disease)  High cholesterol  History of acute pancreatitis  History of blood transfusion  History of Salmonella infection 2009  Multiple myeloma ([address] 2007  Osteoporosis  Other complication due to venous access device 1/29/2013  Recurrent umbilical hernia 7/41/2377  Recurrent ventral incisional hernia 3/28/2016  Rheumatoid arthritis(714.0)  Sleep apnea INTOLERATANT OF CPAP  Thromboembolus ([address] Right leg DVT  Urinary incontinence Past Surgical History:  
Procedure Laterality Date  HX CHOLECYSTECTOMY  2004  HX CRANIOTOMY    
 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    
 COLONOSCOPIES, EGD  HX GI    
 ESOPHAGUS STREACHED  
 HX HEENT  10/2011  
 b/l cataract surgery in October 2011  HX HERNIA REPAIR  [id number]  X6  
  HX HERNIA REPAIR  10-13-14  
 repair of recurrent ventral incisional hernia  with primary suture [id number]. Randall Dumas  HX KYPHOPLASTY  2017 L4  
 HX ORTHOPAEDIC Right  BONE SPURS SHOULDER  
 HX OTHER SURGICAL   LLQ PAIN PUMP FOR MORPHINE INFUSION  
 HX OTHER SURGICAL  14  
 repair of recurrent umbilical hernia with ventralex pillow top mesh and extensive lysis of [id number]. Randall Dumas  HX OTHER SURGICAL  16  
 repair of recurrent ventral incisional hernia with underlay mesh-[facility]-Dr. Randall Dumas  HX VASCULAR ACCESS Right 2013 POWER PORT   
 HX VASCULAR ACCESS  2010 PAIN PUMP  IR INJ FORAMIN EPID LUMB ANES/STER SNGL  3/19/2019  
 NEUROLOGICAL PROCEDURE UNLISTED   KYPHOPLASTY X2  
 NEUROLOGICAL PROCEDURE UNLISTED  10/26/15 Kyphoplasty t-5  PAIN PUMP DEVICE    
 implanted in LLQ, MORPHINE Family History:  
Problem Relation Age of Onset Luis Carlos Arthritis-osteo Mother  Anesth Problems Neg Hx Social History Socioeconomic History  Marital status:  Spouse name: Not on file  Number of children: Not on file  Years of education: Not on file  Highest education level: Not on file Occupational History  Not on file Social Needs  Financial resource strain: Not on file  Food insecurity:  
  Worry: Not on file Inability: Not on file  Transportation needs:  
  Medical: Not on file Non-medical: Not on file Tobacco Use  Smoking status: Former Smoker Years: 10.00 Last attempt to quit: 10/7/2005 Years since quittin.0  Smokeless tobacco: Never Used Substance and Sexual Activity  Alcohol use: No  
 Drug use: No  
 Sexual activity: Not on file Lifestyle  Physical activity:  
  Days per week: Not on file Minutes per session: Not on file  Stress: Not on file Relationships  Social connections:  
  Talks on phone: Not on file Gets together: Not on file Attends Congregation service: Not on file Active member of club or organization: Not on file Attends meetings of clubs or organizations: Not on file Relationship status: Not on file  Intimate partner violence:  
  Fear of current or ex partner: Not on file Emotionally abused: Not on file Physically abused: Not on file Forced sexual activity: Not on file Other Topics Concern  Not on file Social History Narrative  Not on file ALLERGIES: Aggrenox [aspirin-dipyridamole]; Broccoli; Bumex [bumetanide]; Doxil [doxorubicin, peg-liposomal]; Flagyl [metronidazole]; Keflex [cephalexin]; Lasix [furosemide]; Macrobid [nitrofurantoin monohyd/m-cryst]; Methotrexate; Pcn [penicillins]; Sulfa (sulfonamide antibiotics); Tetanus and diphtheria toxoids; and Thalidomide Review of Systems Constitutional: Negative for fever. HENT: Negative for trouble swallowing. Eyes: Negative for visual disturbance. Respiratory: Negative for cough. Cardiovascular: Negative for chest pain. Gastrointestinal: Negative for abdominal pain. Genitourinary: Negative for difficulty urinating. Musculoskeletal: Positive for back pain and gait problem. Skin: Negative for rash. Neurological: Negative for headaches. Hematological: Bruises/bleeds easily. Psychiatric/Behavioral: Negative for sleep disturbance. Vitals:  
 11/05/19 1323 BP: 143/72 Pulse: 96  
Resp: 18 Temp: 98 °F (36.7 °C) SpO2: 96% Weight: 83.7 kg (184 lb 8.4 oz) Height: 5' 3\" (1.6 m) Physical Exam  
Constitutional: She is oriented to person, place, and time. She appears well-developed. HENT:  
Head: Normocephalic. Right Ear: No hemotympanum. Left Ear: No hemotympanum. Nose: No nasal septal hematoma. Eyes: Pupils are equal, round, and reactive to light. Cardiovascular: Murmur heard. Systolic murmur is present. Pulses: Radial pulses are 2+ on the right side, and 2+ on the left side. Femoral pulses are 2+ on the right side, and 2+ on the left side. Dorsalis pedis pulses are 2+ on the right side, and 2+ on the left side. Pulmonary/Chest: Breath sounds normal. No respiratory distress. She exhibits no bony tenderness and no deformity. Old port scar on the left chest 
New port on the right Abdominal: Soft. There is no tenderness. There is no rebound and no guarding. Spinal stimulator Musculoskeletal: She exhibits no tenderness. Cervical back: She exhibits no tenderness. Thoracic back: She exhibits no tenderness. Lumbar back: She exhibits no tenderness. Tenderness of both knees, but not the ankles Tenderness with both hips and with flexion and internal and external rotation No significant tenderness on the arms, some areas are chronically tender Significant kyphosis with tenderness of the cervical spine Neurological: She is alert and oriented to person, place, and time. No sensory deficit.  strength normal bilaterally. Plantar and dorsiflexion normal bilaterally. Nursing note and vitals reviewed. MDM Number of Diagnoses or Management Options Diagnosis management comments: She is not showing signs of a head injury, but being on Xarelto and taking a hard hit to her head on linoleum could reasonably be evaluated with a head CT and with her cervical spine tenderness she will need imaging there as well. Low suspicion for fracture, but she is quite tender on the areas where she landed, so getting plain films of those. I do not think she will need any lab work to help me diagnose anything today because it sounds like she was in her normal state of health and I do not foresee that the trip was related to any occult infection or electrolyte abnormality or occult problem.   If I find anything concerning on the imaging then I might pursue lab work, otherwise the plan will be to have her follow-up with Dr. Iam Gonzalez tomorrow and with her pain management and regular doctors as needed. Procedures

## 2019-11-05 NOTE — ED TRIAGE NOTES
TRIAGE NOTE: Patient arrived from home with c/o midback, bilateral knee and posterior head pain after a fall this morning while walking with the rollator. Denies LOC.

## 2019-11-13 RX ORDER — POTASSIUM CHLORIDE 20 MEQ/1
TABLET, EXTENDED RELEASE ORAL
Qty: 30 TAB | Refills: 0 | Status: SHIPPED | OUTPATIENT
Start: 2019-11-13 | End: 2019-12-16 | Stop reason: SDUPTHER

## 2019-11-15 RX ORDER — AMITRIPTYLINE HYDROCHLORIDE 50 MG/1
TABLET, FILM COATED ORAL
Qty: 60 TAB | Refills: 0 | Status: SHIPPED | OUTPATIENT
Start: 2019-11-15 | End: 2019-12-16 | Stop reason: SDUPTHER

## 2019-11-15 RX ORDER — SIMVASTATIN 20 MG/1
TABLET, FILM COATED ORAL
Qty: 30 TAB | Refills: 0 | Status: SHIPPED | OUTPATIENT
Start: 2019-11-15 | End: 2019-12-16 | Stop reason: SDUPTHER

## 2019-11-22 RX ORDER — DEXLANSOPRAZOLE 60 MG/1
CAPSULE, DELAYED RELEASE ORAL
Qty: 30 CAP | Refills: 0 | Status: SHIPPED | OUTPATIENT
Start: 2019-11-22 | End: 2019-12-18 | Stop reason: SDUPTHER

## 2019-12-09 ENCOUNTER — OFFICE VISIT (OUTPATIENT)
Dept: INTERNAL MEDICINE CLINIC | Age: 74
End: 2019-12-09

## 2019-12-09 VITALS
HEART RATE: 108 BPM | DIASTOLIC BLOOD PRESSURE: 70 MMHG | HEIGHT: 63 IN | BODY MASS INDEX: 31.54 KG/M2 | RESPIRATION RATE: 16 BRPM | SYSTOLIC BLOOD PRESSURE: 107 MMHG | TEMPERATURE: 98.6 F | OXYGEN SATURATION: 96 % | WEIGHT: 178 LBS

## 2019-12-09 DIAGNOSIS — C90.00 MULTIPLE MYELOMA NOT HAVING ACHIEVED REMISSION (HCC): Primary | ICD-10-CM

## 2019-12-09 DIAGNOSIS — M05.79 RHEUMATOID ARTHRITIS INVOLVING MULTIPLE SITES WITH POSITIVE RHEUMATOID FACTOR (HCC): ICD-10-CM

## 2019-12-09 DIAGNOSIS — R52 SEVERE PAIN: ICD-10-CM

## 2019-12-09 PROBLEM — K56.609 SBO (SMALL BOWEL OBSTRUCTION) (HCC): Status: RESOLVED | Noted: 2019-01-24 | Resolved: 2019-12-09

## 2019-12-09 RX ORDER — LIDOCAINE 50 MG/G
1 PATCH TOPICAL EVERY 24 HOURS
Qty: 30 EACH | Refills: 1 | Status: SHIPPED | OUTPATIENT
Start: 2019-12-09 | End: 2020-04-23 | Stop reason: ALTCHOICE

## 2019-12-09 RX ORDER — RANITIDINE 150 MG/1
300 TABLET, FILM COATED ORAL 2 TIMES DAILY
Qty: 180 TAB | Refills: 1 | Status: SHIPPED | OUTPATIENT
Start: 2019-12-09 | End: 2020-04-23

## 2019-12-09 NOTE — PROGRESS NOTES
Chief Complaint Patient presents with  Rib Pain  
  left side Reviewed record in preparation for visit and have obtained necessary documentation. Identified pt with two pt identifiers(name and ). Health Maintenance Due Topic  Shingrix Vaccine Age 50> (1 of 2) [address] EXAM   
 
 
 
Chief Complaint Patient presents with  Rib Pain  
  left side Wt Readings from Last 3 Encounters:  
19 178 lb (80.7 kg) 19 184 lb 8.4 oz (83.7 kg) 19 174 lb (78.9 kg) Temp Readings from Last 3 Encounters:  
19 98.6 °F (37 °C) (Oral) 19 97.6 °F (36.4 °C)  
19 98.5 °F (36.9 °C) BP Readings from Last 3 Encounters:  
19 107/70  
19 125/68  
19 115/61 Pulse Readings from Last 3 Encounters:  
19 (!) 108  
19 80  
19 86 Learning Assessment: 
:  
 
Learning Assessment 2014 PRIMARY LEARNER Patient HIGHEST LEVEL OF EDUCATION - PRIMARY LEARNER  GRADUATED HIGH SCHOOL OR GED  
BARRIERS PRIMARY LEARNER NONE  
CO-LEARNER CAREGIVER No  
PRIMARY LANGUAGE ENGLISH  
LEARNER PREFERENCE PRIMARY READING  
ANSWERED BY patient RELATIONSHIP SELF Depression Screening: 
:  
 
3 most recent PHQ Screens 4/10/2019 PHQ Not Done - Little interest or pleasure in doing things Several days Feeling down, depressed, irritable, or hopeless Several days Total Score PHQ 2 2 Fall Risk Assessment: 
:  
 
Fall Risk Assessment, last 12 mths 2019 Able to walk? Yes Fall in past 12 months? No  
 
 
Abuse Screening: 
:  
 
No flowsheet data found. Coordination of Care Questionnaire: 
:  
 
1) Have you been to an emergency room, urgent care clinic since your last visit? no  
Hospitalized since your last visit? no          
 
2) Have you seen or consulted any other health care providers outside of [address] [place] [address] since your last visit?  yes  (Include any pap smears or colon screenings in this section.) 3) Do you have an Advance Directive on file? yes 4) Are you interested in receiving information on Advance Directives? NO Patient is accompanied by spouse I have received verbal consent from April Edwards to discuss any/all medical information while they are present in the room. Reviewed record  In preparation for visit and have obtained necessary documentation.

## 2019-12-10 NOTE — PROGRESS NOTES
HPI: 
Cindi Dai is a 76y.o. year old female who is here for several week history of left rib pain that had increased. . No injury. Some pain to movement and with breath. No shortness of breath or wheeze. No fevers or chills. She is recently seen her pain doctor for ongoing lower back pain with pain radiating down the leg. They have tried multiple injections without success. Her pain pump was recently refilled as well. No vomiting. Some nausea. Bowel movements are reasonably controlled. She was told that she is not a candidate for back surgery secondary to multiple myeloma. Past Medical History:  
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   
 spinal stenosis per pt  Fibromyalgia  GERD (gastroesophageal reflux disease)  High cholesterol  History of acute pancreatitis  History of blood transfusion  History of Salmonella infection 2009  Multiple myeloma ([address] 2007  Osteoporosis  Other complication due to venous access device 1/29/2013  Recurrent umbilical hernia 5/48/5777  Recurrent ventral incisional hernia 3/28/2016  Rheumatoid arthritis(714.0)  Sleep apnea INTOLERATANT OF CPAP  Thromboembolus ([address] Right leg DVT  Urinary incontinence Past Surgical History:  
Procedure Laterality Date  HX CHOLECYSTECTOMY  2004  HX CRANIOTOMY    
 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    
 COLONOSCOPIES, EGD  HX GI    
 ESOPHAGUS STREACHED  
 HX HEENT  10/2011  
 b/l cataract surgery in October 2011  HX HERNIA REPAIR  [id number] X6  
 HX HERNIA REPAIR  10-13-14  
 repair of recurrent ventral incisional hernia  with primary suture [id number]. Sanaz Dozier  HX KYPHOPLASTY  09/14/2017 L4  
 HX ORTHOPAEDIC Right 2001 BONE SPURS SHOULDER  
 HX OTHER SURGICAL  2010 [facility]Q PAIN PUMP FOR MORPHINE INFUSION  
 HX OTHER SURGICAL  1-16-14 repair of recurrent umbilical hernia with ventralex pillow top mesh and extensive lysis of [id number]. Todd Calzada  HX OTHER SURGICAL  5-31-16  
 repair of recurrent ventral incisional hernia with underlay mesh-[facility]-Dr. Todd Calzada  HX VASCULAR ACCESS Right 1/2013 POWER PORT   
 HX VASCULAR ACCESS  2010 PAIN PUMP  IR INJ FORAMIN EPID LUMB ANES/STER SNGL  3/19/2019  
 NEUROLOGICAL PROCEDURE UNLISTED  2007 KYPHOPLASTY X2  
 NEUROLOGICAL PROCEDURE UNLISTED  10/26/15 Kyphoplasty t-5  PAIN PUMP DEVICE    
 implanted in LLQ, MORPHINE Prior to Admission medications Medication Sig Start Date End Date Taking? Authorizing Provider  
lidocaine (LIDODERM) 5 % 1 Patch by TransDERmal route every twenty-four (24) hours. Apply patch to the affected area for 12 hours a day and remove for 12 hours a day. 12/9/19  Yes Woodrow Villegas MD  
raNITIdine (ZANTAC) 150 mg tablet Take 2 Tabs by mouth two (2) times a day. 12/9/19  Yes Woodrow Villegas MD  
milnacipran (SAVELLA) 50 mg tablet TAKE 1 TABLET BY MOUTH TWICE DAILY 11/22/19  Yes Woodrow Villegas MD  
dexlansoprazole (DEXILANT) 60 mg CpDB capsule (delayed release) TAKE 1 CAPSULE BY MOUTH EVERY DAY 11/22/19  Yes Woodrow Villegas MD  
simvastatin (ZOCOR) 20 mg tablet TAKE ONE TABLET BY MOUTH AT BEDTIME. 11/15/19  Yes Woodrow Villegas MD  
amitriptyline (ELAVIL) 50 mg tablet TAKE TWO TABLETS BY MOUTH AT BEDTIME AS NEEDED FOR SLEEP. 11/15/19  Yes Woodrow Villegas MD  
potassium chloride (K-DUR, KLOR-CON) 20 mEq tablet TAKE ONE TABLET BY MOUTH EVERY DAY. 11/13/19  Yes Woodrow Villegas MD  
gabapentin (NEURONTIN) 300 mg capsule Take 1 Cap by mouth four (4) times daily. TAKE ONE CAPSULE BY MOUTH FOUR TIMES A DAY 6/20/19  Yes Richy Gomez III, MD  
HYDROmorphone (DILAUDID) 8 mg tablet Take 8 mg by mouth every four (4) hours as needed for Pain.    Yes Sherif Hernandez MD  
 Cetirizine (ZYRTEC) 10 mg cap Take 10 mg by mouth daily as needed (allergies). Yes Other, MD Sherif  
cholecalciferol (VITAMIN D3) 400 unit tab tablet Take 400 Units by mouth daily. Yes Provider, Historical  
fluticasone (FLONASE) 50 mcg/actuation nasal spray 2 Sprays by Both Nostrils route two (2) times a day. Patient taking differently: 2 Sprays by Both Nostrils route two (2) times daily as needed for Allergies. 2/7/19  Yes Magaly Estrada MD  
clotrimazole (LOTRIMIN) 1 % topical cream Apply  to affected area two (2) times a day. 2/7/19  Yes Magaly Estrada MD  
bisacodyl (DULCOLAX) 10 mg suppository Insert 10 mg into rectum daily as needed. 1/31/19  Yes Donn Boxer I, MD  
rivaroxaban Monique Millicent) 10 mg tablet Take 10 mg by mouth daily (with dinner). Yes Provider, Historical  
predniSONE (DELTASONE) 10 mg tablet Take 10 mg by mouth daily. Yes Provider, Historical  
spironolactone (ALDACTONE) 50 mg tablet Take 1 Tab by mouth two (2) times a day. Indications: EDEMA Patient taking differently: Take 50 mg by mouth two (2) times daily as needed (edema). 9/23/16  Yes Magaly Estrada MD  
calcium citrate-vitamin D3 (CITRACAL WITH VITAMIN D MAXIMUM) tablet Take 1 Tab by mouth daily. Yes Provider, Historical  
OTHER,NON-FORMULARY, LLQ Morphine/bupivacaine pain pump for multiple myeloma. 4.287mg/3.215 mg per day   Yes Provider, Historical  
diazepam (VALIUM) 10 mg tablet Take 10 mg by mouth two (2) times a day. Yes Provider, Historical  
docusate sodium (COLACE) 100 mg capsule Take 100 mg by mouth daily. 1 capsule in the morning and 3 capsule at night   Yes Provider, Historical  
senna (SENOKOT) 8.6 mg tablet Take 4 Tabs by mouth two (2) times a day. 4 PILLS IN AM  4 PILLS IN PM   Yes Provider, Historical  
LORazepam (ATIVAN) 1 mg tablet Take  by mouth every four (4) hours as needed for Anxiety.     Provider, Historical  
 polyethylene glycol (MIRALAX) 17 gram/dose powder Take 17 g by mouth daily as needed (constipation). Other, MD Sherif  
raNITIdine (ZANTAC) 150 mg tablet Take 300 mg by mouth Daily (before dinner). 19  Provider, Historical  
aluminum hydrox-magnesium carb (GAVISCON)  mg/15 mL suspension Take 15 mL by mouth every six (6) hours as needed for Indigestion. Provider, Historical  
calcium carbonate (TUMS) 200 mg calcium (500 mg) chew Take 1 Tab by mouth as needed. Provider, Historical  
magnesium hydroxide (MILK OF MAGNESIA) 400 mg/5 mL suspension Take 30 mL by mouth daily as needed for Constipation. Provider, Historical  
acetaminophen (TYLENOL) 500 mg tablet Take 1,000 mg by mouth every six (6) hours as needed for Pain. Provider, Historical  
prochlorperazine (COMPAZINE) 5 mg tablet Take 5 mg by mouth every six (6) hours as needed for Nausea. Provider, Historical  
 
 
Social History Socioeconomic History  Marital status:  Spouse name: Not on file  Number of children: Not on file  Years of education: Not on file  Highest education level: Not on file Occupational History  Not on file Social Needs  Financial resource strain: Not on file  Food insecurity:  
  Worry: Not on file Inability: Not on file  Transportation needs:  
  Medical: Not on file Non-medical: Not on file Tobacco Use  Smoking status: Former Smoker Years: 10.00 Last attempt to quit: 10/7/2005 Years since quittin.1  Smokeless tobacco: Never Used Substance and Sexual Activity  Alcohol use: No  
 Drug use: No  
 Sexual activity: Not on file Lifestyle  Physical activity:  
  Days per week: Not on file Minutes per session: Not on file  Stress: Not on file Relationships  Social connections:  
  Talks on phone: Not on file Gets together: Not on file Attends Anabaptism service: Not on file Active member of club or organization: Not on file Attends meetings of clubs or organizations: Not on file Relationship status: Not on file  Intimate partner violence:  
  Fear of current or ex partner: Not on file Emotionally abused: Not on file Physically abused: Not on file Forced sexual activity: Not on file Other Topics Concern  Not on file Social History Narrative  Not on file ROS Per HPI Visit Vitals /70 (BP 1 Location: Left arm, BP Patient Position: Sitting) Pulse (!) 108 Temp 98.6 °F (37 °C) (Oral) Resp 16 Ht 5' 3\" (1.6 m) Wt 178 lb (80.7 kg) SpO2 96% BMI 31.53 kg/m² Physical Exam  
Physical Examination: General appearance - alert, well appearing, and in no distress Chest - clear to auscultation, no wheezes, rales or rhonchi, symmetric air entry, chest wall tenderness noted along the left lower rib cage. No bony deformity. No swelling or redness. Heart - normal rate and regular rhythm Abdomen - left upper abdomen with pump in place. Upper aspect with a small area of protrusion. No redness or warmth. Extremities - peripheral pulses normal, no pedal edema, no clubbing or cyanosis Assessment/Plan: 
Diagnoses and all orders for this visit: 
 
1. Multiple myeloma not having achieved remission ([address] -per oncology. Will continue to follow-up with them. 2. Rheumatoid arthritis involving multiple sites with positive rheumatoid factor ([address] -followed by rheumatology. No acute flares. 3. Severe pain  -chronic back pain with radiculopathy and now with left rib cage pain. ?  Related to costochondritis versus occult fracture. Will treat with Lidoderm patches as she is taking significant amounts of Dilaudid already as well as has a pain pump. If pain persists consider imaging.  
 
Other orders 
-     lidocaine (LIDODERM) 5 %; 1 Patch by TransDERmal route every twenty-four (24) hours. Apply patch to the affected area for 12 hours a day and remove for 12 hours a day. -     raNITIdine (ZANTAC) 150 mg tablet; Take 2 Tabs by mouth two (2) times a day. Follow-up and Dispositions · Return if symptoms worsen or fail to improve. Advised her to call back or return to office if symptoms worsen/change/persist. 
Discussed expected course/resolution/complications of diagnosis in detail with patient. Medication risks/benefits/costs/interactions/alternatives discussed with patient. She was given an after visit summary which includes diagnoses, current medications, & vitals. She expressed understanding with the diagnosis and plan.

## 2019-12-16 RX ORDER — SIMVASTATIN 20 MG/1
TABLET, FILM COATED ORAL
Qty: 30 TAB | Refills: 0 | Status: SHIPPED | OUTPATIENT
Start: 2019-12-16 | End: 2020-01-13

## 2019-12-16 RX ORDER — POTASSIUM CHLORIDE 20 MEQ/1
TABLET, EXTENDED RELEASE ORAL
Qty: 30 TAB | Refills: 0 | Status: SHIPPED | OUTPATIENT
Start: 2019-12-16 | End: 2020-01-13

## 2019-12-16 RX ORDER — AMITRIPTYLINE HYDROCHLORIDE 50 MG/1
TABLET, FILM COATED ORAL
Qty: 60 TAB | Refills: 0 | Status: SHIPPED | OUTPATIENT
Start: 2019-12-16 | End: 2020-01-13

## 2019-12-18 RX ORDER — DEXLANSOPRAZOLE 60 MG/1
60 CAPSULE, DELAYED RELEASE ORAL DAILY
Qty: 90 CAP | Refills: 1 | Status: SHIPPED | OUTPATIENT
Start: 2019-12-18 | End: 2020-05-17

## 2019-12-18 NOTE — TELEPHONE ENCOUNTER
Orders Placed This Encounter    dexlansoprazole (DEXILANT) 60 mg CpDB capsule (delayed release)     Sig: Take 1 Cap by mouth daily. TAKE 1 CAPSULE BY MOUTH EVERY DAY     Dispense:  90 Cap     Refill:  1     The above orders were approved via [facility] per Dr. Elvia Edwards, III.

## 2020-01-01 ENCOUNTER — TELEPHONE (OUTPATIENT)
Dept: INTERNAL MEDICINE CLINIC | Age: 75
End: 2020-01-01

## 2020-01-01 ENCOUNTER — APPOINTMENT (OUTPATIENT)
Dept: CT IMAGING | Age: 75
DRG: 515 | End: 2020-01-01
Attending: INTERNAL MEDICINE
Payer: MEDICARE

## 2020-01-01 ENCOUNTER — HOSPITAL ENCOUNTER (OUTPATIENT)
Dept: INTERVENTIONAL RADIOLOGY/VASCULAR | Age: 75
Discharge: HOME | End: 2020-07-22
Attending: INTERNAL MEDICINE | Admitting: INTERNAL MEDICINE
Payer: MEDICARE

## 2020-01-01 ENCOUNTER — HOSPITAL ENCOUNTER (INPATIENT)
Age: 75
LOS: 8 days | Discharge: HOSPICE | DRG: 515 | End: 2020-10-31
Attending: EMERGENCY MEDICINE | Admitting: INTERNAL MEDICINE
Payer: MEDICARE

## 2020-01-01 ENCOUNTER — ANESTHESIA EVENT (OUTPATIENT)
Dept: INTERVENTIONAL RADIOLOGY/VASCULAR | Age: 75
End: 2020-01-01
Payer: MEDICARE

## 2020-01-01 ENCOUNTER — TRANSCRIBE ORDER (OUTPATIENT)
Dept: SCHEDULING | Age: 75
End: 2020-01-01

## 2020-01-01 ENCOUNTER — APPOINTMENT (OUTPATIENT)
Dept: CT IMAGING | Age: 75
DRG: 515 | End: 2020-01-01
Attending: EMERGENCY MEDICINE
Payer: MEDICARE

## 2020-01-01 ENCOUNTER — OFFICE VISIT (OUTPATIENT)
Dept: INTERNAL MEDICINE CLINIC | Age: 75
End: 2020-01-01
Payer: MEDICARE

## 2020-01-01 ENCOUNTER — APPOINTMENT (OUTPATIENT)
Dept: NUCLEAR MEDICINE | Age: 75
DRG: 515 | End: 2020-01-01
Attending: PHYSICIAN ASSISTANT
Payer: MEDICARE

## 2020-01-01 ENCOUNTER — HOSPITAL ENCOUNTER (OUTPATIENT)
Dept: ULTRASOUND IMAGING | Age: 75
Discharge: HOME | End: 2020-10-15
Attending: INTERNAL MEDICINE
Payer: MEDICARE

## 2020-01-01 ENCOUNTER — APPOINTMENT (OUTPATIENT)
Dept: GENERAL RADIOLOGY | Age: 75
DRG: 515 | End: 2020-01-01
Attending: EMERGENCY MEDICINE
Payer: MEDICARE

## 2020-01-01 ENCOUNTER — APPOINTMENT (OUTPATIENT)
Dept: GENERAL RADIOLOGY | Age: 75
DRG: 515 | End: 2020-01-01
Attending: INTERNAL MEDICINE
Payer: MEDICARE

## 2020-01-01 ENCOUNTER — APPOINTMENT (OUTPATIENT)
Dept: GENERAL RADIOLOGY | Age: 75
DRG: 515 | End: 2020-01-01
Attending: THORACIC SURGERY (CARDIOTHORACIC VASCULAR SURGERY)
Payer: MEDICARE

## 2020-01-01 ENCOUNTER — HOSPITAL ENCOUNTER (OUTPATIENT)
Dept: INTERVENTIONAL RADIOLOGY/VASCULAR | Age: 75
Discharge: HOME | DRG: 515 | End: 2020-10-26
Attending: INTERNAL MEDICINE
Payer: MEDICARE

## 2020-01-01 ENCOUNTER — HOSPICE ADMISSION (OUTPATIENT)
Dept: HOSPICE | Facility: HOSPICE | Age: 75
End: 2020-01-01

## 2020-01-01 ENCOUNTER — ANESTHESIA (OUTPATIENT)
Dept: INTERVENTIONAL RADIOLOGY/VASCULAR | Age: 75
DRG: 515 | End: 2020-01-01
Payer: MEDICARE

## 2020-01-01 ENCOUNTER — ANESTHESIA (OUTPATIENT)
Dept: INTERVENTIONAL RADIOLOGY/VASCULAR | Age: 75
End: 2020-01-01
Payer: MEDICARE

## 2020-01-01 ENCOUNTER — ANESTHESIA EVENT (OUTPATIENT)
Dept: INTERVENTIONAL RADIOLOGY/VASCULAR | Age: 75
DRG: 515 | End: 2020-01-01
Payer: MEDICARE

## 2020-01-01 VITALS
RESPIRATION RATE: 16 BRPM | SYSTOLIC BLOOD PRESSURE: 134 MMHG | HEIGHT: 56 IN | BODY MASS INDEX: 40.26 KG/M2 | TEMPERATURE: 97.8 F | OXYGEN SATURATION: 99 % | DIASTOLIC BLOOD PRESSURE: 82 MMHG | HEART RATE: 103 BPM | WEIGHT: 179 LBS

## 2020-01-01 VITALS
RESPIRATION RATE: 14 BRPM | TEMPERATURE: 98.4 F | OXYGEN SATURATION: 94 % | SYSTOLIC BLOOD PRESSURE: 110 MMHG | DIASTOLIC BLOOD PRESSURE: 64 MMHG | HEART RATE: 101 BPM

## 2020-01-01 VITALS
BODY MASS INDEX: 41.61 KG/M2 | HEIGHT: 56 IN | TEMPERATURE: 98.5 F | DIASTOLIC BLOOD PRESSURE: 56 MMHG | SYSTOLIC BLOOD PRESSURE: 124 MMHG | RESPIRATION RATE: 18 BRPM | HEART RATE: 86 BPM | OXYGEN SATURATION: 97 % | WEIGHT: 185 LBS

## 2020-01-01 VITALS — TEMPERATURE: 97.8 F

## 2020-01-01 VITALS
DIASTOLIC BLOOD PRESSURE: 68 MMHG | HEIGHT: 56 IN | WEIGHT: 176 LBS | TEMPERATURE: 98 F | HEART RATE: 99 BPM | SYSTOLIC BLOOD PRESSURE: 113 MMHG | BODY MASS INDEX: 39.59 KG/M2 | OXYGEN SATURATION: 93 % | RESPIRATION RATE: 18 BRPM

## 2020-01-01 DIAGNOSIS — R53.81 PHYSICAL DEBILITY: ICD-10-CM

## 2020-01-01 DIAGNOSIS — S22.49XA CLOSED FRACTURE OF MULTIPLE RIBS, UNSPECIFIED LATERALITY, INITIAL ENCOUNTER: ICD-10-CM

## 2020-01-01 DIAGNOSIS — C90.00 MULTIPLE MYELOMA NOT HAVING ACHIEVED REMISSION (HCC): ICD-10-CM
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
## 2020-01-01 DIAGNOSIS — S32.000A COMPRESSION OF LUMBAR VERTEBRA (HCC): ICD-10-CM

## 2020-01-01 DIAGNOSIS — C90.00 MYELOMA (HCC): ICD-10-CM

## 2020-01-01 DIAGNOSIS — M05.79 RHEUMATOID ARTHRITIS INVOLVING MULTIPLE SITES WITH POSITIVE RHEUMATOID FACTOR (HCC): ICD-10-CM

## 2020-01-01 DIAGNOSIS — E66.01 OBESITY, MORBID (HCC): ICD-10-CM

## 2020-01-01 DIAGNOSIS — Z23 NEEDS FLU SHOT: Primary | ICD-10-CM

## 2020-01-01 DIAGNOSIS — S22.42XA CLOSED FRACTURE OF MULTIPLE RIBS OF LEFT SIDE, INITIAL ENCOUNTER: ICD-10-CM

## 2020-01-01 DIAGNOSIS — M79.604 RIGHT LEG PAIN: ICD-10-CM

## 2020-01-01 DIAGNOSIS — R60.0 LOCALIZED EDEMA: Primary | ICD-10-CM

## 2020-01-01 DIAGNOSIS — R09.02 HYPOXIA: ICD-10-CM

## 2020-01-01 DIAGNOSIS — S22.000A THORACIC COMPRESSION FRACTURE, CLOSED, INITIAL ENCOUNTER (HCC): Primary | ICD-10-CM

## 2020-01-01 DIAGNOSIS — C90.00 MYELOMA ASSOCIATED AMYLOIDOSIS (HCC): ICD-10-CM

## 2020-01-01 DIAGNOSIS — R52 GENERALIZED PAIN: ICD-10-CM

## 2020-01-01 DIAGNOSIS — E85.9 MYELOMA ASSOCIATED AMYLOIDOSIS (HCC): ICD-10-CM

## 2020-01-01 DIAGNOSIS — K59.03 DRUG INDUCED CONSTIPATION: Primary | ICD-10-CM

## 2020-01-01 DIAGNOSIS — C90.00 MULTIPLE MYELOMA, REMISSION STATUS UNSPECIFIED (HCC): ICD-10-CM

## 2020-01-01 DIAGNOSIS — R60.0 LOCALIZED EDEMA: ICD-10-CM

## 2020-01-01 DIAGNOSIS — M25.512 ACUTE PAIN OF LEFT SHOULDER: ICD-10-CM

## 2020-01-01 LAB
ABO + RH BLD: NORMAL
ALBUMIN SERPL-MCNC: 2.7 G/DL (ref 3.5–5)
ALBUMIN/GLOB SERPL: 0.9 {RATIO} (ref 1.1–2.2)
ALP SERPL-CCNC: 141 U/L (ref 45–117)
ALT SERPL-CCNC: 9 U/L (ref 12–78)
ANION GAP SERPL CALC-SCNC: 4 MMOL/L (ref 5–15)
ANION GAP SERPL CALC-SCNC: 5 MMOL/L (ref 5–15)
ANION GAP SERPL CALC-SCNC: 6 MMOL/L (ref 5–15)
ANION GAP SERPL CALC-SCNC: 6 MMOL/L (ref 5–15)
ANION GAP SERPL CALC-SCNC: 7 MMOL/L (ref 5–15)
APPEARANCE UR: CLEAR
APTT PPP: 21 SEC (ref 22.1–32)
APTT PPP: 25.7 SEC (ref 22.1–32)
AST SERPL-CCNC: 34 U/L (ref 15–37)
B PERT DNA SPEC QL NAA+PROBE: NOT DETECTED
BACTERIA SPEC CULT: NORMAL
BACTERIA URNS QL MICRO: NEGATIVE /HPF
BASOPHILS # BLD: 0 K/UL (ref 0–0.1)
BASOPHILS # BLD: 0.1 K/UL (ref 0–0.1)
BASOPHILS NFR BLD: 0 % (ref 0–1)
BASOPHILS NFR BLD: 1 % (ref 0–1)
BILIRUB SERPL-MCNC: 0.3 MG/DL (ref 0.2–1)
BILIRUB UR QL: NEGATIVE
BLOOD GROUP ANTIBODIES SERPL: NORMAL
BORDETELLA PARAPERTUSSIS PCR, BORPAR: NOT DETECTED
BUN SERPL-MCNC: 5 MG/DL (ref 6–20)
BUN SERPL-MCNC: 8 MG/DL (ref 6–20)
BUN SERPL-MCNC: 8 MG/DL (ref 6–20)
BUN SERPL-MCNC: 9 MG/DL (ref 6–20)
BUN SERPL-MCNC: 9 MG/DL (ref 6–20)
BUN/CREAT SERPL: 12 (ref 12–20)
BUN/CREAT SERPL: 13 (ref 12–20)
BUN/CREAT SERPL: 13 (ref 12–20)
BUN/CREAT SERPL: 15 (ref 12–20)
BUN/CREAT SERPL: 8 (ref 12–20)
C PNEUM DNA SPEC QL NAA+PROBE: NOT DETECTED
CALCIUM SERPL-MCNC: 8.5 MG/DL (ref 8.5–10.1)
CALCIUM SERPL-MCNC: 8.7 MG/DL (ref 8.5–10.1)
CALCIUM SERPL-MCNC: 8.8 MG/DL (ref 8.5–10.1)
CALCIUM SERPL-MCNC: 9 MG/DL (ref 8.5–10.1)
CALCIUM SERPL-MCNC: 9 MG/DL (ref 8.5–10.1)
CHLORIDE SERPL-SCNC: 100 MMOL/L (ref 97–108)
CHLORIDE SERPL-SCNC: 101 MMOL/L (ref 97–108)
CHLORIDE SERPL-SCNC: 102 MMOL/L (ref 97–108)
CHLORIDE SERPL-SCNC: 97 MMOL/L (ref 97–108)
CHLORIDE SERPL-SCNC: 98 MMOL/L (ref 97–108)
CO2 SERPL-SCNC: 29 MMOL/L (ref 21–32)
CO2 SERPL-SCNC: 32 MMOL/L (ref 21–32)
CO2 SERPL-SCNC: 33 MMOL/L (ref 21–32)
CO2 SERPL-SCNC: 33 MMOL/L (ref 21–32)
CO2 SERPL-SCNC: 34 MMOL/L (ref 21–32)
COLOR UR: ABNORMAL
COMMENT, HOLDF: NORMAL
CREAT SERPL-MCNC: 0.59 MG/DL (ref 0.55–1.02)
CREAT SERPL-MCNC: 0.59 MG/DL (ref 0.55–1.02)
CREAT SERPL-MCNC: 0.62 MG/DL (ref 0.55–1.02)
CREAT SERPL-MCNC: 0.65 MG/DL (ref 0.55–1.02)
CREAT SERPL-MCNC: 0.67 MG/DL (ref 0.55–1.02)
CRP SERPL-MCNC: 4.84 MG/DL (ref 0–0.6)
D DIMER PPP FEU-MCNC: 1.68 MG/L FEU (ref 0–0.65)
D DIMER PPP FEU-MCNC: 4.06 MG/L FEU (ref 0–0.65)
DIFFERENTIAL METHOD BLD: ABNORMAL
EOSINOPHIL # BLD: 0 K/UL (ref 0–0.4)
EOSINOPHIL # BLD: 0.4 K/UL (ref 0–0.4)
EOSINOPHIL # BLD: 0.5 K/UL (ref 0–0.4)
EOSINOPHIL # BLD: 0.6 K/UL (ref 0–0.4)
EOSINOPHIL NFR BLD: 0 % (ref 0–7)
EOSINOPHIL NFR BLD: 4 % (ref 0–7)
EOSINOPHIL NFR BLD: 5 % (ref 0–7)
EOSINOPHIL NFR BLD: 6 % (ref 0–7)
EPITH CASTS URNS QL MICRO: ABNORMAL /LPF
ERYTHROCYTE [DISTWIDTH] IN BLOOD BY AUTOMATED COUNT: 14.1 % (ref 11.5–14.5)
ERYTHROCYTE [DISTWIDTH] IN BLOOD BY AUTOMATED COUNT: 14.5 % (ref 11.5–14.5)
ERYTHROCYTE [DISTWIDTH] IN BLOOD BY AUTOMATED COUNT: 14.6 % (ref 11.5–14.5)
ERYTHROCYTE [DISTWIDTH] IN BLOOD BY AUTOMATED COUNT: 14.7 % (ref 11.5–14.5)
ERYTHROCYTE [DISTWIDTH] IN BLOOD BY AUTOMATED COUNT: 14.8 % (ref 11.5–14.5)
ERYTHROCYTE [SEDIMENTATION RATE] IN BLOOD: 21 MM/HR (ref 0–30)
FERRITIN SERPL-MCNC: 59 NG/ML (ref 26–388)
FIBRINOGEN PPP-MCNC: 442 MG/DL (ref 200–475)
FIBRINOGEN PPP-MCNC: 494 MG/DL (ref 200–475)
FLUAV H1 2009 PAND RNA SPEC QL NAA+PROBE: NOT DETECTED
FLUAV H1 RNA SPEC QL NAA+PROBE: NOT DETECTED
FLUAV H3 RNA SPEC QL NAA+PROBE: NOT DETECTED
FLUAV SUBTYP SPEC NAA+PROBE: NOT DETECTED
FLUBV RNA SPEC QL NAA+PROBE: NOT DETECTED
GLOBULIN SER CALC-MCNC: 2.9 G/DL (ref 2–4)
GLUCOSE SERPL-MCNC: 105 MG/DL (ref 65–100)
GLUCOSE SERPL-MCNC: 113 MG/DL (ref 65–100)
GLUCOSE SERPL-MCNC: 176 MG/DL (ref 65–100)
GLUCOSE SERPL-MCNC: 90 MG/DL (ref 65–100)
GLUCOSE SERPL-MCNC: 94 MG/DL (ref 65–100)
GLUCOSE UR STRIP.AUTO-MCNC: NEGATIVE MG/DL
HADV DNA SPEC QL NAA+PROBE: NOT DETECTED
HCOV 229E RNA SPEC QL NAA+PROBE: NOT DETECTED
HCOV HKU1 RNA SPEC QL NAA+PROBE: NOT DETECTED
HCOV NL63 RNA SPEC QL NAA+PROBE: NOT DETECTED
HCOV OC43 RNA SPEC QL NAA+PROBE: NOT DETECTED
HCT VFR BLD AUTO: 34.4 % (ref 35–47)
HCT VFR BLD AUTO: 36.5 % (ref 35–47)
HCT VFR BLD AUTO: 38.1 % (ref 35–47)
HCT VFR BLD AUTO: 38.3 % (ref 35–47)
HCT VFR BLD AUTO: 38.8 % (ref 35–47)
HEALTH STATUS, XMCV2T: NORMAL
HEALTH STATUS, XMCV2T: NORMAL
HGB BLD-MCNC: 10.8 G/DL (ref 11.5–16)
HGB BLD-MCNC: 11.1 G/DL (ref 11.5–16)
HGB BLD-MCNC: 11.5 G/DL (ref 11.5–16)
HGB BLD-MCNC: 11.9 G/DL (ref 11.5–16)
HGB BLD-MCNC: 12 G/DL (ref 11.5–16)
HGB UR QL STRIP: NEGATIVE
HMPV RNA SPEC QL NAA+PROBE: NOT DETECTED
HPIV1 RNA SPEC QL NAA+PROBE: NOT DETECTED
HPIV2 RNA SPEC QL NAA+PROBE: NOT DETECTED
HPIV3 RNA SPEC QL NAA+PROBE: NOT DETECTED
HPIV4 RNA SPEC QL NAA+PROBE: NOT DETECTED
HYALINE CASTS URNS QL MICRO: ABNORMAL /LPF (ref 0–5)
IMM GRANULOCYTES # BLD AUTO: 0.2 K/UL (ref 0–0.04)
IMM GRANULOCYTES # BLD AUTO: 0.3 K/UL (ref 0–0.04)
IMM GRANULOCYTES NFR BLD AUTO: 2 % (ref 0–0.5)
IMM GRANULOCYTES NFR BLD AUTO: 3 % (ref 0–0.5)
INR PPP: 1 (ref 0.9–1.1)
INR PPP: 1 (ref 0.9–1.1)
KETONES UR QL STRIP.AUTO: ABNORMAL MG/DL
L PNEUMO1 AG UR QL IA: NEGATIVE
LACTATE SERPL-SCNC: 0.6 MMOL/L (ref 0.4–2)
LACTATE SERPL-SCNC: 1.1 MMOL/L (ref 0.4–2)
LEUKOCYTE ESTERASE UR QL STRIP.AUTO: NEGATIVE
LYMPHOCYTES # BLD: 0.3 K/UL (ref 0.8–3.5)
LYMPHOCYTES # BLD: 0.8 K/UL (ref 0.8–3.5)
LYMPHOCYTES # BLD: 0.9 K/UL (ref 0.8–3.5)
LYMPHOCYTES # BLD: 1.2 K/UL (ref 0.8–3.5)
LYMPHOCYTES NFR BLD: 13 % (ref 12–49)
LYMPHOCYTES NFR BLD: 4 % (ref 12–49)
LYMPHOCYTES NFR BLD: 9 % (ref 12–49)
LYMPHOCYTES NFR BLD: 9 % (ref 12–49)
M PNEUMO DNA SPEC QL NAA+PROBE: NOT DETECTED
MCH RBC QN AUTO: 30.1 PG (ref 26–34)
MCH RBC QN AUTO: 30.3 PG (ref 26–34)
MCH RBC QN AUTO: 30.9 PG (ref 26–34)
MCH RBC QN AUTO: 30.9 PG (ref 26–34)
MCH RBC QN AUTO: 31.4 PG (ref 26–34)
MCHC RBC AUTO-ENTMCNC: 30.2 G/DL (ref 30–36.5)
MCHC RBC AUTO-ENTMCNC: 30.4 G/DL (ref 30–36.5)
MCHC RBC AUTO-ENTMCNC: 30.9 G/DL (ref 30–36.5)
MCHC RBC AUTO-ENTMCNC: 31.1 G/DL (ref 30–36.5)
MCHC RBC AUTO-ENTMCNC: 31.4 G/DL (ref 30–36.5)
MCV RBC AUTO: 100.5 FL (ref 80–99)
MCV RBC AUTO: 101.6 FL (ref 80–99)
MCV RBC AUTO: 98.6 FL (ref 80–99)
MCV RBC AUTO: 98.9 FL (ref 80–99)
MCV RBC AUTO: 99.5 FL (ref 80–99)
MONOCYTES # BLD: 0.3 K/UL (ref 0–1)
MONOCYTES # BLD: 0.9 K/UL (ref 0–1)
MONOCYTES NFR BLD: 10 % (ref 5–13)
MONOCYTES NFR BLD: 4 % (ref 5–13)
MONOCYTES NFR BLD: 9 % (ref 5–13)
MONOCYTES NFR BLD: 9 % (ref 5–13)
NEUTS SEG # BLD: 6.5 K/UL (ref 1.8–8)
NEUTS SEG # BLD: 6.6 K/UL (ref 1.8–8)
NEUTS SEG # BLD: 7.5 K/UL (ref 1.8–8)
NEUTS SEG # BLD: 8 K/UL (ref 1.8–8)
NEUTS SEG NFR BLD: 70 % (ref 32–75)
NEUTS SEG NFR BLD: 71 % (ref 32–75)
NEUTS SEG NFR BLD: 75 % (ref 32–75)
NEUTS SEG NFR BLD: 90 % (ref 32–75)
NITRITE UR QL STRIP.AUTO: NEGATIVE
NRBC # BLD: 0 K/UL (ref 0–0.01)
NRBC BLD-RTO: 0 PER 100 WBC
PH UR STRIP: 8.5 [PH] (ref 5–8)
PLATELET # BLD AUTO: 319 K/UL (ref 150–400)
PLATELET # BLD AUTO: 335 K/UL (ref 150–400)
PLATELET # BLD AUTO: 343 K/UL (ref 150–400)
PLATELET # BLD AUTO: 353 K/UL (ref 150–400)
PLATELET # BLD AUTO: 374 K/UL (ref 150–400)
PMV BLD AUTO: 8.5 FL (ref 8.9–12.9)
PMV BLD AUTO: 8.5 FL (ref 8.9–12.9)
PMV BLD AUTO: 8.6 FL (ref 8.9–12.9)
PMV BLD AUTO: 9 FL (ref 8.9–12.9)
POTASSIUM SERPL-SCNC: 3.4 MMOL/L (ref 3.5–5.1)
POTASSIUM SERPL-SCNC: 3.6 MMOL/L (ref 3.5–5.1)
POTASSIUM SERPL-SCNC: 4 MMOL/L (ref 3.5–5.1)
POTASSIUM SERPL-SCNC: 4.1 MMOL/L (ref 3.5–5.1)
POTASSIUM SERPL-SCNC: 4.2 MMOL/L (ref 3.5–5.1)
PROCALCITONIN SERPL-MCNC: 0.06 NG/ML
PROT SERPL-MCNC: 5.6 G/DL (ref 6.4–8.2)
PROT UR STRIP-MCNC: NEGATIVE MG/DL
PROTHROMBIN TIME: 10.6 SEC (ref 9–11.1)
PROTHROMBIN TIME: 10.8 SEC (ref 9–11.1)
RBC # BLD AUTO: 3.49 M/UL (ref 3.8–5.2)
RBC # BLD AUTO: 3.69 M/UL (ref 3.8–5.2)
RBC # BLD AUTO: 3.79 M/UL (ref 3.8–5.2)
RBC # BLD AUTO: 3.82 M/UL (ref 3.8–5.2)
RBC # BLD AUTO: 3.85 M/UL (ref 3.8–5.2)
RBC #/AREA URNS HPF: ABNORMAL /HPF (ref 0–5)
RBC MORPH BLD: ABNORMAL
RSV RNA SPEC QL NAA+PROBE: NOT DETECTED
RV+EV RNA SPEC QL NAA+PROBE: NOT DETECTED
SAMPLES BEING HELD,HOLD: NORMAL
SARS-COV-2, COV2: NOT DETECTED
SARS-COV-2, COV2: NOT DETECTED
SERVICE CMNT-IMP: NORMAL
SODIUM SERPL-SCNC: 136 MMOL/L (ref 136–145)
SODIUM SERPL-SCNC: 136 MMOL/L (ref 136–145)
SODIUM SERPL-SCNC: 137 MMOL/L (ref 136–145)
SODIUM SERPL-SCNC: 138 MMOL/L (ref 136–145)
SODIUM SERPL-SCNC: 140 MMOL/L (ref 136–145)
SOURCE, COVRS: NORMAL
SOURCE, COVRS: NORMAL
SP GR UR REFRACTOMETRY: 1.01 (ref 1–1.03)
SPECIMEN EXP DATE BLD: NORMAL
SPECIMEN SOURCE, FCOV2M: NORMAL
SPECIMEN SOURCE, FCOV2M: NORMAL
SPECIMEN SOURCE: NORMAL
SPECIMEN TYPE, XMCV1T: NORMAL
SPECIMEN TYPE, XMCV1T: NORMAL
THERAPEUTIC RANGE,PTTT: ABNORMAL SECS (ref 58–77)
THERAPEUTIC RANGE,PTTT: NORMAL SECS (ref 58–77)
UR CULT HOLD, URHOLD: NORMAL
UROBILINOGEN UR QL STRIP.AUTO: 0.2 EU/DL (ref 0.2–1)
WBC # BLD AUTO: 10.5 K/UL (ref 3.6–11)
WBC # BLD AUTO: 8.3 K/UL (ref 3.6–11)
WBC # BLD AUTO: 8.7 K/UL (ref 3.6–11)
WBC # BLD AUTO: 9.2 K/UL (ref 3.6–11)
WBC # BLD AUTO: 9.4 K/UL (ref 3.6–11)
WBC URNS QL MICRO: ABNORMAL /HPF (ref 0–4)

## 2020-01-01 PROCEDURE — 74011250637 HC RX REV CODE- 250/637: Performed by: INTERNAL MEDICINE

## 2020-01-01 PROCEDURE — 99223 1ST HOSP IP/OBS HIGH 75: CPT | Performed by: NURSE PRACTITIONER

## 2020-01-01 PROCEDURE — 86900 BLOOD TYPING SEROLOGIC ABO: CPT

## 2020-01-01 PROCEDURE — 74011250636 HC RX REV CODE- 250/636: Performed by: INTERNAL MEDICINE

## 2020-01-01 PROCEDURE — 77030021783 HC SYS CEM DEL MEDT -D

## 2020-01-01 PROCEDURE — 85610 PROTHROMBIN TIME: CPT

## 2020-01-01 PROCEDURE — 65660000000 HC RM CCU STEPDOWN

## 2020-01-01 PROCEDURE — 77030026438 HC STYL ET INTUB CARD -A: Performed by: ANESTHESIOLOGY

## 2020-01-01 PROCEDURE — G8432 DEP SCR NOT DOC, RNG: HCPCS | Performed by: INTERNAL MEDICINE

## 2020-01-01 PROCEDURE — G8427 DOCREV CUR MEDS BY ELIG CLIN: HCPCS | Performed by: INTERNAL MEDICINE

## 2020-01-01 PROCEDURE — A9503 TC99M MEDRONATE: HCPCS

## 2020-01-01 PROCEDURE — 77010033678 HC OXYGEN DAILY

## 2020-01-01 PROCEDURE — 36415 COLL VENOUS BLD VENIPUNCTURE: CPT

## 2020-01-01 PROCEDURE — 76060000034 HC ANESTHESIA 1.5 TO 2 HR

## 2020-01-01 PROCEDURE — 97530 THERAPEUTIC ACTIVITIES: CPT

## 2020-01-01 PROCEDURE — 3288F FALL RISK ASSESSMENT DOCD: CPT | Performed by: INTERNAL MEDICINE

## 2020-01-01 PROCEDURE — 72131 CT LUMBAR SPINE W/O DYE: CPT

## 2020-01-01 PROCEDURE — 99284 EMERGENCY DEPT VISIT MOD MDM: CPT

## 2020-01-01 PROCEDURE — 85384 FIBRINOGEN ACTIVITY: CPT

## 2020-01-01 PROCEDURE — 77030034842 HC TAMP SPN BN INFL EXP II KYPH -I

## 2020-01-01 PROCEDURE — 74011000636 HC RX REV CODE- 636: Performed by: RADIOLOGY

## 2020-01-01 PROCEDURE — 74011250636 HC RX REV CODE- 250/636

## 2020-01-01 PROCEDURE — 74011000250 HC RX REV CODE- 250: Performed by: STUDENT IN AN ORGANIZED HEALTH CARE EDUCATION/TRAINING PROGRAM

## 2020-01-01 PROCEDURE — 97530 THERAPEUTIC ACTIVITIES: CPT | Performed by: PHYSICAL THERAPIST

## 2020-01-01 PROCEDURE — 83605 ASSAY OF LACTIC ACID: CPT

## 2020-01-01 PROCEDURE — C1713 ANCHOR/SCREW BN/BN,TIS/BN: HCPCS

## 2020-01-01 PROCEDURE — 76210000063 HC OR PH I REC FIRST 0.5 HR

## 2020-01-01 PROCEDURE — 74011250636 HC RX REV CODE- 250/636: Performed by: STUDENT IN AN ORGANIZED HEALTH CARE EDUCATION/TRAINING PROGRAM

## 2020-01-01 PROCEDURE — 77030021782 HC SYS CEM CART DEL KYPH -C

## 2020-01-01 PROCEDURE — 74011250637 HC RX REV CODE- 250/637: Performed by: NURSE PRACTITIONER

## 2020-01-01 PROCEDURE — 99221 1ST HOSP IP/OBS SF/LOW 40: CPT | Performed by: PHYSICIAN ASSISTANT

## 2020-01-01 PROCEDURE — 71045 X-RAY EXAM CHEST 1 VIEW: CPT

## 2020-01-01 PROCEDURE — 84145 PROCALCITONIN (PCT): CPT

## 2020-01-01 PROCEDURE — 65270000032 HC RM SEMIPRIVATE

## 2020-01-01 PROCEDURE — 74011250636 HC RX REV CODE- 250/636: Performed by: ANESTHESIOLOGY

## 2020-01-01 PROCEDURE — 97161 PT EVAL LOW COMPLEX 20 MIN: CPT

## 2020-01-01 PROCEDURE — 77030005513 HC CATH URETH FOL11 MDII -B

## 2020-01-01 PROCEDURE — 77030019908 HC STETH ESOPH SIMS -A: Performed by: ANESTHESIOLOGY

## 2020-01-01 PROCEDURE — 97535 SELF CARE MNGMENT TRAINING: CPT

## 2020-01-01 PROCEDURE — 0100U RESPIRATORY PANEL,PCR,NASOPHARYNGEAL: CPT

## 2020-01-01 PROCEDURE — 97166 OT EVAL MOD COMPLEX 45 MIN: CPT

## 2020-01-01 PROCEDURE — 51798 US URINE CAPACITY MEASURE: CPT

## 2020-01-01 PROCEDURE — 85379 FIBRIN DEGRADATION QUANT: CPT

## 2020-01-01 PROCEDURE — 1100F PTFALLS ASSESS-DOCD GE2>/YR: CPT | Performed by: INTERNAL MEDICINE

## 2020-01-01 PROCEDURE — 80053 COMPREHEN METABOLIC PANEL: CPT

## 2020-01-01 PROCEDURE — 77030003666 HC NDL SPINAL BD -A

## 2020-01-01 PROCEDURE — 82728 ASSAY OF FERRITIN: CPT

## 2020-01-01 PROCEDURE — 74011000636 HC RX REV CODE- 636

## 2020-01-01 PROCEDURE — 85652 RBC SED RATE AUTOMATED: CPT

## 2020-01-01 PROCEDURE — 90694 VACC AIIV4 NO PRSRV 0.5ML IM: CPT | Performed by: INTERNAL MEDICINE

## 2020-01-01 PROCEDURE — 73700 CT LOWER EXTREMITY W/O DYE: CPT

## 2020-01-01 PROCEDURE — 85730 THROMBOPLASTIN TIME PARTIAL: CPT

## 2020-01-01 PROCEDURE — 99214 OFFICE O/P EST MOD 30 MIN: CPT | Performed by: INTERNAL MEDICINE

## 2020-01-01 PROCEDURE — 80048 BASIC METABOLIC PNL TOTAL CA: CPT

## 2020-01-01 PROCEDURE — 87449 NOS EACH ORGANISM AG IA: CPT

## 2020-01-01 PROCEDURE — 73030 X-RAY EXAM OF SHOULDER: CPT

## 2020-01-01 PROCEDURE — 87040 BLOOD CULTURE FOR BACTERIA: CPT

## 2020-01-01 PROCEDURE — 0PS43ZZ REPOSITION THORACIC VERTEBRA, PERCUTANEOUS APPROACH: ICD-10-PCS | Performed by: RADIOLOGY

## 2020-01-01 PROCEDURE — 85025 COMPLETE CBC W/AUTO DIFF WBC: CPT

## 2020-01-01 PROCEDURE — 87635 SARS-COV-2 COVID-19 AMP PRB: CPT

## 2020-01-01 PROCEDURE — 93970 EXTREMITY STUDY: CPT

## 2020-01-01 PROCEDURE — 77030019905 HC CATH URETH INTMIT MDII -A

## 2020-01-01 PROCEDURE — 94760 N-INVAS EAR/PLS OXIMETRY 1: CPT

## 2020-01-01 PROCEDURE — 71275 CT ANGIOGRAPHY CHEST: CPT

## 2020-01-01 PROCEDURE — G8536 NO DOC ELDER MAL SCRN: HCPCS | Performed by: INTERNAL MEDICINE

## 2020-01-01 PROCEDURE — 81001 URINALYSIS AUTO W/SCOPE: CPT

## 2020-01-01 PROCEDURE — 86140 C-REACTIVE PROTEIN: CPT

## 2020-01-01 PROCEDURE — 77030013079 HC BLNKT BAIR HGGR 3M -A: Performed by: ANESTHESIOLOGY

## 2020-01-01 PROCEDURE — 92610 EVALUATE SWALLOWING FUNCTION: CPT

## 2020-01-01 PROCEDURE — 1090F PRES/ABSN URINE INCON ASSESS: CPT | Performed by: INTERNAL MEDICINE

## 2020-01-01 PROCEDURE — 3017F COLORECTAL CA SCREEN DOC REV: CPT | Performed by: INTERNAL MEDICINE

## 2020-01-01 PROCEDURE — 22513 PERQ VERTEBRAL AUGMENTATION: CPT

## 2020-01-01 PROCEDURE — 85027 COMPLETE CBC AUTOMATED: CPT

## 2020-01-01 PROCEDURE — 22514 PERQ VERTEBRAL AUGMENTATION: CPT

## 2020-01-01 PROCEDURE — 2709999900 HC NON-CHARGEABLE SUPPLY

## 2020-01-01 PROCEDURE — 74011000258 HC RX REV CODE- 258: Performed by: RADIOLOGY

## 2020-01-01 PROCEDURE — G8399 PT W/DXA RESULTS DOCUMENT: HCPCS | Performed by: INTERNAL MEDICINE

## 2020-01-01 PROCEDURE — G8417 CALC BMI ABV UP PARAM F/U: HCPCS | Performed by: INTERNAL MEDICINE

## 2020-01-01 PROCEDURE — 76060000032 HC ANESTHESIA 0.5 TO 1 HR

## 2020-01-01 PROCEDURE — 74011000250 HC RX REV CODE- 250

## 2020-01-01 PROCEDURE — 0PU43JZ SUPPLEMENT THORACIC VERTEBRA WITH SYNTHETIC SUBSTITUTE, PERCUTANEOUS APPROACH: ICD-10-PCS | Performed by: RADIOLOGY

## 2020-01-01 PROCEDURE — 99232 SBSQ HOSP IP/OBS MODERATE 35: CPT | Performed by: NURSE PRACTITIONER

## 2020-01-01 PROCEDURE — G9899 SCRN MAM PERF RSLTS DOC: HCPCS | Performed by: INTERNAL MEDICINE

## 2020-01-01 PROCEDURE — 77030008684 HC TU ET CUF COVD -B: Performed by: ANESTHESIOLOGY

## 2020-01-01 RX ORDER — FENTANYL 75 UG/H
1 PATCH TRANSDERMAL
Status: DISCONTINUED | OUTPATIENT
Start: 2020-01-01 | End: 2020-01-01 | Stop reason: DRUGHIGH

## 2020-01-01 RX ORDER — MORPHINE SULFATE 10 MG/ML
5 INJECTION, SOLUTION INTRAMUSCULAR; INTRAVENOUS
Status: DISCONTINUED | OUTPATIENT
Start: 2020-01-01 | End: 2020-01-01 | Stop reason: HOSPADM

## 2020-01-01 RX ORDER — EPHEDRINE SULFATE/0.9% NACL/PF 50 MG/5 ML
SYRINGE (ML) INTRAVENOUS AS NEEDED
Status: DISCONTINUED | OUTPATIENT
Start: 2020-01-01 | End: 2020-01-01 | Stop reason: HOSPADM

## 2020-01-01 RX ORDER — SUCCINYLCHOLINE CHLORIDE 20 MG/ML
INJECTION INTRAMUSCULAR; INTRAVENOUS AS NEEDED
Status: DISCONTINUED | OUTPATIENT
Start: 2020-01-01 | End: 2020-01-01 | Stop reason: HOSPADM

## 2020-01-01 RX ORDER — FENTANYL CITRATE 50 UG/ML
INJECTION, SOLUTION INTRAMUSCULAR; INTRAVENOUS AS NEEDED
Status: DISCONTINUED | OUTPATIENT
Start: 2020-01-01 | End: 2020-01-01 | Stop reason: HOSPADM

## 2020-01-01 RX ORDER — MORPHINE SULFATE 10 MG/ML
5 INJECTION, SOLUTION INTRAMUSCULAR; INTRAVENOUS CONTINUOUS
COMMUNITY

## 2020-01-01 RX ORDER — LEVOFLOXACIN 750 MG/1
750 TABLET ORAL EVERY 24 HOURS
Status: COMPLETED | OUTPATIENT
Start: 2020-01-01 | End: 2020-01-01

## 2020-01-01 RX ORDER — PREDNISONE 20 MG/1
1 TABLET ORAL DAILY
COMMUNITY
Start: 2020-01-01 | End: 2020-01-01

## 2020-01-01 RX ORDER — POLYETHYLENE GLYCOL 3350 17 G/17G
17 POWDER, FOR SOLUTION ORAL DAILY
Status: DISCONTINUED | OUTPATIENT
Start: 2020-01-01 | End: 2020-01-01 | Stop reason: HOSPADM

## 2020-01-01 RX ORDER — ALBUTEROL SULFATE 90 UG/1
1 AEROSOL, METERED RESPIRATORY (INHALATION)
Status: DISCONTINUED | OUTPATIENT
Start: 2020-01-01 | End: 2020-01-01 | Stop reason: HOSPADM

## 2020-01-01 RX ORDER — SODIUM CHLORIDE 0.9 % (FLUSH) 0.9 %
5-40 SYRINGE (ML) INJECTION EVERY 8 HOURS
Status: DISCONTINUED | OUTPATIENT
Start: 2020-01-01 | End: 2020-01-01 | Stop reason: HOSPADM

## 2020-01-01 RX ORDER — VANCOMYCIN HYDROCHLORIDE 500 MG/10ML
1000 INJECTION, POWDER, LYOPHILIZED, FOR SOLUTION INTRAVENOUS
Status: DISCONTINUED | OUTPATIENT
Start: 2020-01-01 | End: 2020-01-01 | Stop reason: SDUPTHER

## 2020-01-01 RX ORDER — HYDROMORPHONE HYDROCHLORIDE 1 MG/ML
1 INJECTION, SOLUTION INTRAMUSCULAR; INTRAVENOUS; SUBCUTANEOUS
Status: DISCONTINUED | OUTPATIENT
Start: 2020-01-01 | End: 2020-01-01

## 2020-01-01 RX ORDER — FENTANYL 50 UG/1
1 PATCH TRANSDERMAL
Qty: 5 PATCH | Refills: 0 | Status: SHIPPED | OUTPATIENT
Start: 2020-01-01 | End: 2020-01-01

## 2020-01-01 RX ORDER — FACIAL-BODY WIPES
10 EACH TOPICAL
Status: DISCONTINUED | OUTPATIENT
Start: 2020-01-01 | End: 2020-01-01 | Stop reason: HOSPADM

## 2020-01-01 RX ORDER — LORAZEPAM 2 MG/ML
1 INJECTION INTRAMUSCULAR ONCE
Status: ACTIVE | OUTPATIENT
Start: 2020-01-01 | End: 2020-01-01

## 2020-01-01 RX ORDER — PHENYLEPHRINE HCL IN 0.9% NACL 0.4MG/10ML
SYRINGE (ML) INTRAVENOUS AS NEEDED
Status: DISCONTINUED | OUTPATIENT
Start: 2020-01-01 | End: 2020-01-01 | Stop reason: HOSPADM

## 2020-01-01 RX ORDER — MIDAZOLAM HYDROCHLORIDE 1 MG/ML
INJECTION, SOLUTION INTRAMUSCULAR; INTRAVENOUS
Status: DISCONTINUED
Start: 2020-01-01 | End: 2020-01-01

## 2020-01-01 RX ORDER — DOCUSATE SODIUM 100 MG/1
100 CAPSULE, LIQUID FILLED ORAL DAILY
COMMUNITY

## 2020-01-01 RX ORDER — POTASSIUM CHLORIDE 750 MG/1
40 TABLET, FILM COATED, EXTENDED RELEASE ORAL
Status: COMPLETED | OUTPATIENT
Start: 2020-01-01 | End: 2020-01-01

## 2020-01-01 RX ORDER — FLUTICASONE PROPIONATE 50 MCG
2 SPRAY, SUSPENSION (ML) NASAL
COMMUNITY

## 2020-01-01 RX ORDER — ENOXAPARIN SODIUM 100 MG/ML
40 INJECTION SUBCUTANEOUS DAILY
Status: DISCONTINUED | OUTPATIENT
Start: 2020-01-01 | End: 2020-01-01

## 2020-01-01 RX ORDER — POLYETHYLENE GLYCOL 3350 17 G/17G
17 POWDER, FOR SOLUTION ORAL DAILY
COMMUNITY
Start: 2020-01-01

## 2020-01-01 RX ORDER — MORPHINE SULFATE 10 MG/ML
INJECTION, SOLUTION INTRAMUSCULAR; INTRAVENOUS
Status: COMPLETED
Start: 2020-01-01 | End: 2020-01-01

## 2020-01-01 RX ORDER — ALBUTEROL SULFATE 90 UG/1
1 AEROSOL, METERED RESPIRATORY (INHALATION)
Qty: 1 INHALER | Refills: 0 | Status: SHIPPED | OUTPATIENT
Start: 2020-01-01

## 2020-01-01 RX ORDER — DIPHENHYDRAMINE HYDROCHLORIDE 50 MG/ML
INJECTION, SOLUTION INTRAMUSCULAR; INTRAVENOUS
Status: DISPENSED
Start: 2020-01-01 | End: 2020-01-01

## 2020-01-01 RX ORDER — FENTANYL 50 UG/1
1 PATCH TRANSDERMAL
COMMUNITY
End: 2020-01-01

## 2020-01-01 RX ORDER — GLYCOPYRROLATE 0.2 MG/ML
INJECTION INTRAMUSCULAR; INTRAVENOUS AS NEEDED
Status: DISCONTINUED | OUTPATIENT
Start: 2020-01-01 | End: 2020-01-01 | Stop reason: HOSPADM

## 2020-01-01 RX ORDER — FENTANYL CITRATE 50 UG/ML
INJECTION, SOLUTION INTRAMUSCULAR; INTRAVENOUS
Status: COMPLETED
Start: 2020-01-01 | End: 2020-01-01

## 2020-01-01 RX ORDER — HEPARIN 100 UNIT/ML
500 SYRINGE INTRAVENOUS
Status: DISCONTINUED | OUTPATIENT
Start: 2020-01-01 | End: 2020-01-01

## 2020-01-01 RX ORDER — HYDROMORPHONE HYDROCHLORIDE 1 MG/ML
INJECTION, SOLUTION INTRAMUSCULAR; INTRAVENOUS; SUBCUTANEOUS
Status: DISPENSED
Start: 2020-01-01 | End: 2020-01-01

## 2020-01-01 RX ORDER — POLYETHYLENE GLYCOL 3350 17 G/17G
17 POWDER, FOR SOLUTION ORAL DAILY PRN
Status: DISCONTINUED | OUTPATIENT
Start: 2020-01-01 | End: 2020-01-01 | Stop reason: DRUGHIGH

## 2020-01-01 RX ORDER — ACETAMINOPHEN 650 MG/1
650 SUPPOSITORY RECTAL
Status: DISCONTINUED | OUTPATIENT
Start: 2020-01-01 | End: 2020-01-01 | Stop reason: HOSPADM

## 2020-01-01 RX ORDER — KETAMINE HYDROCHLORIDE 10 MG/ML
INJECTION, SOLUTION INTRAMUSCULAR; INTRAVENOUS AS NEEDED
Status: DISCONTINUED | OUTPATIENT
Start: 2020-01-01 | End: 2020-01-01 | Stop reason: HOSPADM

## 2020-01-01 RX ORDER — LIDOCAINE HYDROCHLORIDE 20 MG/ML
INJECTION, SOLUTION INFILTRATION; PERINEURAL
Status: DISCONTINUED
Start: 2020-01-01 | End: 2020-01-01 | Stop reason: HOSPADM

## 2020-01-01 RX ORDER — GABAPENTIN 300 MG/1
600 CAPSULE ORAL 3 TIMES DAILY
Status: DISCONTINUED | OUTPATIENT
Start: 2020-01-01 | End: 2020-01-01 | Stop reason: HOSPADM

## 2020-01-01 RX ORDER — GENTAMICIN SULFATE 1 MG/G
CREAM TOPICAL EVERY EVENING
COMMUNITY
End: 2020-01-01

## 2020-01-01 RX ORDER — MIDAZOLAM HYDROCHLORIDE 1 MG/ML
INJECTION, SOLUTION INTRAMUSCULAR; INTRAVENOUS AS NEEDED
Status: DISCONTINUED | OUTPATIENT
Start: 2020-01-01 | End: 2020-01-01 | Stop reason: HOSPADM

## 2020-01-01 RX ORDER — BALSAM PERU/CASTOR OIL
OINTMENT (GRAM) TOPICAL EVERY 8 HOURS
Qty: 1 TUBE | Refills: 0 | Status: SHIPPED | OUTPATIENT
Start: 2020-01-01

## 2020-01-01 RX ORDER — PROMETHAZINE HYDROCHLORIDE 12.5 MG/1
12.5 TABLET ORAL
Qty: 30 TAB | Refills: 0 | Status: SHIPPED | OUTPATIENT
Start: 2020-01-01

## 2020-01-01 RX ORDER — HYDROMORPHONE HYDROCHLORIDE 2 MG/ML
INJECTION, SOLUTION INTRAMUSCULAR; INTRAVENOUS; SUBCUTANEOUS AS NEEDED
Status: DISCONTINUED | OUTPATIENT
Start: 2020-01-01 | End: 2020-01-01 | Stop reason: HOSPADM

## 2020-01-01 RX ORDER — DIPHENHYDRAMINE HYDROCHLORIDE 50 MG/ML
INJECTION, SOLUTION INTRAMUSCULAR; INTRAVENOUS
Status: COMPLETED
Start: 2020-01-01 | End: 2020-01-01

## 2020-01-01 RX ORDER — SODIUM CHLORIDE 0.9 % (FLUSH) 0.9 %
10 SYRINGE (ML) INJECTION
Status: COMPLETED | OUTPATIENT
Start: 2020-01-01 | End: 2020-01-01

## 2020-01-01 RX ORDER — DIPHENHYDRAMINE HYDROCHLORIDE 50 MG/ML
12.5 INJECTION, SOLUTION INTRAMUSCULAR; INTRAVENOUS AS NEEDED
Status: DISCONTINUED | OUTPATIENT
Start: 2020-01-01 | End: 2020-01-01 | Stop reason: HOSPADM

## 2020-01-01 RX ORDER — PROMETHAZINE HYDROCHLORIDE 25 MG/1
12.5 TABLET ORAL
Status: DISCONTINUED | OUTPATIENT
Start: 2020-01-01 | End: 2020-01-01 | Stop reason: HOSPADM

## 2020-01-01 RX ORDER — DOCUSATE SODIUM 100 MG/1
300 CAPSULE, LIQUID FILLED ORAL EVERY EVENING
COMMUNITY

## 2020-01-01 RX ORDER — LORAZEPAM 2 MG/ML
1 INJECTION INTRAMUSCULAR ONCE
Status: COMPLETED | OUTPATIENT
Start: 2020-01-01 | End: 2020-01-01

## 2020-01-01 RX ORDER — ROCURONIUM BROMIDE 10 MG/ML
INJECTION, SOLUTION INTRAVENOUS AS NEEDED
Status: DISCONTINUED | OUTPATIENT
Start: 2020-01-01 | End: 2020-01-01 | Stop reason: HOSPADM

## 2020-01-01 RX ORDER — VANCOMYCIN HYDROCHLORIDE 500 MG/10ML
1500 INJECTION, POWDER, LYOPHILIZED, FOR SOLUTION INTRAVENOUS
Status: CANCELLED | OUTPATIENT
Start: 2020-01-01 | End: 2020-01-01

## 2020-01-01 RX ORDER — BUPIVACAINE HYDROCHLORIDE 5 MG/ML
20 INJECTION, SOLUTION EPIDURAL; INTRACAUDAL ONCE
Status: COMPLETED | OUTPATIENT
Start: 2020-01-01 | End: 2020-01-01

## 2020-01-01 RX ORDER — LIDOCAINE HYDROCHLORIDE 20 MG/ML
INJECTION, SOLUTION INFILTRATION; PERINEURAL
Status: COMPLETED
Start: 2020-01-01 | End: 2020-01-01

## 2020-01-01 RX ORDER — VANCOMYCIN 2 GRAM/500 ML IN 0.9 % SODIUM CHLORIDE INTRAVENOUS
2000 ONCE
Status: COMPLETED | OUTPATIENT
Start: 2020-01-01 | End: 2020-01-01

## 2020-01-01 RX ORDER — ACETAMINOPHEN 325 MG/1
650 TABLET ORAL
Status: DISCONTINUED | OUTPATIENT
Start: 2020-01-01 | End: 2020-01-01 | Stop reason: HOSPADM

## 2020-01-01 RX ORDER — DEXAMETHASONE SODIUM PHOSPHATE 4 MG/ML
INJECTION, SOLUTION INTRA-ARTICULAR; INTRALESIONAL; INTRAMUSCULAR; INTRAVENOUS; SOFT TISSUE AS NEEDED
Status: DISCONTINUED | OUTPATIENT
Start: 2020-01-01 | End: 2020-01-01 | Stop reason: HOSPADM

## 2020-01-01 RX ORDER — HEPARIN 100 UNIT/ML
300 SYRINGE INTRAVENOUS ONCE
Status: DISCONTINUED | OUTPATIENT
Start: 2020-01-01 | End: 2020-01-01

## 2020-01-01 RX ORDER — HEPARIN 100 UNIT/ML
300 SYRINGE INTRAVENOUS
Status: COMPLETED | OUTPATIENT
Start: 2020-01-01 | End: 2020-01-01

## 2020-01-01 RX ORDER — HEPARIN 100 UNIT/ML
300 SYRINGE INTRAVENOUS AS NEEDED
Status: DISCONTINUED | OUTPATIENT
Start: 2020-01-01 | End: 2020-01-01 | Stop reason: HOSPADM

## 2020-01-01 RX ORDER — AMITRIPTYLINE HYDROCHLORIDE 50 MG/1
100 TABLET, FILM COATED ORAL
Status: DISCONTINUED | OUTPATIENT
Start: 2020-01-01 | End: 2020-01-01 | Stop reason: HOSPADM

## 2020-01-01 RX ORDER — SODIUM CHLORIDE 9 MG/ML
INJECTION, SOLUTION INTRAVENOUS
Status: DISCONTINUED | OUTPATIENT
Start: 2020-01-01 | End: 2020-01-01 | Stop reason: HOSPADM

## 2020-01-01 RX ORDER — DIAZEPAM 5 MG/1
10 TABLET ORAL 2 TIMES DAILY
Status: DISCONTINUED | OUTPATIENT
Start: 2020-01-01 | End: 2020-01-01 | Stop reason: HOSPADM

## 2020-01-01 RX ORDER — PROPOFOL 10 MG/ML
INJECTION, EMULSION INTRAVENOUS AS NEEDED
Status: DISCONTINUED | OUTPATIENT
Start: 2020-01-01 | End: 2020-01-01 | Stop reason: HOSPADM

## 2020-01-01 RX ORDER — FENTANYL 50 UG/1
1 PATCH TRANSDERMAL
Status: DISCONTINUED | OUTPATIENT
Start: 2020-01-01 | End: 2020-01-01 | Stop reason: HOSPADM

## 2020-01-01 RX ORDER — DOCUSATE SODIUM 100 MG/1
100 CAPSULE, LIQUID FILLED ORAL DAILY
Status: DISCONTINUED | OUTPATIENT
Start: 2020-01-01 | End: 2020-01-01 | Stop reason: HOSPADM

## 2020-01-01 RX ORDER — SENNOSIDES 8.6 MG/1
4 TABLET ORAL 2 TIMES DAILY
Status: DISCONTINUED | OUTPATIENT
Start: 2020-01-01 | End: 2020-01-01 | Stop reason: HOSPADM

## 2020-01-01 RX ORDER — HEPARIN 100 UNIT/ML
SYRINGE INTRAVENOUS
Status: COMPLETED
Start: 2020-01-01 | End: 2020-01-01

## 2020-01-01 RX ORDER — BUPIVACAINE HYDROCHLORIDE 5 MG/ML
INJECTION, SOLUTION EPIDURAL; INTRACAUDAL
Status: COMPLETED
Start: 2020-01-01 | End: 2020-01-01

## 2020-01-01 RX ORDER — POLYETHYLENE GLYCOL 3350 17 G/17G
17 POWDER, FOR SOLUTION ORAL
Status: DISCONTINUED | OUTPATIENT
Start: 2020-01-01 | End: 2020-01-01

## 2020-01-01 RX ORDER — HYDROMORPHONE HYDROCHLORIDE 2 MG/1
12 TABLET ORAL
Status: DISCONTINUED | OUTPATIENT
Start: 2020-01-01 | End: 2020-01-01

## 2020-01-01 RX ORDER — DOCUSATE SODIUM 100 MG/1
300 CAPSULE, LIQUID FILLED ORAL EVERY EVENING
Status: DISCONTINUED | OUTPATIENT
Start: 2020-01-01 | End: 2020-01-01 | Stop reason: HOSPADM

## 2020-01-01 RX ORDER — FENTANYL 75 UG/H
1 PATCH TRANSDERMAL
COMMUNITY
End: 2020-01-01 | Stop reason: DRUGHIGH

## 2020-01-01 RX ORDER — ONDANSETRON 2 MG/ML
INJECTION INTRAMUSCULAR; INTRAVENOUS AS NEEDED
Status: DISCONTINUED | OUTPATIENT
Start: 2020-01-01 | End: 2020-01-01 | Stop reason: HOSPADM

## 2020-01-01 RX ORDER — DIPHENHYDRAMINE HCL 25 MG
1 CAPSULE ORAL AS NEEDED
COMMUNITY
End: 2020-01-01

## 2020-01-01 RX ORDER — ONDANSETRON 2 MG/ML
4 INJECTION INTRAMUSCULAR; INTRAVENOUS
Status: DISCONTINUED | OUTPATIENT
Start: 2020-01-01 | End: 2020-01-01 | Stop reason: HOSPADM

## 2020-01-01 RX ORDER — FLUTICASONE PROPIONATE 50 MCG
2 SPRAY, SUSPENSION (ML) NASAL
Status: DISCONTINUED | OUTPATIENT
Start: 2020-01-01 | End: 2020-01-01 | Stop reason: HOSPADM

## 2020-01-01 RX ORDER — HYDROMORPHONE HYDROCHLORIDE 2 MG/1
10 TABLET ORAL
Qty: 20 TAB | Refills: 0 | Status: SHIPPED | OUTPATIENT
Start: 2020-01-01 | End: 2020-01-01

## 2020-01-01 RX ORDER — PROPOFOL 10 MG/ML
INJECTION, EMULSION INTRAVENOUS
Status: DISCONTINUED | OUTPATIENT
Start: 2020-01-01 | End: 2020-01-01 | Stop reason: HOSPADM

## 2020-01-01 RX ORDER — LIDOCAINE HYDROCHLORIDE 20 MG/ML
20 INJECTION, SOLUTION INFILTRATION; PERINEURAL ONCE
Status: COMPLETED | OUTPATIENT
Start: 2020-01-01 | End: 2020-01-01

## 2020-01-01 RX ORDER — MORPHINE SULFATE 10 MG/ML
5 INJECTION, SOLUTION INTRAMUSCULAR; INTRAVENOUS CONTINUOUS
Status: DISCONTINUED | OUTPATIENT
Start: 2020-01-01 | End: 2020-01-01

## 2020-01-01 RX ORDER — SODIUM CHLORIDE, SODIUM LACTATE, POTASSIUM CHLORIDE, CALCIUM CHLORIDE 600; 310; 30; 20 MG/100ML; MG/100ML; MG/100ML; MG/100ML
INJECTION, SOLUTION INTRAVENOUS
Status: DISCONTINUED | OUTPATIENT
Start: 2020-01-01 | End: 2020-01-01 | Stop reason: HOSPADM

## 2020-01-01 RX ORDER — HYDROMORPHONE HYDROCHLORIDE 2 MG/1
8 TABLET ORAL
Status: DISCONTINUED | OUTPATIENT
Start: 2020-01-01 | End: 2020-01-01

## 2020-01-01 RX ORDER — LEVOFLOXACIN 5 MG/ML
750 INJECTION, SOLUTION INTRAVENOUS EVERY 24 HOURS
Status: DISCONTINUED | OUTPATIENT
Start: 2020-01-01 | End: 2020-01-01

## 2020-01-01 RX ORDER — VANCOMYCIN HYDROCHLORIDE
1250
Status: DISCONTINUED | OUTPATIENT
Start: 2020-01-01 | End: 2020-01-01 | Stop reason: ALTCHOICE

## 2020-01-01 RX ORDER — VANCOMYCIN HYDROCHLORIDE 500 MG/10ML
1500 INJECTION, POWDER, LYOPHILIZED, FOR SOLUTION INTRAVENOUS
Status: DISCONTINUED | OUTPATIENT
Start: 2020-01-01 | End: 2020-01-01

## 2020-01-01 RX ORDER — GABAPENTIN 300 MG/1
600 CAPSULE ORAL 3 TIMES DAILY
COMMUNITY

## 2020-01-01 RX ORDER — BALSAM PERU/CASTOR OIL
OINTMENT (GRAM) TOPICAL EVERY 8 HOURS
Status: DISCONTINUED | OUTPATIENT
Start: 2020-01-01 | End: 2020-01-01 | Stop reason: HOSPADM

## 2020-01-01 RX ORDER — LEVOFLOXACIN 750 MG/1
750 TABLET ORAL EVERY 24 HOURS
Status: DISCONTINUED | OUTPATIENT
Start: 2020-01-01 | End: 2020-01-01

## 2020-01-01 RX ORDER — SODIUM CHLORIDE 0.9 % (FLUSH) 0.9 %
5-40 SYRINGE (ML) INJECTION AS NEEDED
Status: DISCONTINUED | OUTPATIENT
Start: 2020-01-01 | End: 2020-01-01 | Stop reason: HOSPADM

## 2020-01-01 RX ADMIN — GABAPENTIN 600 MG: 300 CAPSULE ORAL at 09:28

## 2020-01-01 RX ADMIN — GABAPENTIN 600 MG: 300 CAPSULE ORAL at 15:23

## 2020-01-01 RX ADMIN — SENNOSIDES 34.4 MG: 8.6 TABLET, FILM COATED ORAL at 09:05

## 2020-01-01 RX ADMIN — VANCOMYCIN HYDROCHLORIDE 1000 MG: 1 INJECTION, POWDER, LYOPHILIZED, FOR SOLUTION INTRAVENOUS at 10:26

## 2020-01-01 RX ADMIN — PROPOFOL 100 MG: 10 INJECTION, EMULSION INTRAVENOUS at 16:33

## 2020-01-01 RX ADMIN — GABAPENTIN 600 MG: 300 CAPSULE ORAL at 21:08

## 2020-01-01 RX ADMIN — GABAPENTIN 600 MG: 300 CAPSULE ORAL at 23:24

## 2020-01-01 RX ADMIN — DIAZEPAM 10 MG: 5 TABLET ORAL at 21:36

## 2020-01-01 RX ADMIN — FENTANYL CITRATE 25 MCG: 50 INJECTION, SOLUTION INTRAMUSCULAR; INTRAVENOUS at 10:37

## 2020-01-01 RX ADMIN — Medication 10 ML: at 15:23

## 2020-01-01 RX ADMIN — HYDROMORPHONE HYDROCHLORIDE 10 MG: 4 TABLET ORAL at 21:36

## 2020-01-01 RX ADMIN — Medication 120 MCG: at 16:51

## 2020-01-01 RX ADMIN — IOPAMIDOL 80 ML: 755 INJECTION, SOLUTION INTRAVENOUS at 16:20

## 2020-01-01 RX ADMIN — Medication: at 23:18

## 2020-01-01 RX ADMIN — AMITRIPTYLINE HYDROCHLORIDE 100 MG: 50 TABLET, FILM COATED ORAL at 21:36

## 2020-01-01 RX ADMIN — ROCURONIUM BROMIDE 5 MG: 10 INJECTION, SOLUTION INTRAVENOUS at 16:33

## 2020-01-01 RX ADMIN — ACETAMINOPHEN 650 MG: 325 TABLET ORAL at 04:13

## 2020-01-01 RX ADMIN — LACTULOSE 45 ML: 20 SOLUTION ORAL at 12:38

## 2020-01-01 RX ADMIN — GABAPENTIN 600 MG: 300 CAPSULE ORAL at 00:43

## 2020-01-01 RX ADMIN — KETAMINE HYDROCHLORIDE 10 MG: 10 INJECTION, SOLUTION INTRAMUSCULAR; INTRAVENOUS at 11:00

## 2020-01-01 RX ADMIN — Medication 10 ML: at 10:10

## 2020-01-01 RX ADMIN — HEPARIN 300 UNITS: 100 SYRINGE at 14:35

## 2020-01-01 RX ADMIN — Medication 10 ML: at 16:33

## 2020-01-01 RX ADMIN — SENNOSIDES 34.4 MG: 8.6 TABLET, FILM COATED ORAL at 08:26

## 2020-01-01 RX ADMIN — HYDROMORPHONE HYDROCHLORIDE 10 MG: 4 TABLET ORAL at 05:27

## 2020-01-01 RX ADMIN — Medication 80 MCG: at 16:41

## 2020-01-01 RX ADMIN — Medication 10 ML: at 02:21

## 2020-01-01 RX ADMIN — BUPIVACAINE HYDROCHLORIDE 10 ML: 5 INJECTION, SOLUTION EPIDURAL; INTRACAUDAL at 11:01

## 2020-01-01 RX ADMIN — DIAZEPAM 10 MG: 5 TABLET ORAL at 09:04

## 2020-01-01 RX ADMIN — RIVAROXABAN 10 MG: 10 TABLET, FILM COATED ORAL at 20:59

## 2020-01-01 RX ADMIN — Medication 10 ML: at 06:55

## 2020-01-01 RX ADMIN — Medication: at 14:17

## 2020-01-01 RX ADMIN — Medication 120 MCG: at 16:47

## 2020-01-01 RX ADMIN — Medication 10 ML: at 15:53

## 2020-01-01 RX ADMIN — GABAPENTIN 600 MG: 300 CAPSULE ORAL at 09:05

## 2020-01-01 RX ADMIN — POLYETHYLENE GLYCOL 3350 17 G: 17 POWDER, FOR SOLUTION ORAL at 09:35

## 2020-01-01 RX ADMIN — SODIUM CHLORIDE: 9 INJECTION, SOLUTION INTRAVENOUS at 16:16

## 2020-01-01 RX ADMIN — DOCUSATE SODIUM 300 MG: 100 CAPSULE, LIQUID FILLED ORAL at 20:00

## 2020-01-01 RX ADMIN — KETAMINE HYDROCHLORIDE 10 MG: 10 INJECTION, SOLUTION INTRAMUSCULAR; INTRAVENOUS at 16:31

## 2020-01-01 RX ADMIN — DIPHENHYDRAMINE HYDROCHLORIDE 50 MG: 50 INJECTION, SOLUTION INTRAMUSCULAR; INTRAVENOUS at 23:19

## 2020-01-01 RX ADMIN — SUCCINYLCHOLINE CHLORIDE 160 MG: 20 INJECTION INTRAMUSCULAR; INTRAVENOUS at 16:33

## 2020-01-01 RX ADMIN — HYDROMORPHONE HYDROCHLORIDE 8 MG: 2 TABLET ORAL at 02:21

## 2020-01-01 RX ADMIN — Medication 10 ML: at 07:30

## 2020-01-01 RX ADMIN — HYDROMORPHONE HYDROCHLORIDE 10 MG: 4 TABLET ORAL at 20:55

## 2020-01-01 RX ADMIN — SENNOSIDES 34.4 MG: 8.6 TABLET, FILM COATED ORAL at 07:29

## 2020-01-01 RX ADMIN — HYDROMORPHONE HYDROCHLORIDE 12 MG: 2 TABLET ORAL at 03:15

## 2020-01-01 RX ADMIN — IOHEXOL 50 ML: 240 INJECTION, SOLUTION INTRATHECAL; INTRAVASCULAR; INTRAVENOUS; ORAL at 17:33

## 2020-01-01 RX ADMIN — HYDROMORPHONE HYDROCHLORIDE 10 MG: 4 TABLET ORAL at 10:20

## 2020-01-01 RX ADMIN — SENNOSIDES 34.4 MG: 8.6 TABLET, FILM COATED ORAL at 20:00

## 2020-01-01 RX ADMIN — DOCUSATE SODIUM 100 MG: 100 CAPSULE, LIQUID FILLED ORAL at 08:52

## 2020-01-01 RX ADMIN — GABAPENTIN 600 MG: 300 CAPSULE ORAL at 15:09

## 2020-01-01 RX ADMIN — Medication 10 ML: at 09:02

## 2020-01-01 RX ADMIN — ACETAMINOPHEN 650 MG: 325 TABLET ORAL at 18:21

## 2020-01-01 RX ADMIN — DOCUSATE SODIUM 100 MG: 100 CAPSULE, LIQUID FILLED ORAL at 10:14

## 2020-01-01 RX ADMIN — SENNOSIDES 34.4 MG: 8.6 TABLET, FILM COATED ORAL at 10:14

## 2020-01-01 RX ADMIN — KETAMINE HYDROCHLORIDE 10 MG: 10 INJECTION, SOLUTION INTRAMUSCULAR; INTRAVENOUS at 10:56

## 2020-01-01 RX ADMIN — DOCUSATE SODIUM 100 MG: 100 CAPSULE, LIQUID FILLED ORAL at 10:10

## 2020-01-01 RX ADMIN — HYDROMORPHONE HYDROCHLORIDE 8 MG: 2 TABLET ORAL at 13:20

## 2020-01-01 RX ADMIN — RIVAROXABAN 10 MG: 10 TABLET, FILM COATED ORAL at 18:18

## 2020-01-01 RX ADMIN — GABAPENTIN 600 MG: 300 CAPSULE ORAL at 08:52

## 2020-01-01 RX ADMIN — HYDROMORPHONE HYDROCHLORIDE 10 MG: 4 TABLET ORAL at 10:15

## 2020-01-01 RX ADMIN — DOCUSATE SODIUM 100 MG: 100 CAPSULE, LIQUID FILLED ORAL at 08:26

## 2020-01-01 RX ADMIN — DIAZEPAM 10 MG: 5 TABLET ORAL at 09:28

## 2020-01-01 RX ADMIN — DIAZEPAM 10 MG: 5 TABLET ORAL at 10:09

## 2020-01-01 RX ADMIN — Medication 10 ML: at 06:00

## 2020-01-01 RX ADMIN — AMITRIPTYLINE HYDROCHLORIDE 100 MG: 50 TABLET, FILM COATED ORAL at 19:22

## 2020-01-01 RX ADMIN — GABAPENTIN 600 MG: 300 CAPSULE ORAL at 07:29

## 2020-01-01 RX ADMIN — DIAZEPAM 10 MG: 5 TABLET ORAL at 21:42

## 2020-01-01 RX ADMIN — Medication 80 MCG: at 16:33

## 2020-01-01 RX ADMIN — KETAMINE HYDROCHLORIDE 10 MG: 10 INJECTION, SOLUTION INTRAMUSCULAR; INTRAVENOUS at 10:37

## 2020-01-01 RX ADMIN — Medication 10 ML: at 09:28

## 2020-01-01 RX ADMIN — DIAZEPAM 10 MG: 5 TABLET ORAL at 20:11

## 2020-01-01 RX ADMIN — LIDOCAINE HYDROCHLORIDE 10 ML: 20 INJECTION, SOLUTION INFILTRATION; PERINEURAL at 11:01

## 2020-01-01 RX ADMIN — Medication 10 ML: at 16:20

## 2020-01-01 RX ADMIN — HYDROMORPHONE HYDROCHLORIDE 10 MG: 4 TABLET ORAL at 00:43

## 2020-01-01 RX ADMIN — AMITRIPTYLINE HYDROCHLORIDE 100 MG: 50 TABLET, FILM COATED ORAL at 19:59

## 2020-01-01 RX ADMIN — DOCUSATE SODIUM 300 MG: 100 CAPSULE, LIQUID FILLED ORAL at 20:11

## 2020-01-01 RX ADMIN — GABAPENTIN 600 MG: 300 CAPSULE ORAL at 10:14

## 2020-01-01 RX ADMIN — LEVOFLOXACIN 750 MG: 5 INJECTION, SOLUTION INTRAVENOUS at 13:26

## 2020-01-01 RX ADMIN — MIDAZOLAM HYDROCHLORIDE 1 MG: 1 INJECTION, SOLUTION INTRAMUSCULAR; INTRAVENOUS at 10:42

## 2020-01-01 RX ADMIN — HYDROMORPHONE HYDROCHLORIDE 10 MG: 4 TABLET ORAL at 12:38

## 2020-01-01 RX ADMIN — Medication: at 15:46

## 2020-01-01 RX ADMIN — ONDANSETRON 4 MG: 2 INJECTION INTRAMUSCULAR; INTRAVENOUS at 17:40

## 2020-01-01 RX ADMIN — GABAPENTIN 600 MG: 300 CAPSULE ORAL at 23:23

## 2020-01-01 RX ADMIN — SENNOSIDES 34.4 MG: 8.6 TABLET, FILM COATED ORAL at 19:22

## 2020-01-01 RX ADMIN — DOCUSATE SODIUM 300 MG: 100 CAPSULE, LIQUID FILLED ORAL at 19:22

## 2020-01-01 RX ADMIN — Medication 10 ML: at 22:07

## 2020-01-01 RX ADMIN — Medication 10 ML: at 23:24

## 2020-01-01 RX ADMIN — Medication 10 ML: at 13:10

## 2020-01-01 RX ADMIN — Medication: at 06:00

## 2020-01-01 RX ADMIN — SENNOSIDES 34.4 MG: 8.6 TABLET, FILM COATED ORAL at 21:41

## 2020-01-01 RX ADMIN — LEVOFLOXACIN 750 MG: 5 INJECTION, SOLUTION INTRAVENOUS at 13:09

## 2020-01-01 RX ADMIN — SENNOSIDES 34.4 MG: 8.6 TABLET, FILM COATED ORAL at 07:01

## 2020-01-01 RX ADMIN — DOCUSATE SODIUM 100 MG: 100 CAPSULE, LIQUID FILLED ORAL at 09:05

## 2020-01-01 RX ADMIN — DOCUSATE SODIUM 100 MG: 100 CAPSULE, LIQUID FILLED ORAL at 21:36

## 2020-01-01 RX ADMIN — LEVOFLOXACIN 750 MG: 750 TABLET, FILM COATED ORAL at 12:25

## 2020-01-01 RX ADMIN — FENTANYL CITRATE 50 MCG: 50 INJECTION, SOLUTION INTRAMUSCULAR; INTRAVENOUS at 18:13

## 2020-01-01 RX ADMIN — RIVAROXABAN 10 MG: 10 TABLET, FILM COATED ORAL at 17:47

## 2020-01-01 RX ADMIN — Medication 10 ML: at 21:49

## 2020-01-01 RX ADMIN — HYDROMORPHONE HYDROCHLORIDE 0.5 MG: 2 INJECTION, SOLUTION INTRAMUSCULAR; INTRAVENOUS; SUBCUTANEOUS at 18:09

## 2020-01-01 RX ADMIN — ONDANSETRON 4 MG: 2 INJECTION INTRAMUSCULAR; INTRAVENOUS at 22:47

## 2020-01-01 RX ADMIN — DEXAMETHASONE SODIUM PHOSPHATE 4 MG: 4 INJECTION, SOLUTION INTRA-ARTICULAR; INTRALESIONAL; INTRAMUSCULAR; INTRAVENOUS; SOFT TISSUE at 17:04

## 2020-01-01 RX ADMIN — FENTANYL CITRATE 25 MCG: 50 INJECTION, SOLUTION INTRAMUSCULAR; INTRAVENOUS at 10:48

## 2020-01-01 RX ADMIN — LEVOFLOXACIN 750 MG: 750 TABLET, FILM COATED ORAL at 12:38

## 2020-01-01 RX ADMIN — GABAPENTIN 600 MG: 300 CAPSULE ORAL at 10:09

## 2020-01-01 RX ADMIN — Medication 10 MG: at 16:52

## 2020-01-01 RX ADMIN — HYDROMORPHONE HYDROCHLORIDE 0.5 MG: 2 INJECTION, SOLUTION INTRAMUSCULAR; INTRAVENOUS; SUBCUTANEOUS at 18:12

## 2020-01-01 RX ADMIN — DOCUSATE SODIUM 100 MG: 100 CAPSULE, LIQUID FILLED ORAL at 07:01

## 2020-01-01 RX ADMIN — DIAZEPAM 10 MG: 5 TABLET ORAL at 08:26

## 2020-01-01 RX ADMIN — GABAPENTIN 600 MG: 300 CAPSULE ORAL at 07:01

## 2020-01-01 RX ADMIN — GABAPENTIN 600 MG: 300 CAPSULE ORAL at 17:46

## 2020-01-01 RX ADMIN — HYDROMORPHONE HYDROCHLORIDE 10 MG: 4 TABLET ORAL at 16:52

## 2020-01-01 RX ADMIN — DIAZEPAM 10 MG: 5 TABLET ORAL at 10:14

## 2020-01-01 RX ADMIN — GABAPENTIN 600 MG: 300 CAPSULE ORAL at 23:37

## 2020-01-01 RX ADMIN — SENNOSIDES 34.4 MG: 8.6 TABLET, FILM COATED ORAL at 21:36

## 2020-01-01 RX ADMIN — VANCOMYCIN HYDROCHLORIDE 2000 MG: 10 INJECTION, POWDER, LYOPHILIZED, FOR SOLUTION INTRAVENOUS at 18:16

## 2020-01-01 RX ADMIN — HYDROMORPHONE HYDROCHLORIDE 10 MG: 4 TABLET ORAL at 08:35

## 2020-01-01 RX ADMIN — HYDROMORPHONE HYDROCHLORIDE 1 MG: 1 INJECTION, SOLUTION INTRAMUSCULAR; INTRAVENOUS; SUBCUTANEOUS at 16:29

## 2020-01-01 RX ADMIN — DIPHENHYDRAMINE HYDROCHLORIDE 12.5 MG: 50 INJECTION, SOLUTION INTRAMUSCULAR; INTRAVENOUS at 18:47

## 2020-01-01 RX ADMIN — Medication: at 13:10

## 2020-01-01 RX ADMIN — SENNOSIDES 34.4 MG: 8.6 TABLET, FILM COATED ORAL at 22:02

## 2020-01-01 RX ADMIN — GABAPENTIN 600 MG: 300 CAPSULE ORAL at 16:31

## 2020-01-01 RX ADMIN — LEVOFLOXACIN 750 MG: 5 INJECTION, SOLUTION INTRAVENOUS at 13:18

## 2020-01-01 RX ADMIN — GABAPENTIN 600 MG: 300 CAPSULE ORAL at 16:29

## 2020-01-01 RX ADMIN — Medication 10 MG: at 16:47

## 2020-01-01 RX ADMIN — AMITRIPTYLINE HYDROCHLORIDE 100 MG: 50 TABLET, FILM COATED ORAL at 20:10

## 2020-01-01 RX ADMIN — SODIUM CHLORIDE 1250 MG: 900 INJECTION, SOLUTION INTRAVENOUS at 10:13

## 2020-01-01 RX ADMIN — SENNOSIDES 34.4 MG: 8.6 TABLET, FILM COATED ORAL at 17:14

## 2020-01-01 RX ADMIN — HYDROMORPHONE HYDROCHLORIDE 1 MG: 1 INJECTION, SOLUTION INTRAMUSCULAR; INTRAVENOUS; SUBCUTANEOUS at 22:11

## 2020-01-01 RX ADMIN — HYDROMORPHONE HYDROCHLORIDE 10 MG: 4 TABLET ORAL at 15:07

## 2020-01-01 RX ADMIN — AMITRIPTYLINE HYDROCHLORIDE 100 MG: 50 TABLET, FILM COATED ORAL at 00:44

## 2020-01-01 RX ADMIN — GABAPENTIN 600 MG: 300 CAPSULE ORAL at 23:35

## 2020-01-01 RX ADMIN — DOCUSATE SODIUM 100 MG: 100 CAPSULE, LIQUID FILLED ORAL at 07:29

## 2020-01-01 RX ADMIN — Medication 10 ML: at 13:26

## 2020-01-01 RX ADMIN — FENTANYL CITRATE 50 MCG: 50 INJECTION, SOLUTION INTRAMUSCULAR; INTRAVENOUS at 16:12

## 2020-01-01 RX ADMIN — POLYETHYLENE GLYCOL 3350 17 G: 17 POWDER, FOR SOLUTION ORAL at 16:40

## 2020-01-01 RX ADMIN — DIAZEPAM 10 MG: 5 TABLET ORAL at 08:51

## 2020-01-01 RX ADMIN — FENTANYL CITRATE 25 MCG: 50 INJECTION, SOLUTION INTRAMUSCULAR; INTRAVENOUS at 11:04

## 2020-01-01 RX ADMIN — AMITRIPTYLINE HYDROCHLORIDE 100 MG: 50 TABLET, FILM COATED ORAL at 20:56

## 2020-01-01 RX ADMIN — Medication: at 23:08

## 2020-01-01 RX ADMIN — SENNOSIDES 34.4 MG: 8.6 TABLET, FILM COATED ORAL at 21:08

## 2020-01-01 RX ADMIN — HYDROMORPHONE HYDROCHLORIDE 8 MG: 2 TABLET ORAL at 10:14

## 2020-01-01 RX ADMIN — GABAPENTIN 600 MG: 300 CAPSULE ORAL at 15:45

## 2020-01-01 RX ADMIN — SENNOSIDES 34.4 MG: 8.6 TABLET, FILM COATED ORAL at 10:09

## 2020-01-01 RX ADMIN — Medication 10 ML: at 21:42

## 2020-01-01 RX ADMIN — DIAZEPAM 10 MG: 5 TABLET ORAL at 23:08

## 2020-01-01 RX ADMIN — Medication 10 ML: at 23:44

## 2020-01-01 RX ADMIN — DIAZEPAM 10 MG: 5 TABLET ORAL at 13:20

## 2020-01-01 RX ADMIN — GABAPENTIN 600 MG: 300 CAPSULE ORAL at 08:26

## 2020-01-01 RX ADMIN — FENTANYL CITRATE 50 MCG: 50 INJECTION, SOLUTION INTRAMUSCULAR; INTRAVENOUS at 16:33

## 2020-01-01 RX ADMIN — Medication 10 ML: at 23:10

## 2020-01-01 RX ADMIN — HYDROMORPHONE HYDROCHLORIDE 10 MG: 4 TABLET ORAL at 12:25

## 2020-01-01 RX ADMIN — Medication: at 07:30

## 2020-01-01 RX ADMIN — SODIUM CHLORIDE, SODIUM LACTATE, POTASSIUM CHLORIDE, CALCIUM CHLORIDE: 600; 310; 30; 20 INJECTION, SOLUTION INTRAVENOUS at 10:37

## 2020-01-01 RX ADMIN — SENNOSIDES 34.4 MG: 8.6 TABLET, FILM COATED ORAL at 20:11

## 2020-01-01 RX ADMIN — SENNOSIDES 34.4 MG: 8.6 TABLET, FILM COATED ORAL at 09:28

## 2020-01-01 RX ADMIN — HYDROMORPHONE HYDROCHLORIDE 10 MG: 4 TABLET ORAL at 03:15

## 2020-01-01 RX ADMIN — KETAMINE HYDROCHLORIDE 10 MG: 10 INJECTION, SOLUTION INTRAMUSCULAR; INTRAVENOUS at 17:04

## 2020-01-01 RX ADMIN — PROPOFOL 50 MCG/KG/MIN: 10 INJECTION, EMULSION INTRAVENOUS at 10:51

## 2020-01-01 RX ADMIN — MIDAZOLAM HYDROCHLORIDE 2 MG: 1 INJECTION, SOLUTION INTRAMUSCULAR; INTRAVENOUS at 10:37

## 2020-01-01 RX ADMIN — Medication: at 22:35

## 2020-01-01 RX ADMIN — GABAPENTIN 600 MG: 300 CAPSULE ORAL at 13:18

## 2020-01-01 RX ADMIN — ALUMINUM HYDROXIDE AND MAGNESIUM CARBONATE 15 ML: 95; 358 LIQUID ORAL at 16:44

## 2020-01-01 RX ADMIN — HYDROMORPHONE HYDROCHLORIDE 8 MG: 2 TABLET ORAL at 09:37

## 2020-01-01 RX ADMIN — DIAZEPAM 10 MG: 5 TABLET ORAL at 07:30

## 2020-01-01 RX ADMIN — HYDROMORPHONE HYDROCHLORIDE 10 MG: 4 TABLET ORAL at 21:42

## 2020-01-01 RX ADMIN — AMITRIPTYLINE HYDROCHLORIDE 100 MG: 50 TABLET, FILM COATED ORAL at 22:03

## 2020-01-01 RX ADMIN — POLYETHYLENE GLYCOL 3350 17 G: 17 POWDER, FOR SOLUTION ORAL at 09:00

## 2020-01-01 RX ADMIN — HYDROMORPHONE HYDROCHLORIDE 10 MG: 4 TABLET ORAL at 08:54

## 2020-01-01 RX ADMIN — HYDROMORPHONE HYDROCHLORIDE 10 MG: 4 TABLET ORAL at 17:15

## 2020-01-01 RX ADMIN — HYDROMORPHONE HYDROCHLORIDE 8 MG: 2 TABLET ORAL at 15:39

## 2020-01-01 RX ADMIN — LACTULOSE 45 ML: 20 SOLUTION ORAL at 15:45

## 2020-01-01 RX ADMIN — LIDOCAINE HYDROCHLORIDE 3 ML: 20 INJECTION, SOLUTION INFILTRATION; PERINEURAL at 17:08

## 2020-01-01 RX ADMIN — HYDROMORPHONE HYDROCHLORIDE 10 MG: 4 TABLET ORAL at 19:59

## 2020-01-01 RX ADMIN — POLYETHYLENE GLYCOL 3350 17 G: 17 POWDER, FOR SOLUTION ORAL at 10:01

## 2020-01-01 RX ADMIN — HYDROMORPHONE HYDROCHLORIDE 10 MG: 4 TABLET ORAL at 03:43

## 2020-01-01 RX ADMIN — Medication: at 16:31

## 2020-01-01 RX ADMIN — ONDANSETRON 4 MG: 2 INJECTION INTRAMUSCULAR; INTRAVENOUS at 13:20

## 2020-01-01 RX ADMIN — Medication: at 06:44

## 2020-01-01 RX ADMIN — DOCUSATE SODIUM 300 MG: 100 CAPSULE, LIQUID FILLED ORAL at 22:02

## 2020-01-01 RX ADMIN — KETAMINE HYDROCHLORIDE 10 MG: 10 INJECTION, SOLUTION INTRAMUSCULAR; INTRAVENOUS at 16:33

## 2020-01-01 RX ADMIN — LEVOFLOXACIN 750 MG: 5 INJECTION, SOLUTION INTRAVENOUS at 12:21

## 2020-01-01 RX ADMIN — DIAZEPAM 10 MG: 5 TABLET ORAL at 07:01

## 2020-01-01 RX ADMIN — HYDROMORPHONE HYDROCHLORIDE 10 MG: 4 TABLET ORAL at 20:06

## 2020-01-01 RX ADMIN — Medication: at 21:35

## 2020-01-01 RX ADMIN — FENTANYL CITRATE 25 MCG: 50 INJECTION, SOLUTION INTRAMUSCULAR; INTRAVENOUS at 10:56

## 2020-01-01 RX ADMIN — HYDROMORPHONE HYDROCHLORIDE 1 MG: 1 INJECTION, SOLUTION INTRAMUSCULAR; INTRAVENOUS; SUBCUTANEOUS at 07:24

## 2020-01-01 RX ADMIN — GABAPENTIN 600 MG: 300 CAPSULE ORAL at 15:07

## 2020-01-01 RX ADMIN — DOCUSATE SODIUM 300 MG: 100 CAPSULE, LIQUID FILLED ORAL at 20:56

## 2020-01-01 RX ADMIN — Medication 10 MG: at 16:43

## 2020-01-01 RX ADMIN — HYDROMORPHONE HYDROCHLORIDE 10 MG: 4 TABLET ORAL at 11:49

## 2020-01-01 RX ADMIN — HYDROMORPHONE HYDROCHLORIDE 12 MG: 2 TABLET ORAL at 20:16

## 2020-01-01 RX ADMIN — AMITRIPTYLINE HYDROCHLORIDE 100 MG: 50 TABLET, FILM COATED ORAL at 21:42

## 2020-01-01 RX ADMIN — HYDROMORPHONE HYDROCHLORIDE 10 MG: 4 TABLET ORAL at 23:23

## 2020-01-01 RX ADMIN — GABAPENTIN 600 MG: 300 CAPSULE ORAL at 23:08

## 2020-01-01 RX ADMIN — DIAZEPAM 10 MG: 5 TABLET ORAL at 19:59

## 2020-01-01 RX ADMIN — SENNOSIDES 34.4 MG: 8.6 TABLET, FILM COATED ORAL at 20:57

## 2020-01-01 RX ADMIN — FENTANYL CITRATE 50 MCG: 50 INJECTION, SOLUTION INTRAMUSCULAR; INTRAVENOUS at 16:04

## 2020-01-01 RX ADMIN — MIDAZOLAM HYDROCHLORIDE 2 MG: 1 INJECTION, SOLUTION INTRAMUSCULAR; INTRAVENOUS at 10:40

## 2020-01-01 RX ADMIN — ALUMINUM HYDROXIDE AND MAGNESIUM CARBONATE 15 ML: 95; 358 LIQUID ORAL at 11:51

## 2020-01-01 RX ADMIN — HYDROMORPHONE HYDROCHLORIDE 12 MG: 2 TABLET ORAL at 23:40

## 2020-01-01 RX ADMIN — LORAZEPAM 1 MG: 2 INJECTION INTRAMUSCULAR; INTRAVENOUS at 15:40

## 2020-01-01 RX ADMIN — Medication 10 ML: at 15:09

## 2020-01-01 RX ADMIN — GLYCOPYRROLATE 0.2 MG: 0.2 INJECTION INTRAMUSCULAR; INTRAVENOUS at 10:37

## 2020-01-01 RX ADMIN — DOCUSATE SODIUM 300 MG: 100 CAPSULE, LIQUID FILLED ORAL at 21:42

## 2020-01-01 RX ADMIN — Medication: at 06:55

## 2020-01-01 RX ADMIN — DIAZEPAM 10 MG: 5 TABLET ORAL at 19:22

## 2020-01-01 RX ADMIN — DIAZEPAM 10 MG: 5 TABLET ORAL at 17:13

## 2020-01-01 RX ADMIN — DIAZEPAM 10 MG: 5 TABLET ORAL at 22:03

## 2020-01-01 RX ADMIN — SENNOSIDES 34.4 MG: 8.6 TABLET, FILM COATED ORAL at 08:53

## 2020-01-01 RX ADMIN — MORPHINE SULFATE 5 MG: 10 INJECTION INTRAVENOUS at 13:21

## 2020-01-01 RX ADMIN — Medication 10 ML: at 21:36

## 2020-01-01 RX ADMIN — POTASSIUM CHLORIDE 40 MEQ: 750 TABLET, FILM COATED, EXTENDED RELEASE ORAL at 09:28

## 2020-01-01 RX ADMIN — SODIUM CHLORIDE 100 ML: 900 INJECTION, SOLUTION INTRAVENOUS at 16:21

## 2020-01-01 RX ADMIN — KETAMINE HYDROCHLORIDE 20 MG: 10 INJECTION, SOLUTION INTRAMUSCULAR; INTRAVENOUS at 10:42

## 2020-01-01 RX ADMIN — Medication: at 23:43

## 2020-01-01 RX ADMIN — BUPIVACAINE HYDROCHLORIDE 8 ML: 5 INJECTION, SOLUTION EPIDURAL; INTRACAUDAL at 17:08

## 2020-01-01 RX ADMIN — HYDROMORPHONE HYDROCHLORIDE 10 MG: 4 TABLET ORAL at 14:49

## 2020-01-01 RX ADMIN — Medication 10 ML: at 21:14

## 2020-01-01 RX ADMIN — Medication: at 03:43

## 2020-01-02 ENCOUNTER — HOSPITAL ENCOUNTER (OUTPATIENT)
Dept: MAMMOGRAPHY | Age: 75
Discharge: HOME | End: 2020-01-02
Attending: INTERNAL MEDICINE
Payer: MEDICARE

## 2020-01-02 DIAGNOSIS — Z12.31 VISIT FOR SCREENING MAMMOGRAM: ICD-10-CM

## 2020-01-02 PROCEDURE — 77067 SCR MAMMO BI INCL CAD: CPT

## 2020-01-13 RX ORDER — SIMVASTATIN 20 MG/1
TABLET, FILM COATED ORAL
Qty: 90 TAB | Refills: 1 | Status: SHIPPED | OUTPATIENT
Start: 2020-01-13 | End: 2020-06-29

## 2020-01-13 RX ORDER — POTASSIUM CHLORIDE 20 MEQ/1
TABLET, EXTENDED RELEASE ORAL
Qty: 90 TAB | Refills: 1 | Status: SHIPPED | OUTPATIENT
Start: 2020-01-13 | End: 2020-06-29

## 2020-01-13 RX ORDER — AMITRIPTYLINE HYDROCHLORIDE 50 MG/1
TABLET, FILM COATED ORAL
Qty: 180 TAB | Refills: 1 | Status: SHIPPED | OUTPATIENT
Start: 2020-01-13 | End: 2020-06-29

## 2020-02-10 RX ORDER — RANITIDINE 300 MG/1
TABLET ORAL
Qty: 120 TAB | Refills: 1 | Status: SHIPPED | OUTPATIENT
Start: 2020-02-10 | End: 2020-04-23

## 2020-04-07 ENCOUNTER — HOSPITAL ENCOUNTER (OUTPATIENT)
Dept: GENERAL RADIOLOGY | Age: 75
Discharge: HOME | End: 2020-04-07
Attending: INTERNAL MEDICINE
Payer: MEDICARE

## 2020-04-07 DIAGNOSIS — M54.6 PAIN IN THORACIC SPINE: ICD-10-CM

## 2020-04-07 DIAGNOSIS — R26.89 BALANCE PROBLEM: ICD-10-CM

## 2020-04-07 DIAGNOSIS — M25.512 LEFT SHOULDER PAIN: ICD-10-CM

## 2020-04-07 PROCEDURE — 72072 X-RAY EXAM THORAC SPINE 3VWS: CPT

## 2020-04-23 ENCOUNTER — OFFICE VISIT (OUTPATIENT)
Dept: INTERNAL MEDICINE CLINIC | Age: 75
End: 2020-04-23

## 2020-04-23 ENCOUNTER — TELEPHONE (OUTPATIENT)
Dept: INTERNAL MEDICINE CLINIC | Age: 75
End: 2020-04-23

## 2020-04-23 VITALS — WEIGHT: 185 LBS | BODY MASS INDEX: 32.78 KG/M2 | HEIGHT: 63 IN

## 2020-04-23 DIAGNOSIS — M62.838 MUSCLE SPASM OF LEFT SHOULDER: ICD-10-CM

## 2020-04-23 DIAGNOSIS — M25.512 ACUTE PAIN OF LEFT SHOULDER: Primary | ICD-10-CM

## 2020-04-23 DIAGNOSIS — E07.89 OTHER SPECIFIED DISORDERS OF THYROID: ICD-10-CM

## 2020-04-23 RX ORDER — TIZANIDINE 4 MG/1
4 TABLET ORAL
Qty: 30 TAB | Refills: 1 | Status: SHIPPED | OUTPATIENT
Start: 2020-04-23 | End: 2020-01-01

## 2020-04-23 NOTE — PATIENT INSTRUCTIONS
Preventing Falls: Care Instructions Your Care Instructions Getting around your home safely can be a challenge if you have injuries or health problems that make it easy for you to fall. Loose rugs and furniture in walkways are among the dangers for many older people who have problems walking or who have poor eyesight. People who have conditions such as arthritis, osteoporosis, or dementia also have to be careful not to fall. You can make your home safer with a few simple measures. Follow-up care is a key part of your treatment and safety. Be sure to make and go to all appointments, and call your doctor if you are having problems. It's also a good idea to know your test results and keep a list of the medicines you take. How can you care for yourself at home? Taking care of yourself · You may get dizzy if you do not drink enough water. To prevent dehydration, drink plenty of fluids, enough so that your urine is light yellow or clear like water. Choose water and other caffeine-free clear liquids. If you have kidney, heart, or liver disease and have to limit fluids, talk with your doctor before you increase the amount of fluids you drink. · Exercise regularly to improve your strength, muscle tone, and balance. Walk if you can. Swimming may be a good choice if you cannot walk easily. · Have your vision and hearing checked each year or any time you notice a change. If you have trouble seeing and hearing, you might not be able to avoid objects and could lose your balance. · Know the side effects of the medicines you take. Ask your doctor or pharmacist whether the medicines you take can affect your balance. Sleeping pills or sedatives can affect your balance. · Limit the amount of alcohol you drink. Alcohol can impair your balance and other senses. · Ask your doctor whether calluses or corns on your feet need to be removed.  If you wear loose-fitting shoes because of calluses or corns, you can lose your balance and fall. · Talk to your doctor if you have numbness in your feet. Preventing falls at home · Remove raised doorway thresholds, throw rugs, and clutter. Repair loose carpet or raised areas in the floor. · Move furniture and electrical cords to keep them out of walking paths. · Use nonskid floor wax, and wipe up spills right away, especially on ceramic tile floors. · If you use a walker or cane, put rubber tips on it. If you use crutches, clean the bottoms of them regularly with an abrasive pad, such as steel wool. · Keep your house well lit, especially [employer], and outside walkways. Use night-lights in areas such as hallways and bathrooms. Add extra light switches or use remote switches (such as switches that go on or off when you clap your hands) to make it easier to turn lights on if you have to get up during the night. · Install sturdy handrails on stairways. · Move items in your cabinets so that the things you use a lot are on the lower shelves (about waist level). · Keep a cordless phone and a flashlight with new batteries by your bed. If possible, put a phone in each of the main rooms of your house, or carry a cell phone in case you fall and cannot reach a phone. Or, you can wear a device around your neck or wrist. You push a button that sends a signal for help. · Wear low-heeled shoes that fit well and give your feet good support. Use footwear with nonskid soles. Check the heels and soles of your shoes for wear. Repair or replace worn heels or soles. · Do not wear socks without shoes on wood floors. · Walk on the grass when the sidewalks are slippery. If you live in an area that gets snow and ice in the winter, sprinkle salt on slippery steps and sidewalks. Preventing falls in the bath · Install grab bars and nonskid mats inside and outside your shower or tub and near the toilet and sinks. · Use shower chairs and bath benches. · Use a hand-held shower head that will allow you to sit while showering. · Get into a tub or shower by putting the weaker leg in first. Get out of a tub or shower with your strong side first. 
· Repair loose toilet seats and consider installing a raised toilet seat to make getting on and off the toilet easier. · Keep your bathroom door unlocked while you are in the shower. Where can you learn more? Go to http://hadley-clemencia.info/ Enter [phone] 79 69 71 in the search box to learn more about \"Preventing Falls: Care Instructions. \" Current as of: August 6, 2019Content Version: 12.4 © 0688-5485 [employer]. Care instructions adapted under license by [employer] (which disclaims liability or warranty for this information). If you have questions about a medical condition or this instruction, always ask your healthcare professional. [place] [address] any warranty or liability for your use of this information. Shoulder Blade: Exercises Introduction Here are some examples of exercises for you to try. The exercises may be suggested for a condition or for rehabilitation. Start each exercise slowly. Ease off the exercises if you start to have pain. You will be told when to start these exercises and which ones will work best for you. How to do the exercises Shoulder roll 1. Stand tall with your chin slightly tucked. Imagine that a string at the top of your head is pulling you straight up. 2. Keep your arms relaxed. All motion will be in your shoulders. 3. Shrug your shoulders up toward your ears, then up and back. [place] your shoulders down and back, like you're sliding your hands down into your back pants pockets. 4. Repeat the circles at least 2 to 4 times. 5. This exercise is also helpful anytime you want to relax. Lower neck and upper back stretch 1. With your arms about shoulder height, clasp your hands in front of you. 2. Drop your chin toward your chest. 
3. Reach straight forward so you are rounding your upper back. Think about pulling your shoulder blades apart. Adonis Mason feel a stretch across your upper back and shoulders. Hold for at least 6 seconds. 4. Repeat 2 to 4 times. Triceps stretch 1. Reach your arm straight up. 2. Keeping your elbow in place, bend your arm and reach your hand down behind your back. 3. With your other hand, apply gentle pressure to the bent elbow. Adonis Mason feel a stretch at the back of your upper arm and shoulder. Hold about 6 seconds. 4. Repeat 2 to 4 times with each arm. Shoulder stretch 1. Relax your shoulders. 2. Raise one arm to shoulder height, and reach it across your chest. 
3. Pull the arm slightly toward you with your other arm. This will help you get a gentle stretch. Hold for about 6 seconds. 4. Repeat 2 to 4 times. Shoulder blade squeeze 1. Sit or stand up tall with your arms at your sides. 2. Keep your shoulders relaxed and down, not shrugged. 3. Squeeze your shoulder blades together. Hold for 6 seconds, then relax. 4. Repeat 8 to 12 times. Straight-arm shoulder blade squeeze 1. Sit or stand tall. Relax your shoulders. 2. With palms down, hold your elastic tubing or band straight out in front of you. 3. Start with slight tension in the tubing or band, with your hands about shoulder-width apart. 4. Slowly pull straight out to the sides, squeezing your shoulder blades together. Keep your arms straight and at shoulder height. Slowly release. 5. Repeat 8 to 12 times. Rowing 1. [place] your elastic tubing or band at about waist height. Take one end in each hand. 2. Sit or stand with your feet hip-width apart. 3. Hold your arms straight in front of you. Adjust your distance to create slight tension in the tubing or band. 4. Slightly tuck your chin. Relax your shoulders. 5. Without shrugging your shoulders, pull straight back.  Your elbows will pass alongside your waist.   
Pull-downs 1. [place] your elastic tubing or band in the top of a closed door. Take one end in each hand. 2. Either sit or stand, depending on what is more comfortable. If you feel unsteady, sit on a chair. 3. Start with your arms up and comfortably apart, elbows straight. There should be a slight tension in the tubing or band. 4. Slightly tuck your chin, and look straight ahead. 5. Keeping your back straight, slowly pull down and back, bending your elbows. 6. Stop where your hands are level with your chin, in a \"goalpost\" position. 7. Repeat 8 to 12 times. Chest T stretch 1. Lie on your back. Raise your knees so they are bent. Plant your feet on the floor, hip-width apart. 2. Tuck your chin, and relax your shoulders. 3. Reach your arms straight out to the sides. If you don't feel a mild stretch in your shoulders and across your chest, use a foam roll or a tightly rolled blanket under your spine, from your tailbone to your head. 4. Relax in this position for at least 15 to 30 seconds while you breathe normally. Repeat 2 to 4 times. 5. As you get used to this stretch, keep adding a little more time until you are able relax in this position for 2 or 3 minutes. When you can relax for at least 2 minutes, you only need to do the exercise 1 time per session. Chest goalpost stretch 1. Lie on your back. Raise your knees so they are bent. Plant your feet on the floor, hip-width apart. 2. Tuck your chin, and relax your shoulders. 3. Reach your arms straight out to the sides. 4. Bend your arms at the elbows, with your hands pointed toward the top of your head. Your arms should make an L on either side of your head. Your palms should be facing up. 5. If you don't feel a mild stretch in your shoulders and across your chest, use a foam roll or tightly rolled blanket under your spine, from your tailbone to your head. 6. Relax in this position for at least 15 to 30 seconds while you breathe normally. Repeat 2 to 4 times. 7. Each day you do this exercise, add a little more time until you can relax in this position for 2 or 3 minutes. When you can relax for at least 2 minutes, you only need to do the exercise 1 time per session. Follow-up care is a key part of your treatment and safety. Be sure to make and go to all appointments, and call your doctor if you are having problems. It's also a good idea to know your test results and keep a list of the medicines you take. Where can you learn more? Go to http://hadleyMotionloftclemencia.info/ Enter (44) 9753 0043 in the search box to learn more about \"Shoulder Blade: Exercises. \" Current as of: June 26, 2019Content Version: 12.4 © 3695-9705 [employer]. Care instructions adapted under license by [employer] (which disclaims liability or warranty for this information). If you have questions about a medical condition or this instruction, always ask your healthcare professional. [address] any warranty or liability for your use of this information. Rhomboid Muscle Strain: Rehab Exercises Introduction Here are some examples of exercises for you to try. The exercises may be suggested for a condition or for rehabilitation. Start each exercise slowly. Ease off the exercises if you start to have pain. You will be told when to start these exercises and which ones will work best for you. How to do the exercises Lower neck and upper back (rhomboid) stretch 1. Stretch your arms out in front of your body. Clasp one hand on top of your other hand. 2. Gently reach out so that you feel your shoulder blades stretching away from each other. 3. Gently bend your head forward. 4. Hold for 15 to 30 seconds. 5. Repeat 2 to 4 times. Resisted rows 1.  Put the band around a solid object, such as a bedpost, at about waist level. Stand facing where you have placed the band. Hold equal lengths of the band in each hand. 2. Start with your arms held out in front of you. 3. Pull the bands back, and move your shoulder blades together. As you finish, your elbows should be at your side and bent at 90 degrees (like the angle of the letter \"L\"). 4. Return to the starting position. 5. Repeat 8 to 12 times. Neck stretches 1. Look straight ahead, and tip your right ear to your right shoulder. Do not let your left shoulder rise as you tip your head to the right. 2. Hold for 15 to 30 seconds. 3. Tilt your head to the left. Do not let your right shoulder rise as you tip your head to the left. 4. Hold for 15 to 30 seconds. 5. Repeat 2 to 4 times to each side. Neck rotation 1. Sit in a firm chair, or stand up straight. 2. Keeping your chin level, turn your head to the right, and hold for 15 to 30 seconds. 3. Turn your head to the left, and hold for 15 to 30 seconds. 4. Repeat 2 to 4 times to each side. Follow-up care is a key part of your treatment and safety. Be sure to make and go to all appointments, and call your doctor if you are having problems. It's also a good idea to know your test results and keep a list of the medicines you take. Where can you learn more? Go to http://hadley-clemencia.info/ Enter [phone] 89 in the search box to learn more about \"Rhomboid Muscle Strain: Rehab Exercises. \" Current as of: June 26, 2019Content Version: 12.4 © 9943-9589 [employer]. Care instructions adapted under license by [employer] (which disclaims liability or warranty for this information). If you have questions about a medical condition or this instruction, always ask your healthcare professional. [address] any warranty or liability for your use of this information.

## 2020-04-23 NOTE — PROGRESS NOTES
Bob Cox is a 76 y.o. female evaluated via telephone on 4/23/2020. Consent: 
She and/or health care decision maker is aware that that she may receive a bill for this telephone service, depending on her insurance coverage, and has provided verbal consent to proceed: Yes The following sections were reviewed and/or updated: 
Patient Active Problem List  
 Diagnosis Date Noted  Severe pain 03/16/2019  Recurrent ventral incisional hernia 03/28/2016  Advance directive on file 03/09/2016  Acute bronchitis 03/03/2016  Sinusitis 03/03/2016  Dysphagia 01/07/2016  Umbilical hernia 02/76/0505  Recurrent umbilical hernia 23/26/6737  Other complication due to venous access device 01/29/2013  Infected seroma, postoperative 09/13/2012  Multiple myeloma ([facility] [address] 09/29/2009  Fibromyalgia 09/29/2009  GERD (gastroesophageal reflux disease) 09/29/2009  Rheumatoid arthritis involving multiple sites with positive rheumatoid factor ([facility] 09/29/2009 Current Outpatient Medications Medication Sig Dispense Refill  tiZANidine (ZANAFLEX) 4 mg tablet Take 1 Tab by mouth three (3) times daily as needed for Muscle Spasm(s). 30 Tab 1  
 milnacipran (Savella) 50 mg tablet TAKE 1 TABLET BY MOUTH TWICE DAILY 60 Tab 0  
 amitriptyline (ELAVIL) 50 mg tablet TAKE TWO TABLETS BY MOUTH AT BEDTIME AS NEEDED FOR SLEEP. 180 Tab 1  
 simvastatin (ZOCOR) 20 mg tablet TAKE ONE TABLET BY MOUTH AT BEDTIME 90 Tab 1  potassium chloride (K-DUR, KLOR-CON) 20 mEq tablet TAKE ONE TABLET BY MOUTH ONCE DAILY. 90 Tab 1  
 dexlansoprazole (DEXILANT) 60 mg CpDB capsule (delayed release) Take 1 Cap by mouth daily. TAKE 1 CAPSULE BY MOUTH EVERY DAY 90 Cap 1  
 gabapentin (NEURONTIN) 300 mg capsule Take 1 Cap by mouth four (4) times daily. TAKE ONE CAPSULE BY MOUTH FOUR TIMES A DAY (Patient taking differently: Take 600 mg by mouth three (3) times daily.  TAKE ONE CAPSULE BY MOUTH FOUR TIMES A DAY) 120 Cap 5  
 HYDROmorphone (DILAUDID) 8 mg tablet Take 8 mg by mouth every four (4) hours as needed for Pain.  Cetirizine (ZYRTEC) 10 mg cap Take 10 mg by mouth daily as needed (allergies).  polyethylene glycol (MIRALAX) 17 gram/dose powder Take 17 g by mouth daily as needed (constipation).  cholecalciferol (VITAMIN D3) 400 unit tab tablet Take 400 Units by mouth daily.  fluticasone (FLONASE) 50 mcg/actuation nasal spray 2 Sprays by Both Nostrils route two (2) times a day. (Patient taking differently: 2 Sprays by Both Nostrils route two (2) times daily as needed for Allergies. ) 3 Bottle 3  clotrimazole (LOTRIMIN) 1 % topical cream Apply  to affected area two (2) times a day. 60 g 0  
 bisacodyl (DULCOLAX) 10 mg suppository Insert 10 mg into rectum daily as needed. 30 Suppository 0  
 rivaroxaban (XARELTO) 10 mg tablet Take 10 mg by mouth daily (with dinner).  predniSONE (DELTASONE) 10 mg tablet Take 20 mg by mouth daily.  spironolactone (ALDACTONE) 50 mg tablet Take 1 Tab by mouth two (2) times a day. Indications: EDEMA (Patient taking differently: Take 50 mg by mouth two (2) times daily as needed (edema). ) 180 Tab 1  
 aluminum hydrox-magnesium carb (GAVISCON)  mg/15 mL suspension Take 15 mL by mouth every six (6) hours as needed for Indigestion.  calcium carbonate (TUMS) 200 mg calcium (500 mg) chew Take 1 Tab by mouth as needed.  calcium citrate-vitamin D3 (CITRACAL WITH VITAMIN D MAXIMUM) tablet Take 1 Tab by mouth daily.  magnesium hydroxide (MILK OF MAGNESIA) 400 mg/5 mL suspension Take 30 mL by mouth daily as needed for Constipation.  acetaminophen (TYLENOL) 500 mg tablet Take 1,000 mg by mouth every six (6) hours as needed for Pain.  OTHER,NON-FORMULARY, LLQ Morphine/bupivacaine pain pump for multiple myeloma. 4.287mg/3.215 mg per day  diazepam (VALIUM) 10 mg tablet Take 10 mg by mouth two (2) times a day.  docusate sodium (COLACE) 100 mg capsule Take 100 mg by mouth daily. 1 capsule in the morning and 3 capsule at night  senna (SENOKOT) 8.6 mg tablet Take 4 Tabs by mouth two (2) times a day. 4 PILLS IN AM  4 PILLS IN PM    
 prochlorperazine (COMPAZINE) 5 mg tablet Take 5 mg by mouth every six (6) hours as needed for Nausea. Allergies Allergen Reactions  Aggrenox [Aspirin-Dipyridamole] Rash Allergic to the dipyridamole and not aspirin.  Broccoli Other (comments) C/O GAS  Bumex [Bumetanide] Rash  Doxil [Doxorubicin, Peg-Liposomal] Rash Burning of skin  Flagyl [Metronidazole] Rash  Keflex [Cephalexin] Rash  Lasix [Furosemide] Rash  Macrobid [Nitrofurantoin Monohyd/M-Cryst] Shortness of Breath  Methotrexate Hives and Rash  Pcn [Penicillins] Rash  Sulfa (Sulfonamide Antibiotics) Hives  Tetanus And Diphtheria Toxoids Swelling  Thalidomide Rash Past Medical History:  
Diagnosis Date  Anemia  Aneurysm ([facility] [address] 3/2005 HX LEFT OPTIC NERVE  Anxiety  Chronic pain   
 spinal stenosis per pt  Fibromyalgia  GERD (gastroesophageal reflux disease)  High cholesterol  History of acute pancreatitis  History of blood transfusion  History of Salmonella infection 2009  Multiple myeloma ([facility] [address] 2007  Osteoporosis  Other complication due to venous access device 1/29/2013  Recurrent umbilical hernia 0/46/5242  Recurrent ventral incisional hernia 3/28/2016  Rheumatoid arthritis(714.0)  Sleep apnea INTOLERATANT OF CPAP  Thromboembolus ([facility] [address] Right leg DVT  Urinary incontinence Past Surgical History:  
Procedure Laterality Date  HX CHOLECYSTECTOMY  2004  HX CRANIOTOMY    
 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    
 COLONOSCOPIES, EGD  HX GI    
 ESOPHAGUS STREACHED  
 HX HEENT  10/2011  
 b/l cataract surgery in October 2011  HX HERNIA REPAIR  [id number]  X6  
  HX HERNIA REPAIR  10-13-14  
 repair of recurrent ventral incisional hernia  with primary suture [id number]. Herb Cummings  HX KYPHOPLASTY  2017 L4  
 HX ORTHOPAEDIC Right  BONE SPURS SHOULDER  
 HX OTHER SURGICAL   LLQ PAIN PUMP FOR MORPHINE INFUSION  
 HX OTHER SURGICAL  14  
 repair of recurrent umbilical hernia with ventralex pillow top mesh and extensive lysis of [id number]. Herb Cummings  HX OTHER SURGICAL  16  
 repair of recurrent ventral incisional hernia with underlay mesh-[facility]-Dr. Herb Cummings  HX VASCULAR ACCESS Right 2013 POWER PORT   
 HX VASCULAR ACCESS   PAIN PUMP  IR INJ FORAMIN EPID LUMB ANES/STER SNGL  3/19/2019  
 NEUROLOGICAL PROCEDURE UNLISTED   KYPHOPLASTY X2  
 NEUROLOGICAL PROCEDURE UNLISTED  10/26/15 Kyphoplasty t-5  PAIN PUMP DEVICE    
 implanted in LLQ, MORPHINE Family History Problem Relation Age of Onset 24 [facility] Arthritis-osteo Mother  Anesth Problems Neg Hx Social History Tobacco Use  Smoking status: Former Smoker Years: 10.00 Last attempt to quit: 10/7/2005 Years since quittin.5  Smokeless tobacco: Never Used Substance Use Topics  Alcohol use: No  
 
 
 
Documentation: I communicated with the patient and/or health care decision maker regarding: Left shoulder pain for several days, increased with movement. No injury. Pain increases with movement and deep breath. No rash. No fever or chills. Has been taking 8 mg dilaudid every 3 hours. No cough or sputum. No change in bowels or bladder. No radiation of the pain to the arms or legs. Details of this discussion including any medical advice provided is documented below. Diagnoses and all orders for this visit: 
 
1. Acute pain of left shoulder - likely muscle related.  Had her  exam the area and she had tenderness over the levator scapulae on the left as well as the rhomboids. Likely muscle spasm - some improvement with using the valium. Will try ice, stretches, heat, and add zanaflex. NO DIAZEPAM AT THE SAME TIME AS THE MUSCLE RELAXERS. Consider PT if persists. 2. Other specified disorders of thyroid 3. Muscle spasm of left shoulder Other orders -     tiZANidine (ZANAFLEX) 4 mg tablet; Take 1 Tab by mouth three (3) times daily as needed for Muscle Spasm(s). I affirm this is a Patient Initiated Episode with an Established Patient who has not had a related appointment within my department in the past 7 days or scheduled within the next 24 hours. Total Time: minutes: 18 min 42 seconds Note: not billable if this call serves to triage the patient into an appointment for the relevant concern Kris Cha MD

## 2020-05-01 ENCOUNTER — TELEPHONE (OUTPATIENT)
Dept: INTERNAL MEDICINE CLINIC | Age: 75
End: 2020-05-01

## 2020-05-01 NOTE — TELEPHONE ENCOUNTER
----- Message from Kelly Solis sent at 5/1/2020  8:07 AM EDT -----  Regarding: Dr. Drummond Paci  Appointment not available    Caller's first and last name and relationship to patient (if not the patient):  PT    Best contact number:  [phone]      Preferred date and time:  Today, anytime 05/01/20 Phone visit      Scheduled appointment date and time:  MARIA E      Reason for appointment: F/up muscle soreness and pressure beneath (L) breast and (L) ribs radiating to shoulder blades      Details to clarify the request:  Pt's last visit was 04/23/20      Kelly Solis

## 2020-05-01 NOTE — TELEPHONE ENCOUNTER
Verified patient identity with two identifiers. Spoke with patient by phone. Offered PT. States she cannot even move left arm without great pain 9/10. Pain under left breast radiating around to shoulder blade. Hurts to take a deep breath, but denies SOB. C/o left shoulder pain. States her stomach is always on a roller coaster, denies N/V/D. She frequently has GERD she takes dexilant for this. Pt states all movement hurts, makes her want to scream. Will send to [facility] and get right back to pt.

## 2020-05-01 NOTE — TELEPHONE ENCOUNTER
Verified patient identity with two identifiers. Spoke with patient by phone. Per SRJ pt to speak with her pain specialist.  Pt states pain specialist only works with her spine, not this area.  Upon further discussion she states the pain spread from spine to this area so she will call her pain specialist.

## 2020-05-05 ENCOUNTER — TELEPHONE (OUTPATIENT)
Dept: INTERNAL MEDICINE CLINIC | Age: 75
End: 2020-05-05

## 2020-05-06 NOTE — TELEPHONE ENCOUNTER
Spoke with patient she wanted SRJ to know that she followed up with her pain specialist and that she did have sevreal fracture ribs on her left side. Reviewed deep breathing w/ pt - SRJ made aware and already [facility] notes.

## 2020-05-17 RX ORDER — DEXLANSOPRAZOLE 60 MG/1
CAPSULE, DELAYED RELEASE ORAL
Qty: 90 CAP | Refills: 1 | Status: SHIPPED | OUTPATIENT
Start: 2020-05-17 | End: 2020-05-20 | Stop reason: SDUPTHER

## 2020-05-20 ENCOUNTER — HOSPITAL ENCOUNTER (OUTPATIENT)
Dept: GENERAL RADIOLOGY | Age: 75
Discharge: HOME | End: 2020-05-20
Payer: MEDICARE

## 2020-05-20 DIAGNOSIS — C90.00 MULTIPLE MYELOMA NOT HAVING ACHIEVED REMISSION (HCC): ICD-10-CM

## 2020-05-20 DIAGNOSIS — S32.000A WEDGE COMPRESSION FRACTURE OF UNSPECIFIED LUMBAR VERTEBRA, INITIAL ENCOUNTER FOR CLOSED FRACTURE (HCC): ICD-10-CM

## 2020-05-20 PROCEDURE — 72170 X-RAY EXAM OF PELVIS: CPT

## 2020-05-20 PROCEDURE — 72100 X-RAY EXAM L-S SPINE 2/3 VWS: CPT

## 2020-05-20 RX ORDER — DEXLANSOPRAZOLE 60 MG/1
CAPSULE, DELAYED RELEASE ORAL
Qty: 30 CAP | Refills: 2 | Status: SHIPPED | OUTPATIENT
Start: 2020-05-20 | End: 2020-01-01

## 2020-05-20 NOTE — TELEPHONE ENCOUNTER
Pt wants you to call in dexilant 60mg for 30 days supply instead of 90 days supply it costing too much for the 90 days supply

## 2020-05-20 NOTE — TELEPHONE ENCOUNTER
Orders Placed This Encounter    dexlansoprazole (Dexilant) 60 mg CpDB capsule (delayed release)     Sig: TAKE 1 CAPSULE BY MOUTH EVERY DAY     Dispense:  30 Cap     Refill:  2     The above orders were approved via [facility] per Dr. Jessi Correa, III.

## 2020-05-28 ENCOUNTER — HOSPITAL ENCOUNTER (OUTPATIENT)
Dept: CT IMAGING | Age: 75
Discharge: HOME | End: 2020-05-28
Attending: INTERNAL MEDICINE
Payer: MEDICARE

## 2020-05-28 DIAGNOSIS — C90.00 MYELOMA ASSOCIATED AMYLOIDOSIS (HCC): ICD-10-CM

## 2020-05-28 DIAGNOSIS — E85.9 MYELOMA ASSOCIATED AMYLOIDOSIS (HCC): ICD-10-CM

## 2020-05-28 DIAGNOSIS — D50.9 IRON DEFICIENCY ANEMIA, UNSPECIFIED: ICD-10-CM

## 2020-05-28 LAB — CREAT BLD-MCNC: 0.8 MG/DL (ref 0.6–1.3)

## 2020-05-28 PROCEDURE — 72132 CT LUMBAR SPINE W/DYE: CPT

## 2020-05-28 PROCEDURE — 74011000258 HC RX REV CODE- 258: Performed by: INTERNAL MEDICINE

## 2020-05-28 PROCEDURE — 74011636320 HC RX REV CODE- 636/320: Performed by: INTERNAL MEDICINE

## 2020-05-28 PROCEDURE — 82565 ASSAY OF CREATININE: CPT

## 2020-05-28 RX ORDER — SODIUM CHLORIDE 9 MG/ML
50 INJECTION, SOLUTION INTRAVENOUS ONCE
Status: COMPLETED | OUTPATIENT
Start: 2020-05-28 | End: 2020-05-28

## 2020-05-28 RX ORDER — SODIUM CHLORIDE 0.9 % (FLUSH) 0.9 %
10 SYRINGE (ML) INJECTION ONCE
Status: COMPLETED | OUTPATIENT
Start: 2020-05-28 | End: 2020-05-28

## 2020-05-28 RX ADMIN — SODIUM CHLORIDE 50 ML: 900 INJECTION, SOLUTION INTRAVENOUS at 11:33

## 2020-05-28 RX ADMIN — Medication 10 ML: at 11:32

## 2020-05-28 RX ADMIN — IOPAMIDOL 100 ML: 612 INJECTION, SOLUTION INTRAVENOUS at 11:32

## 2020-05-29 ENCOUNTER — TELEPHONE (OUTPATIENT)
Dept: INTERNAL MEDICINE CLINIC | Age: 75
End: 2020-05-29

## 2020-05-29 NOTE — TELEPHONE ENCOUNTER
Patient called, she states she has been having issues with constipation for about 2 weeks. She has been taking magnisium citrate, and stool softeners without much relief. She is a cancer patient and uses fentnal patches which she believes is the cause of the constipation. She would like to know if she should go to an urgent care facility for this problem.

## 2020-05-29 NOTE — TELEPHONE ENCOUNTER
Returned call to patient - after previous message she did a fleet enema and is now going to the bathroom with no trouble. She denies abdominal pain, n/v, or blood. Says can disregard call at this time. Reviewed red flags to warrant ED/urgent care, patient verbalized understanding.

## 2020-06-05 ENCOUNTER — TELEPHONE (OUTPATIENT)
Dept: INTERNAL MEDICINE CLINIC | Age: 75
End: 2020-06-05

## 2020-06-05 NOTE — TELEPHONE ENCOUNTER
[facility] [phone]     [phone]    Calling to let you know the Dexilant 60mg which was  prescribed May 20th is costing pt $75 per month. If you were to change to Omeprazole or something similar, it would save the patient a lot of money.

## 2020-06-11 ENCOUNTER — HOSPITAL ENCOUNTER (OUTPATIENT)
Dept: CT IMAGING | Age: 75
Discharge: HOME | End: 2020-06-11
Attending: INTERNAL MEDICINE
Payer: MEDICARE

## 2020-06-11 DIAGNOSIS — M85.80 BONE LOSS: ICD-10-CM

## 2020-06-11 DIAGNOSIS — C90.00 MULTIPLE MYELOMA (HCC): ICD-10-CM

## 2020-06-11 DIAGNOSIS — R06.02 SHORTNESS OF BREATH: ICD-10-CM

## 2020-06-11 DIAGNOSIS — R07.9 CHEST PAIN: ICD-10-CM

## 2020-06-11 PROCEDURE — 74011636320 HC RX REV CODE- 636/320: Performed by: INTERNAL MEDICINE

## 2020-06-11 PROCEDURE — 74011250636 HC RX REV CODE- 250/636: Performed by: INTERNAL MEDICINE

## 2020-06-11 PROCEDURE — 71275 CT ANGIOGRAPHY CHEST: CPT

## 2020-06-11 RX ORDER — HEPARIN 100 UNIT/ML
300 SYRINGE INTRAVENOUS
Status: COMPLETED | OUTPATIENT
Start: 2020-06-11 | End: 2020-06-11

## 2020-06-11 RX ORDER — HEPARIN 100 UNIT/ML
SYRINGE INTRAVENOUS
Status: DISCONTINUED
Start: 2020-06-11 | End: 2020-06-12 | Stop reason: HOSPADM

## 2020-06-11 RX ORDER — SODIUM CHLORIDE 0.9 % (FLUSH) 0.9 %
10 SYRINGE (ML) INJECTION
Status: COMPLETED | OUTPATIENT
Start: 2020-06-11 | End: 2020-06-11

## 2020-06-11 RX ADMIN — Medication 10 ML: at 13:03

## 2020-06-11 RX ADMIN — HEPARIN 300 UNITS: 100 SYRINGE at 13:01

## 2020-06-11 RX ADMIN — IOPAMIDOL 100 ML: 755 INJECTION, SOLUTION INTRAVENOUS at 13:03

## 2020-06-11 RX ADMIN — SODIUM CHLORIDE 250 ML: 900 INJECTION, SOLUTION INTRAVENOUS at 13:03

## 2020-06-12 ENCOUNTER — ANESTHESIA EVENT (OUTPATIENT)
Dept: INTERVENTIONAL RADIOLOGY/VASCULAR | Age: 75
End: 2020-06-12
Payer: MEDICARE

## 2020-06-12 ENCOUNTER — ANESTHESIA (OUTPATIENT)
Dept: INTERVENTIONAL RADIOLOGY/VASCULAR | Age: 75
End: 2020-06-12
Payer: MEDICARE

## 2020-06-12 ENCOUNTER — HOSPITAL ENCOUNTER (OUTPATIENT)
Dept: INTERVENTIONAL RADIOLOGY/VASCULAR | Age: 75
Discharge: HOME | End: 2020-06-12
Attending: INTERNAL MEDICINE | Admitting: INTERNAL MEDICINE
Payer: MEDICARE

## 2020-06-12 VITALS
RESPIRATION RATE: 12 BRPM | DIASTOLIC BLOOD PRESSURE: 52 MMHG | WEIGHT: 188 LBS | BODY MASS INDEX: 42.29 KG/M2 | SYSTOLIC BLOOD PRESSURE: 119 MMHG | HEIGHT: 56 IN | TEMPERATURE: 99.3 F | OXYGEN SATURATION: 90 % | HEART RATE: 91 BPM

## 2020-06-12 DIAGNOSIS — C90.00 MULTIPLE MYELOMA (HCC): ICD-10-CM

## 2020-06-12 PROCEDURE — 74011250636 HC RX REV CODE- 250/636: Performed by: STUDENT IN AN ORGANIZED HEALTH CARE EDUCATION/TRAINING PROGRAM

## 2020-06-12 PROCEDURE — 77030021783 HC SYS CEM DEL MEDT -D

## 2020-06-12 PROCEDURE — 77030021782 HC SYS CEM CART DEL KYPH -C

## 2020-06-12 PROCEDURE — 22514 PERQ VERTEBRAL AUGMENTATION: CPT

## 2020-06-12 PROCEDURE — 22515 PERQ VERTEBRAL AUGMENTATION: CPT

## 2020-06-12 PROCEDURE — 77030034843 HC TAMP SPN BN INFL EXP II KYPH -H

## 2020-06-12 PROCEDURE — C1713 ANCHOR/SCREW BN/BN,TIS/BN: HCPCS

## 2020-06-12 PROCEDURE — 77030034842 HC TAMP SPN BN INFL EXP II KYPH -I

## 2020-06-12 PROCEDURE — 74011000250 HC RX REV CODE- 250

## 2020-06-12 PROCEDURE — 74011636320 HC RX REV CODE- 636/320

## 2020-06-12 PROCEDURE — 77030003666 HC NDL SPINAL BD -A

## 2020-06-12 PROCEDURE — 76060000033 HC ANESTHESIA 1 TO 1.5 HR

## 2020-06-12 PROCEDURE — 77030030399

## 2020-06-12 RX ORDER — SODIUM CHLORIDE, SODIUM LACTATE, POTASSIUM CHLORIDE, CALCIUM CHLORIDE 600; 310; 30; 20 MG/100ML; MG/100ML; MG/100ML; MG/100ML
INJECTION, SOLUTION INTRAVENOUS
Status: DISCONTINUED | OUTPATIENT
Start: 2020-06-12 | End: 2020-06-12 | Stop reason: HOSPADM

## 2020-06-12 RX ORDER — FENTANYL CITRATE 50 UG/ML
200 INJECTION, SOLUTION INTRAMUSCULAR; INTRAVENOUS
Status: DISCONTINUED | OUTPATIENT
Start: 2020-06-12 | End: 2020-06-12

## 2020-06-12 RX ORDER — SODIUM CHLORIDE 9 MG/ML
500 INJECTION, SOLUTION INTRAVENOUS CONTINUOUS
Status: DISCONTINUED | OUTPATIENT
Start: 2020-06-12 | End: 2020-06-12

## 2020-06-12 RX ORDER — BUPIVACAINE HYDROCHLORIDE 5 MG/ML
INJECTION, SOLUTION EPIDURAL; INTRACAUDAL
Status: COMPLETED
Start: 2020-06-12 | End: 2020-06-12

## 2020-06-12 RX ORDER — VANCOMYCIN HYDROCHLORIDE 500 MG/10ML
1000 INJECTION, POWDER, LYOPHILIZED, FOR SOLUTION INTRAVENOUS ONCE
Status: DISCONTINUED | OUTPATIENT
Start: 2020-06-12 | End: 2020-06-12 | Stop reason: CLARIF

## 2020-06-12 RX ORDER — MIDAZOLAM HYDROCHLORIDE 1 MG/ML
.5-2 INJECTION, SOLUTION INTRAMUSCULAR; INTRAVENOUS
Status: DISCONTINUED | OUTPATIENT
Start: 2020-06-12 | End: 2020-06-12

## 2020-06-12 RX ORDER — FENTANYL CITRATE 50 UG/ML
INJECTION, SOLUTION INTRAMUSCULAR; INTRAVENOUS AS NEEDED
Status: DISCONTINUED | OUTPATIENT
Start: 2020-06-12 | End: 2020-06-12 | Stop reason: HOSPADM

## 2020-06-12 RX ORDER — KETAMINE HYDROCHLORIDE 10 MG/ML
INJECTION, SOLUTION INTRAMUSCULAR; INTRAVENOUS AS NEEDED
Status: DISCONTINUED | OUTPATIENT
Start: 2020-06-12 | End: 2020-06-12 | Stop reason: HOSPADM

## 2020-06-12 RX ORDER — LIDOCAINE HYDROCHLORIDE 20 MG/ML
INJECTION, SOLUTION INFILTRATION; PERINEURAL
Status: COMPLETED
Start: 2020-06-12 | End: 2020-06-12

## 2020-06-12 RX ORDER — SODIUM CHLORIDE 0.9 % (FLUSH) 0.9 %
10 SYRINGE (ML) INJECTION AS NEEDED
Status: DISCONTINUED | OUTPATIENT
Start: 2020-06-12 | End: 2020-06-12 | Stop reason: HOSPADM

## 2020-06-12 RX ORDER — PROPOFOL 10 MG/ML
INJECTION, EMULSION INTRAVENOUS
Status: DISCONTINUED | OUTPATIENT
Start: 2020-06-12 | End: 2020-06-12 | Stop reason: HOSPADM

## 2020-06-12 RX ORDER — HEPARIN 100 UNIT/ML
300 SYRINGE INTRAVENOUS AS NEEDED
Status: DISCONTINUED | OUTPATIENT
Start: 2020-06-12 | End: 2020-06-12 | Stop reason: HOSPADM

## 2020-06-12 RX ADMIN — SODIUM CHLORIDE, SODIUM LACTATE, POTASSIUM CHLORIDE, CALCIUM CHLORIDE: 600; 310; 30; 20 INJECTION, SOLUTION INTRAVENOUS at 16:06

## 2020-06-12 RX ADMIN — FENTANYL CITRATE 25 MCG: 50 INJECTION, SOLUTION INTRAMUSCULAR; INTRAVENOUS at 17:08

## 2020-06-12 RX ADMIN — KETAMINE HYDROCHLORIDE 5 MG: 10 INJECTION, SOLUTION INTRAMUSCULAR; INTRAVENOUS at 16:20

## 2020-06-12 RX ADMIN — KETAMINE HYDROCHLORIDE 5 MG: 10 INJECTION, SOLUTION INTRAMUSCULAR; INTRAVENOUS at 17:18

## 2020-06-12 RX ADMIN — SODIUM CHLORIDE: 900 INJECTION, SOLUTION INTRAVENOUS at 16:59

## 2020-06-12 RX ADMIN — KETAMINE HYDROCHLORIDE 5 MG: 10 INJECTION, SOLUTION INTRAMUSCULAR; INTRAVENOUS at 16:34

## 2020-06-12 RX ADMIN — FENTANYL CITRATE 25 MCG: 50 INJECTION, SOLUTION INTRAMUSCULAR; INTRAVENOUS at 17:06

## 2020-06-12 RX ADMIN — FENTANYL CITRATE 25 MCG: 50 INJECTION, SOLUTION INTRAMUSCULAR; INTRAVENOUS at 16:46

## 2020-06-12 RX ADMIN — KETAMINE HYDROCHLORIDE 5 MG: 10 INJECTION, SOLUTION INTRAMUSCULAR; INTRAVENOUS at 17:20

## 2020-06-12 RX ADMIN — VANCOMYCIN HYDROCHLORIDE 1000 MG: 1 INJECTION, POWDER, LYOPHILIZED, FOR SOLUTION INTRAVENOUS at 14:13

## 2020-06-12 RX ADMIN — HEPARIN 300 UNITS: 100 SYRINGE at 18:21

## 2020-06-12 RX ADMIN — KETAMINE HYDROCHLORIDE 5 MG: 10 INJECTION, SOLUTION INTRAMUSCULAR; INTRAVENOUS at 16:48

## 2020-06-12 RX ADMIN — SODIUM CHLORIDE 500 ML: 900 INJECTION, SOLUTION INTRAVENOUS at 14:15

## 2020-06-12 RX ADMIN — KETAMINE HYDROCHLORIDE 5 MG: 10 INJECTION, SOLUTION INTRAMUSCULAR; INTRAVENOUS at 16:13

## 2020-06-12 RX ADMIN — FENTANYL CITRATE 25 MCG: 50 INJECTION, SOLUTION INTRAMUSCULAR; INTRAVENOUS at 17:22

## 2020-06-12 RX ADMIN — FENTANYL CITRATE 25 MCG: 50 INJECTION, SOLUTION INTRAMUSCULAR; INTRAVENOUS at 17:01

## 2020-06-12 RX ADMIN — BUPIVACAINE HYDROCHLORIDE: 5 INJECTION, SOLUTION EPIDURAL; INTRACAUDAL; PERINEURAL at 16:39

## 2020-06-12 RX ADMIN — FENTANYL CITRATE 50 MCG: 50 INJECTION, SOLUTION INTRAMUSCULAR; INTRAVENOUS at 16:11

## 2020-06-12 RX ADMIN — LIDOCAINE HYDROCHLORIDE: 20 INJECTION, SOLUTION INFILTRATION; PERINEURAL at 16:39

## 2020-06-12 RX ADMIN — KETAMINE HYDROCHLORIDE 10 MG: 10 INJECTION, SOLUTION INTRAMUSCULAR; INTRAVENOUS at 16:26

## 2020-06-12 RX ADMIN — KETAMINE HYDROCHLORIDE 5 MG: 10 INJECTION, SOLUTION INTRAMUSCULAR; INTRAVENOUS at 16:17

## 2020-06-12 RX ADMIN — IOHEXOL 50 ML: 240 INJECTION, SOLUTION INTRATHECAL; INTRAVASCULAR; INTRAVENOUS; ORAL at 16:39

## 2020-06-12 RX ADMIN — FENTANYL CITRATE 25 MCG: 50 INJECTION, SOLUTION INTRAMUSCULAR; INTRAVENOUS at 16:38

## 2020-06-12 RX ADMIN — KETAMINE HYDROCHLORIDE 5 MG: 10 INJECTION, SOLUTION INTRAMUSCULAR; INTRAVENOUS at 16:37

## 2020-06-12 RX ADMIN — PROPOFOL 40 MCG/KG/MIN: 10 INJECTION, EMULSION INTRAVENOUS at 16:12

## 2020-06-12 NOTE — PROGRESS NOTES
Patient assisted OOB to dress. Assisted to stand and bear weight at patient's baseline. Ambulated with assistance to wheelchair. Discharge instructions reviewed and copy given. Transported via wheelchair to discharge area for spouse to drive home.

## 2020-06-12 NOTE — H&P
Radiology History and Physical    Patient: David Serrato 76 y.o. female       Chief Complaint: No chief complaint on file.       History of Present Illness: L5 compression fracture    History:    Past Medical History:   Diagnosis Date    Anemia     Aneurysm ([facility] 3/2005    HX LEFT OPTIC NERVE    Anxiety     Chronic pain     spinal stenosis per pt    Fibromyalgia     GERD (gastroesophageal reflux disease)     High cholesterol     History of acute pancreatitis     History of blood transfusion     History of Salmonella infection     Multiple myeloma ([facility]     Osteoporosis     Other complication due to venous access device 2013    Recurrent umbilical hernia     Recurrent ventral incisional hernia 3/28/2016    Rheumatoid arthritis(714.0)     Sleep apnea     INTOLERATANT OF CPAP    Thromboembolus (HCC)     Right leg DVT    Urinary incontinence      Family History   Problem Relation Age of Onset    Arthritis-osteo Mother     Anesth Problems Neg Hx      Social History     Socioeconomic History    Marital status:      Spouse name: Not on file    Number of children: Not on file    Years of education: Not on file    Highest education level: Not on file   Occupational History    Not on file   Social Needs    Financial resource strain: Not on file    Food insecurity     Worry: Not on file     Inability: Not on file    Transportation needs     Medical: Not on file     Non-medical: Not on file   Tobacco Use    Smoking status: Former Smoker     Years: 10.00     Last attempt to quit: 10/7/2005     Years since quittin.6    Smokeless tobacco: Never Used   Substance and Sexual Activity    Alcohol use: No    Drug use: No    Sexual activity: Not on file   Lifestyle    Physical activity     Days per week: Not on file     Minutes per session: Not on file    Stress: Not on file   Relationships    Social connections     Talks on phone: Not on file     Gets together: Not on file     Attends Yazidi service: Not on file     Active member of club or organization: Not on file     Attends meetings of clubs or organizations: Not on file     Relationship status: Not on file    Intimate partner violence     Fear of current or ex partner: Not on file     Emotionally abused: Not on file     Physically abused: Not on file     Forced sexual activity: Not on file   Other Topics Concern    Not on file   Social History Narrative    Not on file       Allergies: Allergies   Allergen Reactions    Aggrenox [Aspirin-Dipyridamole] Rash     Allergic to the dipyridamole and not aspirin.  Broccoli Other (comments)     C/O GAS    Bumex [Bumetanide] Rash    Doxil [Doxorubicin, Peg-Liposomal] Rash     Burning of skin      Flagyl [Metronidazole] Rash    Keflex [Cephalexin] Rash    Lasix [Furosemide] Rash    Macrobid [Nitrofurantoin Monohyd/M-Cryst] Shortness of Breath    Methotrexate Hives and Rash    Pcn [Penicillins] Rash    Sulfa (Sulfonamide Antibiotics) Hives    Tetanus And Diphtheria Toxoids Swelling    Thalidomide Rash       Current Medications:  No current facility-administered medications for this encounter. Physical Exam:  There were no vitals taken for this visit. GENERAL: alert, cooperative, no distress, appears stated age  LUNG: clear to auscultation bilaterally  HEART: regular rate and rhythm  ABD: Non tender, non distended. Alerts:    Hospital Problems  Date Reviewed: 3/19/2019    None          Laboratory:    No results for input(s): HGB, HCT, WBC, PLT, INR, BUN, CREA, K, CRCLT, HGBEXT, HCTEXT, PLTEXT, INREXT in the last 72 hours. No lab exists for component: PTT, PT      Plan of Care/Planned Procedure:  Risks, benefits, and alternatives reviewed with patient and she agrees to proceed with the procedure. Deemed appropriate or moderate sedation with versed and fentanyl.       Josphine Dandy, MD

## 2020-06-12 NOTE — ANESTHESIA POSTPROCEDURE EVALUATION
Post-Anesthesia Evaluation and Assessment Patient: Dhaval Hung MRN: [id number]  SSN: [id number] YOB: 1945  Age: 76 y.o. Sex: female I have evaluated the patient and they are stable and ready for discharge from the PACU. Cardiovascular Function/Vital Signs Visit Vitals /56 (BP 1 Location: Left arm, BP Patient Position: At rest) Pulse 94 Temp 37.4 °C (99.3 °F) Resp 16 Ht 4' 8\" (1.422 m) Wt 85.3 kg (188 lb) SpO2 100% BMI 42.15 kg/m² Patient is status post * No anesthesia type entered * anesthesia for * No procedures listed *. Nausea/Vomiting: None Postoperative hydration reviewed and adequate. Pain: 
   
Managed Neurological Status: At baseline Mental Status, Level of Consciousness: Alert and  oriented to person, place, and time Pulmonary Status:  
O2 Device: Room air (06/12/20 1734) Adequate oxygenation and airway patent Complications related to anesthesia: None Post-anesthesia assessment completed. No concerns Signed By: Washington Cruz MD   
 June 12, 2020 * No procedures listed *. MAC 
 
<BSHSIANPOST> INITIAL Post-op Vital signs:  
Vitals Value Taken Time /56 6/12/2020  5:34 PM  
Temp Pulse 94 6/12/2020  5:34 PM  
Resp 16 6/12/2020  5:34 PM  
SpO2 100 % 6/12/2020  5:34 PM

## 2020-06-12 NOTE — PROGRESS NOTES
Patient to angio room 1 for L1 and L5 kyphoplasty with MAC anesthesia. CRNA to manage airway, vital signs, medications. Positioned prone and secure on procedure table with supports.

## 2020-06-12 NOTE — ANESTHESIA PREPROCEDURE EVALUATION
Relevant Problems No relevant active problems Anesthetic History No history of anesthetic complications Review of Systems / Medical History Patient summary reviewed, nursing notes reviewed and pertinent labs reviewed Pulmonary Within defined limits Sleep apnea Neuro/Psych Within defined limits Psychiatric history Cardiovascular Within defined limits Exercise tolerance: <4 METS 
  
GI/Hepatic/Renal 
Within defined limits GERD Endo/Other Within defined limits Arthritis and cancer Other Findings Comments: RA 
ch pain Physical Exam 
 
Airway Mallampati: III 
TM Distance: > 6 cm Neck ROM: decreased range of motion, short neck Mouth opening: Normal 
 
 Cardiovascular Regular rate and rhythm,  S1 and S2 normal,  no murmur, click, rub, or gallop Dental 
No notable dental hx Pulmonary Breath sounds clear to auscultation Abdominal 
GI exam deferred Other Findings Anesthetic Plan ASA: 3 Anesthesia type: MAC Induction: Intravenous Anesthetic plan and risks discussed with: Patient Pt does nto want GA, has had 5 procedures in past with mac, will try MAC with GETA as backup

## 2020-06-12 NOTE — DISCHARGE INSTRUCTIONS
[address] [facility]  Department of Interventional Radiology  [facility]    KYPHOPLASTY DISCHARGE INSTRUCTIONS    General Information: This procedure is done to help with the back pain that is associated with compression fractures in the spine. The kyphoplasty involves placing a balloon into the space of the vertebrae that is fractured, blowing up the balloon, therefore realigning the broken pieces of bone, and then injecting cement into the space to strengthen the vertebrae. The pain experienced from compression fractures is caused by the vertebrae not being stabilized. The cement stabilizes the bone, therefore reducing the pain. Home Care Instructions: You can resume your regular diet and medication regimen. Do not drink alcohol, drive, or make any important legal decisions in the next 24 hours. Do not lift anything heavier than a gallon of milk, or do anything strenuous for the next 24 hours. You will notice a dressing on your lower back after your procedure. This dressing can be removed in 24 hours. Showering is acceptable in 24 hours, but you should refrain from tub baths or swimming for 5 days. Call If:     You should call your Physician and/or the Radiology Nurse if you have any bleeding other than a small spot on your bandage. Call if you have any signs of infection, fever, or increased pain at the site. Call if you should have new or worsening pain in your back, or if you lose control of your bladder or bowel. Any tingling or loss of feeling or movement in your legs should also be reported. Follow-Up Instructions: Please see your ordering doctor as he/she has requested. To Reach Us:   Side effects of sedation medications and other medications used today have been reviewed. Notify us of nausea, itching, hives, dizziness, or anything else out of the ordinary.        Should you experience any of these significant changes, please call [phone] between the hours of 7:30 am and 10 pm or [phone] after hours.  After hours, ask the  to page the [address] Technologist, and describe the problem to the technologist.      Patient Signature:  Date: 6/12/2020  Discharging Nurse: Geraldene Epley, RN

## 2020-06-29 RX ORDER — POTASSIUM CHLORIDE 20 MEQ/1
TABLET, EXTENDED RELEASE ORAL
Qty: 30 TAB | Refills: 5 | Status: SHIPPED | OUTPATIENT
Start: 2020-06-29 | End: 2020-01-01

## 2020-06-29 RX ORDER — AMITRIPTYLINE HYDROCHLORIDE 50 MG/1
TABLET, FILM COATED ORAL
Qty: 60 TAB | Refills: 5 | Status: SHIPPED | OUTPATIENT
Start: 2020-06-29

## 2020-06-29 RX ORDER — SIMVASTATIN 20 MG/1
TABLET, FILM COATED ORAL
Qty: 30 TAB | Refills: 5 | Status: SHIPPED | OUTPATIENT
Start: 2020-06-29 | End: 2020-01-01

## 2020-07-21 NOTE — PROGRESS NOTES
Do you have a personal history of COVID-19 within the past 28 days?  no  If Yes, What was the method of testing: clinical assumption or test result? Have you had close contact with a known to be positive COVID-19 patient within the past 14 days? NO    Are you a healthcare worker or ? NO  If Yes, have you been exposed to COVID-19 without proper PPE? Do you live in a SNF, adult home or other institutional setting? NO  If Yes, have they experienced a flood of COVID-19 positive patients?     In the past 2-14 days have you had any of the following symptoms NO SYMPTOMS   Cough   New onset Shortness of breath or difficulty breathing    Or at least two of these symptoms: NO SYMPTOMS   Fever greater than 100 F   Chills   Repeated shaking with chills   Muscle pain   Headache   Sore throat   New loss of taste or smell   New onset diarrhea

## 2020-07-22 NOTE — PROGRESS NOTES
Discharge instructions including post ansesthesia care, post kyphoplasty and constipation teaching have been reviewed with spouse over phone and reinforced at passenger side of car as patient discharged via W/C and assisted into private vehicle without difficulty.

## 2020-07-22 NOTE — PROGRESS NOTES
Post kyphoplasty under MAC, resting quietly without complaint and VS are stable.  Tamra Delcid has been called and updated on status.

## 2020-07-22 NOTE — H&P
Radiology History and Physical    Patient: Erik Lopez 76 y.o. female       Chief Complaint: No chief complaint on file.       History of Present Illness: L2 kyphoplasty    History:    Past Medical History:   Diagnosis Date    Anemia     Aneurysm ([facility] 3/2005    HX LEFT OPTIC NERVE    Anxiety     Chronic pain     spinal stenosis per pt    Fibromyalgia     GERD (gastroesophageal reflux disease)     High cholesterol     History of acute pancreatitis     History of blood transfusion     History of Salmonella infection 2009    Multiple myeloma ([facility]     Osteoporosis     Other complication due to venous access device 2013    Recurrent umbilical hernia 3/20/7268    Recurrent ventral incisional hernia 3/28/2016    Rheumatoid arthritis(714.0)     Sleep apnea     INTOLERATANT OF CPAP    Thromboembolus (HCC)     Right leg DVT    Urinary incontinence      Family History   Problem Relation Age of Onset    Arthritis-osteo Mother     Anesth Problems Neg Hx      Social History     Socioeconomic History    Marital status:      Spouse name: Not on file    Number of children: Not on file    Years of education: Not on file    Highest education level: Not on file   Occupational History    Not on file   Social Needs    Financial resource strain: Not on file    Food insecurity     Worry: Not on file     Inability: Not on file    Transportation needs     Medical: Not on file     Non-medical: Not on file   Tobacco Use    Smoking status: Former Smoker     Years: 10.00     Last attempt to quit: 10/7/2005     Years since quittin.8    Smokeless tobacco: Never Used   Substance and Sexual Activity    Alcohol use: No    Drug use: No    Sexual activity: Not on file   Lifestyle    Physical activity     Days per week: Not on file     Minutes per session: Not on file    Stress: Not on file   Relationships    Social connections     Talks on phone: Not on file     Gets together: Not on file Attends Ep[employer]opalian service: Not on file     Active member of club or organization: Not on file     Attends meetings of clubs or organizations: Not on file     Relationship status: Not on file    Intimate partner violence     Fear of current or ex partner: Not on file     Emotionally abused: Not on file     Physically abused: Not on file     Forced sexual activity: Not on file   Other Topics Concern    Not on file   Social History Narrative    Not on file       Allergies: Allergies   Allergen Reactions    Aggrenox [Aspirin-Dipyridamole] Rash     Allergic to the dipyridamole and not aspirin.  Broccoli Other (comments)     C/O GAS    Bumex [Bumetanide] Rash    Doxil [Doxorubicin, Peg-Liposomal] Rash     Burning of skin      Flagyl [Metronidazole] Rash    Keflex [Cephalexin] Rash    Lasix [Furosemide] Rash    Macrobid [Nitrofurantoin Monohyd/M-Cryst] Shortness of Breath    Methotrexate Hives and Rash    Pcn [Penicillins] Rash    Sulfa (Sulfonamide Antibiotics) Hives    Tetanus And Diphtheria Toxoids Swelling    Thalidomide Rash       Current Medications:  Current Facility-Administered Medications   Medication Dose Route Frequency    lidocaine (XYLOCAINE) 20 mg/mL (2 %) injection 400 mg  20 mL IntraDERMal ONCE    bupivacaine (PF) (MARCAINE) 0.5 % (5 mg/mL) injection 100 mg  20 mL SubCUTAneous ONCE    iohexoL (OMNIPAQUE) 240 mg iodine/mL solution 50 mL  50 mL Intrathecal ONCE        Physical Exam:  There were no vitals taken for this visit. Alerts:    Hospital Problems  Date Reviewed: 6/12/2020    None          Laboratory:    No results for input(s): HGB, HCT, WBC, PLT, INR, BUN, CREA, K, CRCLT, HGBEXT, HCTEXT, PLTEXT, INREXT in the last 72 hours. No lab exists for component: PTT, PT      Plan of Care/Planned Procedure:  Risks, benefits, and alternatives reviewed with patient and she agrees to proceed with the procedure.      L2 kyphoplasty with anesthesia    Mesha Luciano MD

## 2020-07-22 NOTE — ROUTINE PROCESS
Pt arrives via stretcher to angio department accompanied by  Dominique Caicedo in cell phone lot [phone] for kyphoplasty L2 procedure with anesthesia. All assessments completed and consent was reviewed. Education given regarding procedure, anesthesia, post-procedure care and  management/follow-up. Opportunity for questions was provided and all questions and concerns were addressed. Patient voices good understanding with this being her \"8th or 9 time\". Assisted to bathroom and disrobed onto Angio stretcher for procedure prep.

## 2020-07-22 NOTE — PROGRESS NOTES
Assisted out of her gown and into street clothing, assisted to stand at bedside, weight bearing present bilateral LE and assisted into W/C and into bathroom with standby assist for position change. Tolerated well.

## 2020-07-22 NOTE — DISCHARGE INSTRUCTIONS
Patient Education        Kyphoplasty: What to Expect at Home  Your Recovery  After kyphoplasty to relieve pain from compression fractures, your back may feel sore where the hollow needle (trocar) went into your back. This should go away in a few days. Most people are able to return to their daily activities within a day. This care sheet gives you a general idea about how long it will take for you to recover. But each person recovers at a different pace. Follow the steps below to get better as quickly as possible. How can you care for yourself at home? Activity  · Take it easy for the first 24 hours. Rest when you feel tired. Getting enough sleep will help you recover. · For the first day after the procedure, avoid lifting anything that would make you strain. This may include heavy grocery bags and milk containers, a heavy briefcase or backpack, cat litter or dog food bags, a vacuum , or a child. Diet  · You can eat your normal diet. If your stomach is upset, try bland, low-fat foods like plain rice, broiled chicken, toast, and yogurt. Medicines  · Your doctor will tell you if and when you can restart your medicines. He or she will also give you instructions about taking any new medicines. · If you take aspirin or some other blood thinner, ask your doctor if and when to start taking it again. Make sure that you understand exactly what your doctor wants you to do. · Be safe with medicines. Take pain medicines exactly as directed. ? If the doctor gave you a prescription medicine for pain, take it as prescribed. ? If you are not taking a prescription pain medicine, ask your doctor if you can take an over-the-counter medicine. ? Do not take two or more pain medicines at the same time unless your doctor told you to. Many pain medicines have acetaminophen, which is Tylenol. Too much acetaminophen (Tylenol) can be harmful. Incision care  · You will have a dressing over the cut (incision).  A dressing helps the incision heal and protects it. Your doctor will tell you how to take care of this. Ice  · If you are sore where the needle was inserted, put ice or a cold pack on your back for 10 to 20 minutes at a time. Try to do this every 1 to 2 hours for the next 3 days (when you are awake) or until the swelling goes down. Put a thin cloth between the ice and your skin. Follow-up care is a key part of your treatment and safety. Be sure to make and go to all appointments, and call your doctor if you are having problems. It's also a good idea to know your test results and keep a list of the medicines you take. When should you call for help? [id number] anytime you think you may need emergency care. For example, call if:  · You passed out (lost consciousness). · You have severe trouble breathing. · You have sudden chest pain and shortness of breath, or you cough up blood. · You are unable to move a leg at all. Call your doctor now or seek immediate medical care if:  · You have new or worse symptoms in your legs, belly, or buttocks. Symptoms may include:  ? Numbness or tingling. ? Weakness. ? Pain. · You lose bladder or bowel control. · You have signs of infection, such as:  ? Increased pain, swelling, warmth, or redness. ? Red streaks leading from the incision. ? Pus draining from the incision. ? Swollen lymph nodes in your neck, armpits, or groin. ? A fever. Watch closely for any changes in your health, and be sure to contact your doctor if:  · You do not get better as expected. Where can you learn more? Go to http://www.gray.com/  Enter O461 in the search box to learn more about \"Kyphoplasty: What to Expect at Home. \"  Current as of: March 2, 2020               Content Version: 12.5  © 5397-7664 [employer], Incorporated. Care instructions adapted under license by [employer] (which disclaims liability or warranty for this information).  If you have questions about a medical condition or this instruction, always ask your healthcare professional. [place] [address] any warranty or liability for your use of this information.

## 2020-07-22 NOTE — ANESTHESIA PREPROCEDURE EVALUATION
Relevant Problems   No relevant active problems       Anesthetic History   No history of anesthetic complications            Review of Systems / Medical History  Patient summary reviewed, nursing notes reviewed and pertinent labs reviewed    Pulmonary  Within defined limits      Sleep apnea           Neuro/Psych   Within defined limits      Psychiatric history     Cardiovascular  Within defined limits                Exercise tolerance: <4 METS     GI/Hepatic/Renal  Within defined limits   GERD           Endo/Other  Within defined limits      Arthritis and cancer     Other Findings   Comments: RA  ch pain           Physical Exam    Airway  Mallampati: III  TM Distance: > 6 cm  Neck ROM: decreased range of motion, short neck   Mouth opening: Normal     Cardiovascular  Regular rate and rhythm,  S1 and S2 normal,  no murmur, click, rub, or gallop             Dental  No notable dental hx       Pulmonary  Breath sounds clear to auscultation               Abdominal  GI exam deferred       Other Findings            Anesthetic Plan    ASA: 3  Anesthesia type: general          Induction: Intravenous  Anesthetic plan and risks discussed with: Patient

## 2020-07-31 NOTE — TELEPHONE ENCOUNTER
Pt would like to come in for a visit for bowel movement issues. She said she had surgery recently and it has caused her to have this issue. Just confirming it is ok to have her come in office for an appt vs TM.

## 2020-08-10 PROBLEM — E66.01 OBESITY, MORBID (HCC): Status: ACTIVE | Noted: 2020-01-01

## 2020-08-10 NOTE — PROGRESS NOTES
HPI: 
Eloisa Bethea is a 76y.o. year old female who is here for a follow-up visit. She has had ongoing worsening issues with constipation. Her pain medications have been adjusted recently and a Duragesic patch was added. She continues to use morphine via pump and is apparently getting 1500 mg/day via her pain pump. She is also using Dilaudid 8 mg tablets multiple times per day. Her bowel movements are now every few days despite multiple medications for that. No nausea or vomiting. Her pain continues to be problematic. She was recently tried on new chemotherapy for her multiple myeloma but failed due to an allergic reaction. She has a follow-up visit coming up with her oncologist.  Her arthritis pain is otherwise reasonably controlled. Past Medical History:  
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   
 spinal stenosis per pt  Fibromyalgia  GERD (gastroesophageal reflux disease)  High cholesterol  History of acute pancreatitis  History of blood transfusion  History of Salmonella infection 2009  Multiple myeloma ([address] 2007  Osteoporosis  Other complication due to venous access device 1/29/2013  Recurrent umbilical hernia 6/50/7455  Recurrent ventral incisional hernia 3/28/2016  Rheumatoid arthritis(714.0)  Sleep apnea INTOLERATANT OF CPAP  Thromboembolus ([address] Right leg DVT  Urinary incontinence Past Surgical History:  
Procedure Laterality Date  HX CHOLECYSTECTOMY  2004  HX CRANIOTOMY    
 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    
 COLONOSCOPIES, EGD  HX GI    
 ESOPHAGUS STREACHED  
 HX HEENT  10/2011  
 b/l cataract surgery in October 2011  HX HERNIA REPAIR  [id number] X6  
 HX HERNIA REPAIR  10-13-14  
 repair of recurrent ventral incisional hernia  with primary suture [id number]. Todd Calzada  HX KYPHOPLASTY  09/14/2017  L4  
 Metformin refill  HX KYPHOPLASTY  2020 L1, L2, L5   
 HX ORTHOPAEDIC Right 2001 BONE SPURS SHOULDER  
 HX OTHER SURGICAL  2010 LLQ PAIN PUMP FOR MORPHINE INFUSION  
 HX OTHER SURGICAL  1-16-14  
 repair of recurrent umbilical hernia with ventralex pillow top mesh and extensive lysis of [id number]. Viviann Artjose  HX OTHER SURGICAL  5-31-16  
 repair of recurrent ventral incisional hernia with underlay mesh-[facility]-Dr. Oates Artist  HX VASCULAR ACCESS Right 1/2013 POWER PORT   
 HX VASCULAR ACCESS  2010 PAIN PUMP  IR INJ FORAMIN EPID LUMB ANES/STER SNGL  3/19/2019  IR KYPHOPLASTY LUMBAR  6/12/2020  IR KYPHOPLASTY LUMBAR  7/22/2020  
 NEUROLOGICAL PROCEDURE UNLISTED  2007 KYPHOPLASTY X2  
 NEUROLOGICAL PROCEDURE UNLISTED  10/26/15 Kyphoplasty t-5  PAIN PUMP DEVICE    
 implanted in LLQ, MORPHINE Prior to Admission medications Medication Sig Start Date End Date Taking? Authorizing Provider  
polyethylene glycol (Miralax) 17 gram/dose powder Take 17 g by mouth daily. 8/10/20  Yes Delroy Kc III, MD  
linaCLOtide (Linzess) 145 mcg cap capsule Take 1 Cap by mouth Daily (before breakfast). 8/10/20  Yes Jose A Smith MD  
fentaNYL (DURAGESIC) 75 mcg/hr 1 Patch by TransDERmal route every seventy-two (72) hours.  Indications: severe chronic pain with opioid tolerance   Yes Provider, Historical  
morphine 10 mg/mL injection 5 mg by IntraVENous route continuous. 5mg/day concentration is 20.0 mg/ml mixed with Bupivacaine 3.215 mg/day   Yes Provider, Historical  
milnacipran (Savella) 50 mg tablet TAKE 1 TABLET BY MOUTH TWICE DAILY 7/16/20  Yes Jose A Smith MD  
simvastatin (ZOCOR) 20 mg tablet TAKE ONE TABLET BY MOUTH AT BEDTIME. 6/29/20  Yes Jose A Smith MD  
amitriptyline (ELAVIL) 50 mg tablet TAKE TWO TABLETS BY MOUTH AT BEDTIME AS NEEDED FOR SLEEP. 6/29/20  Yes Jose A Smith MD  
potassium chloride (K-DUR, KLOR-CON) 20 mEq tablet TAKE ONE TABLET BY MOUTH ONCE DAILY. 6/29/20  Yes Gian Mckee MD  
dexlansoprazole (Dexilant) 60 mg CpDB capsule (delayed release) TAKE 1 CAPSULE BY MOUTH EVERY DAY 5/20/20  Yes Gian Mckee MD  
tiZANidine (ZANAFLEX) 4 mg tablet Take 1 Tab by mouth three (3) times daily as needed for Muscle Spasm(s). 4/23/20  Yes Gian Mckee MD  
gabapentin (NEURONTIN) 300 mg capsule Take 1 Cap by mouth four (4) times daily. TAKE ONE CAPSULE BY MOUTH FOUR TIMES A DAY Patient taking differently: Take 600 mg by mouth three (3) times daily. TAKE ONE CAPSULE BY MOUTH FOUR TIMES A DAY 6/20/19  Yes Parker Patel III, MD  
HYDROmorphone (DILAUDID) 8 mg tablet Take 8 mg by mouth every three (3) hours as needed for Pain. Yes Other, MD Sherif  
Cetirizine (ZYRTEC) 10 mg cap Take 10 mg by mouth daily as needed (allergies). Yes Other, MD Sherif  
cholecalciferol (VITAMIN D3) 400 unit tab tablet Take 400 Units by mouth daily. Yes Provider, Historical  
fluticasone (FLONASE) 50 mcg/actuation nasal spray 2 Sprays by Both Nostrils route two (2) times a day. Patient taking differently: 2 Sprays by Both Nostrils route two (2) times daily as needed for Allergies. 2/7/19  Yes Gian Mckee MD  
clotrimazole (LOTRIMIN) 1 % topical cream Apply  to affected area two (2) times a day. 2/7/19  Yes Gian Mckee MD  
bisacodyl (DULCOLAX) 10 mg suppository Insert 10 mg into rectum daily as needed. 1/31/19  Yes Africa RAM MD  
rivaroxaban Jose A Stevens) 10 mg tablet Take 10 mg by mouth daily (with dinner). Yes Provider, Historical  
predniSONE (DELTASONE) 10 mg tablet Take 20 mg by mouth daily. Yes Provider, Historical  
spironolactone (ALDACTONE) 50 mg tablet Take 1 Tab by mouth two (2) times a day. Indications: EDEMA Patient taking differently: Take 50 mg by mouth two (2) times daily as needed (edema).  9/23/16  Yes Gian Mckee MD  
aluminum hydrox-magnesium carb (GAVISCON)  mg/15 mL suspension Take 15 mL by mouth every six (6) hours as needed for Indigestion. Yes Provider, Historical  
calcium carbonate (TUMS) 200 mg calcium (500 mg) chew Take 1 Tab by mouth as needed. Yes Provider, Historical  
calcium citrate-vitamin D3 (CITRACAL WITH VITAMIN D MAXIMUM) tablet Take 1 Tab by mouth daily. Yes Provider, Historical  
magnesium hydroxide (MILK OF MAGNESIA) 400 mg/5 mL suspension Take 30 mL by mouth daily as needed for Constipation. Yes Provider, Historical  
acetaminophen (TYLENOL) 500 mg tablet Take 1,000 mg by mouth every six (6) hours as needed for Pain. Yes Provider, Historical  
OTHER,NON-FORMULARY, LLQ Morphine/bupivacaine pain pump for multiple myeloma. 4.287mg/3.215 mg per day   Yes Provider, Historical  
diazepam (VALIUM) 10 mg tablet Take 10 mg by mouth two (2) times a day. Yes Provider, Historical  
docusate sodium (COLACE) 100 mg capsule Take 100 mg by mouth daily. 1 capsule in the morning and 3 capsule at night   Yes Provider, Historical  
senna (SENOKOT) 8.6 mg tablet Take 4 Tabs by mouth two (2) times a day. 4 PILLS IN AM  4 PILLS IN PM   Yes Provider, Historical  
prochlorperazine (COMPAZINE) 5 mg tablet Take 5 mg by mouth every six (6) hours as needed for Nausea. Yes Provider, Historical  
polyethylene glycol (MIRALAX) 17 gram/dose powder Take 17 g by mouth daily as needed (constipation). 8/10/20  Other, MD Sherif  
 
 
Social History Socioeconomic History  Marital status:  Spouse name: Not on file  Number of children: Not on file  Years of education: Not on file  Highest education level: Not on file Occupational History  Not on file Social Needs  Financial resource strain: Not on file  Food insecurity Worry: Not on file Inability: Not on file  Transportation needs Medical: Not on file Non-medical: Not on file Tobacco Use  Smoking status: Former Smoker Years: 10.00 Last attempt to quit: 10/7/2005 Years since quittin.8  Smokeless tobacco: Never Used Substance and Sexual Activity  Alcohol use: No  
 Drug use: No  
 Sexual activity: Not on file Lifestyle  Physical activity Days per week: Not on file Minutes per session: Not on file  Stress: Not on file Relationships  Social connections Talks on phone: Not on file Gets together: Not on file Attends Mandaeism service: Not on file Active member of club or organization: Not on file Attends meetings of clubs or organizations: Not on file Relationship status: Not on file  Intimate partner violence Fear of current or ex partner: Not on file Emotionally abused: Not on file Physically abused: Not on file Forced sexual activity: Not on file Other Topics Concern  Not on file Social History Narrative  Not on file ROS Per HPI Visit Vitals /82 Pulse (!) 103 Temp 97.8 °F (36.6 °C) (Temporal) Resp 16 Ht 4' 8\" (1.422 m) Wt 179 lb (81.2 kg) SpO2 99% BMI 40.13 kg/m² Physical Exam  
Physical Examination: General appearance - alert, well appearing, and in no distress Chest - clear to auscultation, no wheezes, rales or rhonchi, symmetric air entry Heart - normal rate and regular rhythm Abdomen - soft, nontender, nondistended, no masses or organomegaly Neurological - alert, oriented, normal speech, no focal findings or movement disorder noted Extremities - peripheral pulses normal, no pedal edema, no clubbing or cyanosis Assessment/Plan: 
Diagnoses and all orders for this visit: 1. Drug induced constipation -previously reasonably controlled with Amitiza and over-the-counter drugs. At this point this is not covered by her insurance. Will add Linzess. If this is not covered, consider increasing her MiraLAX to twice a day and continue her other medications. 2. Obesity, morbid ([facility] -continue to work on diet and exercise for weight loss. 3. Rheumatoid arthritis involving multiple sites with positive rheumatoid factor (HCC) -stable on current meds. 4. Multiple myeloma not having achieved remission ([facility] -we will see oncology as planned to discuss additional alternatives. -  
Other orders 
-     linaCLOtide (Linzess) 145 mcg cap capsule; Take 1 Cap by mouth Daily (before breakfast). Advised her to call back or return to office if symptoms worsen/change/persist. 
Discussed expected course/resolution/complications of diagnosis in detail with patient. Medication risks/benefits/costs/interactions/alternatives discussed with patient. She was given an after visit summary which includes diagnoses, current medications, & vitals. She expressed understanding with the diagnosis and plan.

## 2020-10-06 NOTE — TELEPHONE ENCOUNTER
Patient says at her last appt they discussed possibly increasing her Linzess from 145 to 290 she is calling back requesting this change and refill with new dose to [facility] on file.

## 2020-10-14 NOTE — PROGRESS NOTES
Pharmacy Note - Immunizations Fercho Agrawal is a 76 y.o.  female  who present for a flu shot. She denies any symptoms, reactions or allergies that would exclude them from being immunized today. Patient is taking Prednisone 20 mg daily-VO from Dr. Erica Ogden can receive vaccine today. Risks and adverse reactions were discussed and the VIS was given to them. All questions were addressed. Verbal order received from Dr. Erica Ogden. Patient was observed for 10 min post injection. There were no reactions observed. Criss Luz PHARMD BCACP 
 
CLINICAL PHARMACY CONSULT: MED RECONCILIATION/REVIEW ADDENDUM For Pharmacy Admin Tracking Only PHSO: PHSO Patient?: Yes Total # of Interventions Recommended: Count: 1 Total Interventions Accepted: 1 Time Spent (min): 15 Criss Luz PHARMD, BCACP

## 2020-10-14 NOTE — PATIENT INSTRUCTIONS
Vaccine Information Statement Influenza (Flu) Vaccine (Inactivated or Recombinant): What You Need to Know Many Vaccine Information Statements are available in [place] and other languages. See www.immunize.org/vis Hojas de información sobre vacunas están disponibles en español y en muchos otros idiomas. Visite www.immunize.org/vis 1. Why get vaccinated? Influenza vaccine can prevent influenza (flu). Flu is a contagious disease that spreads around the [facility] every year, usually between October and May. Anyone can get the flu, but it is more dangerous for some people. Infants and young children, people 72years of age and older, pregnant women, and people with certain health conditions or a weakened immune system are at greatest risk of flu complications. Pneumonia, bronchitis, sinus infections and ear infections are examples of flu-related complications. If you have a medical condition, such as heart disease, cancer or diabetes, flu can make it worse. Flu can cause fever and chills, sore throat, muscle aches, fatigue, cough, headache, and runny or stuffy nose. Some people may have vomiting and diarrhea, though this is more common in children than adults. Each year thousands of people in the [facility] die from flu, and many more are hospitalized. Flu vaccine prevents millions of illnesses and flu-related visits to the doctor each year. 2. Influenza vaccines CDC recommends everyone 10months of age and older get vaccinated every flu season. Children 6 months through 6years of age may need 2 doses during a single flu season. Everyone else needs only 1 dose each flu season. It takes about 2 weeks for protection to develop after vaccination. There are many flu viruses, and they are always changing. Each year a new flu vaccine is made to protect against three or four viruses that are likely to cause disease in the upcoming flu season.  Even when the vaccine doesnt exactly match these viruses, it may still provide some protection. Influenza vaccine does not cause flu. Influenza vaccine may be given at the same time as other vaccines. 3. Talk with your health care provider Tell your vaccine provider if the person getting the vaccine: 
 Has had an allergic reaction after a previous dose of influenza vaccine, or has any severe, life-threatening allergies.  Has ever had Guillain-Barré Syndrome (also called GBS). In some cases, your health care provider may decide to postpone influenza vaccination to a future visit. People with minor illnesses, such as a cold, may be vaccinated. People who are moderately or severely ill should usually wait until they recover before getting influenza vaccine. Your health care provider can give you more information. 4. Risks of a reaction  Soreness, redness, and swelling where shot is given, fever, muscle aches, and headache can happen after influenza vaccine.  There may be a very small increased risk of Guillain-Barré Syndrome (GBS) after inactivated influenza vaccine (the flu shot). Isael Comings children who get the flu shot along with pneumococcal vaccine (PCV13), and/or DTaP vaccine at the same time might be slightly more likely to have a seizure caused by fever. Tell your health care provider if a child who is getting flu vaccine has ever had a seizure. People sometimes faint after medical procedures, including vaccination. Tell your provider if you feel dizzy or have vision changes or ringing in the ears. As with any medicine, there is a very remote chance of a vaccine causing a severe allergic reaction, other serious injury, or death. 5. What if there is a serious problem? An allergic reaction could occur after the vaccinated person leaves the clinic.  If you see signs of a severe allergic reaction (hives, swelling of the face and throat, difficulty breathing, a fast heartbeat, dizziness, or weakness), call 9-1-1 and get the person to the nearest hospital. 
 
For other signs that concern you, call your health care provider. Adverse reactions should be reported to the Vaccine Adverse Event Reporting System (VAERS). Your health care provider will usually file this report, or you can do it yourself. Visit the VAERS website at www.vaers. hhs.gov or call 8-125.997.4704. VAERS is only for reporting reactions, and VAERS staff do not give medical advice. 6. The National Vaccine Injury Compensation Program 
 
The [facility] Vaccine Injury Compensation Program (VICP) is a federal program that was created to compensate people who may have been injured by certain vaccines. Visit the VICP website at www.hrsa.gov/vaccinecompensation or call 0-947.513.8655 to learn about the program and about filing a claim. There is a time limit to file a claim for compensation. 7. How can I learn more?  Ask your health care provider.  Call your local or state health department.  Contact the Centers for Disease Control and Prevention (CDC): 
- Call 0-505.281.4033 (8-917-NAH-INFO) or 
- Visit CDCs influenza website at www.cdc.gov/flu Vaccine Information Statement (Interim) Inactivated Influenza Vaccine 8/15/2019 
[address]eri [id number] Department of [facility] and [place] Centers for Disease Control and Prevention Office Use Only

## 2020-10-23 PROBLEM — S22.49XA MULTIPLE RIB FRACTURES: Status: ACTIVE | Noted: 2020-01-01

## 2020-10-23 NOTE — H&P
HISTORY AND PHYSICAL 
 
 
PCP: Radha Magdaleno MD 
History source: Patient CC: Left shoulder pain HPI: A 76year old female patient with PMH of Multiple myeloma with metastatic disease, rheumatoid arthritis, chronic pain/ opioid dependence on implanted morphine pump, DVT on xarelto, multiple pathological fractures, recent kyphoplasty of L spine presents to the ED for evaluation of left shoulder pain and difficulty weightbearing. Patient states that she has been having trouble ambulating for a few days and was trying to get off her commode this am when she lost her hand hold and fell back onto the toilet. She had been evaluated for DVT earlier in the week but was found negative. She states that she has been unable to bear weight very well for about a week but denies prior injury. She complains of pain primarily in her left shoulder as well as right posterior hip/buttock region. She denied sob initially and stated that she is getting evaluated for sleep study and hypoxia at night time. Pt has pain on deep breathing. Denied cough, fever, abd pain, urinary or bowel changes. In ED, imaging showed acute multiple rib fractures and thoracic fractures. Hematology consulted in ED. Hospitalist consulted for admission. Discussed with Ortho PA- recommended NM bone scan. On arrival to floor , pt desaturated to 80's and placed on 2l NC. CXR ordered by me showed diffuse bilateral pulmonary opacification. Spoke with patient again - she stated that she has been having sob since 1 week , worsening with exertion. She discussed with her PCP and oncology Dr. Valentina Dupont - who recommended oxygen study possible due to bruising of her lungs for recurrent fractures. Explained CXR findings and in light of hypoxia, will test for covid. She was initially upset regarding this but understands. PMH/PSH: 
Past Medical History:  
Diagnosis Date  Anemia  Aneurysm ([facility] [address] 3/2005  HX LEFT OPTIC NERVE  
  Anxiety  Chronic pain   
 spinal stenosis per pt  Fibromyalgia  GERD (gastroesophageal reflux disease)  High cholesterol  History of acute pancreatitis  History of blood transfusion  History of Salmonella infection 2009  Multiple myeloma ([address] 2007  Osteoporosis  Other complication due to venous access device 1/29/2013  Recurrent umbilical hernia 0/00/3171  Recurrent ventral incisional hernia 3/28/2016  Rheumatoid arthritis(714.0)  Sleep apnea INTOLERATANT OF CPAP  Thromboembolus ([address] Right leg DVT  Urinary incontinence Past Surgical History:  
Procedure Laterality Date  HX CHOLECYSTECTOMY  2004  HX CRANIOTOMY    
 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    
 COLONOSCOPIES, EGD  HX GI    
 ESOPHAGUS STREACHED  
 HX HEENT  10/2011  
 b/l cataract surgery in October 2011  HX HERNIA REPAIR  [id number] X6  
 HX HERNIA REPAIR  10-13-14  
 repair of recurrent ventral incisional hernia  with primary suture [id number][facility]  HX KYPHOPLASTY  09/14/2017 L4  
 HX KYPHOPLASTY  2020 L1, L2, L5   
 HX ORTHOPAEDIC Right 2001 BONE SPURS SHOULDER  
 HX OTHER SURGICAL  2010 LLQ PAIN PUMP FOR MORPHINE INFUSION  
 HX OTHER SURGICAL  1-16-14  
 repair of recurrent umbilical hernia with ventralex pillow top mesh and extensive lysis of [id number]. Adams Gist  HX OTHER SURGICAL  5-31-16  
 [facility]  HX VASCULAR ACCESS Right 1/2013 POWER PORT   
 HX VASCULAR ACCESS  2010 PAIN PUMP  IR INJ FORAMIN EPID LUMB ANES/STER SNGL  3/19/2019  IR KYPHOPLASTY LUMBAR  6/12/2020  IR KYPHOPLASTY LUMBAR  7/22/2020  
 NEUROLOGICAL PROCEDURE UNLISTED  2007 KYPHOPLASTY X2  
 NEUROLOGICAL PROCEDURE UNLISTED  10/26/15 Kyphoplasty t-5  PAIN PUMP DEVICE    
 implanted in LLQ, MORPHINE Home meds: Prior to Admission medications Medication Sig Start Date End Date Taking? Authorizing Provider  
docusate sodium (COLACE) 100 mg capsule Take 100 mg by mouth daily. Takes 100 mg QAM and 300 mg QPM   Yes Provider, Historical  
docusate sodium (COLACE) 100 mg capsule Take 300 mg by mouth every evening. Takes 100 mg QAM and 300 mg QPM   Yes Provider, Historical  
fluticasone propionate (Flonase Allergy Relief) 50 mcg/actuation nasal spray 2 Sprays by Both Nostrils route two (2) times daily as needed for Allergies. Yes Provider, Historical  
gabapentin (NEURONTIN) 300 mg capsule Take 600 mg by mouth three (3) times daily. Yes Provider, Historical  
fentaNYL (DURAGESIC) 50 mcg/hr PATCH 1 Patch by TransDERmal route every seventy-two (72) hours. Yes Provider, Historical  
gentamicin (GARAMYCIN) 0.1 % topical cream Apply  to affected area every evening. Apply to toes   Yes Provider, Historical  
dextran 70-hypromellose (Artificial Tears,[id number],) ophthalmic solution Administer 1 Drop to both eyes as needed. Yes Provider, Historical  
predniSONE (DELTASONE) 20 mg tablet Take 1 Tab by mouth daily. 10/14/20  Yes Provider, Historical  
milnacipran (Savella) 50 mg tablet TAKE 1 TABLET BY MOUTH TWICE DAILY 10/12/20  Yes Iris Olmos III, MD  
linaCLOtide (Linzess) 290 mcg cap capsule Take 1 Cap by mouth Daily (before breakfast). 10/7/20  Yes Suad Llanes MD  
polyethylene glycol (Miralax) 17 gram/dose powder Take 17 g by mouth daily.  8/10/20  Yes Suad Llanes MD  
morphine 10 mg/mL injection 5 mg by IntraVENous route continuous. 5mg/day concentration is 20.0 mg/ml mixed with Bupivacaine 3.215 mg/day   Yes Provider, Historical  
simvastatin (ZOCOR) 20 mg tablet TAKE ONE TABLET BY MOUTH AT BEDTIME. 6/29/20  Yes Suad Llanes MD  
amitriptyline (ELAVIL) 50 mg tablet TAKE TWO TABLETS BY MOUTH AT BEDTIME AS NEEDED FOR SLEEP. 6/29/20  Yes Suad Llanes MD  
 potassium chloride (K-DUR, KLOR-CON) 20 mEq tablet TAKE ONE TABLET BY MOUTH ONCE DAILY. 6/29/20  Yes Austyn Pandya MD  
dexlansoprazole (Dexilant) 60 mg CpDB capsule (delayed release) TAKE 1 CAPSULE BY MOUTH EVERY DAY 5/20/20  Yes Kelly Witt III, MD  
HYDROmorphone (DILAUDID) 8 mg tablet Take 8 mg by mouth every three (3) hours as needed for Pain. Yes Other, MD Sherif  
Cetirizine (ZYRTEC) 10 mg cap Take 10 mg by mouth daily as needed (allergies). Yes Other, MD Sherif  
cholecalciferol (VITAMIN D3) 400 unit tab tablet Take 400 Units by mouth daily. Yes Provider, Historical  
clotrimazole (LOTRIMIN) 1 % topical cream Apply  to affected area two (2) times a day. 2/7/19  Yes Austyn Pandya MD  
bisacodyl (DULCOLAX) 10 mg suppository Insert 10 mg into rectum daily as needed. 1/31/19  Yes Mychal RAM MD  
rivaroxaban Soni Mireles) 10 mg tablet Take 10 mg by mouth daily (with dinner). Yes Provider, Historical  
aluminum hydrox-magnesium carb (GAVISCON)  mg/15 mL suspension Take 15 mL by mouth every six (6) hours as needed for Indigestion. Yes Provider, Historical  
calcium carbonate (TUMS) 200 mg calcium (500 mg) chew Take 1 Tab by mouth as needed. Yes Provider, Historical  
calcium citrate-vitamin D3 (CITRACAL WITH VITAMIN D MAXIMUM) tablet Take 1 Tab by mouth daily. Yes Provider, Historical  
magnesium hydroxide (MILK OF MAGNESIA) 400 mg/5 mL suspension Take 30 mL by mouth daily as needed for Constipation. Yes Provider, Historical  
acetaminophen (TYLENOL) 500 mg tablet Take 1,000 mg by mouth every six (6) hours as needed for Pain. Yes Provider, Historical  
diazepam (VALIUM) 10 mg tablet Take 10 mg by mouth two (2) times a day. Yes Provider, Historical  
senna (SENOKOT) 8.6 mg tablet Take 4 Tabs by mouth two (2) times a day.  4 PILLS IN AM  4 PILLS IN PM   Yes Provider, Historical  
prochlorperazine (COMPAZINE) 5 mg tablet Take 5 mg by mouth every six (6) hours as needed for Nausea. Yes Provider, Historical  
OTHER,NON-FORMULARY, LLQ Morphine/bupivacaine pain pump for multiple myeloma. 4.287mg/3.215 mg per day    Provider, Historical  
 
 
Allergies: Allergies Allergen Reactions  Broccoli Other (comments) C/O GAS  Bumex [Bumetanide] Rash  Doxil [Doxorubicin, Peg-Liposomal] Rash Burning of skin  Flagyl [Metronidazole] Rash  Keflex [Cephalexin] Rash  Lasix [Furosemide] Rash  Macrobid [Nitrofurantoin Monohyd/M-Cryst] Shortness of Breath  Methotrexate Hives and Rash  Pcn [Penicillins] Rash  Pomalyst [Pomalidomide] Hives  Sulfa (Sulfonamide Antibiotics) Hives  Tetanus And Diphtheria Toxoids Swelling  Thalidomide Rash  Dipyridamole Rash FH: 
Family History Problem Relation Age of Onset Stella Pal Arthritis-osteo Mother  Anesth Problems Neg Hx SH: Social History Tobacco Use  Smoking status: Former Smoker Years: 10.00 Last attempt to quit: 10/7/2005 Years since quitting: 15.0  Smokeless tobacco: Never Used Substance Use Topics  Alcohol use: No  
 
 
ROS: A comprehensive review of systems was negative except for that written in the HPI. PHYSICAL EXAM: 
Visit Vitals /70 (BP 1 Location: Right arm, BP Patient Position: At rest) Pulse (!) 102 Temp 97.2 °F (36.2 °C) Resp 23 SpO2 94% Gen: moderate - sever distress due to pain HEENT: anicteric sclerae, normal conjunctiva, oropharynx clear, MM moist 
Neck: supple, trachea midline, no adenopathy Heart: RRR, no MRG, no JVD, no peripheral edema Lungs: decreased breath sounds Abd: soft, NT, ND, BS+, no organomegaly Extr: warm. Right hip pain. Left shoulder pain and ROM limited Skin: dry, no rash Neuro: CN II-XII grossly intact, normal speech, moves all extremities Psych: normal mood, appropriate affect Labs/Imaging: 
Recent Results (from the past 24 hour(s)) SAMPLES BEING HELD  
 Collection Time: 10/23/20 12:47 PM  
Result Value Ref Range SAMPLES BEING HELD 1PST,1RED,1LAV,1BLU   
 COMMENT Add-on orders for these samples will be processed based on acceptable specimen integrity and analyte stability, which may vary by analyte. URINALYSIS W/MICROSCOPIC Collection Time: 10/23/20 12:47 PM  
Result Value Ref Range Color YELLOW/STRAW Appearance CLEAR CLEAR Specific gravity 1.013 1.003 - 1.030    
 pH (UA) 8.5 (H) 5.0 - 8.0 Protein Negative NEG mg/dL Glucose Negative NEG mg/dL Ketone TRACE (A) NEG mg/dL Bilirubin Negative NEG Blood Negative NEG Urobilinogen 0.2 0.2 - 1.0 EU/dL Nitrites Negative NEG Leukocyte Esterase Negative NEG    
 WBC 0-4 0 - 4 /hpf  
 RBC 0-5 0 - 5 /hpf Epithelial cells FEW FEW /lpf Bacteria Negative NEG /hpf Hyaline cast 0-2 0 - 5 /lpf URINE CULTURE HOLD SAMPLE Collection Time: 10/23/20 12:47 PM  
 Specimen: Serum; Urine Result Value Ref Range Urine culture hold Urine on hold in Microbiology dept for 2 days. If unpreserved urine is submitted, it cannot be used for addtional testing after 24 hours, recollection will be required. URINE CULTURE HOLD SAMPLE Collection Time: 10/23/20 12:47 PM  
 Specimen: Serum Result Value Ref Range Urine culture hold Urine on hold in Microbiology dept for 2 days. If unpreserved urine is submitted, it cannot be used for addtional testing after 24 hours, recollection will be required. No results for input(s): WBC, HGB, HCT, PLT, HGBEXT, HCTEXT, PLTEXT in the last 72 hours. No results for input(s): NA, K, CL, CO2, BUN, CREA, GLU, CA, MG, PHOS, URICA in the last 72 hours. No results for input(s): ALT, AP, TBIL, TBILI, TP, ALB, GLOB, GGT, AML, LPSE in the last 72 hours. No lab exists for component: SGOT, GPT, AMYP, HLPSE No results for input(s): CPK, CKNDX, TROIQ in the last 72 hours. No lab exists for component: CPKMB No results for input(s): INR, PTP, APTT, INREXT in the last 72 hours. No results for input(s): PH, PCO2, PO2 in the last 72 hours. All labs and imaging personally reviewed by me Assessment & Plan:  
Acute hypoxic resp failure. R/o Covid 
- PO2 <90% on RA 
- could be due to lung contusion from rib fractures - CXR: Nonspecific ill-defined diffuse bilateral pulmonary opacification. - saturating on 2l NC 
- covid test ordered  
- lactic, pro calcitonin and other inflammatory markers ordered  
- resp pcr , legionella ordered 
- albuterol MDI prn Left shoulder pain/ Right hip pain Thoracic vertebra fractures/ multiple rib fractures - CT L spine: Acute versus subacute T11 and T12 partial compression fractures are new since 5 months ago. - CT L hip : No evidence of acute abnormality 
- XR: Multiple left rib fractures. - XR shoulder: Degenerative changes left shoulder. Moi Dominguez - IR consulted for Kyphoplasty for T11 and T12 fracture - appreciate thoracic surgery input for rib fractures conservative management  
- Orthopedics on board for shoulder and hip pains - whole body bone scan ordered Myltiple myeloma with metastatic disease 
- not on nay treatment now due to intolerance 
- follows with oncology Dr. Cheryl Hanson, consult placed in ED Chronic pain syndrome on implanted morphine drip - c/w home fentanyl patch, po dilaudid prn. Added IV dilaudid prn Hx DVT - on Xarelto DVT ppx: Xarelto Code status: Full.  is mpoa Disposition: TBD Signed By: Liz Brennan MD   
 October 23, 2020

## 2020-10-23 NOTE — PROGRESS NOTES
DR Amor Buys in to speak with PT to discuss  Transfer and follow up care for droplet plus precautions

## 2020-10-23 NOTE — CONSULTS
ORTHOPAEDIC CONSULT NOTE Subjective:  
 
Date of Consultation:  October 23, 2020 Referring Physician:  Marcela Parra Adrián aCin is a 76 y.o. female with PMH significant for multiple myeloma not currently being treated to medication tolerance issues now s/p numerous kyphoplaties of the lumbar spine, RA, chronic pain with implanted Morphine pump, past DVT is seen for right lower leg pain as well as lower leg pain and bilateral shoulder pain L > R. Patient states that she has been having trouble ambulating for a few days and was trying to get off her commode this am when she lost her hand hold and fell back onto the toilet. She had been evaluated for DVT earlier in the week but was found negative. She states that she has been unable to bear weight very well for about a week but denies prior injury. She states that she is normally minimally ambulatory around her house and uses a rollator. She complains of pain primarily in her left shoulder as well as right posterior hip/buttock region but also states she has some right lateral thigh and right lower leg pain. She denies new onset tingling or numbness. She was brought to the ED and found to have likely new compression fractures of her low thoracic spine but no other new fractures. We have been asked to see patient in light of her lower extremity symptoms. Patient Active Problem List  
 Diagnosis Date Noted  Multiple rib fractures 10/23/2020  Obesity, morbid ([address] 08/10/2020  Severe pain 03/16/2019  Recurrent ventral incisional hernia 03/28/2016  Advance directive on file 03/09/2016  Acute bronchitis 03/03/2016  Sinusitis 03/03/2016  Dysphagia 01/07/2016  Umbilical hernia 43/96/8643  Recurrent umbilical hernia 87/18/9905  Other complication due to venous access device 01/29/2013  Infected seroma, postoperative 09/13/2012  Multiple myeloma ([address] 09/29/2009  Fibromyalgia 09/29/2009  GERD (gastroesophageal reflux disease) 09/29/2009  Rheumatoid arthritis involving multiple sites with positive rheumatoid factor ([address] 09/29/2009 Family History Problem Relation Age of Onset Ayanna Portuguese Arthritis-osteo Mother  Anesth Problems Neg Hx Social History Tobacco Use  Smoking status: Former Smoker Years: 10.00 Last attempt to quit: 10/7/2005 Years since quitting: 15.0  Smokeless tobacco: Never Used Substance Use Topics  Alcohol use: No  
 
Past Medical History:  
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   
 spinal stenosis per pt  Fibromyalgia  GERD (gastroesophageal reflux disease)  High cholesterol  History of acute pancreatitis  History of blood transfusion  History of Salmonella infection 2009  Multiple myeloma ([address] 2007  Osteoporosis  Other complication due to venous access device 1/29/2013  Recurrent umbilical hernia 9/83/6186  Recurrent ventral incisional hernia 3/28/2016  Rheumatoid arthritis(714.0)  Sleep apnea INTOLERATANT OF CPAP  Thromboembolus ([address] Right leg DVT  Urinary incontinence Past Surgical History:  
Procedure Laterality Date  HX CHOLECYSTECTOMY  2004  HX CRANIOTOMY    
 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    
 COLONOSCOPIES, EGD  HX GI    
 ESOPHAGUS STREACHED  
 HX HEENT  10/2011  
 b/l cataract surgery in October 2011  HX HERNIA REPAIR  [id number] X6  
 HX HERNIA REPAIR  10-13-14  
 repair of recurrent ventral incisional hernia  with primary suture [id number]. Ivet Lawrence  HX KYPHOPLASTY  09/14/2017 L4  
 HX KYPHOPLASTY  2020 L1, L2, L5   
 HX ORTHOPAEDIC Right 2001 BONE SPURS SHOULDER  
 HX OTHER SURGICAL  2010  LLQ PAIN PUMP FOR MORPHINE INFUSION  
 HX OTHER SURGICAL  1-16-14  
 repair of recurrent umbilical hernia with ventralex pillow top mesh and extensive lysis of [id number]. Julio Riggins  HX OTHER SURGICAL  5-31-16  
 repair of recurrent ventral incisional hernia with underlay mesh-[facility]-Dr. Julio ROSADO VASCULAR ACCESS Right 1/2013 POWER PORT   
 HX VASCULAR ACCESS  2010 PAIN PUMP  IR INJ FORAMIN EPID LUMB ANES/STER SNGL  3/19/2019  IR KYPHOPLASTY LUMBAR  6/12/2020  IR KYPHOPLASTY LUMBAR  7/22/2020  
 NEUROLOGICAL PROCEDURE UNLISTED  2007 KYPHOPLASTY X2  
 NEUROLOGICAL PROCEDURE UNLISTED  10/26/15 Kyphoplasty t-5  PAIN PUMP DEVICE    
 implanted in LLQ, MORPHINE Prior to Admission medications Medication Sig Start Date End Date Taking? Authorizing Provider  
docusate sodium (COLACE) 100 mg capsule Take 100 mg by mouth daily. Takes 100 mg QAM and 300 mg QPM   Yes Provider, Historical  
docusate sodium (COLACE) 100 mg capsule Take 300 mg by mouth every evening. Takes 100 mg QAM and 300 mg QPM   Yes Provider, Historical  
fluticasone propionate (Flonase Allergy Relief) 50 mcg/actuation nasal spray 2 Sprays by Both Nostrils route two (2) times daily as needed for Allergies. Yes Provider, Historical  
gabapentin (NEURONTIN) 300 mg capsule Take 600 mg by mouth three (3) times daily. Yes Provider, Historical  
fentaNYL (DURAGESIC) 50 mcg/hr PATCH 1 Patch by TransDERmal route every seventy-two (72) hours. Yes Provider, Historical  
gentamicin (GARAMYCIN) 0.1 % topical cream Apply  to affected area every evening. Apply to toes   Yes Provider, Historical  
dextran 70-hypromellose (Artificial Tears,[id number],) ophthalmic solution Administer 1 Drop to both eyes as needed. Yes Provider, Historical  
predniSONE (DELTASONE) 20 mg tablet Take 1 Tab by mouth daily.  10/14/20  Yes Provider, Historical  
milnacipran (Savella) 50 mg tablet TAKE 1 TABLET BY MOUTH TWICE DAILY 10/12/20  Yes Rajesh Win III, MD  
linaCLOtide (Linzess) 290 mcg cap capsule Take 1 Cap by mouth Daily (before breakfast). 10/7/20  Yes Brenda Aquino MD  
polyethylene glycol (Miralax) 17 gram/dose powder Take 17 g by mouth daily. 8/10/20  Yes Brenda Aquino MD  
morphine 10 mg/mL injection 5 mg by IntraVENous route continuous. 5mg/day concentration is 20.0 mg/ml mixed with Bupivacaine 3.215 mg/day   Yes Provider, Historical  
simvastatin (ZOCOR) 20 mg tablet TAKE ONE TABLET BY MOUTH AT BEDTIME. 6/29/20  Yes Brenda Aquino MD  
amitriptyline (ELAVIL) 50 mg tablet TAKE TWO TABLETS BY MOUTH AT BEDTIME AS NEEDED FOR SLEEP. 6/29/20  Yes Brenda Aquino MD  
potassium chloride (K-DUR, KLOR-CON) 20 mEq tablet TAKE ONE TABLET BY MOUTH ONCE DAILY. 6/29/20  Yes Brenda Aquino MD  
dexlansoprazole (Dexilant) 60 mg CpDB capsule (delayed release) TAKE 1 CAPSULE BY MOUTH EVERY DAY 5/20/20  Yes Meghna Pool III, MD  
HYDROmorphone (DILAUDID) 8 mg tablet Take 8 mg by mouth every three (3) hours as needed for Pain. Yes Other, MD Sherif  
Cetirizine (ZYRTEC) 10 mg cap Take 10 mg by mouth daily as needed (allergies). Yes Other, MD Sherif  
cholecalciferol (VITAMIN D3) 400 unit tab tablet Take 400 Units by mouth daily. Yes Provider, Historical  
clotrimazole (LOTRIMIN) 1 % topical cream Apply  to affected area two (2) times a day. 2/7/19  Yes Brenda Aquino MD  
bisacodyl (DULCOLAX) 10 mg suppository Insert 10 mg into rectum daily as needed. 1/31/19  Yes Bertha RAM MD  
rivaroxaban [facility]) 10 mg tablet Take 10 mg by mouth daily (with dinner). Yes Provider, Historical  
aluminum hydrox-magnesium carb (GAVISCON)  mg/15 mL suspension Take 15 mL by mouth every six (6) hours as needed for Indigestion. Yes Provider, Historical  
calcium carbonate (TUMS) 200 mg calcium (500 mg) chew Take 1 Tab by mouth as needed. Yes Provider, Historical  
calcium citrate-vitamin D3 (CITRACAL WITH VITAMIN D MAXIMUM) tablet Take 1 Tab by mouth daily.    Yes Provider, Historical  
 magnesium hydroxide (MILK OF MAGNESIA) 400 mg/5 mL suspension Take 30 mL by mouth daily as needed for Constipation. Yes Provider, Historical  
acetaminophen (TYLENOL) 500 mg tablet Take 1,000 mg by mouth every six (6) hours as needed for Pain. Yes Provider, Historical  
diazepam (VALIUM) 10 mg tablet Take 10 mg by mouth two (2) times a day. Yes Provider, Historical  
senna (SENOKOT) 8.6 mg tablet Take 4 Tabs by mouth two (2) times a day. 4 PILLS IN AM  4 PILLS IN PM   Yes Provider, Historical  
prochlorperazine (COMPAZINE) 5 mg tablet Take 5 mg by mouth every six (6) hours as needed for Nausea. Yes Provider, Historical  
OTHER,NON-FORMULARY, LLQ Morphine/bupivacaine pain pump for multiple myeloma. 4.287mg/3.215 mg per day    Provider, Historical  
 
Current Facility-Administered Medications Medication Dose Route Frequency  sodium chloride (NS) flush 5-40 mL  5-40 mL IntraVENous Q8H  
 sodium chloride (NS) flush 5-40 mL  5-40 mL IntraVENous PRN  
 acetaminophen (TYLENOL) tablet 650 mg  650 mg Oral Q6H PRN Or  
 acetaminophen (TYLENOL) suppository 650 mg  650 mg Rectal Q6H PRN  polyethylene glycol (MIRALAX) packet 17 g  17 g Oral DAILY PRN  promethazine (PHENERGAN) tablet 12.5 mg  12.5 mg Oral Q6H PRN Or  
 ondansetron (ZOFRAN) injection 4 mg  4 mg IntraVENous Q6H PRN  
 diazePAM (VALIUM) tablet 10 mg  10 mg Oral BID  
 gabapentin (NEURONTIN) capsule 600 mg  600 mg Oral TID  
 HYDROmorphone (DILAUDID) tablet 8 mg  8 mg Oral Q3H PRN  
 . PHARMACY TO SUBSTITUTE PER PROTOCOL (Reordered from: linaCLOtide (Linzess) 290 mcg cap capsule)    Per Protocol  morphine injection 5 mg  5 mg IntraVENous CONTINUOUS  
 [START ON 10/24/2020] rivaroxaban (XARELTO) tablet 10 mg  10 mg Oral DAILY  senna (SENOKOT) tablet 34.4 mg  4 Tab Oral BID  fentaNYL (DURAGESIC) 50 mcg/hr patch 1 Patch  1 Patch TransDERmal Q72H Allergies Allergen Reactions  Broccoli Other (comments)   C/O GAS  
  Bumex [Bumetanide] Rash  Doxil [Doxorubicin, Peg-Liposomal] Rash Burning of skin  Flagyl [Metronidazole] Rash  Keflex [Cephalexin] Rash  Lasix [Furosemide] Rash  Macrobid [Nitrofurantoin Monohyd/M-Cryst] Shortness of Breath  Methotrexate Hives and Rash  Pcn [Penicillins] Rash  Pomalyst [Pomalidomide] Hives  Sulfa (Sulfonamide Antibiotics) Hives  Tetanus And Diphtheria Toxoids Swelling  Thalidomide Rash  Dipyridamole Rash Review of Systems:  Pertinent items are noted in HPI. Mental Status: no dementia Objective:  
 
Patient Vitals for the past 8 hrs: 
 BP Temp Pulse Resp SpO2  
10/23/20 1501 137/70  (!) 102 23 94 % 10/23/20 1343 118/60 98.8 °F (37.1 °C) 92 18 92 % 10/23/20 1238 122/61 98.8 °F (37.1 °C) 98 20 93 % 10/23/20 0838 134/72 98 °F (36.7 °C) (!) 103 20 93 % Temp (24hrs), Av.5 °F (36.9 °C), Min:98 °F (36.7 °C), Max:98.8 °F (37.1 °C) EXAM: Awake and alert lying in bed; appears tired and somewhat uncomfortable; lungs sound quite congested when patient speaks Left shoulder without erythema, ecchymosis or visible effusion; TTP about the anterior surface; trouble moving actively The right shoulder has an old surgical scar superior which is well healed; no erythema, ecchymosis or effusion noted; NTTP; able to move armactively easier than left Right hip without erythema, ecchymosis or effusion; TTP over the posterior lateral buttock and posterior iliac crest, mild TTP over right greater trochanter as well further there is mild TTP over the distal lower leg where there is mild erythema noted which is mirrored on the left lower leg Patient very weak with right sided active hip flexion/straight leg raise (~2/5) and is weak for right sided ankle DF/PF as well (~4-/5) Left leg is stronger but still weak for straight leg raise Distal sensory function grossly intact Distal pulses palpable Imaging Review: EXAM:  CT LOW EXT RT WO CONT 
  
 HISTORY: History of multiple myeloma. Patient unable to bear weight, concern for 
right hip/thigh stress fracture. 
  
COMPARISON: Pelvis radiographs 5/20/2020 
  
TECHNIQUE: Multiple contiguous axial, sagittal, and coronal sections were 
obtained from a spiral volume acquisition of the pelvis with a focus on the 
right hip. No IV contrast was administered. .  CT dose reduction was achieved 
through use of a standardized protocol tailored for this examination and 
automatic exposure control for dose modulation.  
  
FINDINGS: The patient has a spinal stimulator in place with the generator pack 
in the subcutaneous tissues of the left lower quadrant. No evidence of acute 
intrapelvic process. 
  
The bones are osteopenic. Multiple small lucent holes are seen within the bones 
related to the patient's history of multiple myeloma. These are most evident 
within the iliac wings. There are chronic fracture deformities in the lower 
lumbar spine status post kyphoplasty. Significant degenerative changes are seen 
in the posterior facet joints. The bilateral sacroiliac joints are intact. The 
pubic symphysis is intact. No evidence of acute fracture or dislocation. 
  
IMPRESSION IMPRESSION:  
No evidence of acute abnormality. Small lucent lesions in the bones are 
consistent with the patient's history of multiple myeloma. INDICATION:  Right lower extremity pain and difficulty weightbearing. Multiple 
myeloma. Previous vertebral body fracture treated with kyphoplasty.  
  
COMPARISON: CT lumbar spine on 5/28/2020. Lumbar kyphoplasty on 6/12/2020 and 
7/22/2020. 
  
TECHNIQUE: Helical CT imaging of the lumbar spine. Coronal and sagittal 
reformats. CT dose reduction was achieved through the use of a standardized 
protocol tailored for this examination and automatic exposure control for dose 
modulation.  Adaptive statistical iterative reconstruction (ASIR) was utilized. 
  
CONTRAST: None 
  
FINDINGS: 
  
 Methylmethacrylate is within the T9, L1, L2, L3, L4, and L5 vertebral bodies. T11 and T12 compression fractures are new since 5 months ago. No posterior 
element fracture. No soft tissue mass. Bones are osteopenic. Degenerative disc 
disease and vacuum disc phenomenon is increased at T10-T11, T11-T12, and T12-L1. 
  
Bony retropulsion of the T11 partial compression fracture measures 7 mm. 
  
No pneumonia in the incidentally imaged lung bases. Moderate-severe muscle 
atrophy. Spinal stimulator terminates at the T11-T12 level. 
  
Lower thoracic spine: T11 bony retropulsion causes new mild central spinal canal 
stenosis. 
  
L1-2: Bony retropulsion and disc disease cause mild central spinal canal 
stenosis. 
  
L2-3: No herniation or stenosis. 
  
L3-4: Posterior disc osteophyte complex. Mild central spinal canal stenosis. 
  
L4-5: Grade 1 anterolisthesis, disc disease, and facet arthrosis. Moderate 
central spinal canal stenosis. No change. 
  
L5-S1: Mild bilateral foraminal stenosis. 
  
IMPRESSION IMPRESSION: 
  
1. Acute versus subacute T11 and T12 partial compression fractures are new since 5 months ago. T11 bony retropulsion causes new mild central spinal canal 
stenosis. No soft tissue mass. 2. Previous kyphoplasty of T9, L1, L2, L3, L4, and L5. Labs:  
Recent Results (from the past 24 hour(s)) SAMPLES BEING HELD Collection Time: 10/23/20 12:47 PM  
Result Value Ref Range SAMPLES BEING HELD 1PST,1RED,1LAV,1BLU   
 COMMENT Add-on orders for these samples will be processed based on acceptable specimen integrity and analyte stability, which may vary by analyte. URINALYSIS W/MICROSCOPIC Collection Time: 10/23/20 12:47 PM  
Result Value Ref Range Color YELLOW/STRAW Appearance CLEAR CLEAR Specific gravity 1.013 1.003 - 1.030    
 pH (UA) 8.5 (H) 5.0 - 8.0 Protein Negative NEG mg/dL Glucose Negative NEG mg/dL Ketone TRACE (A) NEG mg/dL  Bilirubin Negative NEG    
 Blood Negative NEG Urobilinogen 0.2 0.2 - 1.0 EU/dL Nitrites Negative NEG Leukocyte Esterase Negative NEG    
 WBC 0-4 0 - 4 /hpf  
 RBC 0-5 0 - 5 /hpf Epithelial cells FEW FEW /lpf Bacteria Negative NEG /hpf Hyaline cast 0-2 0 - 5 /lpf URINE CULTURE HOLD SAMPLE Collection Time: 10/23/20 12:47 PM  
 Specimen: Serum; Urine Result Value Ref Range Urine culture hold Urine on hold in Microbiology dept for 2 days. If unpreserved urine is submitted, it cannot be used for addtional testing after 24 hours, recollection will be required. URINE CULTURE HOLD SAMPLE Collection Time: 10/23/20 12:47 PM  
 Specimen: Serum Result Value Ref Range Urine culture hold Urine on hold in Microbiology dept for 2 days. If unpreserved urine is submitted, it cannot be used for addtional testing after 24 hours, recollection will be required. Impression: Active Problems: 
  Multiple rib fractures (10/23/2020) Acute low thoracic compression fractures Right lower extremity pain Plan:  
 
Right lower extremity pain - nothing on imaging suggestive of acute fracture through sacrum, pelvis or femur; patient quite weak and uncomfortable; In light of fact that she has been recommended for kyphoplasty for new spine fractures she will need a bone scan to age fractures - in light of this need it would be prudent to proceed with whole body bone scan to assess for other potential active sites that correlate to her pain. The presence of her pain pump makes MRIs difficult. Spoke with [facility] Radiologist who stated that MM usually does not light up on bone scan so any active locations would likely be representative of active/acute fracture. Bone scan ordered Pain control Will follow-up on scan to see if any issues in shoulders or right lower leg Already on for kyphoplasty Monday Medical management per primary team 
Following Patient to be discussed with Dr Elizabeth Orr Boaz Villasenor PA-C [facility] [address]

## 2020-10-23 NOTE — ED TRIAGE NOTES
Arrives via EMS from private residence where she resides with  for c/o trying to push self up off toilet this morning and felt \"tearing\" pain to left axilla, left shoulder and right shoulder. Denies falling.

## 2020-10-23 NOTE — PROGRESS NOTES
1609-Bedside shift change report given to Austin Nguyen  (oncoming nurse) by Valerie Riggins  (offgoing nurse). Report included the following information SBAR, Intake/Output, MAR and Recent Results.

## 2020-10-23 NOTE — PROGRESS NOTES
1504- skin inspection performed, R great toe abrasion, R middle toe abrasion, R LE swelling with redness, R lower extr. small dry abrasion.

## 2020-10-23 NOTE — ROUTINE PROCESS
TRANSFER - OUT REPORT: 
 
Verbal report given to GONZÁLEZ Ulrich(name) on Velia Fontan  being transferred to 64(unit) for routine progression of care Report consisted of patients Situation, Background, Assessment and  
Recommendations(SBAR). Information from the following report(s) SBAR was reviewed with the receiving nurse. Lines:  
Venous Access Device power port 03/16/19 Upper chest (subclavicular area, right (Active) Opportunity for questions and clarification was provided. Patient transported with: 
Transportation

## 2020-10-23 NOTE — PROGRESS NOTES
Admission Medication Reconciliation: 
 
Information obtained from:  Patient RxQuery data available¹:  YES Comments/Recommendations: Spoke with patient via secure phone call due to general Covid-19 precautions. Discussed use of PTA medications including prescription/OTC, vitamins/supplements, inhaled, topical, nasal, injectable, otic and ophthalmic medications. Updated PTA meds/reviewed patient's allergies. 1)  Of note, patient unsure the dose/concentration of her morphine pump. Per notes, the pump is implanted. Unable to currently verify current dosing information for morphine pump. RN caring for patient is attempting to check as well where patient gets refilled at (MD office, etc). Would recommend follow up. 
 
2)  Medication changes (since last review): Added - Gentamicin cream 
- Artificial tears Adjusted - Fentanyl (from 75 mcg to 50 mcg every 72 hours) - Gabapentin (from 300 QID to 600 mg TID) Removed - Tizanidine ¹RxQuery pharmacy benefit data reflects medications filled and processed through the patient's insurance, however  
this data does NOT capture whether the medication was picked up or is currently being taken by the patient. Allergies:  Broccoli; Bumex [bumetanide]; Doxil [doxorubicin, peg-liposomal]; Flagyl [metronidazole]; Keflex [cephalexin]; Lasix [furosemide]; Macrobid [nitrofurantoin monohyd/m-cryst]; Methotrexate; Pcn [penicillins]; Pomalyst [pomalidomide]; Sulfa (sulfonamide antibiotics); Tetanus and diphtheria toxoids; Thalidomide; and Dipyridamole Significant PMH/Disease States:  
Past Medical History:  
Diagnosis Date [facility] 3[facility]2005 HX LEFT OPTIC NERVE Anxiety Chronic pain   
 spinal stenosis per pt Fibromyalgia GERD (gastroesophageal reflux disease) High cholesterol History of acute pancreatitis History of blood transfusion History of Salmonella infection 2009 Multiple myeloma ([facility] 2007 Osteoporosis Other complication due to venous access device 1/29/2013 Recurrent umbilical hernia 6/57/0215 Recurrent ventral incisional hernia 3/28/2016 Rheumatoid arthritis(714.0) Sleep apnea INTOLERATANT OF CPAP Thromboembolus ([address] Right leg DVT Urinary incontinence Chief Complaint for this Admission: Chief Complaint Patient presents with Shoulder Pain Prior to Admission Medications:  
Prior to Admission Medications Prescriptions Last Dose Informant Taking? Cetirizine (ZYRTEC) 10 mg cap   Yes Sig: Take 10 mg by mouth daily as needed (allergies). HYDROmorphone (DILAUDID) 8 mg tablet 10/23/2020 at AM  Yes Sig: Take 8 mg by mouth every three (3) hours as needed for Pain. OTHER,NON-FORMULARY,  Self No  
Sig: LLQ Morphine/bupivacaine pain pump for multiple myeloma. 4.287mg/3.215 mg per day  
acetaminophen (TYLENOL) 500 mg tablet   Yes Sig: Take 1,000 mg by mouth every six (6) hours as needed for Pain. aluminum hydrox-magnesium carb (GAVISCON)  mg/15 mL suspension   Yes Sig: Take 15 mL by mouth every six (6) hours as needed for Indigestion. amitriptyline (ELAVIL) 50 mg tablet   Yes Sig: TAKE TWO TABLETS BY MOUTH AT BEDTIME AS NEEDED FOR SLEEP. bisacodyl (DULCOLAX) 10 mg suppository   Yes Sig: Insert 10 mg into rectum daily as needed. calcium carbonate (TUMS) 200 mg calcium (500 mg) chew   Yes Sig: Take 1 Tab by mouth as needed. calcium citrate-vitamin D3 (CITRACAL WITH VITAMIN D MAXIMUM) tablet 10/22/2020 at Unknown time  Yes Sig: Take 1 Tab by mouth daily. cholecalciferol (VITAMIN D3) 400 unit tab tablet 10/22/2020 at Unknown time  Yes Sig: Take 400 Units by mouth daily. clotrimazole (LOTRIMIN) 1 % topical cream 10/22/2020 at Unknown time  Yes Sig: Apply  to affected area two (2) times a day. dexlansoprazole (Dexilant) 60 mg CpDB capsule (delayed release) 10/22/2020 at Unknown time  Yes Sig: TAKE 1 CAPSULE BY MOUTH EVERY DAY  
 dextran 70-hypromellose (Artificial Tears,[id number],) ophthalmic solution   Yes Sig: Administer 1 Drop to both eyes as needed. diazepam (VALIUM) 10 mg tablet 10/22/2020 at Unknown time  Yes Sig: Take 10 mg by mouth two (2) times a day. docusate sodium (COLACE) 100 mg capsule 10/22/2020 at Unknown time  Yes Sig: Take 100 mg by mouth daily. Takes 100 mg QAM and 300 mg QPM  
docusate sodium (COLACE) 100 mg capsule 10/22/2020 at Unknown time  Yes Sig: Take 300 mg by mouth every evening. Takes 100 mg QAM and 300 mg QPM  
fentaNYL (DURAGESIC) 50 mcg/hr [facility] 10/20/2020  Yes Si Patch by TransDERmal route every seventy-two (72) hours. fluticasone propionate (Flonase Allergy Relief) 50 mcg/actuation nasal spray   Yes Si Sprays by Both Nostrils route two (2) times daily as needed for Allergies. gabapentin (NEURONTIN) 300 mg capsule 10/22/2020 at Unknown time  Yes Sig: Take 600 mg by mouth three (3) times daily. gentamicin (GARAMYCIN) 0.1 % topical cream 10/22/2020 at Unknown time  Yes Sig: Apply  to affected area every evening. Apply to toes  
linaCLOtide (Linzess) 290 mcg cap capsule 10/22/2020 at Unknown time  Yes Sig: Take 1 Cap by mouth Daily (before breakfast). magnesium hydroxide (MILK OF MAGNESIA) 400 mg/5 mL suspension   Yes Sig: Take 30 mL by mouth daily as needed for Constipation. milnacipran (Savella) 50 mg tablet 10/22/2020 at Unknown time  Yes Sig: TAKE 1 TABLET BY MOUTH TWICE DAILY  
morphine 10 mg/mL injection  Self Yes Si mg by IntraVENous route continuous. 5mg/day concentration is 20.0 mg/ml mixed with Bupivacaine 3.215 mg/day  
polyethylene glycol (Miralax) 17 gram/dose powder 10/22/2020 at Unknown time  Yes Sig: Take 17 g by mouth daily. potassium chloride (K-DUR, KLOR-CON) 20 mEq tablet 10/22/2020 at Unknown time  Yes Sig: TAKE ONE TABLET BY MOUTH ONCE DAILY. predniSONE (DELTASONE) 20 mg tablet 10/22/2020 at Unknown time  Yes Sig: Take 1 Tab by mouth daily. prochlorperazine (COMPAZINE) 5 mg tablet  Self Yes Sig: Take 5 mg by mouth every six (6) hours as needed for Nausea. rivaroxaban (XARELTO) 10 mg tablet 10/22/2020 at PM  Yes Sig: Take 10 mg by mouth daily (with dinner). senna (SENOKOT) 8.6 mg tablet 10/22/2020 at Unknown time  Yes Sig: Take 4 Tabs by mouth two (2) times a day. 4 PILLS IN AM  4 PILLS IN PM  
simvastatin (ZOCOR) 20 mg tablet 10/22/2020 at Unknown time  Yes Sig: TAKE ONE TABLET BY MOUTH AT BEDTIME. Facility-Administered Medications: None Please contact the main inpatient pharmacy with any questions or concerns at [phone] and we will direct you to the clinical pharmacist covering this patient's care while in-house.   
DARIEN Adames

## 2020-10-23 NOTE — ROUTINE PROCESS
1930: Pt admitted to floor. Primary Nurse Shai Bonilla and Faviola RN, RN performed a dual skin assessment on this patient Impairment noted- R great toe abrasion, R middle toe abrasion, R LE swelling with redness, R lower extr. small dry abrasion. R great toe abrasion, R middle toe abrasion, R LE swelling with redness, R lower extr. small dry abrasion. Rodolfo score is Bedside and Verbal shift change report given to Lydia Reece (oncoming nurse) by Ngoc Patel (offgoing nurse). Report included the following information SBAR, Kardex, Intake/Output, MAR, Accordion and Recent Results.

## 2020-10-23 NOTE — ED NOTES
Pt would like her right leg evaluated as well. C/o pain x 1.5 weeks. Had doppler which was negative for DVT but states that when the doppler was done, they caused pain into the hip radiating posteriorly to buttock and lower back.

## 2020-10-23 NOTE — CONSULTS
Thoracic Surgery Consultation Admit Date: 10/23/2020 Reason for Consultation: Multiple rib fractures HPI: 
Phil Delatorre is a 76 y.o. female with extensive PMH as noted below & including Is a 19-year-old elderly female with past medical history significant for anemia, aneurysm, chronic pain with implanted Morphine pump, fibromyalgia, GERD, high cholesterol, acute pancreatitis, osteoporosis, rheumatoid arthritis, thromboembolism, osteopenia, multiple myeloma with metastatic disease who presented to the ED via EMS from home earlier today with c/o right leg pain, left shoulder pain and difficulty weightbearing. She states that when she got up to the bathroom this morning both her arms \" gave way\"; and she fell back onto the toilet. Imaging was performed which showed numerous fractures, including multiple rib fractures. We are asked to see her in TS consultation for the above concern. CXR (10/23/2020) shows: 
IMPRESSION: Right rib fractures again demonstrated. Nonspecific ill-defineddiffuse bilateral pulmonary opacification. Patient Active Problem List  
 Diagnosis Date Noted  Multiple rib fractures 10/23/2020  Obesity, morbid ([address] 08/10/2020  Severe pain 03/16/2019  Recurrent ventral incisional hernia 03/28/2016  Advance directive on file 03/09/2016  Acute bronchitis 03/03/2016  Sinusitis 03/03/2016  Dysphagia 01/07/2016  Umbilical hernia 86/35/2587  Recurrent umbilical hernia 70/98/2788  Other complication due to venous access device 01/29/2013  Infected seroma, postoperative 09/13/2012  Multiple myeloma ([address] 09/29/2009  Fibromyalgia 09/29/2009  GERD (gastroesophageal reflux disease) 09/29/2009  Rheumatoid arthritis involving multiple sites with positive rheumatoid factor ([address] 09/29/2009 Past Medical History:  
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   
 spinal stenosis per pt  Fibromyalgia  GERD (gastroesophageal reflux disease)  High cholesterol  History of acute pancreatitis  History of blood transfusion  History of Salmonella infection 2009  Multiple myeloma ([address] 2007  Osteoporosis  Other complication due to venous access device 1/29/2013  Recurrent umbilical hernia 3/57/9870  Recurrent ventral incisional hernia 3/28/2016  Rheumatoid arthritis(714.0)  Sleep apnea INTOLERATANT OF CPAP  Thromboembolus ([address] Right leg DVT  Urinary incontinence Past Surgical History:  
Procedure Laterality Date  HX CHOLECYSTECTOMY  2004  HX CRANIOTOMY    
 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    
 COLONOSCOPIES, EGD  HX GI    
 ESOPHAGUS STREACHED  
 HX HEENT  10/2011  
 b/l cataract surgery in October 2011  HX HERNIA REPAIR  [id number] X6  
 HX HERNIA REPAIR  10-13-14  
 repair of recurrent ventral incisional hernia  with primary suture [id number]. Jose Lehman  HX KYPHOPLASTY  09/14/2017 L4  
 HX KYPHOPLASTY  2020 L1, L2, L5   
 HX ORTHOPAEDIC Right 2001 BONE SPURS SHOULDER  
 HX OTHER SURGICAL  2010 LLQ PAIN PUMP FOR MORPHINE INFUSION  
 HX OTHER SURGICAL  1-16-14  
 repair of recurrent umbilical hernia with ventralex pillow top mesh and extensive lysis of [id number]. Lavon Sines  HX OTHER SURGICAL  5-31-16  
 repair of recurrent ventral incisional hernia with underlay mesh-[facility]-Dr. Jose Lehman  HX VASCULAR ACCESS Right 1/2013 POWER PORT   
 HX VASCULAR ACCESS  2010 PAIN PUMP  IR INJ FORAMIN EPID LUMB ANES/STER SNGL  3/19/2019  IR KYPHOPLASTY LUMBAR  6/12/2020  IR KYPHOPLASTY LUMBAR  7/22/2020  
 NEUROLOGICAL PROCEDURE UNLISTED  2007 KYPHOPLASTY X2  
 NEUROLOGICAL PROCEDURE UNLISTED  10/26/15 Kyphoplasty t-5  PAIN PUMP DEVICE    
 implanted in LLQ, MORPHINE Social History Tobacco Use  Smoking status: Former Smoker Years: 10.00 Last attempt to quit: 10/7/2005 Years since quitting: 15.0  Smokeless tobacco: Never Used Substance Use Topics  Alcohol use: No  
  
Family History Problem Relation Age of Onset 24 [facility] Arthritis-osteo Mother  Anesth Problems Neg Hx Prior to Admission medications Medication Sig Start Date End Date Taking? Authorizing Provider  
docusate sodium (COLACE) 100 mg capsule Take 100 mg by mouth daily. Takes 100 mg QAM and 300 mg QPM   Yes Provider, Historical  
docusate sodium (COLACE) 100 mg capsule Take 300 mg by mouth every evening. Takes 100 mg QAM and 300 mg QPM   Yes Provider, Historical  
fluticasone propionate (Flonase Allergy Relief) 50 mcg/actuation nasal spray 2 Sprays by Both Nostrils route two (2) times daily as needed for Allergies. Yes Provider, Historical  
gabapentin (NEURONTIN) 300 mg capsule Take 600 mg by mouth three (3) times daily. Yes Provider, Historical  
fentaNYL (DURAGESIC) 50 mcg/hr PATCH 1 Patch by TransDERmal route every seventy-two (72) hours. Yes Provider, Historical  
gentamicin (GARAMYCIN) 0.1 % topical cream Apply  to affected area every evening. Apply to toes   Yes Provider, Historical  
dextran 70-hypromellose (Artificial Tears,[id number],) ophthalmic solution Administer 1 Drop to both eyes as needed. Yes Provider, Historical  
predniSONE (DELTASONE) 20 mg tablet Take 1 Tab by mouth daily. 10/14/20  Yes Provider, Historical  
milnacipran (Savella) 50 mg tablet TAKE 1 TABLET BY MOUTH TWICE DAILY 10/12/20  Yes Narciso Wilson III, MD  
linaCLOtide (Linzess) 290 mcg cap capsule Take 1 Cap by mouth Daily (before breakfast). 10/7/20  Yes Radha Magdaleno MD  
polyethylene glycol (Miralax) 17 gram/dose powder Take 17 g by mouth daily.  8/10/20  Yes Radha Magdaleno MD  
morphine 10 mg/mL injection 5 mg by IntraVENous route continuous. 5mg/day concentration is 20.0 mg/ml mixed with Bupivacaine 3.215 mg/day   Yes Provider, Historical  
 simvastatin (ZOCOR) 20 mg tablet TAKE ONE TABLET BY MOUTH AT BEDTIME. 6/29/20  Yes Ree Becerril MD  
amitriptyline (ELAVIL) 50 mg tablet TAKE TWO TABLETS BY MOUTH AT BEDTIME AS NEEDED FOR SLEEP. 6/29/20  Yes Ree Becerril MD  
potassium chloride (K-DUR, KLOR-CON) 20 mEq tablet TAKE ONE TABLET BY MOUTH ONCE DAILY. 6/29/20  Yes Ree Becerril MD  
dexlansoprazole (Dexilant) 60 mg CpDB capsule (delayed release) TAKE 1 CAPSULE BY MOUTH EVERY DAY 5/20/20  Yes Claudy Cox III, MD  
HYDROmorphone (DILAUDID) 8 mg tablet Take 8 mg by mouth every three (3) hours as needed for Pain. Yes Other, MD Sherif  
Cetirizine (ZYRTEC) 10 mg cap Take 10 mg by mouth daily as needed (allergies). Yes Other, MD Sherif  
cholecalciferol (VITAMIN D3) 400 unit tab tablet Take 400 Units by mouth daily. Yes Provider, Historical  
clotrimazole (LOTRIMIN) 1 % topical cream Apply  to affected area two (2) times a day. 2/7/19  Yes Ree Becerril MD  
bisacodyl (DULCOLAX) 10 mg suppository Insert 10 mg into rectum daily as needed. 1/31/19  Yes Sandrita RAM MD  
rivaroxaban Katelynn Gingrivera) 10 mg tablet Take 10 mg by mouth daily (with dinner). Yes Provider, Historical  
aluminum hydrox-magnesium carb (GAVISCON)  mg/15 mL suspension Take 15 mL by mouth every six (6) hours as needed for Indigestion. Yes Provider, Historical  
calcium carbonate (TUMS) 200 mg calcium (500 mg) chew Take 1 Tab by mouth as needed. Yes Provider, Historical  
calcium citrate-vitamin D3 (CITRACAL WITH VITAMIN D MAXIMUM) tablet Take 1 Tab by mouth daily. Yes Provider, Historical  
magnesium hydroxide (MILK OF MAGNESIA) 400 mg/5 mL suspension Take 30 mL by mouth daily as needed for Constipation. Yes Provider, Historical  
acetaminophen (TYLENOL) 500 mg tablet Take 1,000 mg by mouth every six (6) hours as needed for Pain. Yes Provider, Historical  
diazepam (VALIUM) 10 mg tablet Take 10 mg by mouth two (2) times a day. Yes Provider, Historical  
senna (SENOKOT) 8.6 mg tablet Take 4 Tabs by mouth two (2) times a day. 4 PILLS IN AM  4 PILLS IN PM   Yes Provider, Historical  
prochlorperazine (COMPAZINE) 5 mg tablet Take 5 mg by mouth every six (6) hours as needed for Nausea. Yes Provider, Historical  
fentaNYL (DURAGESIC) 75 mcg/hr 1 Patch by TransDERmal route every seventy-two (72) hours. Indications: severe chronic pain with opioid tolerance  10/23/20  Provider, Historical  
tiZANidine (ZANAFLEX) 4 mg tablet Take 1 Tab by mouth three (3) times daily as needed for Muscle Spasm(s). 4/23/20 10/23/20  Sushila Moya MD  
gabapentin (NEURONTIN) 300 mg capsule Take 1 Cap by mouth four (4) times daily. TAKE ONE CAPSULE BY MOUTH FOUR TIMES A DAY Patient taking differently: Take 600 mg by mouth three (3) times daily. TAKE ONE CAPSULE BY MOUTH FOUR TIMES A DAY 6/20/19 10/23/20  Abraham Sood III, MD  
fluticasone (FLONASE) 50 mcg/actuation nasal spray 2 Sprays by Both Nostrils route two (2) times a day. Patient taking differently: 2 Sprays by Both Nostrils route two (2) times daily as needed for Allergies. 2/7/19 10/23/20  Abraham Sood III, MD  
spironolactone (ALDACTONE) 50 mg tablet Take 1 Tab by mouth two (2) times a day. Indications: EDEMA Patient taking differently: Take 50 mg by mouth two (2) times daily as needed (edema). 9/23/16 10/23/20  Abraham Sood III, MD  
OTHER,NON-FORMULARY, LLQ Morphine/bupivacaine pain pump for multiple myeloma. 4.287mg/3.215 mg per day    Provider, Historical  
docusate sodium (COLACE) 100 mg capsule Take 100 mg by mouth two (2) times a day. 1 capsule in the morning and 3 capsule at night  10/23/20  Provider, Historical  
 
Allergies Allergen Reactions  Broccoli Other (comments) C/O GAS  Bumex [Bumetanide] Rash  Doxil [Doxorubicin, Peg-Liposomal] Rash Burning of skin  Flagyl [Metronidazole] Rash  Keflex [Cephalexin] Rash  Lasix [Furosemide] Rash  Macrobid [Nitrofurantoin Monohyd/M-Cryst] Shortness of Breath  Methotrexate Hives and Rash  Pcn [Penicillins] Rash  Pomalyst [Pomalidomide] Hives  Sulfa (Sulfonamide Antibiotics) Hives  Tetanus And Diphtheria Toxoids Swelling  Thalidomide Rash  Dipyridamole Rash Subjective:  
 
Review of Systems: A comprehensive review of systems was negative except for that written in the History of Present Illness. Objective:  
 
Blood pressure 137/70, pulse (!) 102, temperature 97.2 °F (36.2 °C), resp. rate 23, SpO2 94 %. Recent Results (from the past 24 hour(s)) SAMPLES BEING HELD Collection Time: 10/23/20 12:47 PM  
Result Value Ref Range SAMPLES BEING HELD 1PST,1RED,1LAV,1BLU   
 COMMENT Add-on orders for these samples will be processed based on acceptable specimen integrity and analyte stability, which may vary by analyte. URINALYSIS W/MICROSCOPIC Collection Time: 10/23/20 12:47 PM  
Result Value Ref Range Color YELLOW/STRAW Appearance CLEAR CLEAR Specific gravity 1.013 1.003 - 1.030    
 pH (UA) 8.5 (H) 5.0 - 8.0 Protein Negative NEG mg/dL Glucose Negative NEG mg/dL Ketone TRACE (A) NEG mg/dL Bilirubin Negative NEG Blood Negative NEG Urobilinogen 0.2 0.2 - 1.0 EU/dL Nitrites Negative NEG Leukocyte Esterase Negative NEG    
 WBC 0-4 0 - 4 /hpf  
 RBC 0-5 0 - 5 /hpf Epithelial cells FEW FEW /lpf Bacteria Negative NEG /hpf Hyaline cast 0-2 0 - 5 /lpf URINE CULTURE HOLD SAMPLE Collection Time: 10/23/20 12:47 PM  
 Specimen: Serum; Urine Result Value Ref Range Urine culture hold Urine on hold in Microbiology dept for 2 days. If unpreserved urine is submitted, it cannot be used for addtional testing after 24 hours, recollection will be required. URINE CULTURE HOLD SAMPLE Collection Time: 10/23/20 12:47 PM  
 Specimen: Serum Result Value Ref Range Urine culture hold Urine on hold in Microbiology dept for 2 days. If unpreserved urine is submitted, it cannot be used for addtional testing after 24 hours, recollection will be required.  
 
_____________________ Physical Exam:  
 
General:  Alert, cooperative, no distress, appears stated age. Eyes:   Sclera clear. Throat: Lips, mucosa, and tongue normal.  
Neck: Supple, symmetrical, trachea midline. Lungs:   Shallow BS bialt, no wheezing. No resp distress. No chest wall tenderness or rib clicking palpable Heart:  RRR. Abdomen:   Soft, non-tender, round Skin: Skin w/d/i Assessment:  
Active Problems: 
  Multiple rib fractures (10/23/2020) Plan:  
 
Fractures appear stable, no clicking noted Nothing to do from TS standpoint Continue aggressive pain mgmt Frequent IS use Pulm hygiene Thank you for including us in the care of your patient. Concepción Arcos Signed By: YOLA Machado October 23, 2020

## 2020-10-23 NOTE — ED PROVIDER NOTES
HPI  
Is a 28-year-old elderly female with past medical history significant for anemia, aneurysm, chronic pain, fibromyalgia, GERD, high cholesterol, acute pancreatitis, osteoporosis, rheumatoid arthritis, thromboembolism, osteopenia, multiple myeloma with metastatic disease who presents to the ED reporting right leg pain, left shoulder pain and difficulty weightbearing. She states that when she got up to the bathroom this morning both her arms \" gave way\"; the left worse than the right. She has been having right leg pain for about a week and a half and had a negative Doppler study about 11 days ago. She was brought in by EMS this morning. Denies fever, cold symptoms,headache,  neck pain, visual changes or rash. Denies any  difficulty breathing, difficulty swallowing or chest pain. Denies any nausea, vomiting or diarrhea. Pt. Reports taking her previously prescribed pain medicines. She was able to ambulate with her walker to the bathroom this morning but with much difficulty. Past Medical History:  
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   
 spinal stenosis per pt  Fibromyalgia  GERD (gastroesophageal reflux disease)  High cholesterol  History of acute pancreatitis  History of blood transfusion  History of Salmonella infection 2009  Multiple myeloma ([address] 2007  Osteoporosis  Other complication due to venous access device 1/29/2013  Recurrent umbilical hernia 7/76/3643  Recurrent ventral incisional hernia 3/28/2016  Rheumatoid arthritis(714.0)  Sleep apnea INTOLERATANT OF CPAP  Thromboembolus ([address] Right leg DVT  Urinary incontinence Past Surgical History:  
Procedure Laterality Date  HX CHOLECYSTECTOMY  2004  HX CRANIOTOMY    
 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    
 COLONOSCOPIES, EGD  HX GI    
 ESOPHAGUS STREACHED  
 HX HEENT  10/2011 b/l cataract surgery in October 2011  HX HERNIA REPAIR  [id number] X6  
 HX HERNIA REPAIR  10-13-14  
 repair of recurrent ventral incisional hernia  with primary suture [id number]. Farshad Patel  HX KYPHOPLASTY  09/14/2017 L4  
 HX KYPHOPLASTY  2020 L1, L2, L5   
 HX ORTHOPAEDIC Right 2001 BONE SPURS SHOULDER  
 HX OTHER SURGICAL  2010 LLQ PAIN PUMP FOR MORPHINE INFUSION  
 HX OTHER SURGICAL  1-16-14  
 repair of recurrent umbilical hernia with ventralex pillow top mesh and extensive lysis of [id number]. Farshad Patel  HX OTHER SURGICAL  5-31-16  
 repair of recurrent ventral incisional hernia with underlay mesh-[facility]-Dr. Farshad Patel  HX VASCULAR ACCESS Right 1/2013 POWER PORT   
 HX VASCULAR ACCESS  2010 PAIN PUMP  IR INJ FORAMIN EPID LUMB ANES/STER SNGL  3/19/2019  IR KYPHOPLASTY LUMBAR  6/12/2020  IR KYPHOPLASTY LUMBAR  7/22/2020  
 NEUROLOGICAL PROCEDURE UNLISTED  2007 KYPHOPLASTY X2  
 NEUROLOGICAL PROCEDURE UNLISTED  10/26/15 Kyphoplasty t-5  PAIN PUMP DEVICE    
 implanted in LLQ, MORPHINE Family History:  
Problem Relation Age of Onset [facility] Arthritis-osteo Mother  Anesth Problems Neg Hx Social History Socioeconomic History  Marital status:  Spouse name: Not on file  Number of children: Not on file  Years of education: Not on file  Highest education level: Not on file Occupational History  Not on file Social Needs  Financial resource strain: Not on file  Food insecurity Worry: Not on file Inability: Not on file  Transportation needs Medical: Not on file Non-medical: Not on file Tobacco Use  Smoking status: Former Smoker Years: 10.00 Last attempt to quit: 10/7/2005 Years since quitting: 15.0  Smokeless tobacco: Never Used Substance and Sexual Activity  Alcohol use: No  
 Drug use: No  
 Sexual activity: Not on file Lifestyle  Physical activity Days per week: Not on file Minutes per session: Not on file  Stress: Not on file Relationships  Social connections Talks on phone: Not on file Gets together: Not on file Attends Zoroastrianism service: Not on file Active member of club or organization: Not on file Attends meetings of clubs or organizations: Not on file Relationship status: Not on file  Intimate partner violence Fear of current or ex partner: Not on file Emotionally abused: Not on file Physically abused: Not on file Forced sexual activity: Not on file Other Topics Concern  Not on file Social History Narrative  Not on file ALLERGIES: Aggrenox [aspirin-dipyridamole]; Broccoli; Bumex [bumetanide]; Doxil [doxorubicin, peg-liposomal]; Flagyl [metronidazole]; Keflex [cephalexin]; Lasix [furosemide]; Macrobid [nitrofurantoin monohyd/m-cryst]; Methotrexate; Pcn [penicillins]; Pomalyst [pomalidomide]; Sulfa (sulfonamide antibiotics); Tetanus and diphtheria toxoids; Thalidomide; and Dipyridamole Review of Systems Constitutional: Positive for activity change. Negative for appetite change, fever and unexpected weight change. HENT: Negative for congestion. Cardiovascular: Positive for leg swelling. Negative for chest pain. Gastrointestinal: Negative for abdominal pain, diarrhea, nausea and vomiting. Genitourinary: Negative for dysuria. Musculoskeletal: Positive for arthralgias, gait problem and myalgias. Skin: Positive for rash. Patient has bilateral lower leg redness and swelling; right greater than left Neurological: Negative for dizziness, light-headedness and headaches. All other systems reviewed and are negative. Vitals:  
 10/23/20 6715 BP: 134/72 Pulse: (!) 103 Resp: 20 Temp: 98 °F (36.7 °C) SpO2: 93% Physical Exam 
Vitals signs and nursing note reviewed. Constitutional:   
   General: She is not in acute distress. Appearance: Normal appearance. She is obese. She is ill-appearing. She is not toxic-appearing or diaphoretic. Comments: Chronically ill-appearing 51-year-old female; lives with her ; non-smoker HENT:  
   Head: Normocephalic. Neck: Musculoskeletal: Normal range of motion and neck supple. Cardiovascular:  
   Rate and Rhythm: Normal rate and regular rhythm. Pulmonary:  
   Effort: Pulmonary effort is normal.  
   Breath sounds: Normal breath sounds. Abdominal:  
   General: Bowel sounds are normal.  
   Palpations: Abdomen is soft. Musculoskeletal:     
   General: Swelling and tenderness present. No signs of injury. Right lower leg: Edema present. Left lower leg: Edema present. Comments: Reports left anterior shoulder pain; Skin integrity is intact. There is no obvious new  deformity. Good neurovascular sensation. No apparent tendon or nerve injury. Pain increases with active range of motion of the left arm. Patient has pain in both shoulders with active range of motion of her arms; left greater than right. Reports right leg pain with any weightbearing or active range of motion of the right leg; there is redness from mid calf to ankle on both legs and swelling which she reports is chronic; both lower legs appear symmetrical.   
Neurological:  
   Mental Status: She is alert and oriented to person, place, and time. MDM Procedures Xr Shoulder Lt Ap/lat Min 2 V Result Date: 10/23/2020 IMPRESSION: Multiple left rib fractures. Degenerative changes left shoulder. Ken Jones Xr Shoulder Rt Ap/lat Min 2 V Result Date: 10/23/2020 IMPRESSION: No acute abnormality. Ct Spine Lumb Wo Cont Result Date: 10/23/2020 IMPRESSION: 1. Acute versus subacute T11 and T12 partial compression fractures are new since 5 months ago. T11 bony retropulsion causes new mild central spinal canal stenosis. No soft tissue mass.  2. Previous kyphoplasty of T9, L1, L2, L3, L4, and L5. Ct Low Ext Rt Wo Cont Result Date: 10/23/2020 IMPRESSION: No evidence of acute abnormality. Small lucent lesions in the bones are consistent with the patient's history of multiple myeloma. Perfect Serve Consult for Admission 10:56 AM 
 
ED Room Number: O11/Z13 Patient Name and age:  Dominic Mcfarland 76 y.o.  female Working Diagnosis:  
1. Thoracic compression fracture, closed, initial encounter ([facility] [address] 2. Closed fracture of multiple ribs of left side, initial encounter 3. Acute pain of left shoulder 4. Right leg pain 5. Multiple myeloma, remission status unspecified ([facility] [address] COVID-19 Suspicion:  no 
Sepsis present:  no  Reassessment needed: no 
Code Status:  Full Code Readmission: no 
Isolation Requirements:  no 
Recommended Level of Care:  med/surg Department:[facility] Adult ED - [phone] Other:   
 
Consult hematology/oncology (Dr. Alfredo Andrea) Discussed plan of care with Dr. Tiara Fletcher. 12:05 PM 
Patient's results and plan of care have been reviewed with the pt and her . Patient and/or family have verbally conveyed their understanding and agreement of the patient's signs, symptoms, diagnosis, treatment and prognosis and additionally agrees to be admitted.  Bubba Peng NP

## 2020-10-23 NOTE — ED NOTES
Pt unable to provide urine sample at this time. Will notify RN when able. Pt prefers to utilize power port for any needed lab work.

## 2020-10-24 NOTE — PROGRESS NOTES
Hospitalist Progress Note Hospital summary: A 76year old female patient with PMH of Multiple myeloma with metastatic disease, rheumatoid arthritis, chronic pain/ opioid dependence on implanted morphine pump, DVT on xarelto, multiple pathological fractures, recent kyphoplasty of L spine presents to the ED for evaluation of left shoulder pain and difficulty weightbearing.  Patient states that she has been having trouble ambulating for a few days and was trying to get off her commode this am when she lost her hand hold and fell back onto the toilet. She had been evaluated for DVT earlier in the week but was found negative. She states that she has been unable to bear weight very well for about a week but denies prior injury. She complains of pain primarily in her left shoulder as well as right posterior hip/buttock region. She denied sob initially and stated that she is getting evaluated for sleep study and hypoxia at night time. Pt has pain on deep breathing. Denied cough, fever, abd pain, urinary or bowel changes. In ED, imaging showed acute multiple rib fractures and thoracic fractures. Hematology consulted in ED. Hospitalist consulted for admission. 
  
Discussed with Ortho PA- recommended NM bone scan. On arrival to floor , pt desaturated to 80's and placed on 2l NC. CXR ordered by me showed diffuse bilateral pulmonary opacification. Spoke with patient again - she stated that she has been having sob since 1 week , worsening with exertion. She discussed with her PCP and oncology Dr. Bryan Carlos - who recommended oxygen study possible due to bruising of her lungs for recurrent fractures. Explained CXR findings and in light of hypoxia, will test for covid. She was initially upset regarding this but understands 10/23/2020 Assessment/Plan: 
Acute hypoxic resp failure - worsening  
- could be due to lung contusion from rib fractures - CXR: Nonspecific ill-defined diffuse bilateral pulmonary opacification. - saturating on 4l NC 
- covid test initial negative, rpt test ordered   
- normal lactic, pro calcitonin 
- resp pcr negative, legionella pending  
- albuterol MDI prn 
- CTA chest ordered  
  
Left shoulder pain/ Right hip pain Thoracic vertebra fractures/ multiple rib fractures - CT L spine: Acute versus subacute T11 and T12 partial compression fractures are new since 5 months ago. - CT hip : No evidence of acute abnormality 
- XR: Multiple left rib fractures. - XR shoulder: Degenerative changes left shoulder. [place] - IR consulted for Kyphoplasty for T11 and T12 fracture - appreciate thoracic surgery input for rib fractures conservative management  
- Orthopedics on board for shoulder and hip pains - whole body bone scan ordered  
  
Multiple myeloma with metastatic disease 
- not on nay treatment now due to intolerance 
- follows with oncology Dr. Kirk Horton - appreciate oncology input  
  
Chronic pain syndrome on implanted morphine drip - c/w home fentanyl patch, po dilaudid prn. Added IV dilaudid prn   
  Hx DVT -  Xarelto on hold for kyphoplasty Palliative care consulted  
  
DVT ppx: Xarelto Code status: Full.  is mpoa Disposition: TBD  
---------------------------------------------- 
 
CC:  Left shoulder pain S: Patient is seen and examined at bedside this AM. She feels tired and sleepy. Has been spiking fevers since last night and oxygen requirement went up. She still has pains Discussed with nursing Spoke with  on phone and updated patient's status - fevers, worsening hypoxia , CTA chest today. Explained oncology recommendations - MM non treatable and advanced disease . Review of Systems: A comprehensive review of systems was negative. O: 
Visit Vitals /64 (BP 1 Location: Right arm, BP Patient Position: At rest) Pulse 92 Temp 99.3 °F (37.4 °C) Resp 16 Ht 4' 8\" (1.422 m) Wt 82.1 kg (180 lb 14.4 oz) SpO2 99% BMI 40.56 kg/m² PHYSICAL EXAM: 
Gen: mild distress, chronically ill looking HEENT: anicteric sclerae, normal conjunctiva, oropharynx clear, MM moist 
Neck: supple, trachea midline, no adenopathy Heart: RRR, no MRG, no JVD, no peripheral edema Lungs: decreased breath sounds Abd: soft, NT, ND, BS+, no organomegaly Extr: warm. Right hip pain. Left shoulder pain and ROM limited Skin: dry, no rash Neuro: CN II-XII grossly intact, normal speech, moves all extremities Psych: anxious Intake/Output Summary (Last 24 hours) at 10/24/2020 1524 Last data filed at 10/24/2020 0800 Gross per 24 hour Intake 240 ml Output 50 ml Net 190 ml Recent labs & imaging reviewed: 
Recent Results (from the past 24 hour(s)) SARS-COV-2 Collection Time: 10/23/20  7:33 PM  
Result Value Ref Range Specimen source Nasopharyngeal    
 SARS-CoV-2 Not detected NOTD Specimen source Nasopharyngeal    
 Specimen type NP Swab Health status Symptomatic Testing LACTIC ACID Collection Time: 10/23/20  7:33 PM  
Result Value Ref Range Lactic acid 0.6 0.4 - 2.0 MMOL/L  
SAMPLES BEING HELD Collection Time: 10/23/20  7:33 PM  
Result Value Ref Range SAMPLES BEING HELD 1lav,1blue,1sst   
 COMMENT Add-on orders for these samples will be processed based on acceptable specimen integrity and analyte stability, which may vary by analyte. CBC WITH AUTOMATED DIFF Collection Time: 10/24/20  4:30 AM  
Result Value Ref Range WBC 9.4 3.6 - 11.0 K/uL  
 RBC 3.82 3.80 - 5.20 M/uL  
 HGB 12.0 11.5 - 16.0 g/dL HCT 38.8 35.0 - 47.0 % .6 (H) 80.0 - 99.0 FL  
 MCH 31.4 26.0 - 34.0 PG  
 MCHC 30.9 30.0 - 36.5 g/dL  
 RDW 14.8 (H) 11.5 - 14.5 % PLATELET 683 257 - 652 K/uL MPV 8.5 (L) 8.9 - 12.9 FL  
 NRBC 0.0 0  WBC ABSOLUTE NRBC 0.00 0.00 - 0.01 K/uL NEUTROPHILS 70 32 - 75 % LYMPHOCYTES 13 12 - 49 % MONOCYTES 9 5 - 13 % EOSINOPHILS 5 0 - 7 % BASOPHILS 1 0 - 1 % IMMATURE GRANULOCYTES 2 (H) 0.0 - 0.5 % ABS. NEUTROPHILS 6.6 1.8 - 8.0 K/UL  
 ABS. LYMPHOCYTES 1.2 0.8 - 3.5 K/UL  
 ABS. MONOCYTES 0.9 0.0 - 1.0 K/UL  
 ABS. EOSINOPHILS 0.5 (H) 0.0 - 0.4 K/UL  
 ABS. BASOPHILS 0.1 0.0 - 0.1 K/UL  
 ABS. IMM. GRANS. 0.2 (H) 0.00 - 0.04 K/UL  
 DF AUTOMATED METABOLIC PANEL, COMPREHENSIVE Collection Time: 10/24/20  4:30 AM  
Result Value Ref Range Sodium 137 136 - 145 mmol/L Potassium 4.1 3.5 - 5.1 mmol/L Chloride 98 97 - 108 mmol/L  
 CO2 33 (H) 21 - 32 mmol/L Anion gap 6 5 - 15 mmol/L Glucose 94 65 - 100 mg/dL BUN 8 6 - 20 MG/DL Creatinine 0.65 0.55 - 1.02 MG/DL  
 BUN/Creatinine ratio 12 12 - 20 GFR est AA >60 >60 ml/min/1.73m2 GFR est non-AA >60 >60 ml/min/1.73m2 Calcium 8.5 8.5 - 10.1 MG/DL Bilirubin, total 0.3 0.2 - 1.0 MG/DL  
 ALT (SGPT) 9 (L) 12 - 78 U/L  
 AST (SGOT) 34 15 - 37 U/L Alk. phosphatase 141 (H) 45 - 117 U/L Protein, total 5.6 (L) 6.4 - 8.2 g/dL Albumin 2.7 (L) 3.5 - 5.0 g/dL Globulin 2.9 2.0 - 4.0 g/dL A-G Ratio 0.9 (L) 1.1 - 2.2 D DIMER Collection Time: 10/24/20  4:30 AM  
Result Value Ref Range D-dimer 1.68 (H) 0.00 - 0.65 mg/L FEU FERRITIN Collection Time: 10/24/20  4:30 AM  
Result Value Ref Range Ferritin 59 26 - 388 NG/ML  
FIBRINOGEN Collection Time: 10/24/20  4:30 AM  
Result Value Ref Range Fibrinogen 442 200 - 475 mg/dL PROTHROMBIN TIME + INR Collection Time: 10/24/20  4:30 AM  
Result Value Ref Range INR 1.0 0.9 - 1.1 Prothrombin time 10.8 9.0 - 11.1 sec PTT Collection Time: 10/24/20  4:30 AM  
Result Value Ref Range aPTT 25.7 22.1 - 32.0 sec  
 aPTT, therapeutic range     58.0 - 77.0 SECS  
SAMPLES BEING HELD Collection Time: 10/24/20  4:30 AM  
Result Value Ref Range SAMPLES BEING HELD 1GOLD,1LAV,1PST COMMENT   Add-on orders for these samples will be processed based on acceptable specimen integrity and analyte stability, which may vary by analyte. SAMPLES BEING HELD Collection Time: 10/24/20  4:30 AM  
Result Value Ref Range SAMPLES BEING HELD 1SST COMMENT Add-on orders for these samples will be processed based on acceptable specimen integrity and analyte stability, which may vary by analyte. RESPIRATORY PANEL,PCR,NASOPHARYNGEAL Collection Time: 10/24/20 10:50 AM  
 Specimen: Nasopharyngeal  
Result Value Ref Range Adenovirus Not detected NOTD Coronavirus 229E Not detected NOTD Coronavirus HKU1 Not detected NOTD Coronavirus CVNL63 Not detected NOTD Coronavirus OC43 Not detected NOTD Metapneumovirus Not detected NOTD Rhinovirus and Enterovirus Not detected NOTD Influenza A Not detected NOTD Influenza A, subtype H1 Not detected NOTD Influenza A, subtype H3 Not detected NOTD INFLUENZA A H1N1 PCR Not detected NOTD Influenza B Not detected NOTD Parainfluenza 1 Not detected NOTD Parainfluenza 2 Not detected NOTD Parainfluenza 3 Not detected NOTD Parainfluenza virus 4 Not detected NOTD    
 RSV by PCR Not detected NOTD B. parapertussis, PCR Not detected NOTD Bordetella pertussis - PCR Not detected NOTD Chlamydophila pneumoniae DNA, QL, PCR Not detected NOTD Mycoplasma pneumoniae DNA, QL, PCR Not detected NOTD LACTIC ACID Collection Time: 10/24/20 10:55 AM  
Result Value Ref Range Lactic acid 1.1 0.4 - 2.0 MMOL/L  
SARS-COV-2 Collection Time: 10/24/20 12:46 PM  
Result Value Ref Range Specimen source Nasopharyngeal    
 SARS-CoV-2 PENDING   
 SARS-CoV-2 PENDING Specimen source Nasopharyngeal    
 COVID-19 rapid test PENDING Specimen type NP Swab Health status PENDING   
 COVID-19 PENDING Recent Labs 10/24/20 
0430 WBC 9.4 HGB 12.0 HCT 38.8  Recent Labs 10/24/20 
0430   
K 4.1 CL 98  
CO2 33* BUN 8  
 CREA 0.65 GLU 94  
CA 8.5 Recent Labs 10/24/20 
0430 ALT 9* * TBILI 0.3 TP 5.6* ALB 2.7*  
GLOB 2.9 Recent Labs 10/24/20 
0430 INR 1.0 PTP 10.8 APTT 25.7 Recent Labs 10/24/20 
0430 FERR 59 Lab Results Component Value Date/Time Folate 34.3 (H) 05/28/2010 04:30 AM  
  
No results for input(s): PH, PCO2, PO2 in the last 72 hours. No results for input(s): CPK, CKNDX, TROIQ in the last 72 hours. No lab exists for component: CPKMB Lab Results Component Value Date/Time Cholesterol, total 137 03/06/2017 08:01 AM  
 HDL Cholesterol 47 03/06/2017 08:01 AM  
 LDL, calculated 38 03/06/2017 08:01 AM  
 Triglyceride 260 (H) 03/06/2017 08:01 AM  
 CHOL/HDL Ratio 5.0 12/16/2011 05:05 AM  
 
Lab Results Component Value Date/Time Glucose (POC) 113 (H) 06/02/2016 11:06 AM  
 Glucose (POC) 87 09/15/2012 08:29 PM  
 Glucose (POC) 87 09/15/2012 11:58 AM  
 
Lab Results Component Value Date/Time Color YELLOW/STRAW 10/23/2020 12:47 PM  
 Appearance CLEAR 10/23/2020 12:47 PM  
 Specific gravity 1.013 10/23/2020 12:47 PM  
 Specific gravity <1.005 01/24/2019 08:09 PM  
 pH (UA) 8.5 (H) 10/23/2020 12:47 PM  
 Protein Negative 10/23/2020 12:47 PM  
 Glucose Negative 10/23/2020 12:47 PM  
 Ketone TRACE (A) 10/23/2020 12:47 PM  
 Bilirubin Negative 10/23/2020 12:47 PM  
 Urobilinogen 0.2 10/23/2020 12:47 PM  
 Nitrites Negative 10/23/2020 12:47 PM  
 Leukocyte Esterase Negative 10/23/2020 12:47 PM  
 Epithelial cells FEW 10/23/2020 12:47 PM  
 Bacteria Negative 10/23/2020 12:47 PM  
 WBC 0-4 10/23/2020 12:47 PM  
 RBC 0-5 10/23/2020 12:47 PM  
 
 
Med list reviewed Current Facility-Administered Medications Medication Dose Route Frequency  amitriptyline (ELAVIL) tablet 100 mg  100 mg Oral QHS  sodium chloride (NS) flush 5-40 mL  5-40 mL IntraVENous Q8H  
 sodium chloride (NS) flush 5-40 mL  5-40 mL IntraVENous PRN  
  acetaminophen (TYLENOL) tablet 650 mg  650 mg Oral Q6H PRN Or  
 acetaminophen (TYLENOL) suppository 650 mg  650 mg Rectal Q6H PRN  polyethylene glycol (MIRALAX) packet 17 g  17 g Oral DAILY PRN  promethazine (PHENERGAN) tablet 12.5 mg  12.5 mg Oral Q6H PRN Or  
 ondansetron (ZOFRAN) injection 4 mg  4 mg IntraVENous Q6H PRN  
 diazePAM (VALIUM) tablet 10 mg  10 mg Oral BID  
 gabapentin (NEURONTIN) capsule 600 mg  600 mg Oral TID  
 HYDROmorphone (DILAUDID) tablet 8 mg  8 mg Oral Q3H PRN  
 . PHARMACY TO SUBSTITUTE PER PROTOCOL (Reordered from: linaCLOtide (Linzess) 290 mcg cap capsule)    Per Protocol  morphine injection 5 mg  5 mg IntraVENous CONTINUOUS  
 rivaroxaban (XARELTO) tablet 10 mg  10 mg Oral DAILY  senna (SENOKOT) tablet 34.4 mg  4 Tab Oral BID  fentaNYL (DURAGESIC) 50 mcg/hr patch 1 Patch  1 Patch TransDERmal Q72H  
 HYDROmorphone (PF) (DILAUDID) injection 1 mg  1 mg IntraVENous Q4H PRN  
 albuterol (PROVENTIL HFA, VENTOLIN HFA, PROAIR HFA) inhaler 1 Puff  1 Puff Inhalation Q4H PRN Care Plan discussed with:  Patient/Family and Nurse Therese Jiménez MD 
Internal Medicine Date of Service: 10/24/2020

## 2020-10-24 NOTE — PROGRESS NOTES
[facility] [address] 
____________________________________________________________ Admit Date: 10/23/2020 POD/HD * No surgery found * Procedure:  * No surgery found * Subjective:  
 
Patient has no new complaints. Objective:  
 
Blood pressure 115/64, pulse (!) 118, temperature (!) 100.6 °F (38.1 °C), resp. rate 18, weight 82.1 kg (180 lb 14.4 oz), SpO2 95 %. Temp (24hrs), Av.7 °F (37.6 °C), Min:97.2 °F (36.2 °C), Max:102.4 °F (39.1 °C) Date 10/24/20 07 - 10/25/20 4057 Shift 0206-8065 9308-3888 4629-3292 24 Hour Total  
INTAKE Shift Total(mL/kg) OUTPUT Urine(mL/kg/hr) 50   50 Shift Total(mL/kg) 50(0.6)   50(0.6) Weight (kg) 82.1 82.1 82.1 82.1 Date 10/24/20 0700 - 10/25/20 8293 Shift 4046-3566 [id number] 8777-3000 24 Hour Total  
INTAKE Shift Total(mL/kg) OUTPUT Urine(mL/kg/hr) 50   50 Shift Total(mL/kg) 50(0.6)   50(0.6) Weight (kg) 82.1 82.1 82.1 82.1 Physical Exam:  GENERAL: alert, cooperative, no distress, appears stated age, LUNG: clear to auscultation bilaterally, HEART: regular rate and rhythm, S1, S2 normal, no murmur, click, rub or gallop Labs:  
Recent Results (from the past 24 hour(s)) SAMPLES BEING HELD Collection Time: 10/23/20 12:47 PM  
Result Value Ref Range SAMPLES BEING HELD 1PST,1RED,1LAV,1BLU   
 COMMENT Add-on orders for these samples will be processed based on acceptable specimen integrity and analyte stability, which may vary by analyte. URINALYSIS W/MICROSCOPIC Collection Time: 10/23/20 12:47 PM  
Result Value Ref Range Color YELLOW/STRAW Appearance CLEAR CLEAR Specific gravity 1.013 1.003 - 1.030    
 pH (UA) 8.5 (H) 5.0 - 8.0 Protein Negative NEG mg/dL Glucose Negative NEG mg/dL Ketone TRACE (A) NEG mg/dL Bilirubin Negative NEG Blood Negative NEG Urobilinogen 0.2 0.2 - 1.0 EU/dL  Nitrites Negative NEG    
 Leukocyte Esterase Negative NEG    
 WBC 0-4 0 - 4 /hpf  
 RBC 0-5 0 - 5 /hpf Epithelial cells FEW FEW /lpf Bacteria Negative NEG /hpf Hyaline cast 0-2 0 - 5 /lpf URINE CULTURE HOLD SAMPLE Collection Time: 10/23/20 12:47 PM  
 Specimen: Serum; Urine Result Value Ref Range Urine culture hold Urine on hold in Microbiology dept for 2 days. If unpreserved urine is submitted, it cannot be used for addtional testing after 24 hours, recollection will be required. URINE CULTURE HOLD SAMPLE Collection Time: 10/23/20 12:47 PM  
 Specimen: Serum Result Value Ref Range Urine culture hold Urine on hold in Microbiology dept for 2 days. If unpreserved urine is submitted, it cannot be used for addtional testing after 24 hours, recollection will be required. TYPE & SCREEN Collection Time: 10/23/20 12:47 PM  
Result Value Ref Range Crossmatch Expiration 10/26/2020 ABO/Rh(D) B POSITIVE Antibody screen NEG   
C REACTIVE PROTEIN, QT Collection Time: 10/23/20 12:47 PM  
Result Value Ref Range C-Reactive protein 4.84 (H) 0.00 - 0.60 mg/dL PROCALCITONIN Collection Time: 10/23/20 12:47 PM  
Result Value Ref Range Procalcitonin 0.06 ng/mL SED RATE (ESR) Collection Time: 10/23/20 12:47 PM  
Result Value Ref Range Sed rate, automated 21 0 - 30 mm/hr SARS-COV-2 Collection Time: 10/23/20  7:33 PM  
Result Value Ref Range Specimen source Nasopharyngeal    
 SARS-CoV-2 Not detected NOTD Specimen source Nasopharyngeal    
 Specimen type NP Swab Health status Symptomatic Testing LACTIC ACID Collection Time: 10/23/20  7:33 PM  
Result Value Ref Range Lactic acid 0.6 0.4 - 2.0 MMOL/L  
SAMPLES BEING HELD Collection Time: 10/23/20  7:33 PM  
Result Value Ref Range SAMPLES BEING HELD 1lav,1blue,1sst   
 COMMENT   Add-on orders for these samples will be processed based on acceptable specimen integrity and analyte stability, which may vary by analyte. CBC WITH AUTOMATED DIFF Collection Time: 10/24/20  4:30 AM  
Result Value Ref Range WBC 9.4 3.6 - 11.0 K/uL  
 RBC 3.82 3.80 - 5.20 M/uL  
 HGB 12.0 11.5 - 16.0 g/dL HCT 38.8 35.0 - 47.0 % .6 (H) 80.0 - 99.0 FL  
 MCH 31.4 26.0 - 34.0 PG  
 MCHC 30.9 30.0 - 36.5 g/dL  
 RDW 14.8 (H) 11.5 - 14.5 % PLATELET 003 703 - 384 K/uL MPV 8.5 (L) 8.9 - 12.9 FL  
 NRBC 0.0 0  WBC ABSOLUTE NRBC 0.00 0.00 - 0.01 K/uL NEUTROPHILS 70 32 - 75 % LYMPHOCYTES 13 12 - 49 % MONOCYTES 9 5 - 13 % EOSINOPHILS 5 0 - 7 % BASOPHILS 1 0 - 1 % IMMATURE GRANULOCYTES 2 (H) 0.0 - 0.5 % ABS. NEUTROPHILS 6.6 1.8 - 8.0 K/UL  
 ABS. LYMPHOCYTES 1.2 0.8 - 3.5 K/UL  
 ABS. MONOCYTES 0.9 0.0 - 1.0 K/UL  
 ABS. EOSINOPHILS 0.5 (H) 0.0 - 0.4 K/UL  
 ABS. BASOPHILS 0.1 0.0 - 0.1 K/UL  
 ABS. IMM. GRANS. 0.2 (H) 0.00 - 0.04 K/UL  
 DF AUTOMATED METABOLIC PANEL, COMPREHENSIVE Collection Time: 10/24/20  4:30 AM  
Result Value Ref Range Sodium 137 136 - 145 mmol/L Potassium 4.1 3.5 - 5.1 mmol/L Chloride 98 97 - 108 mmol/L  
 CO2 33 (H) 21 - 32 mmol/L Anion gap 6 5 - 15 mmol/L Glucose 94 65 - 100 mg/dL BUN 8 6 - 20 MG/DL Creatinine 0.65 0.55 - 1.02 MG/DL  
 BUN/Creatinine ratio 12 12 - 20 GFR est AA >60 >60 ml/min/1.73m2 GFR est non-AA >60 >60 ml/min/1.73m2 Calcium 8.5 8.5 - 10.1 MG/DL Bilirubin, total 0.3 0.2 - 1.0 MG/DL  
 ALT (SGPT) 9 (L) 12 - 78 U/L  
 AST (SGOT) 34 15 - 37 U/L Alk. phosphatase 141 (H) 45 - 117 U/L Protein, total 5.6 (L) 6.4 - 8.2 g/dL Albumin 2.7 (L) 3.5 - 5.0 g/dL Globulin 2.9 2.0 - 4.0 g/dL A-G Ratio 0.9 (L) 1.1 - 2.2 D DIMER Collection Time: 10/24/20  4:30 AM  
Result Value Ref Range D-dimer 1.68 (H) 0.00 - 0.65 mg/L FEU FERRITIN Collection Time: 10/24/20  4:30 AM  
Result Value Ref Range  Ferritin 59 26 - 388 NG/ML  
FIBRINOGEN  
 Collection Time: 10/24/20  4:30 AM  
Result Value Ref Range Fibrinogen 442 200 - 475 mg/dL PROTHROMBIN TIME + INR Collection Time: 10/24/20  4:30 AM  
Result Value Ref Range INR 1.0 0.9 - 1.1 Prothrombin time 10.8 9.0 - 11.1 sec PTT Collection Time: 10/24/20  4:30 AM  
Result Value Ref Range aPTT 25.7 22.1 - 32.0 sec  
 aPTT, therapeutic range     58.0 - 77.0 SECS  
SAMPLES BEING HELD Collection Time: 10/24/20  4:30 AM  
Result Value Ref Range SAMPLES BEING HELD 1GOLD,1LAV,1PST COMMENT Add-on orders for these samples will be processed based on acceptable specimen integrity and analyte stability, which may vary by analyte. Data Review images and reports reviewed  CXR with rib fractures Assessment:  
 
Active Problems: 
  Multiple rib fractures (10/23/2020) Plan/Recommendations/Medical Decision Making:  
 
Pain control Pulm toilet Daily CXR Thank you for allowing us to participate in the care of your patient.  
 
Brenna López MD

## 2020-10-24 NOTE — PROGRESS NOTES
Bedside and Verbal shift change report given to Blanka Deras RN (oncoming nurse) by Donna Pizano (offgoing nurse). Report included the following information SBAR, Kardex, Intake/Output, MAR, Accordion and Recent Results. Problem: Falls - Risk of 
Goal: *Absence of Falls Description: Document Richard Merlin Fall Risk and appropriate interventions in the flowsheet. Outcome: Progressing Towards Goal 
Note: Fall Risk Interventions: 
  
 
  
 
Medication Interventions: Evaluate medications/consider consulting pharmacy, Patient to call before getting OOB, Teach patient to arise slowly, Utilize gait belt for transfers/ambulation Elimination Interventions: Call light in reach, Patient to call for help with toileting needs, Stay With Me (per policy), Toilet paper/wipes in reach, Toileting schedule/hourly rounds History of Falls Interventions: Consult care management for discharge planning, Evaluate medications/consider consulting pharmacy, Investigate reason for fall, Room close to nurse's station, Utilize gait belt for transfer/ambulation Problem: Patient Education: Go to Patient Education Activity Goal: Patient/Family Education Outcome: Progressing Towards Goal 
  
Problem: Pressure Injury - Risk of 
Goal: *Prevention of pressure injury Description: Document Rodolfo Scale and appropriate interventions in the flowsheet. Outcome: Progressing Towards Goal 
Note: Pressure Injury Interventions: 
Sensory Interventions: Assess changes in LOC, Assess need for specialty bed, Avoid rigorous massage over bony prominences, Check visual cues for pain, Discuss PT/OT consult with provider, Float heels, Keep linens dry and wrinkle-free, Maintain/enhance activity level, Minimize linen layers, Monitor skin under medical devices, Turn and reposition approx. every two hours (pillows and wedges if needed) Moisture Interventions: Absorbent underpads, Apply protective barrier, creams and emollients, Assess need for specialty bed, Check for incontinence Q2 hours and as needed, Internal/External urinary devices, Maintain skin hydration (lotion/cream), Minimize layers, Moisture barrier, Offer toileting Q_hr Activity Interventions: Assess need for specialty bed, Increase time out of bed, Pressure redistribution bed/mattress(bed type), PT/OT evaluation Mobility Interventions: Assess need for specialty bed, Float heels, HOB 30 degrees or less, Pressure redistribution bed/mattress (bed type), PT/OT evaluation, Turn and reposition approx. every two hours(pillow and wedges) Nutrition Interventions: Document food/fluid/supplement intake, Discuss nutritional consult with provider, Offer support with meals,snacks and hydration Friction and Shear Interventions: Apply protective barrier, creams and emollients, Foam dressings/transparent film/skin sealants, HOB 30 degrees or less, Minimize layers Problem: Patient Education: Go to Patient Education Activity Goal: Patient/Family Education Outcome: Progressing Towards Goal 
  
Problem: Patient Education: Go to Patient Education Activity Goal: Patient/Family Education Outcome: Progressing Towards Goal 
  
Problem: High Risk or History of BRENDON Goal: Recognition of BRENDON or High Risk for BRENDON Outcome: Progressing Towards Goal 
Goal: Maintain Patent Airway and Adequate Oxygenation Outcome: Progressing Towards Goal 
  
Problem: Patient Education: Go to Patient Education Activity Goal: Patient/Family Education Outcome: Progressing Towards Goal

## 2020-10-24 NOTE — PROGRESS NOTES
Spiritual Care Assessment/Progress Note [address] 
 
 
NAME: Elza Arciniega      MRN: [id number] AGE: 76 y.o. SEX: female Latter-day Affiliation: Anabaptist  
Language: English  
 
10/24/2020     Total Time (in minutes): 5 Spiritual Assessment begun in [facility] 4 IMCU through conversation with: 
  
    [x]Patient        [] Family    [] Friend(s) Reason for Consult: Palliative Care, Initial/Spiritual Assessment Spiritual beliefs: (Please include comment if needed) 
   [] Identifies with a martinez tradition:     
   [] Supported by a martinez community:        
   [] Claims no spiritual orientation:       
   [] Seeking spiritual identity:            
   [] Adheres to an individual form of spirituality:       
   [x] Not able to assess:                   
 
    
Identified resources for coping:  
   [] Prayer                           
   [] Music                  [] Guided Imagery 
   [] Family/friends                 [] Pet visits [] Devotional reading                         [x] Unknown 
   [] Other:                                          
 
 
Interventions offered during this visit: (See comments for more details) Plan of Care: 
 
 [x] Support spiritual and/or cultural needs  
 [] Support AMD and/or advance care planning process    
 [] Support grieving process 
 [] Coordinate Rites and/or Rituals  
 [] Coordination with community clergy [] No spiritual needs identified at this time 
 [] Detailed Plan of Care below (See Comments)  [] Make referral to Music Therapy 
[] Make referral to Pet Therapy    
[] Make referral to Addiction services 
[] Make referral to [facility] 
[] Make referral to Spiritual Care Partner 
[] No future visits requested       
[x] Follow up visits as needed Comments:  visit per palliative consult. Pt has covid contact precautions. Please contact [phone] [address] for further support.  follow up as needed. [address] [place], [facility] 
 [id number] [phone])

## 2020-10-24 NOTE — PROGRESS NOTES
Pharmacist Note - Vancomycin Dosing Consult provided for this 76 y.o. female for indication of CAP. Patient on vancomycin PTA? NO Recent Labs 10/24/20 
0430 WBC 9.4 CREA 0.65  
BUN 8 Frequency ofBMP: every day x3 Height: 142.2 cm Weight: 82.1 kg Est CrCl: 65 ml/min; UO: n/a ml/kg/hr Temp (24hrs), Av.7 °F (37.6 °C), Min:97.2 °F (36.2 °C), Max:102.4 °F (39.1 °C) Cultures: 
10/24 resp panel: Neg 
10/24 blood: pend Goal trough = 15 - 20 mcg/mL Therapy will be initiated with a loading dose of 2000 mg IV x 1 to be followed by a maintenance dose of 1250 mg IV every 16 hours. Pharmacy to follow patient daily and order levels / make dose adjustments as appropriate.

## 2020-10-24 NOTE — CONSULTS
[address] Name:  Livan Polk 
MR#:  [id number] :  1945 ACCOUNT #:  [de-identified] DATE OF SERVICE:  10/24/2020 REASON FOR ADMISSION:  Left shoulder pain. REASON FOR CONSULTATION:  Multiple myeloma. HISTORY OF PRESENT ILLNESS:  The patient is a very charming 80-year-old woman. She has been followed closely by Dr. Hermes Steel in our office for more than a decade. She was originally diagnosed with multiple myeloma in . She was treated with kyphoplasties of T6 trough T9 and was started on MPT. She eventually received the following subsequent treatments, Velcade-dexamethasone, Velcade-Doxil-dex, CyBorD, Rev-dex, carfilzomib, carfilzomib-dex, Zometa for more than two years, and more recently pomalidomide-elotuzumab, held in 2020 for severe rash. More recently, she has been having additional problems with a hoarse voice, esophageal stricture, dyspnea on exertion, toe infections. She was seen in our office, 10/13/2020 by Dr. Hermes Steel who recommended at that time observation off treatment given her multiple comorbidities and declining performance status. She explains that she was at home, trying to get off the toilet when she used her arm to lift herself, and she felt an abrupt discomfort in her shoulder and side. She was brought to the ER where she had imaging that showed several rib fractures on the left as well as several new vertebral fractures. She also explains that over the last one to two weeks she has had more and more difficulty walking unaided. She denies any fecal incontinence or urinary retention. It has been difficult for her to get MRIs in the past because she has difficulty lying flat for too long a period of time. She lives at home and was normally able to do all activities of daily living until recently when she needed to receive help from her .  
 
PAST MEDICAL HISTORY: 
 1.  Multiple compression fractures, status post kyphoplasties. 2.  Aneurysm clips x2. 
3.  Allergic rhinitis. 4.  Esophageal stricture. 5.  History of DVT, on chronic anticoagulation. 6.  Urinary incontinence. 7.  Chronic fatigue and malaise related to condition. 8.  Osteoporosis with multiple compression fractures. 9.  She has an implanted morphine pump. ALLERGIES:  BUMEX, DIPYRIDAMOLE, DOXIL, FLAGYL, FLOXIN, FUROSEMIDE, KEFLEX, MACROBID, METHOTREXATE, PENICILLIN, POMALYST, SULFA, TETANUS, THALIDOMIDE. CURRENT MEDICATIONS IN THE HOSPITAL: 
1.  Elavil 100 mg p.o. at bedtime. 2.  Valium 10 mg p.o. twice daily. 3.  Duragesic patch 15 mcg q.72 hours. 4.  Neurontin 600 mg three times daily. 5.  Rivaroxaban 10 mg p.o. daily. 6.  Symbicort twice daily. SOCIAL HISTORY:  She is . She is a retired . She has one child. She is a previous homemaker. FAMILY HISTORY:  Mother and aunt  suddenly of blood clots. REVIEW OF SYSTEMS: 
GENERAL:  Admits to fatigue. HEENT:  No auditory or visual change. LYMPHATIC:  No lumps or bumps in neck, underarm, or groin. CARDIOVASCULAR:  No chest pain, no palpitations. PULMONARY:  She admits to shortness of breath on exertion. GASTROINTESTINAL:  Esophageal stricture as above. GENITOURINARY:  Admits to urinary incontinence but no retention. MUSCULOSKELETAL:  Diffuse pain as above. NEUROPSYCHIATRIC:  Denies any weakness in her legs, denies any fecal incontinence or urinary retention. PHYSICAL EXAMINATION: 
GENERAL:  Pleasant, in no acute distress. VITAL SIGNS:  /64, pulse 92. HEENT:  Sclerae anicteric. Oropharynx clear. NECK:  Supple, without lymphadenopathy or thyromegaly. HEART:  Regular rate and rhythm without murmur, rub, or gallop. LUNGS:  Clear to auscultation bilaterally. ABDOMEN:  Nontender and nondistended. Normoactive bowel sounds. No hepatosplenomegaly. EXTREMITIES:  She has 5/5 strength with toe dorsiflexion, toe plantarflexion. SKIN:  I looked at her back, and she has no evidence of decubitus ulcer. ASSESSMENT AND PLAN:  A 22-year-old woman with light chain myeloma, originally diagnosed in 2007, now is status post multiple prior treatments including, melphalan-prednisone-thalidomide, Velcade-dexamethasone, Doxil, CyBorD, Revlimid-dexamethasone, carfilzomib, Zometa, pomalidomide, and elotuzumab, seen recently by Dr. Bryan Carlos, her primary oncologist for multiple complaints on 10/13, and he recommended continued observation off treatment given her multiple side effects and declining performance status, now admitted with arm and back pain after getting off toilet, noted to have vertebral and rib fractures in the context of myelomatous bone disease and severe osteoporosis. 1.  Advanced myeloma with declining performance status:  I spoke with her frankly about her advanced disease and the difficulty discerning reversible medical problems from irreversible medical problems. I am suspicious that her current situation is not reversible, and I have told her the same. I have offered MRIs to evaluate for possible cord compression, but she declined explaining that it has been difficult for her to lie flat before.  not available by phone. Difficult to broach the subject of change to hospice given this is the first day I have met her, but I will continue to discuss goals of care. Thank you for consult. Lelo Haq MD 
 
 
[id number] 
D:  10/24/2020 13:23 
T:  10/24/2020 16:11 
JOB #:  [id number] CC:  Ru Orellana MD

## 2020-10-24 NOTE — PROGRESS NOTES
Last 3 Recorded Weights in this Encounter 10/23/20 1950 10/24/20 0321 Weight: 82.5 kg (181 lb 14.4 oz) 82.1 kg (180 lb 14.4 oz) Bedside shift change report given to Rianna Rosario RN (oncoming nurse) by Juan Jose Chambers RN (offgoing nurse). Report included the following information SBAR, Kardex, ED Summary, Procedure Summary, Intake/Output, MAR, Accordion, Recent Results, Med Rec Status, Cardiac Rhythm NSR, Alarm Parameters  and Pre Procedure Checklist.  
 
 
0032: Pt requesting amitriptyline; states \"I take Gabapentin and Amitriptyline\". 0044: Orders received, Amitriptyline given. Problem: Falls - Risk of 
Goal: *Absence of Falls Description: Document Richard Merlin Fall Risk and appropriate interventions in the flowsheet. Outcome: Progressing Towards Goal 
Note: Fall Risk Interventions: 
  
 
  
 
Medication Interventions: Evaluate medications/consider consulting pharmacy, Teach patient to arise slowly, Assess postural VS orthostatic hypotension Elimination Interventions: Call light in reach, Patient to call for help with toileting needs, Toileting schedule/hourly rounds Problem: Pressure Injury - Risk of 
Goal: *Prevention of pressure injury Description: Document Rodolfo Scale and appropriate interventions in the flowsheet. Outcome: Progressing Towards Goal 
Note: Pressure Injury Interventions: 
  
 
Moisture Interventions: Absorbent underpads, Check for incontinence Q2 hours and as needed, Internal/External urinary devices, Minimize layers, Offer toileting Q_hr Activity Interventions: Pressure redistribution bed/mattress(bed type), PT/OT evaluation Mobility Interventions: Float heels, Pressure redistribution bed/mattress (bed type), PT/OT evaluation, Turn and reposition approx. every two hours(pillow and wedges) Nutrition Interventions: Document food/fluid/supplement intake, Offer support with meals,snacks and hydration Problem: Patient Education: Go to Patient Education Activity Goal: Patient/Family Education Outcome: Not Progressing Towards Goal 
  
Problem: High Risk or History of BRENDON Goal: Recognition of BRENDON or High Risk for BRENDON Outcome: Progressing Towards Goal 
Goal: Maintain Patent Airway and Adequate Oxygenation Outcome: Progressing Towards Goal

## 2020-10-24 NOTE — CONSULTS
Patient seen, chart reviewed, note dictated. [id number] 
 
77 y/o woman with light chain myeloma, originally diagnosed in 2007. Now s/p multiple prior treatments including MPT, heena/dex, cyborD, Fevv/Dex, carfilzomib, Zometa > 2 years, Pomalidomide/elotuzumab. Seen recently by Dr. Ngoc Munguia, her primary oncologist on 10/13/20 for multiple complaints. He recommended continued observation off of treatment given her declining performance status. Now admitted with arm and back pain after gettting off toilet. Noted to have vertebral and rib fracture in the context of myelomatous bone disease . 1. Advanced myeloma with declining perfomance status: I spoke with her frankly about her advanced disease and the difficulty discerning reversible medical problems from irreversible medical problems. I am suspicious that her current situation is not reversible and have told her the same. I have offered MRIs to evaluate for possible cord compression, but she declined, explaining it has been difficult for her to lie flat before.  not available by phone. Difficult to broach the subject of change to hospice given this is the first day I have met her, but will continue to discuss goals of care. Thank you for consult.   
 
Jody Lehman MD 
Hem/Onc

## 2020-10-25 NOTE — PROGRESS NOTES
0100: Bedside and Verbal shift change report given to Aaliyah Lee RN (oncoming nurse) by Marilyn Darden RN (offgoing nurse). Report included the following information SBAR, Kardex, Intake/Output, MAR, Accordion, Recent Results, Med Rec Status and Cardiac Rhythm NSR/Sinus Tach. Hourly rounds completed throughout shift

## 2020-10-25 NOTE — PROGRESS NOTES
Ortho Update: - Reviewed notes, labs, Awaiting Nuc Med Scan to make further recommendations for Orthopedic care. Will re-visit to eval and speak with patient when imaging and second Covid test resulted. - Continue to mobilize as tolerated.

## 2020-10-25 NOTE — PROGRESS NOTES
[facility] [address] 
____________________________________________________________ Admit Date: 10/23/2020 POD/HD * No surgery found * Procedure:  * No surgery found * Subjective:  
 
Patient has no new complaints. Objective:  
 
Blood pressure (!) 122/58, pulse (!) 101, temperature 99.2 °F (37.3 °C), resp. rate 20, height 4' 8\" (1.422 m), weight 84.5 kg (186 lb 4.8 oz), SpO2 96 %. Temp (24hrs), Av.1 °F (37.3 °C), Min:98.3 °F (36.8 °C), Max:100.3 °F (37.9 °C) Date 10/25/20 0700 - 10/26/20 8210 Shift 9188-2072 6919-7447 2561-5716 24 Hour Total  
INTAKE  
I.V.(mL/kg/hr) 150   150 Shift Total(mL/kg) 150(1.8)   150(1.8) OUTPUT Urine(mL/kg/hr) 150   150 Shift Total(mL/kg) 150(1.8)   150(1.8) Weight (kg) 84.5 84.5 84.5 84.5 Date 10/25/20 0700 - 10/26/20 1484 Shift 3094-9855 4093-1489 5639-4832 24 Hour Total  
INTAKE  
I.V.(mL/kg/hr) 150   150 Shift Total(mL/kg) 150(1.8)   150(1.8) OUTPUT Urine(mL/kg/hr) 150   150 Shift Total(mL/kg) 150(1.8)   150(1.8) Weight (kg) 84.5 84.5 84.5 84.5 Physical Exam:  GENERAL: alert, cooperative, no distress, appears stated age, LUNG: clear to auscultation bilaterally, HEART: regular rate and rhythm, S1, S2 normal, no murmur, click, rub or gallop Labs:  
Recent Results (from the past 24 hour(s)) SARS-COV-2 Collection Time: 10/24/20 12:46 PM  
Result Value Ref Range Specimen source Nasopharyngeal    
 SARS-CoV-2 PENDING   
 SARS-CoV-2 PENDING Specimen source Nasopharyngeal    
 COVID-19 rapid test PENDING Specimen type NP Swab Health status PENDING   
 COVID-19 PENDING   
D DIMER Collection Time: 10/25/20  3:03 AM  
Result Value Ref Range D-dimer 4.06 (H) 0.00 - 0.65 mg/L FEU FIBRINOGEN Collection Time: 10/25/20  3:03 AM  
Result Value Ref Range Fibrinogen 494 (H) 200 - 475 mg/dL PROTHROMBIN TIME + INR  Collection Time: 10/25/20  3:03 AM  
 Result Value Ref Range INR 1.0 0.9 - 1.1 Prothrombin time 10.6 9.0 - 11.1 sec PTT Collection Time: 10/25/20  3:03 AM  
Result Value Ref Range aPTT 21.0 (L) 22.1 - 32.0 sec  
 aPTT, therapeutic range     58.0 - 84.3 SECS  
METABOLIC PANEL, BASIC Collection Time: 10/25/20  3:03 AM  
Result Value Ref Range Sodium 136 136 - 145 mmol/L Potassium 4.0 3.5 - 5.1 mmol/L Chloride 100 97 - 108 mmol/L  
 CO2 29 21 - 32 mmol/L Anion gap 7 5 - 15 mmol/L Glucose 105 (H) 65 - 100 mg/dL BUN 9 6 - 20 MG/DL Creatinine 0.59 0.55 - 1.02 MG/DL  
 BUN/Creatinine ratio 15 12 - 20 GFR est AA >60 >60 ml/min/1.73m2 GFR est non-AA >60 >60 ml/min/1.73m2 Calcium 8.7 8.5 - 10.1 MG/DL  
CBC WITH AUTOMATED DIFF Collection Time: 10/25/20  3:03 AM  
Result Value Ref Range WBC 10.5 3.6 - 11.0 K/uL  
 RBC 3.85 3.80 - 5.20 M/uL  
 HGB 11.9 11.5 - 16.0 g/dL HCT 38.3 35.0 - 47.0 % MCV 99.5 (H) 80.0 - 99.0 FL  
 MCH 30.9 26.0 - 34.0 PG  
 MCHC 31.1 30.0 - 36.5 g/dL  
 RDW 14.7 (H) 11.5 - 14.5 % PLATELET 662 067 - 148 K/uL NRBC 0.0 0  WBC ABSOLUTE NRBC 0.00 0.00 - 0.01 K/uL NEUTROPHILS 75 32 - 75 % LYMPHOCYTES 9 (L) 12 - 49 % MONOCYTES 9 5 - 13 % EOSINOPHILS 4 0 - 7 % BASOPHILS 1 0 - 1 % IMMATURE GRANULOCYTES 2 (H) 0.0 - 0.5 % ABS. NEUTROPHILS 8.0 1.8 - 8.0 K/UL  
 ABS. LYMPHOCYTES 0.9 0.8 - 3.5 K/UL  
 ABS. MONOCYTES 0.9 0.0 - 1.0 K/UL  
 ABS. EOSINOPHILS 0.4 0.0 - 0.4 K/UL  
 ABS. BASOPHILS 0.1 0.0 - 0.1 K/UL  
 ABS. IMM. GRANS. 0.2 (H) 0.00 - 0.04 K/UL  
 DF SMEAR SCANNED    
 RBC COMMENTS ANISOCYTOSIS 1+ 
    
 RBC COMMENTS MACROCYTOSIS 1+ Data Review images and reports reviewed stable Assessment:  
 
Active Problems: 
  Multiple rib fractures (10/23/2020) Plan/Recommendations/Medical Decision Making:  
 
Primary team ordering chest CT will review when done At present cont with consertative management Thank you for allowing us to participate in the care of your patient.  
 
Sintia Elena MD

## 2020-10-25 NOTE — PROGRESS NOTES
Hematology-Oncology Progress Note [facility] [place] 1945 
[id number] 
10/25/2020 Subjective:  
 
Complaining of back pain, unrelieved with her usual 8 mg of oral dilaudid. Is worried about her ability to go home and does not think she can do it with her declining health and  age and illness. Reluctant to consider rehab/NH yet. Questionable rash with vancomycin last night which has been held. Allergies: Broccoli; Bumex [bumetanide]; Doxil [doxorubicin, peg-liposomal]; Flagyl [metronidazole]; Keflex [cephalexin]; Lasix [furosemide]; Macrobid [nitrofurantoin monohyd/m-cryst]; Methotrexate; Pcn [penicillins]; Pomalyst [pomalidomide]; Sulfa (sulfonamide antibiotics); Tetanus and diphtheria toxoids; Thalidomide; and Dipyridamole Current Facility-Administered Medications Medication Dose Route Frequency Provider Last Rate Last Dose  
 HYDROmorphone (DILAUDID) tablet 12 mg  12 mg Oral Q3H PRN Antonieta Norman MD      
 amitriptyline (ELAVIL) tablet 100 mg  100 mg Oral QHS Brand Grand, NP   100 mg at 10/24/20 2203  Vancomycin - Pharmacy dosing   Other Rx Dosing/Monitoring Israel Rosa MD      
 vancomycin (VANCOCIN) 1250 mg in  ml infusion  1,250 mg IntraVENous Q16H Israel Rosa MD   Stopped at 10/25/20 1100  
 aluminum hydrox-magnesium carb (GAVISCON) oral suspension 15 mL  15 mL Oral Q6H PRN Madala, Roanne Rubinstein, MD      
 bisacodyL (DULCOLAX) suppository 10 mg  10 mg Rectal DAILY PRN Israel Rosa MD      
 docusate sodium (COLACE) capsule 100 mg  100 mg Oral DAILY Madala, Sushma, MD   100 mg at 10/25/20 1014  docusate sodium (COLACE) capsule 300 mg  300 mg Oral QPM Israel Rosa MD   300 mg at 10/24/20 2202  fluticasone propionate (FLONASE) 50 mcg/actuation nasal spray 2 Spray  2 Spray Both Nostrils BID PRN Israel Rosa MD      
 sodium chloride (NS) flush 5-40 mL  5-40 mL IntraVENous Q8H Madala, Sushma, MD   10 mL at 10/25/20 0226  sodium chloride (NS) flush 5-40 mL  5-40 mL IntraVENous PRN Elise Jones MD      
 acetaminophen (TYLENOL) tablet 650 mg  650 mg Oral Q6H PRN Krystle Watkins MD   650 mg at 10/24/20 0413 Or  acetaminophen (TYLENOL) suppository 650 mg  650 mg Rectal Q6H PRN Elise Jones MD      
 polyethylene glycol (MIRALAX) packet 17 g  17 g Oral DAILY PRN Elise Jones MD      
 promethazine (PHENERGAN) tablet 12.5 mg  12.5 mg Oral Q6H PRN Madala, Sushma, MD      
 Or  
 ondansetron (ZOFRAN) injection 4 mg  4 mg IntraVENous Q6H PRN Madala, Sushma, MD   4 mg at 10/23/20 1320  
 diazePAM (VALIUM) tablet 10 mg  10 mg Oral BID Elise Jones MD   10 mg at 10/25/20 1014  
 gabapentin (NEURONTIN) capsule 600 mg  600 mg Oral TID Krystle Watkins MD   600 mg at 10/25/20 1014  . PHARMACY TO SUBSTITUTE PER PROTOCOL (Reordered from: linaCLOtide (Linzess) 290 mcg cap capsule)    Per Protocol Krystle Watkins MD      
 morphine injection 5 mg  5 mg IntraVENous CONTINUOUS Krystle Watkins MD      
 [Held by provider] rivaroxaban (XARELTO) tablet 10 mg  10 mg Oral DAILY Madala, Sushma, MD      
 senna (SENOKOT) tablet 34.4 mg  4 Tab Oral BID Krystle Watkins MD   34.4 mg at 10/25/20 1014  
 fentaNYL (DURAGESIC) 50 mcg/hr patch 1 Patch  1 Patch TransDERmal Q72H Krystle Watkins MD   1 Patch at 10/24/20 0029  
 HYDROmorphone (PF) (DILAUDID) injection 1 mg  1 mg IntraVENous Q4H PRN Madala, Sushma, MD   1 mg at 10/23/20 2211  
 albuterol (PROVENTIL HFA, VENTOLIN HFA, PROAIR HFA) inhaler 1 Puff  1 Puff Inhalation Q4H PRN Madala, Sushma, MD      
 
Objective:  
 
Patient Vitals for the past 24 hrs: 
 BP Temp Pulse Resp SpO2 Weight 10/25/20 0410 112/80 100.3 °F (37.9 °C) (!) 102 18 97 % 84.5 kg (186 lb 4.8 oz) 10/24/20 2335 109/63 98.3 °F (36.8 °C) 100 20 96 %   
10/24/20 2332 130/62 99.3 °F (37.4 °C) 99 24 (!) 4 %   
10/24/20 1955 117/69 98.5 °F (36.9 °C) 94 22 93 %   
 10/24/20 1523 112/62 98.5 °F (36.9 °C) 99 20 97 %   
10/24/20 1251 119/64 99.3 °F (37.4 °C) 92 16 99 %  Gen: NAD HEENT: PERRL, Sclerae anicteric Cv: RRR without m/r/g Pulm: CTA bilaterally Abd: NABS, NTND, No HSM Ext: No c/c/e Available labs reviewed: 
Labs:   
Recent Results (from the past 24 hour(s)) LACTIC ACID Collection Time: 10/24/20 10:55 AM  
Result Value Ref Range Lactic acid 1.1 0.4 - 2.0 MMOL/L  
SARS-COV-2 Collection Time: 10/24/20 12:46 PM  
Result Value Ref Range Specimen source Nasopharyngeal    
 SARS-CoV-2 PENDING   
 SARS-CoV-2 PENDING Specimen source Nasopharyngeal    
 COVID-19 rapid test PENDING Specimen type NP Swab Health status PENDING   
 COVID-19 PENDING   
D DIMER Collection Time: 10/25/20  3:03 AM  
Result Value Ref Range D-dimer 4.06 (H) 0.00 - 0.65 mg/L FEU FIBRINOGEN Collection Time: 10/25/20  3:03 AM  
Result Value Ref Range Fibrinogen 494 (H) 200 - 475 mg/dL PROTHROMBIN TIME + INR Collection Time: 10/25/20  3:03 AM  
Result Value Ref Range INR 1.0 0.9 - 1.1 Prothrombin time 10.6 9.0 - 11.1 sec PTT Collection Time: 10/25/20  3:03 AM  
Result Value Ref Range aPTT 21.0 (L) 22.1 - 32.0 sec  
 aPTT, therapeutic range     58.0 - 38.0 SECS  
METABOLIC PANEL, BASIC Collection Time: 10/25/20  3:03 AM  
Result Value Ref Range Sodium 136 136 - 145 mmol/L Potassium 4.0 3.5 - 5.1 mmol/L Chloride 100 97 - 108 mmol/L  
 CO2 29 21 - 32 mmol/L Anion gap 7 5 - 15 mmol/L Glucose 105 (H) 65 - 100 mg/dL BUN 9 6 - 20 MG/DL Creatinine 0.59 0.55 - 1.02 MG/DL  
 BUN/Creatinine ratio 15 12 - 20 GFR est AA >60 >60 ml/min/1.73m2 GFR est non-AA >60 >60 ml/min/1.73m2 Calcium 8.7 8.5 - 10.1 MG/DL  
CBC WITH AUTOMATED DIFF Collection Time: 10/25/20  3:03 AM  
Result Value Ref Range WBC 10.5 3.6 - 11.0 K/uL  
 RBC 3.85 3.80 - 5.20 M/uL  
 HGB 11.9 11.5 - 16.0 g/dL HCT 38.3 35.0 - 47.0 % MCV 99.5 (H) 80.0 - 99.0 FL  
 MCH 30.9 26.0 - 34.0 PG  
 MCHC 31.1 30.0 - 36.5 g/dL  
 RDW 14.7 (H) 11.5 - 14.5 % PLATELET 420 291 - 972 K/uL NRBC 0.0 0  WBC ABSOLUTE NRBC 0.00 0.00 - 0.01 K/uL NEUTROPHILS 75 32 - 75 % LYMPHOCYTES 9 (L) 12 - 49 % MONOCYTES 9 5 - 13 % EOSINOPHILS 4 0 - 7 % BASOPHILS 1 0 - 1 % IMMATURE GRANULOCYTES 2 (H) 0.0 - 0.5 % ABS. NEUTROPHILS 8.0 1.8 - 8.0 K/UL  
 ABS. LYMPHOCYTES 0.9 0.8 - 3.5 K/UL  
 ABS. MONOCYTES 0.9 0.0 - 1.0 K/UL  
 ABS. EOSINOPHILS 0.4 0.0 - 0.4 K/UL  
 ABS. BASOPHILS 0.1 0.0 - 0.1 K/UL  
 ABS. IMM. GRANS. 0.2 (H) 0.00 - 0.04 K/UL  
 DF SMEAR SCANNED    
 RBC COMMENTS ANISOCYTOSIS 1+ 
    
 RBC COMMENTS MACROCYTOSIS 
1+ Assessment and Plan  
 
75 y/o woman with a long (since 2007) h/o myeloma, now with declining performance status and limited treatment options, admitted after wrenching her arm getting off of the toilet. Noted to have rib fracture and likely compression fracture. Also with dyspnea, noted to have bilateral pulmonary infiltrates, being ruled out for COVID. 1. Myeloma: not currently a candidate for treatment given declining performance status. I have continued to discuss goals of care. Dr. Cheryl Hanson will likely be rounding as an inpatient on Wednesday and he can speak with her directly. Dr. Kiersten Luis to assume care of service in AM. 2. Rib fracture/comp fractures: likely due to #1 above. Given intractable pain, will increase oral dilaudid by 50% to 12 mg prn.  
 
3. Chronic pain syndrome: has implanted morphine pump, also on fentanyl patch. 4. VTE: rivaroxaban on hold for possible kyphoplasty 5. Disposition: patient does not feel she can safely go home. Spoke with  about possible discharge ultimately to [address] [place] [address]. Thank you for allowing us to participate in the care of this very pleasant patient. Janeann Litten, MD 
Hematology/Oncology Phone [phone]

## 2020-10-25 NOTE — PROGRESS NOTES
Hospitalist Progress Note Hospital summary: A 76year old female patient with PMH of Multiple myeloma with metastatic disease, rheumatoid arthritis, chronic pain/ opioid dependence on implanted morphine pump, DVT on xarelto, multiple pathological fractures, recent kyphoplasty of L spine presents to the ED for evaluation of left shoulder pain and difficulty weightbearing.  Patient states that she has been having trouble ambulating for a few days and was trying to get off her commode this am when she lost her hand hold and fell back onto the toilet. She had been evaluated for DVT earlier in the week but was found negative. She states that she has been unable to bear weight very well for about a week but denies prior injury. She complains of pain primarily in her left shoulder as well as right posterior hip/buttock region. She denied sob initially and stated that she is getting evaluated for sleep study and hypoxia at night time. Pt has pain on deep breathing. Denied cough, fever, abd pain, urinary or bowel changes. In ED, imaging showed acute multiple rib fractures and thoracic fractures. Hematology consulted in ED. Hospitalist consulted for admission. 
  
Discussed with Ortho PA- recommended NM bone scan. On arrival to floor , pt desaturated to 80's and placed on 2l NC. CXR ordered by me showed diffuse bilateral pulmonary opacification. Spoke with patient again - she stated that she has been having sob since 1 week , worsening with exertion. She discussed with her PCP and oncology Dr. Miracle Contreras - who recommended oxygen study possible due to bruising of her lungs for recurrent fractures. Explained CXR findings and in light of hypoxia, will test for covid. She was initially upset regarding this but understands 10/23/2020 Assessment/Plan: 
Acute hypoxic resp failure - worsening  
- could be due to lung contusion from rib fractures - CXR: Nonspecific ill-defined diffuse bilateral pulmonary opacification. - saturating on 5l NC 
- covid test x 2 negative 
- normal lactic, pro calcitonin 
- resp pcr negative 
- albuterol MDI prn 
- febrile on 10/24  
- CTA chest : no PE. bibasilar atelectasis right greater than left - pt had rash - possible due to VAnco, so discontinued 
- started on IV levaquin  
  
Left shoulder pain/ Right hip pain Thoracic vertebra fractures/ multiple rib fractures - CT L spine: Acute versus subacute T11 and T12 partial compression fractures are new since 5 months ago. - CT hip : No evidence of acute abnormality 
- XR: Multiple left rib fractures. - XR shoulder: Degenerative changes left shoulder. Tommas Baptise - IR consulted for Kyphoplasty for T11 and T12 fracture - appreciate thoracic surgery input for rib fractures conservative management  
- Orthopedics on board for shoulder and hip pains - whole body bone scan ordered  
  
Multiple myeloma with metastatic disease 
- not on nay treatment now due to intolerance 
- follows with oncology Dr. Lei Gross - appreciate oncology input  
  
Chronic pain syndrome on implanted morphine drip - c/w home fentanyl patch, po dilaudid prn. Added IV dilaudid prn   
  Hx DVT -  Xarelto on hold for kyphoplasty Palliative care consulted  
  
DVT ppx: Xarelto Code status: Full.  is mpoa Disposition: TBD  
---------------------------------------------- 
 
CC:  Left shoulder pain S: Patient is seen and examined at bedside this AM. She feels tired. C/w pains all over - pain meds adjusted by oncology. Oxygen requirement going up. Plan for CTA chest today Discussed with nursing Review of Systems: A comprehensive review of systems was negative. O: 
Visit Vitals BP (!) 122/58 (BP 1 Location: Right arm, BP Patient Position: At rest) Pulse (!) 101 Temp 99.2 °F (37.3 °C) Resp 20 Ht 4' 8\" (1.422 m) Wt 84.5 kg (186 lb 4.8 oz) SpO2 96% BMI 41.77 kg/m² PHYSICAL EXAM: 
Gen: mild distress, chronically ill looking HEENT: anicteric sclerae, normal conjunctiva, oropharynx clear, MM moist 
Neck: supple, trachea midline, no adenopathy Heart: RRR, no MRG, no JVD, no peripheral edema Lungs: decreased breath sounds Abd: soft, NT, ND, BS+, no organomegaly Extr: warm. Right hip pain. Left shoulder pain and ROM limited Skin: dry, no rash Neuro: CN II-XII grossly intact, normal speech, moves all extremities Psych: anxious Intake/Output Summary (Last 24 hours) at 10/25/2020 1511 Last data filed at 10/25/2020 1224 Gross per 24 hour Intake 630 ml Output 1350 ml Net -720 ml Recent labs & imaging reviewed: 
Recent Results (from the past 24 hour(s)) D DIMER Collection Time: 10/25/20  3:03 AM  
Result Value Ref Range D-dimer 4.06 (H) 0.00 - 0.65 mg/L FEU FIBRINOGEN Collection Time: 10/25/20  3:03 AM  
Result Value Ref Range Fibrinogen 494 (H) 200 - 475 mg/dL PROTHROMBIN TIME + INR Collection Time: 10/25/20  3:03 AM  
Result Value Ref Range INR 1.0 0.9 - 1.1 Prothrombin time 10.6 9.0 - 11.1 sec PTT Collection Time: 10/25/20  3:03 AM  
Result Value Ref Range aPTT 21.0 (L) 22.1 - 32.0 sec  
 aPTT, therapeutic range     58.0 - 67.4 SECS  
METABOLIC PANEL, BASIC Collection Time: 10/25/20  3:03 AM  
Result Value Ref Range Sodium 136 136 - 145 mmol/L Potassium 4.0 3.5 - 5.1 mmol/L Chloride 100 97 - 108 mmol/L  
 CO2 29 21 - 32 mmol/L Anion gap 7 5 - 15 mmol/L Glucose 105 (H) 65 - 100 mg/dL BUN 9 6 - 20 MG/DL Creatinine 0.59 0.55 - 1.02 MG/DL  
 BUN/Creatinine ratio 15 12 - 20 GFR est AA >60 >60 ml/min/1.73m2 GFR est non-AA >60 >60 ml/min/1.73m2 Calcium 8.7 8.5 - 10.1 MG/DL  
CBC WITH AUTOMATED DIFF Collection Time: 10/25/20  3:03 AM  
Result Value Ref Range WBC 10.5 3.6 - 11.0 K/uL  
 RBC 3.85 3.80 - 5.20 M/uL  
 HGB 11.9 11.5 - 16.0 g/dL HCT 38.3 35.0 - 47.0 % MCV 99.5 (H) 80.0 - 99.0 FL  
 MCH 30.9 26.0 - 34.0 PG  
 MCHC 31.1 30.0 - 36.5 g/dL  
 RDW 14.7 (H) 11.5 - 14.5 % PLATELET 994 005 - 524 K/uL NRBC 0.0 0  WBC ABSOLUTE NRBC 0.00 0.00 - 0.01 K/uL NEUTROPHILS 75 32 - 75 % LYMPHOCYTES 9 (L) 12 - 49 % MONOCYTES 9 5 - 13 % EOSINOPHILS 4 0 - 7 % BASOPHILS 1 0 - 1 % IMMATURE GRANULOCYTES 2 (H) 0.0 - 0.5 % ABS. NEUTROPHILS 8.0 1.8 - 8.0 K/UL  
 ABS. LYMPHOCYTES 0.9 0.8 - 3.5 K/UL  
 ABS. MONOCYTES 0.9 0.0 - 1.0 K/UL  
 ABS. EOSINOPHILS 0.4 0.0 - 0.4 K/UL  
 ABS. BASOPHILS 0.1 0.0 - 0.1 K/UL  
 ABS. IMM. GRANS. 0.2 (H) 0.00 - 0.04 K/UL  
 DF SMEAR SCANNED    
 RBC COMMENTS ANISOCYTOSIS 1+ 
    
 RBC COMMENTS MACROCYTOSIS 1+ Recent Labs 10/25/20 
0303 10/24/20 
0430 WBC 10.5 9.4 HGB 11.9 12.0 HCT 38.3 38.8  374 Recent Labs 10/25/20 
0303 10/24/20 
0430  137  
K 4.0 4.1  98 CO2 29 33* BUN 9 8  
CREA 0.59 0.65 * 94  
CA 8.7 8.5 Recent Labs 10/24/20 
0430 ALT 9* * TBILI 0.3 TP 5.6* ALB 2.7*  
GLOB 2.9 Recent Labs 10/25/20 
0303 10/24/20 
0430 INR 1.0 1.0 PTP 10.6 10.8 APTT 21.0* 25.7 Recent Labs 10/24/20 
0430 FERR 59 Lab Results Component Value Date/Time Folate 34.3 (H) 05/28/2010 04:30 AM  
  
No results for input(s): PH, PCO2, PO2 in the last 72 hours. No results for input(s): CPK, CKNDX, TROIQ in the last 72 hours. No lab exists for component: CPKMB Lab Results Component Value Date/Time Cholesterol, total 137 03/06/2017 08:01 AM  
 HDL Cholesterol 47 03/06/2017 08:01 AM  
 LDL, calculated 38 03/06/2017 08:01 AM  
 Triglyceride 260 (H) 03/06/2017 08:01 AM  
 CHOL/HDL Ratio 5.0 12/16/2011 05:05 AM  
 
Lab Results Component Value Date/Time Glucose (POC) 113 (H) 06/02/2016 11:06 AM  
 Glucose (POC) 87 09/15/2012 08:29 PM  
 Glucose (POC) 87 09/15/2012 11:58 AM  
 
Lab Results Component Value Date/Time Color YELLOW/STRAW 10/23/2020 12:47 PM  
 Appearance CLEAR 10/23/2020 12:47 PM  
 Specific gravity 1.013 10/23/2020 12:47 PM  
 Specific gravity <1.005 01/24/2019 08:09 PM  
 pH (UA) 8.5 (H) 10/23/2020 12:47 PM  
 Protein Negative 10/23/2020 12:47 PM  
 Glucose Negative 10/23/2020 12:47 PM  
 Ketone TRACE (A) 10/23/2020 12:47 PM  
 Bilirubin Negative 10/23/2020 12:47 PM  
 Urobilinogen 0.2 10/23/2020 12:47 PM  
 Nitrites Negative 10/23/2020 12:47 PM  
 Leukocyte Esterase Negative 10/23/2020 12:47 PM  
 Epithelial cells FEW 10/23/2020 12:47 PM  
 Bacteria Negative 10/23/2020 12:47 PM  
 WBC 0-4 10/23/2020 12:47 PM  
 RBC 0-5 10/23/2020 12:47 PM  
 
 
Med list reviewed Current Facility-Administered Medications Medication Dose Route Frequency  HYDROmorphone (DILAUDID) tablet 12 mg  12 mg Oral Q3H PRN  
 LORazepam (ATIVAN) injection 1 mg  1 mg IntraVENous ONCE  
 sodium chloride 0.9 % bolus infusion 100 mL  100 mL IntraVENous RAD ONCE  
 iopamidoL (ISOVUE-370) 76 % injection 100 mL  100 mL IntraVENous RAD ONCE  
 sodium chloride (NS) flush 10 mL  10 mL IntraVENous RAD ONCE  
 levoFLOXacin (LEVAQUIN) 750 mg in D5W IVPB  750 mg IntraVENous Q24H  
 amitriptyline (ELAVIL) tablet 100 mg  100 mg Oral QHS  aluminum hydrox-magnesium carb (GAVISCON) oral suspension 15 mL  15 mL Oral Q6H PRN  
 bisacodyL (DULCOLAX) suppository 10 mg  10 mg Rectal DAILY PRN  
 docusate sodium (COLACE) capsule 100 mg  100 mg Oral DAILY  docusate sodium (COLACE) capsule 300 mg  300 mg Oral QPM  
 fluticasone propionate (FLONASE) 50 mcg/actuation nasal spray 2 Spray  2 Spray Both Nostrils BID PRN  
 sodium chloride (NS) flush 5-40 mL  5-40 mL IntraVENous Q8H  
 sodium chloride (NS) flush 5-40 mL  5-40 mL IntraVENous PRN  
 acetaminophen (TYLENOL) tablet 650 mg  650 mg Oral Q6H PRN  Or  
 acetaminophen (TYLENOL) suppository 650 mg  650 mg Rectal Q6H PRN  
  polyethylene glycol (MIRALAX) packet 17 g  17 g Oral DAILY PRN  promethazine (PHENERGAN) tablet 12.5 mg  12.5 mg Oral Q6H PRN Or  
 ondansetron (ZOFRAN) injection 4 mg  4 mg IntraVENous Q6H PRN  
 diazePAM (VALIUM) tablet 10 mg  10 mg Oral BID  
 gabapentin (NEURONTIN) capsule 600 mg  600 mg Oral TID  
 . PHARMACY TO SUBSTITUTE PER PROTOCOL (Reordered from: linaCLOtide (Linzess) 290 mcg cap capsule)    Per Protocol  morphine injection 5 mg  5 mg IntraVENous CONTINUOUS  
 [Held by provider] rivaroxaban (XARELTO) tablet 10 mg  10 mg Oral DAILY  senna (SENOKOT) tablet 34.4 mg  4 Tab Oral BID  fentaNYL (DURAGESIC) 50 mcg/hr patch 1 Patch  1 Patch TransDERmal Q72H  
 HYDROmorphone (PF) (DILAUDID) injection 1 mg  1 mg IntraVENous Q4H PRN  
 albuterol (PROVENTIL HFA, VENTOLIN HFA, PROAIR HFA) inhaler 1 Puff  1 Puff Inhalation Q4H PRN Care Plan discussed with:  Patient/Family and Nurse Katrin Skelton MD 
Internal Medicine Date of Service: 10/25/2020

## 2020-10-26 NOTE — CONSULTS
PULMONARY MEDICINE Initial Physician Consultation Note Name: Jesus Rob : 1945 MRN: [id number] Date: 10/26/2020 Subjective:  
Consult Note: 10/26/2020 Requesting Physician: 
Reason for consult: 
 
Medical records and data reviewed. Patient is a 76 y.o. female who presented to the hospital after a near fall and was found to have rib fractures. She has had a history of multiple myeloma and pathological fractures before requiring kyphoplasties. She has required more O2 during this admission and chest imaging suggests bibasilar atelectasis. She reports poorly controlled rib fracture site pain. She denies underlying lung disease and is not on home O2. She has been diagnosed with BRENDON but does not use CPAP because of claustrophobia. LV function is unknown Review of Systems: A comprehensive 12 system review of systems was negative except for as documented in HPI Assessment:  
Acute hypoxic pulmonary insufficiency Bibasilar atelectasis and pulmonary hypoinflation with rib fracture and chest wall pain Multiple myeloma with history of multiple pathological fractures On Xarelto for previous VTE 
BRENDON- untreated Other medical problems per chart Recommendations: O2- wean as tolerated Incentive spirometry On antibiotics Mobilize as tolerated Pain managemenet Monitor oxygenation/CXR Consider diuretics as indicated May need NIPPV  
D/W  and RN Acutely ill, at risk for further decline Active Problem List:  
 
Problem List  Date Reviewed: 2020 Codes Class Multiple rib fractures ICD-10-CM: O48.23AJ ICD-9-CM: 807.09 Obesity, morbid ([address] ICD-10-CM: E66.01 
ICD-9-CM: 278.01 Severe pain ICD-10-CM: F18 ICD-9-CM: 780.96 Recurrent ventral incisional hernia ICD-10-CM: O35.6 ICD-9-CM: 553.21 Advance directive on file ICD-10-CM: Z78.9 ICD-9-CM: V49.89 Acute bronchitis ICD-10-CM: J20.9 ICD-9-CM: 466.0 Sinusitis ICD-10-CM: J32.9 ICD-9-CM: 473.9 Dysphagia ICD-10-CM: R13.10 ICD-9-CM: 787.20 Umbilical hernia AEN-02-FD: K42.9 ICD-9-CM: 553.1 Recurrent umbilical hernia HFE-77-XG: K42.9 ICD-9-CM: 553.1 Other complication due to venous access device ICD-10-CM: T82.898A ICD-9-CM: 996.74 Infected seroma, postoperative ICD-10-CM: [id number] ICD-9-CM: 998.51 Multiple myeloma (HCC) ICD-10-CM: C90.00 ICD-9-CM: 203.00 Fibromyalgia ICD-10-CM: M79.7 ICD-9-CM: 729.1 GERD (gastroesophageal reflux disease) ICD-10-CM: K21.9 ICD-9-CM: 530.81 Rheumatoid arthritis involving multiple sites with positive rheumatoid factor (HCC) ICD-10-CM: M05.79 ICD-9-CM: 714.0 Past Medical History:  
 
 has a past medical history of Anemia, Aneurysm ([facility] (3/2005), Anxiety, Chronic pain, Fibromyalgia, GERD (gastroesophageal reflux disease), High cholesterol, History of acute pancreatitis, History of blood transfusion, History of Salmonella infection (2009), Multiple myeloma ([facility] (2007), Osteoporosis, Other complication due to venous access device (1/29/2013), Recurrent umbilical hernia (3/66/7969), Recurrent ventral incisional hernia (3/28/2016), Rheumatoid arthritis(714.0), Sleep apnea, Thromboembolus ([facility] and Urinary incontinence.  
 
Past Surgical History:  
 
 has a past surgical history that includes pain pump device; hx heent (10/2011); hx other surgical (2010); hx other surgical (1-16-14); hx vascular access (Right, 1/2013); hx vascular access (2010); hx orthopaedic (Right, 2001); hx gi; hx gi; hx other surgical (5-31-16); hx cholecystectomy (2004); hx hernia repair ([id number]); hx hernia repair (10-13-14); hx craniotomy; neurological procedure unlisted (2007); neurological procedure unlisted (10/26/15); hx kyphoplasty (09/14/2017); ir inj foramin epid lumb anes/ster sngl (3/19/2019); ir kyphoplasty lumbar (6/12/2020); ir kyphoplasty lumbar (7/22/2020); and hx kyphoplasty (2020). Home Medications:  
 
Prior to Admission medications Medication Sig Start Date End Date Taking? Authorizing Provider  
docusate sodium (COLACE) 100 mg capsule Take 100 mg by mouth daily. Takes 100 mg QAM and 300 mg QPM   Yes Provider, Historical  
docusate sodium (COLACE) 100 mg capsule Take 300 mg by mouth every evening. Takes 100 mg QAM and 300 mg QPM   Yes Provider, Historical  
fluticasone propionate (Flonase Allergy Relief) 50 mcg/actuation nasal spray 2 Sprays by Both Nostrils route two (2) times daily as needed for Allergies. Yes Provider, Historical  
gabapentin (NEURONTIN) 300 mg capsule Take 600 mg by mouth three (3) times daily. Yes Provider, Historical  
fentaNYL (DURAGESIC) 50 mcg/hr PATCH 1 Patch by TransDERmal route every seventy-two (72) hours. Yes Provider, Historical  
gentamicin (GARAMYCIN) 0.1 % topical cream Apply  to affected area every evening. Apply to toes   Yes Provider, Historical  
dextran 70-hypromellose (Artificial Tears,[id number],) ophthalmic solution Administer 1 Drop to both eyes as needed. Yes Provider, Historical  
predniSONE (DELTASONE) 20 mg tablet Take 1 Tab by mouth daily. 10/14/20  Yes Provider, Historical  
milnacipran (Savella) 50 mg tablet TAKE 1 TABLET BY MOUTH TWICE DAILY 10/12/20  Yes Narciso Wilson III, MD  
linaCLOtide (Linzess) 290 mcg cap capsule Take 1 Cap by mouth Daily (before breakfast). 10/7/20  Yes Radha Magdaleno MD  
polyethylene glycol (Miralax) 17 gram/dose powder Take 17 g by mouth daily.  8/10/20  Yes Radha Magdaleno MD  
morphine 10 mg/mL injection 5 mg by IntraVENous route continuous. 5mg/day concentration is 20.0 mg/ml mixed with Bupivacaine 3.215 mg/day   Yes Provider, Historical  
simvastatin (ZOCOR) 20 mg tablet TAKE ONE TABLET BY MOUTH AT BEDTIME. 6/29/20  Yes Radha Magdaleno MD  
 amitriptyline (ELAVIL) 50 mg tablet TAKE TWO TABLETS BY MOUTH AT BEDTIME AS NEEDED FOR SLEEP. 6/29/20  Yes Emily Pena MD  
potassium chloride (K-DUR, KLOR-CON) 20 mEq tablet TAKE ONE TABLET BY MOUTH ONCE DAILY. 6/29/20  Yes Emily Pena MD  
dexlansoprazole (Dexilant) 60 mg CpDB capsule (delayed release) TAKE 1 CAPSULE BY MOUTH EVERY DAY 5/20/20  Yes Nahomi Li III, MD  
HYDROmorphone (DILAUDID) 8 mg tablet Take 8 mg by mouth every three (3) hours as needed for Pain. Yes Other, MD Sherif  
Cetirizine (ZYRTEC) 10 mg cap Take 10 mg by mouth daily as needed (allergies). Yes Other, MD Sherif  
cholecalciferol (VITAMIN D3) 400 unit tab tablet Take 400 Units by mouth daily. Yes Provider, Historical  
clotrimazole (LOTRIMIN) 1 % topical cream Apply  to affected area two (2) times a day. 2/7/19  Yes Emily Pena MD  
bisacodyl (DULCOLAX) 10 mg suppository Insert 10 mg into rectum daily as needed. 1/31/19  Yes Nilesh RAM MD  
rivaroxaban Ismael Cohen) 10 mg tablet Take 10 mg by mouth daily (with dinner). Yes Provider, Historical  
aluminum hydrox-magnesium carb (GAVISCON)  mg/15 mL suspension Take 15 mL by mouth every six (6) hours as needed for Indigestion. Yes Provider, Historical  
calcium carbonate (TUMS) 200 mg calcium (500 mg) chew Take 1 Tab by mouth as needed. Yes Provider, Historical  
calcium citrate-vitamin D3 (CITRACAL WITH VITAMIN D MAXIMUM) tablet Take 1 Tab by mouth daily. Yes Provider, Historical  
magnesium hydroxide (MILK OF MAGNESIA) 400 mg/5 mL suspension Take 30 mL by mouth daily as needed for Constipation. Yes Provider, Historical  
acetaminophen (TYLENOL) 500 mg tablet Take 1,000 mg by mouth every six (6) hours as needed for Pain. Yes Provider, Historical  
diazepam (VALIUM) 10 mg tablet Take 10 mg by mouth two (2) times a day. Yes Provider, Historical  
senna (SENOKOT) 8.6 mg tablet Take 4 Tabs by mouth two (2) times a day.  4 PILLS IN AM  4 PILLS IN PM   Yes Provider, Historical  
prochlorperazine (COMPAZINE) 5 mg tablet Take 5 mg by mouth every six (6) hours as needed for Nausea. Yes Provider, Historical  
OTHER,NON-FORMULARY, LLQ Morphine/bupivacaine pain pump for multiple myeloma. 4.287mg/3.215 mg per day    Provider, Historical  
 
 
Allergies/Social/Family History: Allergies Allergen Reactions  Broccoli Other (comments) C/O GAS  Bumex [Bumetanide] Rash  Doxil [Doxorubicin, Peg-Liposomal] Rash Burning of skin  Flagyl [Metronidazole] Rash  Keflex [Cephalexin] Rash  Lasix [Furosemide] Rash  Macrobid [Nitrofurantoin Monohyd/M-Cryst] Shortness of Breath  Methotrexate Hives and Rash  Pcn [Penicillins] Rash  Pomalyst [Pomalidomide] Hives  Sulfa (Sulfonamide Antibiotics) Hives  Tetanus And Diphtheria Toxoids Swelling  Thalidomide Rash  Dipyridamole Rash Social History Tobacco Use  Smoking status: Former Smoker Years: 10.00 Last attempt to quit: 10/7/2005 Years since quitting: 15.0  Smokeless tobacco: Never Used Substance Use Topics  Alcohol use: No  
  
Family History Problem Relation Age of Onset [facility] Arthritis-osteo Mother  Anesth Problems Neg Hx Objective:  
Vital Signs: 
Visit Vitals BP (!) 137/59 (BP 1 Location: Right arm, BP Patient Position: At rest) Pulse (!) 105 Temp 98.1 °F (36.7 °C) Resp 18 Ht 4' 8\" (1.422 m) Wt 79.8 kg (176 lb) SpO2 94% BMI 39.46 kg/m² O2 Flow Rate (L/min): 5 l/min O2 Device: Nasal cannula Temp (24hrs), Av.9 °F (37.2 °C), Min:98.1 °F (36.7 °C), Max:99.8 °F (37.7 °C) Intake/Output:  
 
Intake/Output Summary (Last 24 hours) at 10/26/2020 1346 Last data filed at 10/26/2020 0875 Gross per 24 hour Intake 0 ml Output 1350 ml Net -1350 ml Physical Exam:  
General:  Alert, cooperative, no distress, appears stated age. Head:  Normocephalic, without obvious abnormality, atraumatic. Eyes:  Conjunctivae/corneas clear. PERRL Neck: Supple, symmetrical, no adenopathy, no carotid bruit and no JVD. Lungs:   Clear to auscultation bilaterally. Chest wall:  No tenderness or deformity. Heart:  Regular rate and rhythm, S1-S2 normal, no murmur, no click, rub or gallop. Abdomen:   Soft, non-tender. Bowel sounds present. No masses,  No organomegaly. Extremities: Atraumatic, no cyanosis or edema. Pulses: Palpable Skin: No rashes or lesions Neurologic: Grossly nonfocal  
 
  
 LABS AND  DATA: Personally reviewed Recent Labs 10/26/20 
6926 10/25/20 
0303 WBC 9.2 10.5 HGB 11.5 11.9 HCT 38.1 38.3  353 Recent Labs 10/26/20 
8166 10/25/20 
0303  136  
K 3.6 4.0  
CL 97 100 CO2 34* 29 BUN 5* 9  
CREA 0.59 0.59 * 105* CA 9.0 8.7 Recent Labs 10/24/20 
0430 * TP 5.6* ALB 2.7*  
GLOB 2.9 Recent Labs 10/25/20 
0303 10/24/20 
0430 INR 1.0 1.0 PTP 10.6 10.8 APTT 21.0* 25.7 No results for input(s): PHI, PCO2I, PO2I, FIO2I in the last 72 hours. No results for input(s): CPK, CKMB, TROIQ, BNPP in the last 72 hours. MEDS: Reviewed Chest Imaging: personally reviewed and report checked Tele- reviewed Medical decision making:  
I have reviewed the flowsheet and previous day's notes Patient has acute or chronic illness that poses a threat to life or bodily function Review and order of Clinical lab tests Review and Order of Radiology tests Independent visualization of Image Thank you for allowing me to participate in this patient's care. Sebastian Andrea MD 
 
 
[facility]

## 2020-10-26 NOTE — PERIOP NOTES
TRANSFER - OUT REPORT:    Verbal report given to GONZÁLEZ Gallego(name) on Mitchel Franco  being transferred to 46(unit) for routine post - op       Report consisted of patients Situation, Background, Assessment and   Recommendations(SBAR). Time Pre op antibiotic given:on scheduled Levaquin  Anesthesia Stop time: 18:13  Palma Present on Transfer to floor:no  Order for Palma on Chart:no  Discharge Prescriptions with Chart:no    Information from the following report(s) SBAR, Kardex, OR Summary, Procedure Summary, Intake/Output, MAR, Recent Results, Med Rec Status and Cardiac Rhythm SR/ST was reviewed with the receiving nurse. Opportunity for questions and clarification was provided. Is the patient on 02? YES       L/Min 2       Other     Is the patient on a monitor? YES    Is the nurse transporting with the patient? YES    Surgical Waiting Area notified of patient's transfer from PACU?  YES      The following personal items collected during your admission accompanied patient upon transfer:   Dental Appliance:    Vision:    Hearing Aid:    Jewelry:    Clothing:    Other Valuables:    Valuables sent to safe:

## 2020-10-26 NOTE — PROGRESS NOTES
TRANSFER - OUT REPORT: 
 
Verbal report given to GONZÁLEZ Deras(name) on Elza Arciniega  being transferred to [facility](unit) for routine progression of care Report consisted of patients Situation, Background, Assessment and  
Recommendations(SBAR). Information from the following report(s) SBAR, Kardex, ED Summary, MAR, Recent Results and Cardiac Rhythm NSr was reviewed with the receiving nurse. Lines:  
Venous Access Device power port 03/16/19 Upper chest (subclavicular area, right (Active) Central Line Being Utilized Yes 10/25/20 2047 Criteria for Appropriate Use Limited/no vessel suitable for conventional peripheral access 10/25/20 2047 Site Assessment Clean, dry, & intact 10/25/20 2047 Date of Last Dressing Change 10/23/20 10/25/20 2047 Dressing Status Clean, dry, & intact 10/25/20 2047 Dressing Type Disk with Chlorhexadine gluconate (CHG); Transparent 10/25/20 2047 Action Taken Open ports on tubing capped 10/25/20 2047 Positive Blood Return (Medial Site) Yes 10/25/20 2047 Action Taken (Medial Site) Flushed 10/25/20 2047 Alcohol Cap Used Yes 10/25/20 1620 Opportunity for questions and clarification was provided. Patient transported with: 
 Monitor O2 @ 5 liters Registered Nurse Problem: Falls - Risk of 
Goal: *Absence of Falls Description: Document Rochele Leonila Fall Risk and appropriate interventions in the flowsheet. Outcome: Progressing Towards Goal 
Note: Fall Risk Interventions: 
Mobility Interventions: Communicate number of staff needed for ambulation/transfer Medication Interventions: Evaluate medications/consider consulting pharmacy Elimination Interventions: Call light in reach History of Falls Interventions: Door open when patient unattended, Vital signs minimum Q4HRs X 24 hrs (comment for end date) Problem: Pressure Injury - Risk of 
Goal: *Prevention of pressure injury Description: Document Rodolfo Scale and appropriate interventions in the flowsheet. Outcome: Progressing Towards Goal 
Note: Pressure Injury Interventions: 
Sensory Interventions: Check visual cues for pain, Assess changes in LOC, Keep linens dry and wrinkle-free, Minimize linen layers Moisture Interventions: Absorbent underpads, Check for incontinence Q2 hours and as needed, Internal/External urinary devices Activity Interventions: Assess need for specialty bed Mobility Interventions: Float heels, HOB 30 degrees or less Nutrition Interventions: Document food/fluid/supplement intake Friction and Shear Interventions: HOB 30 degrees or less, Apply protective barrier, creams and emollients

## 2020-10-26 NOTE — CONSULTS
Palliative Medicine Consult [place]: [phone] ([phone]) Patient Name: Mary Torrez YOB: 1945 Date of Initial Consult: October 26, 2020 Reason for Consult: Care [place] Requesting Provider: Dr. Michelle Patton Primary Care Physician: Radha Magdaleno MD 
 
 SUMMARY:  
Mary Torrez is a 76 y. o. with a past history listed below who was admitted on 10/23/2020 from home post fall with resultant rib and thoracic vertebrae fx. Other issues include acute hypoxic respiratory failure, left shoulder pain, rt hip pain,  Current medical issues leading to Palliative Medicine involvement include: pt with high risk of compromise, full code status with advance mm and multiple bone fx, decline in functional status. 
  
PAST MEDICAL HISTORY: 1.  Multiple myeloma with mets 2.  Osteoporosis. 3.  Anemia. 4.  Hyperlipidemia. 5.  Urinary incontinence. 6.  Pancreatitis. 7.  Sleep apnea. 8.  Chronic pain~ intrathecal pump 9.  Rheumatoid arthritis. 10.  DVT in her right leg. 11.  Fibromyalgia. 12.  GERD. 13.  Anxiety. 14.  Salmonella infection. 15.  Aneurysm Social: lives at home with spouse, admits that her care needs have significantly increased PALLIATIVE DIAGNOSES:  
1. Hypoxia 2. Generalized pain 2/2 multiple myeloma complications 3. Fall 4. Physical debility PLAN:  
1. Pt seen without family present. Services introduced. She does not recall meeting with our team previously. 2. Inquired about care needs at home, symptoms, concerns with current full code status, options for care, questions, concerns 3. Pt asked about rehab options. I explained the criteria and concerns given her advanced disease state 4. Pt really pre-occupied with locating her belongings and difficult to engage in discussion 5. Was able to re-direct the discussion regarding code and she stated that no one had ever explained it that way.   She asked if the team would just let her die? I explained at the time when this occurs, there is a cardiac arrest and all efforts would be taken with the exception of CPR and pain and suffering would be addressed. 6. Pt stated that she needed more time to think about it. I respected her wishes and offered to help her with the various tasks prior to surgery 7. Will follow up 8. Initial consult note routed to primary continuity provider and/or primary health care team members 9. Communicated plan of care with: Palliative [facility] Team 
 
 GOALS OF CARE / TREATMENT PREFERENCES:  
 
GOALS OF CARE: 
Patient/Health Care Proxy Stated Goals: Prolong life TREATMENT PREFERENCES:  
Code Status: Full Code Advance Care Planning: 
[x] [facility] Interdisciplinary Team has updated the ACP Navigator with Que and Patient Capacity Primary Decision MakerDelbenito Kayli [facility] - [phone] Advance Care Planning 3/19/2019 Patient's Healthcare Decision Maker is: Named in scanned ACP document Primary Decision Maker Name -  
Primary Decision Maker Phone Number -  
Primary Decision Maker Relationship to Patient -  
Confirm Advance Directive Yes, on file Does the patient have other document types - Medical Interventions: Full interventions Other Instructions:  
Artificially Administered Nutrition: No feeding tube Other: As far as possible, the palliative care team has discussed with patient / health care proxy about goals of care / treatment preferences for patient. HISTORY:  
 
History obtained from: chart, pt, team 
 
CHIEF COMPLAINT:  Pt admitted with aforementioned history and issues HPI/SUBJECTIVE: The patient is:  
[x] Verbal and participatory [] Non-participatory due to:  
See below Clinical Pain Assessment (nonverbal scale for severity on nonverbal patients):  
Clinical Pain Assessment Severity: 4 Location: ribs, arms, spine Character: pt unable to provide Duration: chronic and acute Effect: limited mobility Factors: worse with manipulation Frequency: all the time Duration: for how long has pt been experiencing pain (e.g., 2 days, 1 month, years) Frequency: how often pain is an issue (e.g., several times per day, once every few days, constant) FUNCTIONAL ASSESSMENT:  
 
Palliative Performance Scale (PPS): PPS: 30 
 
 
 PSYCHOSOCIAL/SPIRITUAL SCREENING:  
 
Palliative IDT has assessed this patient for cultural preferences / practices and a referral made as appropriate to needs (Cultural Services, Patient Advocacy, Ethics, etc.) Any spiritual / Anabaptist concerns: 
[] Yes /  [x] No 
 
Caregiver Burnout: 
[] Yes /  [x] No /  [] No Caregiver Present Anticipatory grief assessment:  
[x] Normal  / [] Maladaptive ESAS Anxiety: Anxiety: 0 
 
ESAS Depression: Depression: 0 REVIEW OF SYSTEMS:  
 
Positive and pertinent negative findings in ROS are noted above in HPI. The following systems were [x] reviewed / [] unable to be reviewed as noted in HPI Other findings are noted below. Systems: constitutional, ears/nose/mouth/throat, respiratory, gastrointestinal, genitourinary, musculoskeletal, integumentary, neurologic, psychiatric, endocrine. Positive findings noted below. Modified ESAS Completed by: provider Fatigue: 1 Drowsiness: 0 Depression: 0 Pain: 4 Anxiety: 0 Nausea: 0 Anorexia: 0 Dyspnea: 4 PHYSICAL EXAM:  
 
From RN flowsheet: 
Wt Readings from Last 3 Encounters:  
10/26/20 176 lb (79.8 kg) 08/10/20 179 lb (81.2 kg) 07/22/20 185 lb (83.9 kg) Blood pressure (!) 140/65, pulse (!) 106, temperature 98.8 °F (37.1 °C), resp. rate 20, height 4' 8\" (1.422 m), weight 176 lb (79.8 kg), SpO2 93 %. Pain Scale 1: Numeric (0 - 10) Pain Intensity 1: 0 Pain Onset 1: acute Pain Location 1: Back Pain Orientation 1: Upper Pain Description 1: Aching Pain Intervention(s) 1: Medication (see MAR) Last bowel movement, if known:  
 
Constitutional: ill appearing, uncomfortable Eyes: pupils equal, anicteric ENMT: no nasal discharge, moist mucous membranes Cardiovascular: regular rhythm, distal pulses intact Respiratory: breathing not labored, symmetric Gastrointestinal: gross, soft non-tender, +bowel sounds Musculoskeletal: multiple fractures Skin: warm, dry Neurologic: following commands, moving all extremities Psychiatric: full affect, no hallucinations Other: 
 
 
 HISTORY:  
 
Active Problems: 
  Multiple rib fractures (10/23/2020) Past Medical History:  
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   
 spinal stenosis per pt  Fibromyalgia  GERD (gastroesophageal reflux disease)  High cholesterol  History of acute pancreatitis  History of blood transfusion  History of Salmonella infection 2009  Multiple myeloma ([address] 2007  Osteoporosis  Other complication due to venous access device 1/29/2013  Recurrent umbilical hernia 4/31/4670  Recurrent ventral incisional hernia 3/28/2016  Rheumatoid arthritis(714.0)  Sleep apnea INTOLERATANT OF CPAP  Thromboembolus ([address] Right leg DVT  Urinary incontinence Past Surgical History:  
Procedure Laterality Date  HX CHOLECYSTECTOMY  2004  HX CRANIOTOMY    
 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    
 COLONOSCOPIES, EGD  HX GI    
 ESOPHAGUS STREACHED  
 HX HEENT  10/2011  
 b/l cataract surgery in October 2011  HX HERNIA REPAIR  [id number] X6  
 HX HERNIA REPAIR  10-13-14  
 repair of recurrent ventral incisional hernia  with primary suture [id number]. Lala Nones  HX KYPHOPLASTY  09/14/2017 L4  
 HX KYPHOPLASTY  2020 L1, L2, L5   
 HX ORTHOPAEDIC Right 2001 BONE SPURS SHOULDER  
 HX OTHER SURGICAL  2010 LLQ PAIN PUMP FOR MORPHINE INFUSION  
 HX OTHER SURGICAL  1-16-14 repair of recurrent umbilical hernia with ventralex pillow top mesh and extensive lysis of [id number]. Carmela Ruvalcaba  HX OTHER SURGICAL  5-31-16  
 repair of recurrent ventral incisional hernia with underlay mesh-[facility]-Dr. Carmela Ruvalcaba  HX VASCULAR ACCESS Right 1/2013 POWER PORT   
 HX VASCULAR ACCESS  2010 PAIN PUMP  IR INJ FORAMIN EPID LUMB ANES/STER SNGL  3/19/2019  IR KYPHOPLASTY LUMBAR  6/12/2020  IR KYPHOPLASTY LUMBAR  7/22/2020  
 NEUROLOGICAL PROCEDURE UNLISTED  2007 KYPHOPLASTY X2  
 NEUROLOGICAL PROCEDURE UNLISTED  10/26/15 Kyphoplasty t-5  PAIN PUMP DEVICE    
 implanted in LLQ, MORPHINE Family History Problem Relation Age of Onset [facility] Arthritis-osteo Mother  Anesth Problems Neg Hx History reviewed, no pertinent family history. Social History Tobacco Use  Smoking status: Former Smoker Years: 10.00 Last attempt to quit: 10/7/2005 Years since quitting: 15.0  Smokeless tobacco: Never Used Substance Use Topics  Alcohol use: No  
 
Allergies Allergen Reactions  Broccoli Other (comments) C/O GAS  Bumex [Bumetanide] Rash  Doxil [Doxorubicin, Peg-Liposomal] Rash Burning of skin  Flagyl [Metronidazole] Rash  Keflex [Cephalexin] Rash  Lasix [Furosemide] Rash  Macrobid [Nitrofurantoin Monohyd/M-Cryst] Shortness of Breath  Methotrexate Hives and Rash  Pcn [Penicillins] Rash  Pomalyst [Pomalidomide] Hives  Sulfa (Sulfonamide Antibiotics) Hives  Tetanus And Diphtheria Toxoids Swelling  Thalidomide Rash  Dipyridamole Rash Current Facility-Administered Medications Medication Dose Route Frequency  HYDROmorphone (DILAUDID) tablet 10 mg  10 mg Oral Q3H PRN  
 morphine 10 mg/mL injection 5 mg  5 mg IntraVENous Q4H PRN  polyethylene glycol (MIRALAX) packet 17 g  17 g Oral BID PRN  
 balsam peru-castor oiL (VENELEX) ointment   Topical Q8H  
  diphenhydrAMINE-zinc acetate 1%-0.1% (BENADRYL) cream   Topical TID PRN  
 HYDROmorphone (PF) (DILAUDID) 1 mg/mL injection  levoFLOXacin (LEVAQUIN) 750 mg in D5W IVPB  750 mg IntraVENous Q24H  
 amitriptyline (ELAVIL) tablet 100 mg  100 mg Oral QHS  aluminum hydrox-magnesium carb (GAVISCON) oral suspension 15 mL  15 mL Oral Q6H PRN  
 bisacodyL (DULCOLAX) suppository 10 mg  10 mg Rectal DAILY PRN  
 docusate sodium (COLACE) capsule 100 mg  100 mg Oral DAILY  docusate sodium (COLACE) capsule 300 mg  300 mg Oral QPM  
 fluticasone propionate (FLONASE) 50 mcg/actuation nasal spray 2 Spray  2 Spray Both Nostrils BID PRN  
 sodium chloride (NS) flush 5-40 mL  5-40 mL IntraVENous Q8H  
 sodium chloride (NS) flush 5-40 mL  5-40 mL IntraVENous PRN  
 acetaminophen (TYLENOL) tablet 650 mg  650 mg Oral Q6H PRN Or  
 acetaminophen (TYLENOL) suppository 650 mg  650 mg Rectal Q6H PRN  promethazine (PHENERGAN) tablet 12.5 mg  12.5 mg Oral Q6H PRN Or  
 ondansetron (ZOFRAN) injection 4 mg  4 mg IntraVENous Q6H PRN  
 diazePAM (VALIUM) tablet 10 mg  10 mg Oral BID  
 gabapentin (NEURONTIN) capsule 600 mg  600 mg Oral TID  
 . PHARMACY TO SUBSTITUTE PER PROTOCOL (Reordered from: linaCLOtide (Linzess) 290 mcg cap capsule)    Per Protocol  [Held by provider] rivaroxaban (XARELTO) tablet 10 mg  10 mg Oral DAILY  senna (SENOKOT) tablet 34.4 mg  4 Tab Oral BID  fentaNYL (DURAGESIC) 50 mcg/hr patch 1 Patch  1 Patch TransDERmal Q72H  albuterol (PROVENTIL HFA, VENTOLIN HFA, PROAIR HFA) inhaler 1 Puff  1 Puff Inhalation Q4H PRN Facility-Administered Medications Ordered in Other Encounters Medication Dose Route Frequency  fentaNYL citrate (PF) injection    PRN  
 0.9% sodium chloride infusion   IntraVENous CONTINUOUS  
 PHENYLephrine (ROSALINDA-SYNEPHRINE) 10 mg in 0.9% sodium chloride 100 mL infusion   IntraVENous CONTINUOUS  
  PHENYLephrine (NEOSYNEPHRINE) in NS syringe   IntraVENous PRN  
 ePHEDrine in NS (PF) (MISTOLE) 10 mg/mL in NS syringe   IntraVENous PRN  propofoL (DIPRIVAN) 10 mg/mL injection   IntraVENous PRN  
 rocuronium injection   IntraVENous PRN  
 succinylcholine (ANECTINE) injection   IntraVENous PRN  
 lidocaine (XYLOCAINE) 20 mg/mL (2 %) injection  iopamidoL (ISOVUE 300) 61 % contrast injection  ketamine (KETALAR) 10 mg/mL injection   IntraVENous PRN  
 dexamethasone (DECADRON) 4 mg/mL injection    PRN  
 ondansetron (ZOFRAN) injection    PRN  
 
 
 
 LAB AND IMAGING FINDINGS:  
 
Lab Results Component Value Date/Time WBC 9.2 10/26/2020 03:25 AM  
 HGB 11.5 10/26/2020 03:25 AM  
 PLATELET 272 58/85/5277 03:25 AM  
 
Lab Results Component Value Date/Time Sodium 136 10/26/2020 03:25 AM  
 Potassium 3.6 10/26/2020 03:25 AM  
 Chloride 97 10/26/2020 03:25 AM  
 CO2 34 (H) 10/26/2020 03:25 AM  
 BUN 5 (L) 10/26/2020 03:25 AM  
 Creatinine 0.59 10/26/2020 03:25 AM  
 Calcium 9.0 10/26/2020 03:25 AM  
 Magnesium 2.0 06/06/2016 06:33 AM  
 Phosphorus 3.4 09/20/2012 06:05 AM  
  
Lab Results Component Value Date/Time Alk. phosphatase 141 (H) 10/24/2020 04:30 AM  
 Protein, total 5.6 (L) 10/24/2020 04:30 AM  
 Albumin 2.7 (L) 10/24/2020 04:30 AM  
 Globulin 2.9 10/24/2020 04:30 AM  
 
Lab Results Component Value Date/Time INR 1.0 10/25/2020 03:03 AM  
 Prothrombin time 10.6 10/25/2020 03:03 AM  
 aPTT 21.0 (L) 10/25/2020 03:03 AM  
  
Lab Results Component Value Date/Time Iron 24 (L) 08/25/2011 01:00 PM  
 TIBC 353 08/25/2011 01:00 PM  
 Iron % saturation 7 (L) 08/25/2011 01:00 PM  
 Ferritin 59 10/24/2020 04:30 AM  
  
No results found for: PH, PCO2, PO2 No components found for: [place] Lab Results Component Value Date/Time CK 62 03/01/2016 12:48 PM  
 CK - MB 0.8 03/01/2016 12:48 PM  
  
 
 
   
 
Total time: 70 min Counseling / coordination time, spent as noted above: 60 min 
> 50% counseling / coordination?: y 
 
Prolonged service was provided for  []30 min   []75 min in face to face time in the presence of the patient, spent as noted above. Time Start:  
Time End:  
Note: this can only be billed with 23343 (initial) or 05154 (follow up). If multiple start / stop times, list each separately.

## 2020-10-26 NOTE — PROGRESS NOTES
Transition of Care Plan: · RUR:  18 % GLOS:  Not Assigned  LOS:  3 
· Dx:  Multiple Myeloma with mets, Multiple rib fractures · Admitted to Hospitalist, Oncology, Ortho Consult, [facility] · Disposition:  TBD · Transport will need to be set up, stretcher transport · 1st IM letter completed on 10/24/20 Reason for Admission:   Fall RUR Score:     18%  Low PCP: First and Last name:  Dr. Esperanza Betancourt Name of Practice:  [address] [place] [address] Internal Medicine Are you a current patient: Yes/No:   Yes Approximate date of last visit:   August, 2020 Can you participate in a virtual visit if needed:   Unknown Do you (patient/family) have any concerns for transition/discharge? Poor prognosis, not candidate for rehab, Dr. Da Bernal discussed hospice. Palliative Care Consulted. Plan for utilizing home health:   TBD Current Advanced Directive/Advance Care Plan:  MD discussed with patient and spouse DDNR,  AMD on file Care Management Interventions PCP Verified by CM: Yes(Dr. Esperanza Betancourt) Palliative Care Criteria Met (RRAT>21 & CHF Dx)?: No 
Transition of Care Consult (CM Consult): Discharge Planning, Other(Palliative Consulted, Ortho Consulted, poor prognosis) MyChart Signup: No 
Discharge Durable Medical Equipment: No(Rolling walker) Health Maintenance Reviewed: Yes Physical Therapy Consult: No 
Occupational Therapy Consult: No 
Speech Therapy Consult: No 
Current Support Network: Lives with Spouse(Lives in [place] with spouse ) Transition of Care Plan:       
          
Fredo Don is a 76year old female to [facility] after fall from toilet, arms gave away. ED workup completed, hospitalist consulted. CT of lumbar spine, xray of shoulder left and right. Thoracic compression fracture, closed fracture of multiple ribs on left side. Verified face sheet/demographics. Hospitalist is discussing possible Hospice as an option, noted FULL CODE. Damaso Call, RN, BSN, [facility] 
ED Care Management [phone]

## 2020-10-26 NOTE — PROGRESS NOTES
Thoracic Surgery Simple Progress Note Admit Date: 10/23/2020 POD: * No surgery found * Procedure:  * No surgery found * Subjective:  
 
Patient has complaints: Uncontrolled pain Review of Systems: 
 
CARDIAC: negative RESP: positive for rib fx NEURO:  negative EXT: Denies new swelling or pain in the legs or calves. Objective:  
 
Blood pressure (!) 137/59, pulse (!) 105, temperature 98.1 °F (36.7 °C), resp. rate 18, height 4' 8\" (1.422 m), weight 176 lb (79.8 kg), SpO2 94 %. Temp (24hrs), Av.9 °F (37.2 °C), Min:98.1 °F (36.7 °C), Max:99.8 °F (37.7 °C) Hemodynamics PAP 
  CO 
  CI No intake/output data recorded. 10/24 1901 - 10/26 0700 In: 014 [P.O.:720; I.V.:150] Out: 2450 [Urine:2450] EXAM: 
GENERAL: VSS, afrible, alert and cooperative HEART:  regular rate and rhythm LUNG: clear to auscultation bilaterally NEURO:  mental status, speech normal, alert and oriented x iii EXTREMITIES:No evidence of DVT seen on physical exam. 
GI/: Abd soft, nonterder with + bowel sounds. Voiding Labs: 
Recent Results (from the past 24 hour(s)) URINE CULTURE HOLD SAMPLE Collection Time: 10/26/20 12:30 AM  
 Specimen: Serum Result Value Ref Range Urine culture hold Urine on hold in Microbiology dept for 2 days. If unpreserved urine is submitted, it cannot be used for addtional testing after 24 hours, recollection will be required. METABOLIC PANEL, BASIC Collection Time: 10/26/20  3:25 AM  
Result Value Ref Range Sodium 136 136 - 145 mmol/L Potassium 3.6 3.5 - 5.1 mmol/L Chloride 97 97 - 108 mmol/L  
 CO2 34 (H) 21 - 32 mmol/L Anion gap 5 5 - 15 mmol/L Glucose 113 (H) 65 - 100 mg/dL BUN 5 (L) 6 - 20 MG/DL Creatinine 0.59 0.55 - 1.02 MG/DL  
 BUN/Creatinine ratio 8 (L) 12 - 20 GFR est AA >60 >60 ml/min/1.73m2 GFR est non-AA >60 >60 ml/min/1.73m2  Calcium 9.0 8.5 - 10.1 MG/DL  
CBC WITH AUTOMATED DIFF  
 Collection Time: 10/26/20  3:25 AM  
Result Value Ref Range WBC 9.2 3.6 - 11.0 K/uL  
 RBC 3.79 (L) 3.80 - 5.20 M/uL  
 HGB 11.5 11.5 - 16.0 g/dL HCT 38.1 35.0 - 47.0 % .5 (H) 80.0 - 99.0 FL  
 MCH 30.3 26.0 - 34.0 PG  
 MCHC 30.2 30.0 - 36.5 g/dL  
 RDW 14.5 11.5 - 14.5 % PLATELET 949 185 - 338 K/uL MPV 8.6 (L) 8.9 - 12.9 FL  
 NRBC 0.0 0  WBC ABSOLUTE NRBC 0.00 0.00 - 0.01 K/uL NEUTROPHILS 71 32 - 75 % LYMPHOCYTES 9 (L) 12 - 49 % MONOCYTES 10 5 - 13 % EOSINOPHILS 6 0 - 7 % BASOPHILS 1 0 - 1 % IMMATURE GRANULOCYTES 3 (H) 0.0 - 0.5 % ABS. NEUTROPHILS 6.5 1.8 - 8.0 K/UL  
 ABS. LYMPHOCYTES 0.8 0.8 - 3.5 K/UL  
 ABS. MONOCYTES 0.9 0.0 - 1.0 K/UL  
 ABS. EOSINOPHILS 0.6 (H) 0.0 - 0.4 K/UL  
 ABS. BASOPHILS 0.1 0.0 - 0.1 K/UL  
 ABS. IMM. GRANS. 0.3 (H) 0.00 - 0.04 K/UL  
 DF SMEAR SCANNED    
 RBC COMMENTS ANISOCYTOSIS 1+ 
    
 RBC COMMENTS MACROCYTOSIS 
1+ Assessment:  
No evidence of DVT. Active Problems: 
  Multiple rib fractures (10/23/2020) Plan/Recommendations:  
Recommend aggressive pain management No surgical interventions indicated We will see her again if needed See orders Signed By: Pina VICTOR

## 2020-10-26 NOTE — PROGRESS NOTES
Hematology-Oncology Progress Note Isai Cotton 1945 
[id number] 
10/26/2020 Subjective:  
 
Complaining of back pain,and left shoulder pain, not asking for prn mso4 however. .. no new problems Allergies: Broccoli; Bumex [bumetanide]; Doxil [doxorubicin, peg-liposomal]; Flagyl [metronidazole]; Keflex [cephalexin]; Lasix [furosemide]; Macrobid [nitrofurantoin monohyd/m-cryst]; Methotrexate; Pcn [penicillins]; Pomalyst [pomalidomide]; Sulfa (sulfonamide antibiotics); Tetanus and diphtheria toxoids; Thalidomide; and Dipyridamole Current Facility-Administered Medications Medication Dose Route Frequency Provider Last Rate Last Dose  
 HYDROmorphone (DILAUDID) tablet 10 mg  10 mg Oral Q3H PRN Meño Dowling MD   10 mg at 10/26/20 1015  morphine 10 mg/mL injection 5 mg  5 mg IntraVENous Q4H PRN Keri Cowan MD      
 polyethylene glycol (MIRALAX) packet 17 g  17 g Oral BID PRN Keri Cowan MD      
 levoFLOXacin (LEVAQUIN) 750 mg in D5W IVPB  750 mg IntraVENous Q24H Madala, Sushma,  mL/hr at 10/25/20 1221 750 mg at 10/25/20 1221  
 amitriptyline (ELAVIL) tablet 100 mg  100 mg Oral QHS Winter Cox NP   100 mg at 10/25/20 2010  aluminum hydrox-magnesium carb (GAVISCON) oral suspension 15 mL  15 mL Oral Q6H PRN Marija Jones MD      
 bisacodyL (DULCOLAX) suppository 10 mg  10 mg Rectal DAILY PRN Meño Dowling MD      
 docusate sodium (COLACE) capsule 100 mg  100 mg Oral DAILY Madala, Sushma, MD   100 mg at 10/26/20 0826  
 docusate sodium (COLACE) capsule 300 mg  300 mg Oral QPM Marija Jones MD   300 mg at 10/25/20 2011  fluticasone propionate (FLONASE) 50 mcg/actuation nasal spray 2 Spray  2 Spray Both Nostrils BID PRN Meño Dowling MD      
 sodium chloride (NS) flush 5-40 mL  5-40 mL IntraVENous Q8H Madala, Sushma, MD   10 mL at 10/25/20 2149  sodium chloride (NS) flush 5-40 mL  5-40 mL IntraVENous PRN Meño Dowling MD      
  acetaminophen (TYLENOL) tablet 650 mg  650 mg Oral Q6H PRN Mehran Villagran MD   650 mg at 10/24/20 0413 Or  acetaminophen (TYLENOL) suppository 650 mg  650 mg Rectal Q6H PRN Jenn Jones MD      
 promethazine (PHENERGAN) tablet 12.5 mg  12.5 mg Oral Q6H PRN Madala, Sushma, MD      
 Or  
 ondansetron (ZOFRAN) injection 4 mg  4 mg IntraVENous Q6H PRN Madala, Sushma, MD   4 mg at 10/23/20 1320  
 diazePAM (VALIUM) tablet 10 mg  10 mg Oral BID Mehran Villagran MD   10 mg at 10/26/20 0826  
 gabapentin (NEURONTIN) capsule 600 mg  600 mg Oral TID Mehran Villagran MD   600 mg at 10/26/20 4893  . PHARMACY TO SUBSTITUTE PER PROTOCOL (Reordered from: linaCLOtide (Linzess) 290 mcg cap capsule)    Per Protocol Mehran Villagran MD      
 [Held by provider] rivaroxaban (XARELTO) tablet 10 mg  10 mg Oral DAILY Madala, Sushma, MD      
 senna (SENOKOT) tablet 34.4 mg  4 Tab Oral BID Mehran Villagran MD   34.4 mg at 10/26/20 0826  
 fentaNYL (DURAGESIC) 50 mcg/hr patch 1 Patch  1 Patch TransDERmal Q72H Mehran Villagran MD   1 Patch at 10/24/20 0029  
 albuterol (PROVENTIL HFA, VENTOLIN HFA, PROAIR HFA) inhaler 1 Puff  1 Puff Inhalation Q4H PRN Madala, Sushma, MD      
 
Objective:  
 
Patient Vitals for the past 24 hrs: 
 BP Temp Pulse Resp SpO2 Weight 10/26/20 0820 101/67 98.6 °F (37 °C) (!) 113 18 93 %   
10/26/20 0429      79.8 kg (176 lb) 10/26/20 0311 (!) 141/58 99.8 °F (37.7 °C) (!) 102 18 94 %   
10/26/20 0034 (!) 106/54 99.8 °F (37.7 °C) (!) 104 20 94 %   
10/25/20 2332 121/63 98.2 °F (36.8 °C) (!) 105 20 93 %   
10/25/20 1912 132/73 98.3 °F (36.8 °C) (!) 103 21 91 %   
10/25/20 1620 (!) 118/58 99.4 °F (37.4 °C) (!) 103 20 94 %   
10/25/20 1224 (!) 122/58 99.2 °F (37.3 °C) (!) 101 20 96 %  Gen: NAD HEENT: PERRL, Sclerae anicteric Cv: RRR without m/r/g Pulm: aerting well Abd: NABS, NTND, Ext: No c/c/e Available labs reviewed: 
Labs: Recent Results (from the past 24 hour(s)) URINE CULTURE HOLD SAMPLE Collection Time: 10/26/20 12:30 AM  
 Specimen: Serum Result Value Ref Range Urine culture hold Urine on hold in Microbiology dept for 2 days. If unpreserved urine is submitted, it cannot be used for addtional testing after 24 hours, recollection will be required. METABOLIC PANEL, BASIC Collection Time: 10/26/20  3:25 AM  
Result Value Ref Range Sodium 136 136 - 145 mmol/L Potassium 3.6 3.5 - 5.1 mmol/L Chloride 97 97 - 108 mmol/L  
 CO2 34 (H) 21 - 32 mmol/L Anion gap 5 5 - 15 mmol/L Glucose 113 (H) 65 - 100 mg/dL BUN 5 (L) 6 - 20 MG/DL Creatinine 0.59 0.55 - 1.02 MG/DL  
 BUN/Creatinine ratio 8 (L) 12 - 20 GFR est AA >60 >60 ml/min/1.73m2 GFR est non-AA >60 >60 ml/min/1.73m2 Calcium 9.0 8.5 - 10.1 MG/DL  
CBC WITH AUTOMATED DIFF Collection Time: 10/26/20  3:25 AM  
Result Value Ref Range WBC 9.2 3.6 - 11.0 K/uL  
 RBC 3.79 (L) 3.80 - 5.20 M/uL  
 HGB 11.5 11.5 - 16.0 g/dL HCT 38.1 35.0 - 47.0 % .5 (H) 80.0 - 99.0 FL  
 MCH 30.3 26.0 - 34.0 PG  
 MCHC 30.2 30.0 - 36.5 g/dL  
 RDW 14.5 11.5 - 14.5 % PLATELET 104 922 - 646 K/uL MPV 8.6 (L) 8.9 - 12.9 FL  
 NRBC 0.0 0  WBC ABSOLUTE NRBC 0.00 0.00 - 0.01 K/uL NEUTROPHILS 71 32 - 75 % LYMPHOCYTES 9 (L) 12 - 49 % MONOCYTES 10 5 - 13 % EOSINOPHILS 6 0 - 7 % BASOPHILS 1 0 - 1 % IMMATURE GRANULOCYTES 3 (H) 0.0 - 0.5 % ABS. NEUTROPHILS 6.5 1.8 - 8.0 K/UL  
 ABS. LYMPHOCYTES 0.8 0.8 - 3.5 K/UL  
 ABS. MONOCYTES 0.9 0.0 - 1.0 K/UL  
 ABS. EOSINOPHILS 0.6 (H) 0.0 - 0.4 K/UL  
 ABS. BASOPHILS 0.1 0.0 - 0.1 K/UL  
 ABS. IMM. GRANS. 0.3 (H) 0.00 - 0.04 K/UL  
 DF SMEAR SCANNED    
 RBC COMMENTS ANISOCYTOSIS 1+ 
    
 RBC COMMENTS MACROCYTOSIS 
1+ Assessment and Plan  
 
77 y/o woman with a long (since 2007) h/o myeloma, now with declining performance status and limited treatment options, admitted after wrenching her arm getting off of the toilet. Noted to have rib fracture and likely compression fracture. Also with dyspnea, noted to have bilateral pulmonary infiltrates, being ruled out for COVID. 1. Myeloma: not currently a candidate for treatment given declining performance status. 2. Rib fracture/comp fractures: likely due to #1 above. Given intractable pain,I increased the prn frequency of her mso4 today,, she has this and prn dilaudid available. . she does not want to go up on the fentanyl patch at present. ... bone scan ordered,  
 
3. Chronic pain syndrome: has implanted morphine pump, also on fentanyl patch. 4. VTE: rivaroxaban on hold for possible kyphoplasty 5. Disposition: patient does not feel she can safely go home. Spoke with  about possible discharge ultimately to [address] [place] [address]. 6. Constipation . . pt takes lilnzess at home. April Cade also takes miralax as well, will try to make these available Toni Schmitt MD 
 
Hematology/Oncology Phone [phone]

## 2020-10-26 NOTE — PROGRESS NOTES
Hospitalist Progress Note Hospital summary: A 76year old female patient with PMH of Multiple myeloma with metastatic disease, rheumatoid arthritis, chronic pain/ opioid dependence on implanted morphine pump, DVT on xarelto, multiple pathological fractures, recent kyphoplasty of L spine presents to the ED for evaluation of left shoulder pain and difficulty weightbearing.  Patient states that she has been having trouble ambulating for a few days and was trying to get off her commode this am when she lost her hand hold and fell back onto the toilet. She had been evaluated for DVT earlier in the week but was found negative. She states that she has been unable to bear weight very well for about a week but denies prior injury. She complains of pain primarily in her left shoulder as well as right posterior hip/buttock region. She denied sob initially and stated that she is getting evaluated for sleep study and hypoxia at night time. Pt has pain on deep breathing. Denied cough, fever, abd pain, urinary or bowel changes. In ED, imaging showed acute multiple rib fractures and thoracic fractures. Hematology consulted in ED. Hospitalist consulted for admission. 
  
Discussed with Ortho PA- recommended NM bone scan. On arrival to floor , pt desaturated to 80's and placed on 2l NC. CXR ordered by me showed diffuse bilateral pulmonary opacification. Spoke with patient again - she stated that she has been having sob since 1 week , worsening with exertion. She discussed with her PCP and oncology Dr. Luis Galvin - who recommended oxygen study possible due to bruising of her lungs for recurrent fractures. Explained CXR findings and in light of hypoxia, will test for covid. She was initially upset regarding this but understands 10/23/2020 Assessment/Plan: 
Acute hypoxic resp failure - not improving ? Unclear etiology  
- could be due to lung contusion from rib fractures - CXR: Nonspecific ill-defined diffuse bilateral pulmonary opacification. - febrile on 10/24  
- covid test x 2 negative 
- normal lactic, pro calcitonin 
- resp pcr negative - CTA chest : no PE. bibasilar atelectasis right greater than left - pt had rash - possible due to Vanco, so discontinued 
- on IV levaquin  
- saturating on 5l NC, try to wean down - Pulmonology consulted Left shoulder pain/ Right hip pain Thoracic vertebra fractures/ multiple rib fractures Secondary to MM 
- CT L spine: Acute versus subacute T11 and T12 partial compression fractures are new since 5 months ago. - CT hip : No evidence of acute abnormality 
- XR: Multiple left rib fractures. - XR shoulder: Degenerative changes left shoulder. [place] [facility] - IR consulted for Kyphoplasty for T11 and T12 fracture - appreciate thoracic surgery input for rib fractures conservative management  
- Orthopedics on board for shoulder and hip pains - pal for whole body bone scan today 
  
Multiple myeloma with metastatic disease 
- not on any treatment now due to intolerance 
- follows with oncology Dr. Trey Dunham - appreciate oncology input  
  
Chronic pain syndrome on implanted morphine drip - c/w home fentanyl patch, po dilaudid prn ( dose increased to 10mg prn). IV dilaudid prn   
  Hx DVT -  Xarelto on hold for kyphoplasty Palliative care consulted  
  
DVT ppx: Xarelto Code status: Full.  is mpoa Disposition: TBD  
---------------------------------------------- 
 
CC:  Left shoulder pain S: Patient is seen and examined at bedside this AM.  present. She feels drowsy - likely due to increased po dilaudid dose - adjusted to 10mg from 12mg prn. Plan for bone sacn today Extensive discussion regarding goals of care - please see ACP notes Discussed with nursing Review of Systems: A comprehensive review of systems was negative. O: 
Visit Vitals BP (!) 137/59 (BP 1 Location: Right arm, BP Patient Position: At rest) Pulse (!) 105 Temp 98.1 °F (36.7 °C) Resp 18 Ht 4' 8\" (1.422 m) Wt 79.8 kg (176 lb) SpO2 94% BMI 39.46 kg/m² PHYSICAL EXAM: 
Gen: mild distress, chronically ill looking HEENT: anicteric sclerae, normal conjunctiva, oropharynx clear, MM moist 
Neck: supple, trachea midline, no adenopathy Heart: RRR, no MRG, no JVD, no peripheral edema Lungs: decreased breath sounds Abd: soft, NT, ND, BS+, no organomegaly Extr: warm. Right hip pain. Left shoulder pain and ROM limited Skin: dry, no rash Neuro: drowsy, normal speech, moves all extremities Psych: anxious Intake/Output Summary (Last 24 hours) at 10/26/2020 1305 Last data filed at 10/26/2020 6936 Gross per 24 hour Intake 0 ml Output 1350 ml Net -1350 ml Recent labs & imaging reviewed: 
Recent Results (from the past 24 hour(s)) URINE CULTURE HOLD SAMPLE Collection Time: 10/26/20 12:30 AM  
 Specimen: Serum Result Value Ref Range Urine culture hold Urine on hold in Microbiology dept for 2 days. If unpreserved urine is submitted, it cannot be used for addtional testing after 24 hours, recollection will be required. METABOLIC PANEL, BASIC Collection Time: 10/26/20  3:25 AM  
Result Value Ref Range Sodium 136 136 - 145 mmol/L Potassium 3.6 3.5 - 5.1 mmol/L Chloride 97 97 - 108 mmol/L  
 CO2 34 (H) 21 - 32 mmol/L Anion gap 5 5 - 15 mmol/L Glucose 113 (H) 65 - 100 mg/dL BUN 5 (L) 6 - 20 MG/DL Creatinine 0.59 0.55 - 1.02 MG/DL  
 BUN/Creatinine ratio 8 (L) 12 - 20 GFR est AA >60 >60 ml/min/1.73m2 GFR est non-AA >60 >60 ml/min/1.73m2 Calcium 9.0 8.5 - 10.1 MG/DL  
CBC WITH AUTOMATED DIFF Collection Time: 10/26/20  3:25 AM  
Result Value Ref Range WBC 9.2 3.6 - 11.0 K/uL  
 RBC 3.79 (L) 3.80 - 5.20 M/uL  
 HGB 11.5 11.5 - 16.0 g/dL HCT 38.1 35.0 - 47.0 %  .5 (H) 80.0 - 99.0 FL  
 MCH 30.3 26.0 - 34.0 PG  
 MCHC 30.2 30.0 - 36.5 g/dL  
 RDW 14.5 11.5 - 14.5 % PLATELET 588 256 - 279 K/uL MPV 8.6 (L) 8.9 - 12.9 FL  
 NRBC 0.0 0  WBC ABSOLUTE NRBC 0.00 0.00 - 0.01 K/uL NEUTROPHILS 71 32 - 75 % LYMPHOCYTES 9 (L) 12 - 49 % MONOCYTES 10 5 - 13 % EOSINOPHILS 6 0 - 7 % BASOPHILS 1 0 - 1 % IMMATURE GRANULOCYTES 3 (H) 0.0 - 0.5 % ABS. NEUTROPHILS 6.5 1.8 - 8.0 K/UL  
 ABS. LYMPHOCYTES 0.8 0.8 - 3.5 K/UL  
 ABS. MONOCYTES 0.9 0.0 - 1.0 K/UL  
 ABS. EOSINOPHILS 0.6 (H) 0.0 - 0.4 K/UL  
 ABS. BASOPHILS 0.1 0.0 - 0.1 K/UL  
 ABS. IMM. GRANS. 0.3 (H) 0.00 - 0.04 K/UL  
 DF SMEAR SCANNED    
 RBC COMMENTS ANISOCYTOSIS 1+ 
    
 RBC COMMENTS MACROCYTOSIS 1+ Recent Labs 10/26/20 
2647 10/25/20 
0303 WBC 9.2 10.5 HGB 11.5 11.9 HCT 38.1 38.3  353 Recent Labs 10/26/20 
0564 10/25/20 
0303 10/24/20 
0430  136 137  
K 3.6 4.0 4.1 CL 97 100 98 CO2 34* 29 33* BUN 5* 9 8  
CREA 0.59 0.59 0.65 * 105* 94  
CA 9.0 8.7 8.5 Recent Labs 10/24/20 
0430 ALT 9* * TBILI 0.3 TP 5.6* ALB 2.7*  
GLOB 2.9 Recent Labs 10/25/20 
0303 10/24/20 
0430 INR 1.0 1.0 PTP 10.6 10.8 APTT 21.0* 25.7 Recent Labs 10/24/20 
0430 FERR 59 Lab Results Component Value Date/Time Folate 34.3 (H) 05/28/2010 04:30 AM  
  
No results for input(s): PH, PCO2, PO2 in the last 72 hours. No results for input(s): CPK, CKNDX, TROIQ in the last 72 hours. No lab exists for component: CPKMB Lab Results Component Value Date/Time Cholesterol, total 137 03/06/2017 08:01 AM  
 HDL Cholesterol 47 03/06/2017 08:01 AM  
 LDL, calculated 38 03/06/2017 08:01 AM  
 Triglyceride 260 (H) 03/06/2017 08:01 AM  
 CHOL/HDL Ratio 5.0 12/16/2011 05:05 AM  
 
Lab Results Component Value Date/Time  Glucose (POC) 113 (H) 06/02/2016 11:06 AM  
 Glucose (POC) 87 09/15/2012 08:29 PM  
 Glucose (POC) 87 09/15/2012 11:58 AM  
 
Lab Results Component Value Date/Time Color YELLOW/STRAW 10/23/2020 12:47 PM  
 Appearance CLEAR 10/23/2020 12:47 PM  
 Specific gravity 1.013 10/23/2020 12:47 PM  
 Specific gravity <1.005 01/24/2019 08:09 PM  
 pH (UA) 8.5 (H) 10/23/2020 12:47 PM  
 Protein Negative 10/23/2020 12:47 PM  
 Glucose Negative 10/23/2020 12:47 PM  
 Ketone TRACE (A) 10/23/2020 12:47 PM  
 Bilirubin Negative 10/23/2020 12:47 PM  
 Urobilinogen 0.2 10/23/2020 12:47 PM  
 Nitrites Negative 10/23/2020 12:47 PM  
 Leukocyte Esterase Negative 10/23/2020 12:47 PM  
 Epithelial cells FEW 10/23/2020 12:47 PM  
 Bacteria Negative 10/23/2020 12:47 PM  
 WBC 0-4 10/23/2020 12:47 PM  
 RBC 0-5 10/23/2020 12:47 PM  
 
 
Med list reviewed Current Facility-Administered Medications Medication Dose Route Frequency  HYDROmorphone (DILAUDID) tablet 10 mg  10 mg Oral Q3H PRN  
 morphine 10 mg/mL injection 5 mg  5 mg IntraVENous Q4H PRN  polyethylene glycol (MIRALAX) packet 17 g  17 g Oral BID PRN  
 levoFLOXacin (LEVAQUIN) 750 mg in D5W IVPB  750 mg IntraVENous Q24H  
 amitriptyline (ELAVIL) tablet 100 mg  100 mg Oral QHS  aluminum hydrox-magnesium carb (GAVISCON) oral suspension 15 mL  15 mL Oral Q6H PRN  
 bisacodyL (DULCOLAX) suppository 10 mg  10 mg Rectal DAILY PRN  
 docusate sodium (COLACE) capsule 100 mg  100 mg Oral DAILY  docusate sodium (COLACE) capsule 300 mg  300 mg Oral QPM  
 fluticasone propionate (FLONASE) 50 mcg/actuation nasal spray 2 Spray  2 Spray Both Nostrils BID PRN  
 sodium chloride (NS) flush 5-40 mL  5-40 mL IntraVENous Q8H  
 sodium chloride (NS) flush 5-40 mL  5-40 mL IntraVENous PRN  
 acetaminophen (TYLENOL) tablet 650 mg  650 mg Oral Q6H PRN Or  
 acetaminophen (TYLENOL) suppository 650 mg  650 mg Rectal Q6H PRN  promethazine (PHENERGAN) tablet 12.5 mg  12.5 mg Oral Q6H PRN  Or  
 ondansetron (ZOFRAN) injection 4 mg  4 mg IntraVENous Q6H PRN  
  diazePAM (VALIUM) tablet 10 mg  10 mg Oral BID  
 gabapentin (NEURONTIN) capsule 600 mg  600 mg Oral TID  
 . PHARMACY TO SUBSTITUTE PER PROTOCOL (Reordered from: linaCLOtide (Linzess) 290 mcg cap capsule)    Per Protocol  [Held by provider] rivaroxaban (XARELTO) tablet 10 mg  10 mg Oral DAILY  senna (SENOKOT) tablet 34.4 mg  4 Tab Oral BID  fentaNYL (DURAGESIC) 50 mcg/hr patch 1 Patch  1 Patch TransDERmal Q72H  albuterol (PROVENTIL HFA, VENTOLIN HFA, PROAIR HFA) inhaler 1 Puff  1 Puff Inhalation Q4H PRN Care Plan discussed with:  Patient/Family and Nurse Nicolas Albrecht MD 
Internal Medicine Date of Service: 10/26/2020

## 2020-10-26 NOTE — PROGRESS NOTES
36  Spoke with pharmacist regarding pt's rash; documentation in chart and per pt that rash is most likely from vancomycin administration given recently. Pt received her daily dose of levaquin IV at 1330. Pharmacist said the levaquin IV dose given should be sufficient for antibiotic coverage. Relayed above information to Dr. Arletha Scheuermann and vancomycin dose originally ordered was discontinued.

## 2020-10-26 NOTE — WOUND CARE
Wound Care Note:  
 
New consult placed by nurse request for foot wounds Chart shows: 
Admitted for multiple rib fractures Past Medical History:  
Diagnosis Date  Anemia  Aneurysm ([facility] [address] 3/2005 HX LEFT OPTIC NERVE  Anxiety  Chronic pain   
 spinal stenosis per pt  Fibromyalgia  GERD (gastroesophageal reflux disease)  High cholesterol  History of acute pancreatitis  History of blood transfusion  History of Salmonella infection 2009  Multiple myeloma ([facility] [address] 2007  Osteoporosis  Other complication due to venous access device 1/29/2013  Recurrent umbilical hernia 7/28/5539  Recurrent ventral incisional hernia 3/28/2016  Rheumatoid arthritis(714.0)  Sleep apnea INTOLERATANT OF CPAP  Thromboembolus ([facility] [address] Right leg DVT  Urinary incontinence WBC = 9.2 on 10/26/20 Admitted from home Assessment:  
Patient is A&O x 4, communicative, incontinent with moderate assistance needed in repositioning. Bed: [facility] Patient wearing briefs for incontinence Diet: NPO Patient reports pain (no number given); RN aware and pain medication not currently due. Bilateral heels skin intact and without erythema. 1. POA right great toe plantar surface with callous and crusted area measuring 1 cm x 1 cm, no drainage, sugey-wound intact without erythema. Band-aid applied. 2.  POA right 3rd toe with crusted area measuring 0.5 cm x 0.3 cm, no drainage, sugey-wound intact without erythema. Band-aid applied. 3.  POA sacrum and bilateral buttocks with blanchable erythema. Patient in a lot of pain, not moving much. Venelex and Prius specialty bed to be ordered. Spoke with Dr. Hilda Troncoso, wound care orders obtained. Patient repositioned on right side. Heels offloaded on pillow.  
  
Recommendations:   
Right great toe and 3rd toe- Every other day cleanse with normal saline and apply band-aid. Sacrum and bilateral heels- Every 8 hours liberally apply Venelex ointment ([facility]) Specialty bed: Prius ordered via [facility]. Use only flat sheet and one incontinence pad. Please call Equipment Distribution at  if not delivered and when patient discharged. Skin Care & Pressure Prevention: 
Minimize layers of linen/pads under patient to optimize support surface. Turn/reposition approximately every 2 hours and offload heels. Manage incontinence / promote continence Nourishing Skin Cream to dry skin, minimize use of briefs when able Discussed above plan with patient & Julian Lowe RN Transition of Care: Plan to follow as needed while admitted to hospital. 
 
CHRISTEL Moise, RN, [facility], [facility]. 
office [phone] 
pager [phone] or call  to page

## 2020-10-26 NOTE — PROGRESS NOTES
Bedside and Verbal shift change report given to [id number] (oncoming nurse) by David Kapadia (offgoing nurse). Report included the following information SBAR, Kardex, Intake/Output, MAR, Accordion, and Recent Results. Problem: Falls - Risk of 
Goal: *Absence of Falls Description: Document Bernabe Artis Fall Risk and appropriate interventions in the flowsheet. Outcome: Progressing Towards Goal 
Note: Fall Risk Interventions: 
  
 
  
 
Medication Interventions: Evaluate medications/consider consulting pharmacy, Patient to call before getting OOB, Teach patient to arise slowly, Utilize gait belt for transfers/ambulation Elimination Interventions: Call light in reach, Patient to call for help with toileting needs, Stay With Me (per policy), Toilet paper/wipes in reach, Toileting schedule/hourly rounds History of Falls Interventions: Consult care management for discharge planning, Door open when patient unattended, Evaluate medications/consider consulting pharmacy, Investigate reason for fall, Room close to nurse's station, Utilize gait belt for transfer/ambulation Problem: Patient Education: Go to Patient Education Activity Goal: Patient/Family Education Outcome: Progressing Towards Goal 
  
Problem: Pressure Injury - Risk of 
Goal: *Prevention of pressure injury Description: Document Rodolfo Scale and appropriate interventions in the flowsheet. Outcome: Progressing Towards Goal 
Note: Pressure Injury Interventions: 
Sensory Interventions: Assess changes in LOC, Assess need for specialty bed, Avoid rigorous massage over bony prominences, Check visual cues for pain, Discuss PT/OT consult with provider, Float heels, Keep linens dry and wrinkle-free, Maintain/enhance activity level, Minimize linen layers, Monitor skin under medical devices, Pad between skin to skin, Turn and reposition approx. every two hours (pillows and wedges if needed) Moisture Interventions: Absorbent underpads, Apply protective barrier, creams and emollients, Assess need for specialty bed, Check for incontinence Q2 hours and as needed, Contain wound drainage, Internal/External urinary devices, Limit adult briefs, Maintain skin hydration (lotion/cream), Minimize layers, Moisture barrier, Offer toileting Q_hr Activity Interventions: Assess need for specialty bed, Increase time out of bed, Pressure redistribution bed/mattress(bed type), PT/OT evaluation Mobility Interventions: Assess need for specialty bed, Float heels, HOB 30 degrees or less, PT/OT evaluation, Pressure redistribution bed/mattress (bed type), Turn and reposition approx. every two hours(pillow and wedges) Nutrition Interventions: Document food/fluid/supplement intake, Discuss nutritional consult with provider, Offer support with meals,snacks and hydration Friction and Shear Interventions: Apply protective barrier, creams and emollients, HOB 30 degrees or less, Lift team/patient mobility team, Minimize layers Problem: Patient Education: Go to Patient Education Activity Goal: Patient/Family Education Outcome: Progressing Towards Goal 
  
Problem: High Risk or History of BRENDON Goal: Recognition of BRENDON or High Risk for BRENDON Outcome: Progressing Towards Goal 
Goal: Maintain Patent Airway and Adequate Oxygenation Outcome: Progressing Towards Goal 
Goal: Avoid Over-sedation Outcome: Progressing Towards Goal 
Goal: Maintenance Care of BRENDON Outcome: Progressing Towards Goal 
  
Problem: Patient Education: Go to Patient Education Activity Goal: Patient/Family Education Outcome: Progressing Towards Goal

## 2020-10-26 NOTE — ACP (ADVANCE CARE PLANNING)
[address] [facility] Adult  Hospitalist Group Advance Care Planning Note Name: Lorna Bowles YOB: 1945 MRN: [id number] Admission Date: 10/23/2020  8:58 AM 
 
Date of discussion: 10/26/2020 Active Diagnoses: 
 
Hospital Problems  Date Reviewed: 6/12/2020 Codes Class Noted POA Multiple rib fractures ICD-10-CM: W58.97PC ICD-9-CM: 807.09  10/23/2020 Unknown These active diagnoses are of sufficient risk that focused discussion on advance care planning is indicated in order to allow the patient to thoughtfully consider personal goals of care, and if situations arise that prevent the ability to personally give input, to ensure appropriate representation of their personal desires for different levels and aggressiveness of care. Discussion:  
 
Persons present and participating in discussion: yT Arnett MD,  Judy Qureshi Topics Discussed: 
Patient's medical condition and diagnosis: Aure.Shows  ] yes [  ] no  
Surrogate decision maker: Aure.Shows  ] yes [  ] no Patient's current physical function/cognitive function/frailty: Aure.Shows  ] yes [  ] no Code Status: [ X ] yes [  ] no  
Artificial Nutrition / Dialysis / Non-Invasive Ventilation / Blood Transfusion: [  ] yes [  x] no Potential Resources for home (durable medical equipment, home nursing, home O2): [  ] yes X[  ] no Overview of Discussion:  
 
Discussed in detail regarding hospital course: 1. Hypoxia-  unclear etiology, on 5l, CT chest negative for PE, covid negative, pulmonology consult 2. Fractures and pains-  2/2 MM, Bone scan today. Possible Kyphoplasty 3. MM - untreatable Overall poor prognosis and high risk for debility. Not a candidate for rehab. Explained in detail that she would be poor candidate for CPR due to fragile bones and would risk more pain and suffering.  strongly encouraged to consider DNR. Also explained home hospice is a better option as they could help with her pain and her other symptoms. Palliative care consulted Time Spent: 
 
Total time spent face-to-face in education and discussion: 20 minutes.   
 
Albert Mcdaniel MD 
Date of Service:  10/26/2020 
1:17 PM

## 2020-10-26 NOTE — PROGRESS NOTES
Ortho: 
 
Patient currently off the floor for kyphoplasty. NM scan: 1. There is increased bony activity T11 suspicious for acute fracture. 2. There is low-grade activity T12, L1 and L2 which may be degenerative. 3. There are multiple foci of increased activity in the ribs consistent with rib 
fractures. There is also activity sacral ala bilaterally which may represent insufficiency 
fractures. Activity L5-S1 is most likely related to facet arthropathy. Will plan on seeing patient again tomorrow regarding shoulder/leg pain.  
YOLA Goldberg

## 2020-10-26 NOTE — PROGRESS NOTES
TRANSFER - OUT REPORT: 
 
Verbal report given to ll, PACU RN on Libra Yao  being transferred to PACU for routine post - op Report consisted of patients Situation, Background, Assessment and  
Recommendations(SBAR). Information from the following report(s) SBAR, Procedure Summary and MAR was reviewed with the receiving nurse. Lines:  
Single Lumen Venous Catheter 10/26/20 Right Subclavian (Active) Venous Access Device power port 03/16/19 Upper chest (subclavicular area, right (Active) Central Line Being Utilized Yes 10/26/20 1805 Criteria for Appropriate Use Limited/no vessel suitable for conventional peripheral access 10/26/20 1805 Site Assessment Clean, dry, & intact 10/26/20 1805 Date of Last Dressing Change 10/23/20 10/26/20 0820 Dressing Status Clean, dry, & intact 10/26/20 1805 Dressing Type Disk with Chlorhexadine gluconate (CHG); Transparent 10/26/20 1805 Action Taken Open ports on tubing capped 10/26/20 1805 Positive Blood Return (Medial Site) Yes 10/26/20 0820 Action Taken (Medial Site) Flushed 10/26/20 0820 Alcohol Cap Used Yes 10/26/20 0820 Opportunity for questions and clarification was provided. Patient transported with: 
 pulse oximetry monitoring in bed with Claudio Rosales CRNA and Oleg Kohli, angio RN; pt can move all limbs on command. Pt denies complaints.

## 2020-10-26 NOTE — ANESTHESIA POSTPROCEDURE EVALUATION
* No procedures listed *. 
 
general 
 
Anesthesia Post Evaluation Patient location during evaluation: PACU Patient participation: complete - patient participated Level of consciousness: awake and alert Pain management: adequate Airway patency: patent Anesthetic complications: no 
Cardiovascular status: acceptable Respiratory status: acceptable Hydration status: acceptable Comments: I have seen and evaluated the patient and is ready for discharge. Burdette Denver, MD 
 
Post anesthesia nausea and vomiting:  none INITIAL Post-op Vital signs:  
Vitals Value Taken Time /51 10/26/2020  6:05 PM  
Temp 36.9 °C (98.4 °F) 10/26/2020  6:05 PM  
Pulse 98 10/26/2020  6:06 PM  
Resp 20 10/26/2020  6:06 PM  
SpO2 92 % 10/26/2020  6:06 PM  
Vitals shown include unvalidated device data.

## 2020-10-26 NOTE — PROGRESS NOTES
1678 Bedside and Verbal shift change report given to MALDONADO Craig (oncoming nurse) by Brigido Moran (offgoing nurse). Report included the following information SBAR, Kardex, Intake/Output, MAR, Recent Results, Med Rec Status and Cardiac Rhythm NSR.  
 
1500 Off telemetry and transported to [facility]. 1900 TRANSFER - IN REPORT: 
 
Verbal report received from [address], RN(name) on Bryce Marshall  being received from [employer]) for routine post - op Report consisted of patients Situation, Background, Assessment and  
Recommendations(SBAR). Information from the following report(s) SBAR, Kardex, Intake/Output, MAR, Recent Results and Med Rec Status was reviewed with the receiving nurse. Opportunity for questions and clarification was provided. Assessment completed upon patients arrival to unit and care assumed. 1945 Bedside and Verbal shift change report given to ANNA MARIE Forbes (oncoming nurse) by Danuta Phan (offgoing nurse). Report included the following information SBAR, Kardex, Intake/Output, MAR, Recent Results, Med Rec Status and Cardiac Rhythm NSR. Problem: Falls - Risk of 
Goal: *Absence of Falls Description: Document Florina Brooks Fall Risk and appropriate interventions in the flowsheet. Outcome: Progressing Towards Goal 
Note: Fall Risk Interventions: 
Mobility Interventions: Communicate number of staff needed for ambulation/transfer Mentation Interventions: Door open when patient unattended Medication Interventions: Teach patient to arise slowly Elimination Interventions: Call light in reach History of Falls Interventions: Door open when patient unattended Problem: Patient Education: Go to Patient Education Activity Goal: Patient/Family Education Outcome: Progressing Towards Goal 
  
Problem: Pressure Injury - Risk of 
Goal: *Prevention of pressure injury Description: Document Rodolfo Scale and appropriate interventions in the flowsheet.  
Outcome: Progressing Towards Goal 
 Note: Pressure Injury Interventions: 
Sensory Interventions: Minimize linen layers Moisture Interventions: Minimize layers Activity Interventions: PT/OT evaluation Mobility Interventions: HOB 30 degrees or less, Float heels Nutrition Interventions: Document food/fluid/supplement intake Friction and Shear Interventions: HOB 30 degrees or less Problem: Patient Education: Go to Patient Education Activity Goal: Patient/Family Education Outcome: Progressing Towards Goal 
  
Problem: High Risk or History of BRENDON Goal: Recognition of BRENDON or High Risk for BRENDON Outcome: Progressing Towards Goal 
Goal: Maintain Patent Airway and Adequate Oxygenation Outcome: Progressing Towards Goal 
Goal: Avoid Over-sedation Outcome: Progressing Towards Goal

## 2020-10-27 NOTE — PROGRESS NOTES
4530 Bedside and Verbal shift change report given to MALDONADO Arteaga (oncoming nurse) by Becca Acosta (offgoing nurse). Report included the following information SBAR, Kardex, Intake/Output, MAR, Recent Results and Med Rec Status. 1345 Patient transferred to specialty bed. 1830 Daughter, Rossy, needs to be conference called when Hospice meets with family. Will pass forward in shift report. 1940 Bedside and Verbal shift change report given to ANNA MARIE Forbes (oncoming nurse) by Hedy Britton (offgoing nurse). Report included the following information SBAR, Kardex, Intake/Output, MAR, Recent Results, Med Rec Status and Cardiac Rhythm NSR. Problem: Falls - Risk of 
Goal: *Absence of Falls Description: Document Mckenna Pipes Fall Risk and appropriate interventions in the flowsheet. Outcome: Progressing Towards Goal 
Note: Fall Risk Interventions: 
Mobility Interventions: Communicate number of staff needed for ambulation/transfer Mentation Interventions: Increase mobility Medication Interventions: Teach patient to arise slowly Elimination Interventions: Call light in reach History of Falls Interventions: Door open when patient unattended Problem: Patient Education: Go to Patient Education Activity Goal: Patient/Family Education Outcome: Progressing Towards Goal 
  
Problem: Pressure Injury - Risk of 
Goal: *Prevention of pressure injury Description: Document Rodolfo Scale and appropriate interventions in the flowsheet. Outcome: Progressing Towards Goal 
Note: Pressure Injury Interventions: 
Sensory Interventions: Minimize linen layers Moisture Interventions: Limit adult briefs Activity Interventions: PT/OT evaluation Mobility Interventions: HOB 30 degrees or less Nutrition Interventions: Document food/fluid/supplement intake Friction and Shear Interventions: HOB 30 degrees or less Problem: Patient Education: Go to Patient Education Activity Goal: Patient/Family Education Outcome: Progressing Towards Goal 
  
Problem: High Risk or History of BRENDON Goal: Recognition of BRENDON or High Risk for BRENDON Outcome: Progressing Towards Goal 
Goal: Maintain Patent Airway and Adequate Oxygenation Outcome: Progressing Towards Goal 
Goal: Avoid Over-sedation Outcome: Progressing Towards Goal

## 2020-10-27 NOTE — PROGRESS NOTES
Hematology-Oncology Progress Note Mitchel Franco 1945 
[id number] 
10/27/2020 Subjective:  
 
Complaining of back pain,and left shoulder pain, Allergies: Broccoli; Bumex [bumetanide]; Doxil [doxorubicin, peg-liposomal]; Flagyl [metronidazole]; Keflex [cephalexin]; Lasix [furosemide]; Macrobid [nitrofurantoin monohyd/m-cryst]; Methotrexate; Pcn [penicillins]; Pomalyst [pomalidomide]; Sulfa (sulfonamide antibiotics); Tetanus and diphtheria toxoids; Thalidomide; and Dipyridamole Current Facility-Administered Medications Medication Dose Route Frequency Provider Last Rate Last Dose  
 HYDROmorphone (DILAUDID) tablet 10 mg  10 mg Oral Q3H PRN Jere Chandler MD   10 mg at 10/27/20 1020  morphine 10 mg/mL injection 5 mg  5 mg IntraVENous Q4H PRN Princess Terri MD      
 polyethylene glycol (MIRALAX) packet 17 g  17 g Oral BID PRN Princess Terri MD      
 balsam peru-castor oiL (VENELEX) ointment   Topical Q8H Jere Chandler MD      
 diphenhydrAMINE-zinc acetate 1%-0.1% (BENADRYL) cream   Topical TID PRN Jere Chandler MD      
 levoFLOXacin (LEVAQUIN) 750 mg in D5W IVPB  750 mg IntraVENous Q24H Madala, Sushma,  mL/hr at 10/26/20 1326 750 mg at 10/26/20 1326  
 amitriptyline (ELAVIL) tablet 100 mg  100 mg Oral QHS Felecia Grimm NP   100 mg at 10/26/20 2142  aluminum hydrox-magnesium carb (GAVISCON) oral suspension 15 mL  15 mL Oral Q6H PRN Monae Jones MD      
 bisacodyL (DULCOLAX) suppository 10 mg  10 mg Rectal DAILY PRN Jere Chandler MD      
 docusate sodium (COLACE) capsule 100 mg  100 mg Oral DAILY Madala, Sushma, MD   100 mg at 10/27/20 1010  
 docusate sodium (COLACE) capsule 300 mg  300 mg Oral QPM Madala, Sushma, MD   300 mg at 10/26/20 2142  fluticasone propionate (FLONASE) 50 mcg/actuation nasal spray 2 Spray  2 Spray Both Nostrils BID PRN Jere Chandler MD      
 sodium chloride (NS) flush 5-40 mL  5-40 mL IntraVENous Q8H Robert Sushma, MD   10 mL at 10/27/20 1010  
 sodium chloride (NS) flush 5-40 mL  5-40 mL IntraVENous PRN Lynn Khan MD      
 acetaminophen (TYLENOL) tablet 650 mg  650 mg Oral Q6H PRN Lynn Khan MD   650 mg at 10/24/20 0413 Or  acetaminophen (TYLENOL) suppository 650 mg  650 mg Rectal Q6H PRN Faiza Jones MD      
 promethazine (PHENERGAN) tablet 12.5 mg  12.5 mg Oral Q6H PRN Madala, Sushma, MD      
 Or  
 ondansetron (ZOFRAN) injection 4 mg  4 mg IntraVENous Q6H PRN Madala, Sushma, MD   4 mg at 10/23/20 1320  
 diazePAM (VALIUM) tablet 10 mg  10 mg Oral BID Faiza Jones MD   10 mg at 10/27/20 1009  
 gabapentin (NEURONTIN) capsule 600 mg  600 mg Oral TID Lynn Khan MD   600 mg at 10/27/20 1009  . PHARMACY TO SUBSTITUTE PER PROTOCOL (Reordered from: linaCLOtide (Linzess) 290 mcg cap capsule)    Per Protocol Lynn Khan MD      
 [Held by provider] rivaroxaban (XARELTO) tablet 10 mg  10 mg Oral DAILY Madala, Sushma, MD      
 senna (SENOKOT) tablet 34.4 mg  4 Tab Oral BID Lynn Khan MD   34.4 mg at 10/27/20 1009  
 fentaNYL (DURAGESIC) 50 mcg/hr patch 1 Patch  1 Patch TransDERmal Q72H Lynn Khan MD   1 Patch at 10/26/20 2320  
 albuterol (PROVENTIL HFA, VENTOLIN HFA, PROAIR HFA) inhaler 1 Puff  1 Puff Inhalation Q4H PRN Lynn Khan MD      
 
Facility-Administered Medications Ordered in Other Encounters Medication Dose Route Frequency Provider Last Rate Last Dose  diphenhydrAMINE (BENADRYL) injection 12.5 mg  12.5 mg IntraVENous PRN Chely Brown MD   12.5 mg at 10/26/20 1847 Objective:  
 
Patient Vitals for the past 24 hrs: 
 BP Temp Pulse Resp SpO2  
10/27/20 1207 (!) 122/47 97.7 °F (36.5 °C) 97 18 99 % 10/27/20 0832 102/68 97.6 °F (36.4 °C) 94 14 98 % 10/27/20 0344 100/62 98.2 °F (36.8 °C) 92 20 98 % 10/26/20 2327 (!) 121/59 98.2 °F (36.8 °C) (!) 108 18 92 % 10/26/20 1939 131/82 98.2 °F (36.8 °C) (!) 102 18 93 % 10/26/20 1500 (!) 140/65 98.8 °F (37.1 °C) (!) 106 20 93 % Gen: NAD HEENT: PERRL, Sclerae anicteric Cv: RRR without m/r/g Pulm: aerting well Abd: NABS, NTND, Ext: No c/c/e Available labs reviewed: 
Labs:   
Recent Results (from the past 24 hour(s)) METABOLIC PANEL, BASIC Collection Time: 10/27/20  3:58 AM  
Result Value Ref Range Sodium 138 136 - 145 mmol/L Potassium 4.2 3.5 - 5.1 mmol/L Chloride 101 97 - 108 mmol/L  
 CO2 33 (H) 21 - 32 mmol/L Anion gap 4 (L) 5 - 15 mmol/L Glucose 176 (H) 65 - 100 mg/dL BUN 8 6 - 20 MG/DL Creatinine 0.62 0.55 - 1.02 MG/DL  
 BUN/Creatinine ratio 13 12 - 20 GFR est AA >60 >60 ml/min/1.73m2 GFR est non-AA >60 >60 ml/min/1.73m2 Calcium 8.8 8.5 - 10.1 MG/DL  
CBC WITH AUTOMATED DIFF Collection Time: 10/27/20  3:58 AM  
Result Value Ref Range WBC 8.3 3.6 - 11.0 K/uL  
 RBC 3.49 (L) 3.80 - 5.20 M/uL  
 HGB 10.8 (L) 11.5 - 16.0 g/dL HCT 34.4 (L) 35.0 - 47.0 % MCV 98.6 80.0 - 99.0 FL  
 MCH 30.9 26.0 - 34.0 PG  
 MCHC 31.4 30.0 - 36.5 g/dL  
 RDW 14.1 11.5 - 14.5 % PLATELET 248 134 - 152 K/uL MPV 9.0 8.9 - 12.9 FL  
 NRBC 0.0 0  WBC ABSOLUTE NRBC 0.00 0.00 - 0.01 K/uL NEUTROPHILS 90 (H) 32 - 75 % LYMPHOCYTES 4 (L) 12 - 49 % MONOCYTES 4 (L) 5 - 13 % EOSINOPHILS 0 0 - 7 % BASOPHILS 0 0 - 1 % IMMATURE GRANULOCYTES 2 (H) 0.0 - 0.5 % ABS. NEUTROPHILS 7.5 1.8 - 8.0 K/UL  
 ABS. LYMPHOCYTES 0.3 (L) 0.8 - 3.5 K/UL  
 ABS. MONOCYTES 0.3 0.0 - 1.0 K/UL  
 ABS. EOSINOPHILS 0.0 0.0 - 0.4 K/UL  
 ABS. BASOPHILS 0.0 0.0 - 0.1 K/UL  
 ABS. IMM. GRANS. 0.2 (H) 0.00 - 0.04 K/UL  
 DF SMEAR SCANNED    
 RBC COMMENTS MACROCYTOSIS 
1+ Assessment and Plan  
 
75 y/o woman with a long (since 2007) h/o myeloma, now with declining performance status and limited treatment options, admitted after wrenching her arm getting off of the toilet.  Noted to have rib fracture and likely compression fracture. Also with dyspnea, noted to have bilateral pulmonary infiltrates, being ruled out for COVID. 1. Myeloma: not currently a candidate for treatment given declining performance status. 2. Rib fracture/comp fractures: likely due to #1 above. Given intractable pain,I increased the prn frequency of her mso4 yesterday,, she has this and prn dilaudid available. . she does not want to go up on the fentanyl patch at present. ... bone scan results reviewed with patient. .. will need orthopedic opinion regarding shoulder pain which is her chief complaint at present 3. Chronic pain syndrome: has implanted morphine pump, also on fentanyl patch. 4. VTE: rivaroxaban on hold for possible kyphoplasty 5. Disposition: patient does not feel she can safely go home. Spoke with  about possible discharge ultimately to [address] [place] [address]. 6. Constipation . . pt takes lilnzess, not on formulary her  brought this in and it is ok for her to take it. also takes miralax as well, will try to make these available Juan Davis MD 
 
Hematology/Oncology Phone [phone]

## 2020-10-27 NOTE — HOSPICE
[facility] Liaison Nurse: 
 
Verbal hospice consult received from Dr. Oscar Villegas to hospice liaison, Randell Perez, GONZÁLEZ. However, this consult has not come through to [id number] [facility] intake. Spoke with CM, Jose Martin Jamil, who educated this nurse that the CM for this patient is Merlinda Fu. Shelia Starr offered to put liaison in touch with Karen Chavira. Per Karen Chavira, this patient has not been sent through to [facility] Intake, as CM has not had a conversation about hospice choice. CM is leaving for the day. Plan to be available to meet with patient on Wednesday, if family choose [employer] [facility] [facility]. Updated Dr. Oscar Villegas via [employer]. Dr. Oscar Villegas acknowledged with a message back. Katherin Huff, RN, [facility] Hospice Nurse Liaison [phone] Mobile [phone] Office

## 2020-10-27 NOTE — PROGRESS NOTES
Pulmonary, Critical Care, and Sleep Medicine~Progress Note Name: Trista Chong MRN: [id number] : 1945 Hospital: [facility]  Date: 10/27/2020 9:50 AM Admission: 10/23/2020 Impression Plan 1. Acute hypoxic resp insufficiency 2. Bibasilar atx secondary to rib fxs and chest wall pain 3. BRENDON, untreated 4. VTE hx; was on xarelto 5. M. M. With pathologic fxs   1. We may need to consider dental device for BRENDON for treatment 2. Encourage IS. Spoke with RN 
3. O2 titration above 90% 4. She needs to mobilize and sit up Daily Progression: 
 
Siting in bed. Eating breakfast  
 
I have reviewed the labs and previous days notes. Pertinent items are noted in HPI. OBJECTIVE: 
 
 Vital Signs: 
    
Visit Vitals /68 (BP 1 Location: Left arm, BP Patient Position: At rest) Pulse 94 Temp 97.6 °F (36.4 °C) Resp 14 Ht 4' 8\" (1.422 m) Wt 79.8 kg (176 lb) SpO2 98% BMI 39.46 kg/m² Temp (24hrs), Av.2 °F (36.8 °C), Min:97.6 °F (36.4 °C), Max:98.8 °F (37.1 °C) Intake/Output:  
  Last shift: No intake/output data recorded. Last 3 shifts: 10/25 1901 - 10/27 0700 In: 1430 [P.O.:480; I.V.:950] Out: 2820 [Urine:2800] Intake/Output Summary (Last 24 hours) at 10/27/2020 4848 Last data filed at 10/27/2020 0430 Gross per 24 hour Intake 1310 ml Output 1870 ml Net -560 ml Physical Exam:                                       
Exam Findings Other General: No resp distress noted, appears stated age HEENT:  No ulcers, JVD not elevated, no cervical LAD Chest: No pectus deformity, normal chest rise b/l HEART:  No visible thrills Lungs:  Normal expansion ABD: Soft/NT, non rigid mildly distended EXT: No cyanosis/clubbing/edema, normal peripheral pulses Skin: No rashes or ulcers, no mottling Neuro: A/O x 3 Medications: Current Facility-Administered Medications Medication Dose Route Frequency  HYDROmorphone (DILAUDID) tablet 10 mg  10 mg Oral Q3H PRN  
 morphine 10 mg/mL injection 5 mg  5 mg IntraVENous Q4H PRN  polyethylene glycol (MIRALAX) packet 17 g  17 g Oral BID PRN  
 balsam peru-castor oiL (VENELEX) ointment   Topical Q8H  
 diphenhydrAMINE-zinc acetate 1%-0.1% (BENADRYL) cream   Topical TID PRN  
 diphenhydrAMINE (BENADRYL) 50 mg/mL injection  levoFLOXacin (LEVAQUIN) 750 mg in D5W IVPB  750 mg IntraVENous Q24H  
 amitriptyline (ELAVIL) tablet 100 mg  100 mg Oral QHS  aluminum hydrox-magnesium carb (GAVISCON) oral suspension 15 mL  15 mL Oral Q6H PRN  
 bisacodyL (DULCOLAX) suppository 10 mg  10 mg Rectal DAILY PRN  
 docusate sodium (COLACE) capsule 100 mg  100 mg Oral DAILY  docusate sodium (COLACE) capsule 300 mg  300 mg Oral QPM  
 fluticasone propionate (FLONASE) 50 mcg/actuation nasal spray 2 Spray  2 Spray Both Nostrils BID PRN  
 sodium chloride (NS) flush 5-40 mL  5-40 mL IntraVENous Q8H  
 sodium chloride (NS) flush 5-40 mL  5-40 mL IntraVENous PRN  
 acetaminophen (TYLENOL) tablet 650 mg  650 mg Oral Q6H PRN Or  
 acetaminophen (TYLENOL) suppository 650 mg  650 mg Rectal Q6H PRN  promethazine (PHENERGAN) tablet 12.5 mg  12.5 mg Oral Q6H PRN Or  
 ondansetron (ZOFRAN) injection 4 mg  4 mg IntraVENous Q6H PRN  
 diazePAM (VALIUM) tablet 10 mg  10 mg Oral BID  
 gabapentin (NEURONTIN) capsule 600 mg  600 mg Oral TID  
 . PHARMACY TO SUBSTITUTE PER PROTOCOL (Reordered from: linaCLOtide (Linzess) 290 mcg cap capsule)    Per Protocol  [Held by provider] rivaroxaban (XARELTO) tablet 10 mg  10 mg Oral DAILY  senna (SENOKOT) tablet 34.4 mg  4 Tab Oral BID  fentaNYL (DURAGESIC) 50 mcg/hr patch 1 Patch  1 Patch TransDERmal Q72H  albuterol (PROVENTIL HFA, VENTOLIN HFA, PROAIR HFA) inhaler 1 Puff  1 Puff Inhalation Q4H PRN  
 
 Facility-Administered Medications Ordered in Other Encounters Medication Dose Route Frequency  diphenhydrAMINE (BENADRYL) injection 12.5 mg  12.5 mg IntraVENous PRN Labs: ABG No results for input(s): PHI, PCO2I, PO2I, HCO3I, SO2I, FIO2I in the last 72 hours. CBC Recent Labs 10/27/20 
0839 10/26/20 
5285 10/25/20 
0303 WBC 8.3 9.2 10.5 HGB 10.8* 11.5 11.9 HCT 34.4* 38.1 38.3  319 353 MCV 98.6 100.5* 99.5*  
MCH 30.9 30.3 30.9 Metabolic Panel Recent Labs 10/27/20 
4996 10/26/20 
0776 10/25/20 
0303  136 136  
K 4.2 3.6 4.0  
 97 100 CO2 33* 34* 29 * 113* 105* BUN 8 5* 9  
CREA 0.62 0.59 0.59 CA 8.8 9.0 8.7 INR  --   --  1.0 Pertinent Labs Urvashi Biswas PA-C 
10/27/2020

## 2020-10-27 NOTE — PROGRESS NOTES
Hospitalist Progress Note Hospital summary: A 76year old female patient with PMH of Multiple myeloma with metastatic disease, rheumatoid arthritis, chronic pain/ opioid dependence on implanted morphine pump, DVT on xarelto, multiple pathological fractures, recent kyphoplasty of L spine presents to the ED for evaluation of left shoulder pain and difficulty weightbearing.  Patient states that she has been having trouble ambulating for a few days and was trying to get off her commode this am when she lost her hand hold and fell back onto the toilet. She had been evaluated for DVT earlier in the week but was found negative. She states that she has been unable to bear weight very well for about a week but denies prior injury. She complains of pain primarily in her left shoulder as well as right posterior hip/buttock region. She denied sob initially and stated that she is getting evaluated for sleep study and hypoxia at night time. Pt has pain on deep breathing. Denied cough, fever, abd pain, urinary or bowel changes. In ED, imaging showed acute multiple rib fractures and thoracic fractures. Hematology consulted in ED. Hospitalist consulted for admission. 
  
Discussed with Ortho PA- recommended NM bone scan. On arrival to floor , pt desaturated to 80's and placed on 2l NC. CXR ordered by me showed diffuse bilateral pulmonary opacification. Spoke with patient again - she stated that she has been having sob since 1 week , worsening with exertion. She discussed with her PCP and oncology Dr. Eddie Orozco - who recommended oxygen study possible due to bruising of her lungs for recurrent fractures. Explained CXR findings and in light of hypoxia, will test for covid. She was initially upset regarding this but understands 10/23/2020 Assessment/Plan: 
Acute hypoxic resp failure - improving  
-  due to bibasilar atelectasis / lung hypoventilation  from rib fractures - CXR: Nonspecific ill-defined diffuse bilateral pulmonary opacification. - febrile on 10/24  
- covid test x 2 negative 
- normal lactic, pro calcitonin 
- resp pcr negative - CTA chest : no PE. bibasilar atelectasis right greater than left - pt had rash - possible due to Vanco, so discontinued 
- on IV levaquin  
- saturating on 3l NC, try to wean down - appreciate Pulmonology input - IS Left shoulder pain/ Right hip pain Thoracic vertebra fractures/ multiple rib fractures Secondary to MM 
- CT L spine: Acute versus subacute T11 and T12 partial compression fractures are new since 5 months ago. - CT hip : No evidence of acute abnormality 
- XR: Multiple left rib fractures. - XR shoulder: Degenerative changes left shoulder. Ramona Rao - IR consulted for Kyphoplasty for T11 and T12 fracture - appreciate thoracic surgery input for rib fractures conservative management  
- Orthopedics on board for shoulder and hip pains 
- bone scan: There is increased bony activity T11 suspicious for acute fracture. There are multiple foci of increased activity in the ribs consistent with rib fractures - s/p kyphoplasty T11 on 10/26 
- PT/OT  
  
Multiple myeloma with metastatic disease 
- not on any treatment now due to intolerance 
- follows with oncology Dr. Valentina Dupont - appreciate oncology input  
  
Chronic pain syndrome on implanted morphine drip - c/w home fentanyl patch, po dilaudid prn ( dose increased to 10mg prn). IV dilaudid prn   
 
Urinary retention last night - butts placed  
  Hx DVT -  Xarelto restarted Palliative care on board [facility] - need outpt sleep study Transfer to remote tele  
  
DVT ppx: Xarelto Code status: Full.  is mpoa Disposition: TBD  
---------------------------------------------- 
 
CC:  Left shoulder pain S: Patient is seen and examined at bedside this AM.  present. She had kyphoplasty yesterday. Pain controlled at back but still has pain of shoulder and hip. Extensive discussion again regards to goals of care - explained that she is not a candidate for inpatient rehab. May benefit from SNF but appropriate for home hospice. Patient and her  are mainly concerned about management of her morphine implant. Agreed to talk to hospice nurse regarding pain management. Hospice consult placed Discussed with nursing Discussed with hospice nurse - will look into morphine implant and discuss with patient. Review of Systems: A comprehensive review of systems was negative. O: 
Visit Vitals BP (!) 122/47 (BP 1 Location: Left arm, BP Patient Position: At rest) Pulse 97 Temp 97.7 °F (36.5 °C) Resp 18 Ht 4' 8\" (1.422 m) Wt 79.8 kg (176 lb) SpO2 99% BMI 39.46 kg/m² PHYSICAL EXAM: 
Gen: mild distress, chronically ill looking HEENT: anicteric sclerae, normal conjunctiva, oropharynx clear, MM moist 
Neck: supple, trachea midline, no adenopathy Heart: RRR, no MRG, no JVD, no peripheral edema Lungs: decreased breath sounds Abd: soft, NT, ND, BS+, no organomegaly Extr: warm. Right hip pain. Left shoulder pain and ROM limited Skin: dry, no rash Neuro: normal speech, moves all extremities Psych: anxious Intake/Output Summary (Last 24 hours) at 10/27/2020 1211 Last data filed at 10/27/2020 0430 Gross per 24 hour Intake 1190 ml Output 1870 ml Net -680 ml Recent labs & imaging reviewed: 
Recent Results (from the past 24 hour(s)) METABOLIC PANEL, BASIC Collection Time: 10/27/20  3:58 AM  
Result Value Ref Range Sodium 138 136 - 145 mmol/L Potassium 4.2 3.5 - 5.1 mmol/L Chloride 101 97 - 108 mmol/L  
 CO2 33 (H) 21 - 32 mmol/L Anion gap 4 (L) 5 - 15 mmol/L Glucose 176 (H) 65 - 100 mg/dL BUN 8 6 - 20 MG/DL Creatinine 0.62 0.55 - 1.02 MG/DL  
 BUN/Creatinine ratio 13 12 - 20 GFR est AA >60 >60 ml/min/1.73m2 GFR est non-AA >60 >60 ml/min/1.73m2  Calcium 8.8 8.5 - 10.1 MG/DL  
 CBC WITH AUTOMATED DIFF Collection Time: 10/27/20  3:58 AM  
Result Value Ref Range WBC 8.3 3.6 - 11.0 K/uL  
 RBC 3.49 (L) 3.80 - 5.20 M/uL  
 HGB 10.8 (L) 11.5 - 16.0 g/dL HCT 34.4 (L) 35.0 - 47.0 % MCV 98.6 80.0 - 99.0 FL  
 MCH 30.9 26.0 - 34.0 PG  
 MCHC 31.4 30.0 - 36.5 g/dL  
 RDW 14.1 11.5 - 14.5 % PLATELET 097 878 - 955 K/uL MPV 9.0 8.9 - 12.9 FL  
 NRBC 0.0 0  WBC ABSOLUTE NRBC 0.00 0.00 - 0.01 K/uL NEUTROPHILS 90 (H) 32 - 75 % LYMPHOCYTES 4 (L) 12 - 49 % MONOCYTES 4 (L) 5 - 13 % EOSINOPHILS 0 0 - 7 % BASOPHILS 0 0 - 1 % IMMATURE GRANULOCYTES 2 (H) 0.0 - 0.5 % ABS. NEUTROPHILS 7.5 1.8 - 8.0 K/UL  
 ABS. LYMPHOCYTES 0.3 (L) 0.8 - 3.5 K/UL  
 ABS. MONOCYTES 0.3 0.0 - 1.0 K/UL  
 ABS. EOSINOPHILS 0.0 0.0 - 0.4 K/UL  
 ABS. BASOPHILS 0.0 0.0 - 0.1 K/UL  
 ABS. IMM. GRANS. 0.2 (H) 0.00 - 0.04 K/UL  
 DF SMEAR SCANNED    
 RBC COMMENTS MACROCYTOSIS 1+ Recent Labs 10/27/20 
2801 10/26/20 
1422 WBC 8.3 9.2 HGB 10.8* 11.5 HCT 34.4* 38.1  319 Recent Labs 10/27/20 
0258 10/26/20 
6030 10/25/20 
0303  136 136  
K 4.2 3.6 4.0  
 97 100 CO2 33* 34* 29 BUN 8 5* 9  
CREA 0.62 0.59 0.59 * 113* 105* CA 8.8 9.0 8.7 No results for input(s): ALT, AP, TBIL, TBILI, TP, ALB, GLOB, GGT, AML, LPSE in the last 72 hours. No lab exists for component: SGOT, GPT, AMYP, HLPSE Recent Labs 10/25/20 
0303 INR 1.0 PTP 10.6 APTT 21.0* No results for input(s): FE, TIBC, PSAT, FERR in the last 72 hours. Lab Results Component Value Date/Time Folate 34.3 (H) 05/28/2010 04:30 AM  
  
No results for input(s): PH, PCO2, PO2 in the last 72 hours. No results for input(s): CPK, CKNDX, TROIQ in the last 72 hours. No lab exists for component: CPKMB Lab Results Component Value Date/Time  Cholesterol, total 137 03/06/2017 08:01 AM  
 HDL Cholesterol 47 03/06/2017 08:01 AM  
 LDL, calculated 38 03/06/2017 08:01 AM  
 Triglyceride 260 (H) 03/06/2017 08:01 AM  
 CHOL/HDL Ratio 5.0 12/16/2011 05:05 AM  
 
Lab Results Component Value Date/Time Glucose (POC) 113 (H) 06/02/2016 11:06 AM  
 Glucose (POC) 87 09/15/2012 08:29 PM  
 Glucose (POC) 87 09/15/2012 11:58 AM  
 
Lab Results Component Value Date/Time Color YELLOW/STRAW 10/23/2020 12:47 PM  
 Appearance CLEAR 10/23/2020 12:47 PM  
 Specific gravity 1.013 10/23/2020 12:47 PM  
 Specific gravity <1.005 01/24/2019 08:09 PM  
 pH (UA) 8.5 (H) 10/23/2020 12:47 PM  
 Protein Negative 10/23/2020 12:47 PM  
 Glucose Negative 10/23/2020 12:47 PM  
 Ketone TRACE (A) 10/23/2020 12:47 PM  
 Bilirubin Negative 10/23/2020 12:47 PM  
 Urobilinogen 0.2 10/23/2020 12:47 PM  
 Nitrites Negative 10/23/2020 12:47 PM  
 Leukocyte Esterase Negative 10/23/2020 12:47 PM  
 Epithelial cells FEW 10/23/2020 12:47 PM  
 Bacteria Negative 10/23/2020 12:47 PM  
 WBC 0-4 10/23/2020 12:47 PM  
 RBC 0-5 10/23/2020 12:47 PM  
 
 
Med list reviewed Current Facility-Administered Medications Medication Dose Route Frequency  HYDROmorphone (DILAUDID) tablet 10 mg  10 mg Oral Q3H PRN  
 morphine 10 mg/mL injection 5 mg  5 mg IntraVENous Q4H PRN  polyethylene glycol (MIRALAX) packet 17 g  17 g Oral BID PRN  
 balsam peru-castor oiL (VENELEX) ointment   Topical Q8H  
 diphenhydrAMINE-zinc acetate 1%-0.1% (BENADRYL) cream   Topical TID PRN  
 levoFLOXacin (LEVAQUIN) 750 mg in D5W IVPB  750 mg IntraVENous Q24H  
 amitriptyline (ELAVIL) tablet 100 mg  100 mg Oral QHS  aluminum hydrox-magnesium carb (GAVISCON) oral suspension 15 mL  15 mL Oral Q6H PRN  
 bisacodyL (DULCOLAX) suppository 10 mg  10 mg Rectal DAILY PRN  
 docusate sodium (COLACE) capsule 100 mg  100 mg Oral DAILY  docusate sodium (COLACE) capsule 300 mg  300 mg Oral QPM  
 fluticasone propionate (FLONASE) 50 mcg/actuation nasal spray 2 Spray  2 Spray Both Nostrils BID PRN  
  sodium chloride (NS) flush 5-40 mL  5-40 mL IntraVENous Q8H  
 sodium chloride (NS) flush 5-40 mL  5-40 mL IntraVENous PRN  
 acetaminophen (TYLENOL) tablet 650 mg  650 mg Oral Q6H PRN Or  
 acetaminophen (TYLENOL) suppository 650 mg  650 mg Rectal Q6H PRN  promethazine (PHENERGAN) tablet 12.5 mg  12.5 mg Oral Q6H PRN Or  
 ondansetron (ZOFRAN) injection 4 mg  4 mg IntraVENous Q6H PRN  
 diazePAM (VALIUM) tablet 10 mg  10 mg Oral BID  
 gabapentin (NEURONTIN) capsule 600 mg  600 mg Oral TID  
 . PHARMACY TO SUBSTITUTE PER PROTOCOL (Reordered from: linaCLOtide (Linzess) 290 mcg cap capsule)    Per Protocol  [Held by provider] rivaroxaban (XARELTO) tablet 10 mg  10 mg Oral DAILY  senna (SENOKOT) tablet 34.4 mg  4 Tab Oral BID  fentaNYL (DURAGESIC) 50 mcg/hr patch 1 Patch  1 Patch TransDERmal Q72H  albuterol (PROVENTIL HFA, VENTOLIN HFA, PROAIR HFA) inhaler 1 Puff  1 Puff Inhalation Q4H PRN Facility-Administered Medications Ordered in Other Encounters Medication Dose Route Frequency  diphenhydrAMINE (BENADRYL) injection 12.5 mg  12.5 mg IntraVENous PRN Care Plan discussed with:  Patient/Family and Nurse Albert Mcdaniel MD 
Internal Medicine Date of Service: 10/27/2020

## 2020-10-27 NOTE — PROGRESS NOTES
CM received call from Verenice Stewart, Liaison with [facility] that patient is open to [facility] for home health services. CM will notify  working on patient's case.  MARIO ALBERTO Sung

## 2020-10-27 NOTE — PROGRESS NOTES
ORTHO FRACTURE PROGRESS NOTE 2020 Admit Date:  
10/23/2020 Post Op day: * No surgery found * Subjective:   
Magno Cabral states that her back is still hurting somewhat after her kyphoplasty. She states that her left shoulder is still painful. States she has trouble getting arm above her head. No fractures noted in shoulders on bone scan. Tolerating diet Denies N/V/SOB or CP  
 
PT/OT:  
Gait:    
            
 
Vital Signs:   
Patient Vitals for the past 8 hrs: 
 BP Temp Pulse Resp SpO2  
10/27/20 1207 (!) 122/47 97.7 °F (36.5 °C) 97 18 99 % 10/27/20 0832 102/68 97.6 °F (36.4 °C) 94 14 98 % Temp (24hrs), Av.2 °F (36.8 °C), Min:97.6 °F (36.4 °C), Max:98.8 °F (37.1 °C) Pain Control:  
Pain Assessment Pain Scale 1: Visual 
Pain Intensity 1: 8 Pain Onset 1: acute Pain Location 1: Back Pain Orientation 1: Upper Pain Description 1: Aching Pain Intervention(s) 1: Medication (see MAR) Meds: 
 
Current Facility-Administered Medications Medication Dose Route Frequency  HYDROmorphone (DILAUDID) tablet 10 mg  10 mg Oral Q3H PRN  
 morphine 10 mg/mL injection 5 mg  5 mg IntraVENous Q4H PRN  polyethylene glycol (MIRALAX) packet 17 g  17 g Oral BID PRN  
 balsam peru-castor oiL (VENELEX) ointment   Topical Q8H  
 diphenhydrAMINE-zinc acetate 1%-0.1% (BENADRYL) cream   Topical TID PRN  
 levoFLOXacin (LEVAQUIN) 750 mg in D5W IVPB  750 mg IntraVENous Q24H  
 amitriptyline (ELAVIL) tablet 100 mg  100 mg Oral QHS  aluminum hydrox-magnesium carb (GAVISCON) oral suspension 15 mL  15 mL Oral Q6H PRN  
 bisacodyL (DULCOLAX) suppository 10 mg  10 mg Rectal DAILY PRN  
 docusate sodium (COLACE) capsule 100 mg  100 mg Oral DAILY  docusate sodium (COLACE) capsule 300 mg  300 mg Oral QPM  
 fluticasone propionate (FLONASE) 50 mcg/actuation nasal spray 2 Spray  2 Spray Both Nostrils BID PRN  
 sodium chloride (NS) flush 5-40 mL  5-40 mL IntraVENous Q8H  
  sodium chloride (NS) flush 5-40 mL  5-40 mL IntraVENous PRN  
 acetaminophen (TYLENOL) tablet 650 mg  650 mg Oral Q6H PRN Or  
 acetaminophen (TYLENOL) suppository 650 mg  650 mg Rectal Q6H PRN  promethazine (PHENERGAN) tablet 12.5 mg  12.5 mg Oral Q6H PRN Or  
 ondansetron (ZOFRAN) injection 4 mg  4 mg IntraVENous Q6H PRN  
 diazePAM (VALIUM) tablet 10 mg  10 mg Oral BID  
 gabapentin (NEURONTIN) capsule 600 mg  600 mg Oral TID  
 . PHARMACY TO SUBSTITUTE PER PROTOCOL (Reordered from: linaCLOtide (Linzess) 290 mcg cap capsule)    Per Protocol  rivaroxaban (XARELTO) tablet 10 mg  10 mg Oral DAILY  senna (SENOKOT) tablet 34.4 mg  4 Tab Oral BID  fentaNYL (DURAGESIC) 50 mcg/hr patch 1 Patch  1 Patch TransDERmal Q72H  albuterol (PROVENTIL HFA, VENTOLIN HFA, PROAIR HFA) inhaler 1 Puff  1 Puff Inhalation Q4H PRN Facility-Administered Medications Ordered in Other Encounters Medication Dose Route Frequency  diphenhydrAMINE (BENADRYL) injection 12.5 mg  12.5 mg IntraVENous PRN  
 
 
LAB:   
Recent Labs 10/27/20 
7458  10/25/20 
0303 HCT 34.4*   < > 38.3 HGB 10.8*   < > 11.9 INR  --   --  1.0  
 < > = values in this interval not displayed. Transfuse PRBC's:   
 
Assessment & Physician's Comment: 
Left shoulder TTP over superior and lateral surface Trouble raising arm above head Pain with passive ROM Maintained strength through elbow, wrist and hand Distal sensory function grossly intact Neurovascular checks within normal limits Orientation:  Oriented Active Problems: 
  Multiple rib fractures (10/23/2020) Plan: 
 
PT/OT for mobilization No upper extremity fractures noted on bone scan May have left shoulder rotator cuff arthropathy - MRI is problematic as she has trouble lying still and pain pump is a limiting factor; may need CT of shoulder WBAT Medical management per primary team 
Following Will discuss with Dr Devora Ball [place] [facility] [id number]

## 2020-10-27 NOTE — PROGRESS NOTES
Problem: Mobility Impaired (Adult and Pediatric) Goal: *Acute Goals and Plan of Care (Insert Text) Description: FUNCTIONAL STATUS PRIOR TO ADMISSION: Patient was modified independent using a rolling walker recently for functional mobility. Was declining with functional mobility within the last week prior to admission, requiring increased time for all ambulation within the home and stated she was \"dragging\" her feet. Fall off the toilet that prompted admission. HOME SUPPORT PRIOR TO ADMISSION: The patient lived with her  who is supportive but elderly as well. Patient's daughter is local and supportive. Physical Therapy Goals Initiated 10/27/2020 1. Patient will move from supine to sit and sit to supine , scoot up and down, and roll side to side in bed with moderate assistance  within 7 day(s). 2.  Patient will transfer from bed to chair and chair to bed with moderate assistance  using the least restrictive device within 7 day(s). 3.  Patient will perform sit to stand with moderate assistance  within 7 day(s). 4.  Patient will ambulate with moderate assistance for 10 feet with the least restrictive device within 7 day(s). 5.  Patient will improve Tinetti score by 4-5 points within 7 days. Outcome: Progressing Towards Goal 
 PHYSICAL THERAPY EVALUATION Patient: Bryce Marshall (89 y.o. female) Date: 10/27/2020 Primary Diagnosis: Multiple rib fractures [S22.49XA] Precautions:   Fall, Spinal, Bed Alarm ASSESSMENT Based on the objective data described below, the patient presents with impaired balance, impaired sensation, generalized weakness, increased fall risk with known history of falls, back pain s/p T11 kyphoplasty POD 1, rib fxs, and shoulder pain following fall (xray negative for fx). Patient overall max Ax2 for bed mobility and transfer to chair. With increased productive cough once seated in chair.  Patient with short stature, significant weakness, history of falls, and bed height high due to speciality mattress, therefore recommend eden lift back to bed (discussed with RN). With transfer, patient with flexed/stooped posture and unable to lift feet from ground (scooted feet along for pivot). Kyphotic posture at baseline. At this time she is well below baseline and would benefit from rehab stay, SNF level. Would require significant family assist and training, likely hired in home assist, and copious equipment to allow for safe d/c home. Current Level of Function Impacting Discharge (mobility/balance): max Ax2 Functional Outcome Measure: The patient scored 1/28 on the Tinetti outcome measure which is indicative of high fall risk. Other factors to consider for discharge: fall with multiple fxs Patient will benefit from skilled therapy intervention to address the above noted impairments. PLAN : 
Recommendations and Planned Interventions: bed mobility training, transfer training, gait training, therapeutic exercises, neuromuscular re-education, patient and family training/education, and therapeutic activities Frequency/Duration: Patient will be followed by physical therapy:  5 times a week to address goals. Recommendation for discharge: (in order for the patient to meet his/her long term goals) Therapy up to 5 days/week in SNF setting If home, would need 24 hr assist x2 persons and equipment as below (most likely more physical work than elderly  can handle and daughter works) This discharge recommendation: A follow-up discussion with the attending provider and/or case management is planned IF patient discharges home will need the following DME: hospital bed, mechanical lift, and wheelchair SUBJECTIVE:  
Patient stated Applause please!  after transfer to chair OBJECTIVE DATA SUMMARY:  
HISTORY:   
Past Medical History:  
Diagnosis Date Anemia Aneurysm ([facility] [address] 3/2005 HX LEFT OPTIC NERVE Anxiety Chronic pain   
 spinal stenosis per pt Fibromyalgia GERD (gastroesophageal reflux disease) High cholesterol History of acute pancreatitis History of blood transfusion History of Salmonella infection 2009 Multiple myeloma ([address] 2007 Osteoporosis Other complication due to venous access device 1/29/2013 Recurrent umbilical hernia 4/56/2577 Recurrent ventral incisional hernia 3/28/2016 Rheumatoid arthritis(714.0) Sleep apnea INTOLERATANT OF CPAP Thromboembolus ([address] Right leg DVT Urinary incontinence Past Surgical History:  
Procedure Laterality Date HX CHOLECYSTECTOMY  2004 HX CRANIOTOMY    
 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    
 COLONOSCOPIES, EGD  
 HX GI    
 ESOPHAGUS STREACHED  
 HX HEENT  10/2011  
 b/l cataract surgery in October 2011 HX HERNIA REPAIR  [id number] X6 HX HERNIA REPAIR  10-13-14  
 repair of recurrent ventral incisional hernia  with primary suture [id number]. Nilo Lepe HX KYPHOPLASTY  09/14/2017 L4 HX KYPHOPLASTY  2020 L1, L2, L5 HX ORTHOPAEDIC Right 2001 BONE SPURS SHOULDER  
 HX OTHER SURGICAL  2010 LLQ PAIN PUMP FOR MORPHINE INFUSION  
 HX OTHER SURGICAL  1-16-14  
 repair of recurrent umbilical hernia with ventralex pillow top mesh and extensive lysis of [id number]. Nilo Lepe HX OTHER SURGICAL  5-31-16  
 repair of recurrent ventral incisional hernia with underlay mesh-[facility]-Dr. Nilo Lepe HX VASCULAR ACCESS Right 1/2013 POWER PORT   
 HX VASCULAR ACCESS  2010 PAIN PUMP IR INJ FORAMIN EPID LUMB ANES/STER SNGL  3/19/2019 IR KYPHOPLASTY LUMBAR  6/12/2020 IR KYPHOPLASTY LUMBAR  7/22/2020 IR KYPHOPLASTY THORACIC  10/26/2020 NEUROLOGICAL PROCEDURE UNLISTED  2007 KYPHOPLASTY X2  
 NEUROLOGICAL PROCEDURE UNLISTED  10/26/15 Kyphoplasty t-5  PAIN PUMP DEVICE    
 implanted in LLQ, MORPHINE  
 
 
 Personal factors and/or comorbidities impacting plan of care: [facility] Home Situation Home Environment: Private residence # Steps to Enter: 1 Rails to Enter: No 
One/Two Story Residence: One story Living Alone: No 
Support Systems: Spouse/Significant Other/Partner, Child(juana) Current DME Used/Available at Home: Walker, rolling, Grab bars, Raised toilet seat Tub or Shower Type: Shower EXAMINATION/PRESENTATION/DECISION MAKING:  
Critical Behavior: 
  
Orientation Level: Oriented X4 Cognition: Decreased attention/concentration, Follows commands Safety/Judgement: Fall prevention Hearing: Auditory Auditory Impairment: None Range Of Motion: 
AROM: Generally decreased, functional(LEs) Strength:   
Strength: Generally decreased, functional(~2+/5 LEs) Tone & Sensation:  
Tone: Normal 
Sensation: Impaired(significant neuropathy B feet) Coordination: 
Coordination: Generally decreased, functional 
Vision:  
Acuity: Within Defined Limits Corrective Lenses: Glasses Functional Mobility: 
Bed Mobility: 
Supine to Sit: Maximum assistance;Assist x2 Scooting: Maximum assistance Transfers: 
Sit to Stand: Maximum assistance;Assist x2 Stand to Sit: Maximum assistance;Assist x2 Stand Pivot Transfers: Maximum assistance;Assist x2 Balance:  
Sitting: Impaired; Without support Sitting - Static: Fair (occasional) Sitting - Dynamic: Poor (constant support) Standing: Impaired; With support Standing - Static: Poor Standing - Dynamic : Poor Functional Measure: 
Tinetti test: 
 
Sitting Balance: 1 Arises: 0 Attempts to Rise: 0 Immediate Standing Balance: 0 Standing Balance: 0 Nudged: 0 Eyes Closed: 0 Turn 360 Degrees - Continuous/Discontinuous: 0 Turn 360 Degrees - Steady/Unsteady: 0 Sitting Down: 0 Balance Score: 1 Balance total score Indication of Gait: 0 
R Step Length/Height: 0 
L Step Length/Height: 0 
R Foot Clearance: 0 
L Foot Clearance: 0 Step Symmetry: 0 Step Continuity: 0 Path: 0 
 Trunk: 0 Walking Time: 0 Gait Score: 0 Gait total score Total Score: 1/28 Overall total score Tinetti Tool Score Risk of Falls 
<19 = High Fall Risk 19-24 = Moderate Fall Risk 25-28 = Low Fall Risk Tinetti ME. Performance-Oriented Assessment of Mobility Problems in Elderly Patients. [facility]; [id number]. (Scoring Description: PT Bulletin Feb. 10, 1993) Older adults: Puneet Linda et al, 2009; n = [address] elderly evaluated with ABC, AYE, ADL, and IADL) · Mean AYE score for males aged 69-68 years = 26.21(3.40) · Mean AYE score for females age 69-68 years = 25.16(4.30) · Mean AYE score for males over 80 years = 23.29(6.02) · Mean AYE score for females over 80 years = 17.20(8.32) Physical Therapy Evaluation Charge Determination History Examination Presentation Decision-Making HIGH Complexity :3+ comorbidities / personal factors will impact the outcome/ POC  HIGH Complexity : 4+ Standardized tests and measures addressing body structure, function, activity limitation and / or participation in recreation  LOW Complexity : Stable, uncomplicated  Other outcome measures Tinetti 1/28  HIGH Based on the above components, the patient evaluation is determined to be of the following complexity level: LOW Pain Rating: 
Pain with transitional movements Activity Tolerance:  
Good and requires rest breaks Please refer to the flowsheet for vital signs taken during this treatment. After treatment patient left in no apparent distress:  
Sitting in chair, Call bell within reach, and Caregiver / family present COMMUNICATION/EDUCATION:  
The patients plan of care was discussed with: Occupational therapist and Registered nurse. Fall prevention education was provided and the patient/caregiver indicated understanding., Patient/family have participated as able in goal setting and plan of care. , and Patient/family agree to work toward stated goals and plan of care. Thank you for this referral. 
Brenedn Richardson, PT, DPT Time Calculation: 26 mins

## 2020-10-27 NOTE — PROGRESS NOTES
Problem: Self Care Deficits Care Plan (Adult) Goal: *Acute Goals and Plan of Care (Insert Text) Description:  
FUNCTIONAL STATUS PRIOR TO ADMISSION: Patient was modified independent using a rolling walker for functional mobility. Recently difficulty with WB and reported \"dragging\" her legs HOME SUPPORT: The patient lived with  but reports being able to do her own ADLs. Occupational Therapy Goals Initiated 10/27/2020 1. Patient will perform seated bathing with moderate assistance  within 7 day(s). 2.  Patient will perform transfer to [facility] with least restrictive DME with minimal assistance/contact guard assist within 7 day(s). 3.  Patient will perform upper body dressing with minimal assistance/contact guard assist within 7 day(s). 4.  Patient will perform all aspects of toileting with moderate assistance  within 7 day(s). 5.  Patient will participate in upper extremity therapeutic exercise/activities with supervision/set-up for 5 minutes within 7 day(s). 6.  Patient will utilize energy conservation techniques during functional activities with verbal cues within 7 day(s). Outcome: Not Progressing Towards Goal 
  
 
OCCUPATIONAL THERAPY EVALUATION Patient: Jesus Rob (55 y.o. female) Date: 10/27/2020 Primary Diagnosis: Multiple rib fractures [S22.49XA] Precautions:   Fall, Spinal, Bed Alarm ASSESSMENT Based on the objective data described below, the patient presents with back pain POD 1 T11 kyphoplasty, hx multiple myeloma, acute R rib fx, decreased L shoulder ROM due to pain (?ligament, rotator cuff injury), impaired standing balance, and high risk for falls. Pt reported functional decline over past several weeks with a fall PTA. This date, she required max A x 2 for all aspects of bed mobility and sit to stand.  Pt with poor advancement of feet and WB, needing heavy assist of two people to safely transfer. She is well below her mod I ADL baseline with poor activity tolerance due to pain. At this time, pt would highly benefit from SNF rehab. If she were to go home, she would need heavy physical assistance of two people and unsure that her elderly  can physically manage pt's transfers and ADL level of assist at this time. Current Level of Function Impacting Discharge (ADLs/self-care): max A x 2 for sit to stand, transfer, decreased sensation in B LEs, falls, weakness, pain Functional Outcome Measure: The patient scored Total: 15/100 on the Barthel Index outcome measure which is indicative of 85% impaired ability to care for basic self needs/dependency on others; inferred 100% dependency on others for instrumental ADLs. Other factors to consider for discharge: from home with  who cannot physically assist pt at this time Patient will benefit from skilled therapy intervention to address the above noted impairments. PLAN : 
Recommendations and Planned Interventions: self care training, functional mobility training, therapeutic exercise, balance training, therapeutic activities, endurance activities, patient education, and home safety training Frequency/Duration: Patient will be followed by occupational therapy 5 times a week to address goals. Recommendation for discharge: (in order for the patient to meet his/her long term goals) Therapy up to 5 days/week in SNF setting This discharge recommendation: 
Has not yet been discussed the attending provider and/or case management IF patient discharges home will need the following DME: TBD- if home, may need eden lift, BSC, w/c, hospital bed SUBJECTIVE:  
Patient stated oh hell no.  (when asked if she could lift her L UE) OBJECTIVE DATA SUMMARY:  
HISTORY:  
Past Medical History:  
Diagnosis Date Anemia Aneurysm ([facility] [address] 3/2005 HX LEFT OPTIC NERVE Anxiety Chronic pain spinal stenosis per pt Fibromyalgia GERD (gastroesophageal reflux disease) High cholesterol History of acute pancreatitis History of blood transfusion History of Salmonella infection 2009 Multiple myeloma ([address] 2007 Osteoporosis Other complication due to venous access device 1/29/2013 Recurrent umbilical hernia 2/54/2716 Recurrent ventral incisional hernia 3/28/2016 Rheumatoid arthritis(714.0) Sleep apnea INTOLERATANT OF CPAP Thromboembolus ([address] Right leg DVT Urinary incontinence Past Surgical History:  
Procedure Laterality Date HX CHOLECYSTECTOMY  2004 HX CRANIOTOMY    
 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    
 COLONOSCOPIES, EGD  
 HX GI    
 ESOPHAGUS STREACHED  
 HX HEENT  10/2011  
 b/l cataract surgery in October 2011 HX HERNIA REPAIR  [id number] X6 HX HERNIA REPAIR  10-13-14  
 repair of recurrent ventral incisional hernia  with primary suture [id number]. Uma Fields HX KYPHOPLASTY  09/14/2017 L4 HX KYPHOPLASTY  2020 L1, L2, L5 HX ORTHOPAEDIC Right 2001 BONE SPURS SHOULDER  
 HX OTHER SURGICAL  2010 LLQ PAIN PUMP FOR MORPHINE INFUSION  
 HX OTHER SURGICAL  1-16-14  
 repair of recurrent umbilical hernia with ventralex pillow top mesh and extensive lysis of [id number]. Dorla Leeks HX OTHER SURGICAL  5-31-16  
 repair of recurrent ventral incisional hernia with underlay mesh-[facility]-Dr. Uma Fields HX VASCULAR ACCESS Right 1/2013 POWER PORT   
 HX VASCULAR ACCESS  2010 PAIN PUMP IR INJ FORAMIN EPID LUMB ANES/STER SNGL  3/19/2019 IR KYPHOPLASTY LUMBAR  6/12/2020 IR KYPHOPLASTY LUMBAR  7/22/2020 IR KYPHOPLASTY THORACIC  10/26/2020 NEUROLOGICAL PROCEDURE UNLISTED  2007 KYPHOPLASTY X2  
 NEUROLOGICAL PROCEDURE UNLISTED  10/26/15 Kyphoplasty t-5 PAIN PUMP DEVICE    
 implanted in LLQ, MORPHINE Expanded or extensive additional review of patient history: Home Situation Home Environment: Private residence # Steps to Enter: 1 Rails to Enter: No 
One/Two Story Residence: One story Living Alone: No 
Support Systems: Spouse/Significant Other/Partner, Child(juana) Current DME Used/Available at Home: Walker, rolling, Grab bars, Raised toilet seat Tub or Shower Type: Shower Hand dominance: Right EXAMINATION OF PERFORMANCE DEFICITS: 
Cognitive/Behavioral Status: 
  
Orientation Level: Oriented X4 Cognition: Decreased attention/concentration; Follows commands Perception: Appears intact Perseveration: No perseveration noted Safety/Judgement: Fall prevention Skin: intact- redness to L forearm, reported itching Edema: B LEs Hearing: Auditory Auditory Impairment: None Vision/Perceptual:   
    
    
    
  
    
Acuity: Within Defined Limits Corrective Lenses: Glasses Range of Motion AROM: Generally decreased, functional(LEs) L shoulder only to 90* passively before reporting pain, pain at lateral aspect of glenohumeral joint Strength: 
 
Strength: Generally decreased, functional(~2+/5 LEs) Coordination: 
Coordination: Generally decreased, functional 
Fine Motor Skills-Upper: Left Intact; Right Intact Gross Motor Skills-Upper: Left Intact; Right Intact Tone & Sensation: 
 
Tone: Normal 
Sensation: Impaired(significant neuropathy B feet) Balance: 
Sitting: Impaired; Without support Sitting - Static: Fair (occasional) Sitting - Dynamic: Poor (constant support) Standing: Impaired; With support Standing - Static: Poor Standing - Dynamic : Poor Functional Mobility and Transfers for ADLs: 
Bed Mobility: 
Supine to Sit: Maximum assistance;Assist x2 Scooting: Maximum assistance Transfers: 
Sit to Stand: Maximum assistance;Assist x2 Stand to Sit: Maximum assistance;Assist x2 ADL Assessment: 
Feeding: Setup Oral Facial Hygiene/Grooming: Setup(seated) Bathing: Maximum assistance Upper Body Dressing: Moderate assistance Lower Body Dressing: Maximum assistance Toileting: Total assistance ADL Intervention and task modifications: 
  
 
 Pt educated on role of OT and required verbal cues for safety and proper hand placement during ADLs/functional transfers. Cognitive Retraining Safety/Judgement: Fall prevention Functional Measure: 
Barthel Index: 
 
Bathin Bladder: 0 Bowels: 5 Groomin Dressin Feedin Mobility: 0 Stairs: 0 Toilet Use: 0 Transfer (Bed to Chair and Back): 5 Total: 15/100 The Barthel ADL Index: Guidelines 1. The index should be used as a record of what a patient does, not as a record of what a patient could do. 2. The main aim is to establish degree of independence from any help, physical or verbal, however minor and for whatever reason. 3. The need for supervision renders the patient not independent. 4. A patient's performance should be established using the best available evidence. Asking the patient, friends/relatives and nurses are the usual sources, but direct observation and common sense are also important. However direct testing is not needed. 5. Usually the patient's performance over the preceding 24-48 hours is important, but occasionally longer periods will be relevant. 6. Middle categories imply that the patient supplies over 50 per cent of the effort. 7. Use of aids to be independent is allowed. Waylon Dale., Barthel, D.W. ([id number]). Functional evaluation: the Barthel Index. [facility] (14)2. Stefany Democrat dago VIR[place]IA Pizano, Ruddy Graham., Jennifer Morris., [place], [address] [place] [address] (). Measuring the change indisability after inpatient rehabilitation; comparison of the responsiveness of the Barthel Index and Functional [place] Measure. Journal of Neurology, Neurosurgery, and Psychiatry, 66(4), 202-602. SHIRIN Meyer, LUBA Coon, & Michael Reed M.A. (2004.) Assessment of post-stroke quality of life in cost-effectiveness studies: The usefulness of the Barthel Index and the EuroQoL-5D. [facility], 13, [phone] Occupational Therapy Evaluation Charge Determination History Examination Decision-Making MEDIUM Complexity : Expanded review of history including physical, cognitive and psychosocial  history  MEDIUM Complexity : 3-5 performance deficits relating to physical, cognitive , or psychosocial skils that result in activity limitations and / or participation restrictions MEDIUM Complexity : Patient may present with comorbidities that affect occupational performnce. Miniml to moderate modification of tasks or assistance (eg, physical or verbal ) with assesment(s) is necessary to enable patient to complete evaluation Based on the above components, the patient evaluation is determined to be of the following complexity level: MEDIUM Pain Rating: 
Back pain with bed mobility, has pain pump Activity Tolerance:  
Fair and requires rest breaks Please refer to the flowsheet for vital signs taken during this treatment. After treatment patient left in no apparent distress:   
Sitting in chair and Call bell within reach COMMUNICATION/EDUCATION:  
The patients plan of care was discussed with: Physical therapist and Registered nurse. Patient/family have participated as able in goal setting and plan of care. This patients plan of care is appropriate for delegation to [facility]. Thank you for this referral. 
Holden Hanson OT Time Calculation: 28 mins

## 2020-10-28 NOTE — PROGRESS NOTES
Primary Nurse Rhonda Go RN and Janine CLAUDIO performed a dual skin assessment on this patient Impairment noted- see wound doc flow sheet Rodolfo score is 20. Pt has pink but blanchable skin on her buttocks and right posterior thigh. Pt has band aids on 2nd and forth toes on right foot and pt said it is infected. Nurse said pt got abrasions on them when she fell. Pt has bruises on her anterior arms and redness. Face is flushed and her cheeks look swollen.

## 2020-10-28 NOTE — PROGRESS NOTES
Hematology-Oncology Progress Note Leonardo Doshi 1945 
[id number] 
10/28/2020 Subjective:  
 
Complaining of back pain,and left shoulder pain; she feels misereable. Allergies: Broccoli; Bumex [bumetanide]; Doxil [doxorubicin, peg-liposomal]; Flagyl [metronidazole]; Keflex [cephalexin]; Lasix [furosemide]; Macrobid [nitrofurantoin monohyd/m-cryst]; Methotrexate; Pcn [penicillins]; Pomalyst [pomalidomide]; Sulfa (sulfonamide antibiotics); Tetanus and diphtheria toxoids; Thalidomide; and Dipyridamole Current Facility-Administered Medications Medication Dose Route Frequency Provider Last Rate Last Dose  [START ON 10/29/2020] levoFLOXacin (LEVAQUIN) tablet 750 mg  750 mg Oral Q24H Madala, Sushma, MD      
 polyethylene glycol (MIRALAX) packet 17 g  17 g Oral DAILY Madala, Sushma, MD   17 g at 10/28/20 1640  
 HYDROmorphone (DILAUDID) tablet 10 mg  10 mg Oral Q3H PRN Alejandra Zamora MD   10 mg at 10/28/20 1449  morphine 10 mg/mL injection 5 mg  5 mg IntraVENous Q4H PRN Zeynep Spikes, MD      
 balsam peru-castor oiL (VENELEX) ointment   Topical Q8H Alejandra Zamora MD      
 diphenhydrAMINE-zinc acetate 1%-0.1% (BENADRYL) cream   Topical TID PRN Alejandra Zamora MD      
 amitriptyline (ELAVIL) tablet 100 mg  100 mg Oral QHS Laureen Mercedes NP   100 mg at 10/27/20 2136  aluminum hydrox-magnesium carb (GAVISCON) oral suspension 15 mL  15 mL Oral Q6H PRN Saba Jones MD      
 bisacodyL (DULCOLAX) suppository 10 mg  10 mg Rectal DAILY PRN Alejandra Zamora MD      
 docusate sodium (COLACE) capsule 100 mg  100 mg Oral DAILY Alejandra Zamora MD   100 mg at 10/28/20 1082  docusate sodium (COLACE) capsule 300 mg  300 mg Oral QPM Alejandra Zamora MD   300 mg at 10/26/20 2142  fluticasone propionate (FLONASE) 50 mcg/actuation nasal spray 2 Spray  2 Spray Both Nostrils BID PRN Alejandra Zamora MD      
 sodium chloride (NS) flush 5-40 mL  5-40 mL IntraVENous Q8H Robert Kalen Eller MD   10 mL at 10/28/20 5913  sodium chloride (NS) flush 5-40 mL  5-40 mL IntraVENous PRN Kalen Jones MD      
 acetaminophen (TYLENOL) tablet 650 mg  650 mg Oral Q6H PRN Araceli Fajardo MD   650 mg at 10/27/20 1821 Or  acetaminophen (TYLENOL) suppository 650 mg  650 mg Rectal Q6H PRN Kalen Jones MD      
 promethazine (PHENERGAN) tablet 12.5 mg  12.5 mg Oral Q6H PRN Madala, Sushma, MD      
 Or  
 ondansetron (ZOFRAN) injection 4 mg  4 mg IntraVENous Q6H PRN Madala, Sushma, MD   4 mg at 10/23/20 1320  
 diazePAM (VALIUM) tablet 10 mg  10 mg Oral BID Araceli Fajardo MD   10 mg at 10/28/20 1606  gabapentin (NEURONTIN) capsule 600 mg  600 mg Oral TID Araceli Fajardo MD   600 mg at 10/28/20 1318  . PHARMACY TO SUBSTITUTE PER PROTOCOL (Reordered from: linaCLOtide (Linzess) 290 mcg cap capsule)    Per Protocol Araceli Fajardo MD      
 rivaroxaban (XARELTO) tablet 10 mg  10 mg Oral DAILY Araceli Fajardo MD   10 mg at 10/27/20 1818  senna (SENOKOT) tablet 34.4 mg  4 Tab Oral BID Araceli Fajardo MD   34.4 mg at 10/28/20 9747  fentaNYL (DURAGESIC) 50 mcg/hr patch 1 Patch  1 Patch TransDERmal Q72H Kalen Jones MD   1 Patch at 10/26/20 2320  
 albuterol (PROVENTIL HFA, VENTOLIN HFA, PROAIR HFA) inhaler 1 Puff  1 Puff Inhalation Q4H PRN Araceli Fajardo MD      
 
Facility-Administered Medications Ordered in Other Encounters Medication Dose Route Frequency Provider Last Rate Last Dose  diphenhydrAMINE (BENADRYL) injection 12.5 mg  12.5 mg IntraVENous PRN Martín Maldonado MD   12.5 mg at 10/26/20 1847 Objective:  
 
Patient Vitals for the past 24 hrs: 
 BP Temp Pulse Resp SpO2  
10/28/20 1600 113/60 98.6 °F (37 °C) (!) 106 18 98 % 10/28/20 0800 119/69 98.7 °F (37.1 °C) 85 18 97 % 10/28/20 0641 125/67  (!) 103 18 92 % 10/28/20 0200 103/67 98.1 °F (36.7 °C) 91 14 93 % 10/27/20 2344 126/66 98 °F (36.7 °C) (!) 105 18 96 % 10/27/20 1926 123/63 97.8 °F (36.6 °C) 98 16 94 % Gen: NAD HEENT: PERRL, Sclerae anicteric Cv: RRR without m/r/g Pulm: aerting well Abd: NABS, NTND, Ext: No c/c/e Available labs reviewed: 
Labs:   
No results found for this or any previous visit (from the past 24 hour(s)). Assessment and Plan  
 
77 y/o woman with a long (since 2007) h/o myeloma, now with declining performance status and limited treatment options, admitted after wrenching her arm getting off of the toilet. Noted to have rib fracture and likely compression fracture. Also with dyspnea, noted to have bilateral pulmonary infiltrates, being ruled out for COVID. 1. Myeloma: not currently a candidate for treatment given declining performance status. 2. Rib fracture/comp fractures: likely due to #1 above. Given intractable pain,I increased the prn frequency of her mso4 yesterday,, she has this and prn dilaudid available. . she does not want to go up on the fentanyl patch at present. ... bone scan results reviewed with patient. .. will need orthopedic opinion regarding shoulder pain which is her chief complaint at present 3. Chronic pain syndrome: has implanted morphine pump, also on fentanyl patch. 4. VTE: rivaroxaban on hold for possible kyphoplasty 5. Disposition: I think she may be ready to accept hospice. She was expecting to talk to someone today, but the which hospice to call has not been settled. I wonder is she would do better with PCA. Apparently if her intrathecal pump runs out it will alarm. I will try to call Dr. Andrés Masters to discuss our options. Narcotic PCA might be the simplest approach and help us to keep her comfortable. Hospice could help manage it at home. She does not want to go to a facility for fear that she will never see her family again due to [place]. 6. Constipation . . pt takes lilnzess, not on formulary her  brought this in and it is ok for her to take it.  also takes miralax as well, will try to make these available Phuong Parish MD 
 
Hematology/Oncology Phone [phone]

## 2020-10-28 NOTE — PROGRESS NOTES
Pulmonary, Critical Care, and Sleep Medicine~Progress Note Name: Sherita Dover MRN: [id number] : 1945 [facility] Date: 10/28/2020 9:50 AM Admission: 10/23/2020 Impression Plan 1. Acute hypoxic resp insufficiency 2. Bibasilar atx secondary to rib fxs and chest wall pain 3. BRENDON, untreated 4. VTE hx; was on xarelto 5. M. M. With pathologic fxs   1. Encourage IS 2. O2 titration above 90% 3. She needs to mobilize and sit up 4. Noted consideration of hospice; focus should be on pain control at this point 5. We will be available again to see if needed Daily Progression: 
 
Siting in bed. Eating breakfast  
 
I have reviewed the labs and previous days notes. Pertinent items are noted in HPI. OBJECTIVE: 
 
 Vital Signs: 
    
Visit Vitals /67 Pulse (!) 103 Temp 98.1 °F (36.7 °C) Resp 18 Ht 4' 8\" (1.422 m) Wt 79.8 kg (176 lb) SpO2 92% BMI 39.46 kg/m² Temp (24hrs), Av.9 °F (36.6 °C), Min:97.7 °F (36.5 °C), Max:98.1 °F (36.7 °C) Intake/Output:  
  Last shift: No intake/output data recorded. Last 3 shifts: 10/26 1901 - 10/28 0700 In: 8135 [P.O.:2420; I.V.:150] Out: 9032 Octaviano Mathew  [place] [[id number]] Intake/Output Summary (Last 24 hours) at 10/28/2020 1024 Last data filed at 10/28/2020 6490 Gross per 24 hour Intake 1970 ml Output 2075 ml Net -105 ml Physical Exam:                                       
Exam Findings Other General: No resp distress noted, appears stated age HEENT:  No ulcers, JVD not elevated, no cervical LAD Chest: No pectus deformity, normal chest rise b/l HEART:  No visible thrills Lungs:  Normal expansion ABD: Soft/NT, non rigid mildly distended EXT: No cyanosis/clubbing/edema, normal peripheral pulses Skin: No rashes or ulcers, no mottling Neuro: A/O x 3 Medications: Current Facility-Administered Medications Medication Dose Route Frequency  HYDROmorphone (DILAUDID) tablet 10 mg  10 mg Oral Q3H PRN  
 morphine 10 mg/mL injection 5 mg  5 mg IntraVENous Q4H PRN  polyethylene glycol (MIRALAX) packet 17 g  17 g Oral BID PRN  
 balsam peru-castor oiL (VENELEX) ointment   Topical Q8H  
 diphenhydrAMINE-zinc acetate 1%-0.1% (BENADRYL) cream   Topical TID PRN  
 levoFLOXacin (LEVAQUIN) 750 mg in D5W IVPB  750 mg IntraVENous Q24H  
 amitriptyline (ELAVIL) tablet 100 mg  100 mg Oral QHS  aluminum hydrox-magnesium carb (GAVISCON) oral suspension 15 mL  15 mL Oral Q6H PRN  
 bisacodyL (DULCOLAX) suppository 10 mg  10 mg Rectal DAILY PRN  
 docusate sodium (COLACE) capsule 100 mg  100 mg Oral DAILY  docusate sodium (COLACE) capsule 300 mg  300 mg Oral QPM  
 fluticasone propionate (FLONASE) 50 mcg/actuation nasal spray 2 Spray  2 Spray Both Nostrils BID PRN  
 sodium chloride (NS) flush 5-40 mL  5-40 mL IntraVENous Q8H  
 sodium chloride (NS) flush 5-40 mL  5-40 mL IntraVENous PRN  
 acetaminophen (TYLENOL) tablet 650 mg  650 mg Oral Q6H PRN Or  
 acetaminophen (TYLENOL) suppository 650 mg  650 mg Rectal Q6H PRN  promethazine (PHENERGAN) tablet 12.5 mg  12.5 mg Oral Q6H PRN Or  
 ondansetron (ZOFRAN) injection 4 mg  4 mg IntraVENous Q6H PRN  
 diazePAM (VALIUM) tablet 10 mg  10 mg Oral BID  
 gabapentin (NEURONTIN) capsule 600 mg  600 mg Oral TID  
 . PHARMACY TO SUBSTITUTE PER PROTOCOL (Reordered from: linaCLOtide (Linzess) 290 mcg cap capsule)    Per Protocol  rivaroxaban (XARELTO) tablet 10 mg  10 mg Oral DAILY  senna (SENOKOT) tablet 34.4 mg  4 Tab Oral BID  fentaNYL (DURAGESIC) 50 mcg/hr patch 1 Patch  1 Patch TransDERmal Q72H  albuterol (PROVENTIL HFA, VENTOLIN HFA, PROAIR HFA) inhaler 1 Puff  1 Puff Inhalation Q4H PRN Facility-Administered Medications Ordered in Other Encounters Medication Dose Route Frequency  diphenhydrAMINE (BENADRYL) injection 12.5 mg  12.5 mg IntraVENous PRN Labs: ABG No results for input(s): PHI, PCO2I, PO2I, HCO3I, SO2I, FIO2I in the last 72 hours. CBC Recent Labs 10/27/20 
3859 10/26/20 
6857 WBC 8.3 9.2 HGB 10.8* 11.5 HCT 34.4* 38.1  319 MCV 98.6 100.5*  
MCH 30.9 30.3 Metabolic Panel Recent Labs 10/27/20 
4368 10/26/20 
2549  136  
K 4.2 3.6  97 CO2 33* 34* * 113* BUN 8 5* CREA 0.62 0.59 CA 8.8 9.0 Pertinent Labs Krysta Sánchez PA-C 
10/28/2020

## 2020-10-28 NOTE — PROGRESS NOTES
Ortho Progress Note S: Pain is unchanged in the L shoulder, improved in R hip, able to tolerate a few steps to chair, but still very weak with difficulty standing from sitting. Denies new pain,numbness, focal weakness, cp, sob. O:Left shoulder TTP over superior and lateral surface; able to abduct arm against gravity to 80*; ER/IR 4/5 with no severe limitations to AROM; Pain diffusely with any shoulder PROM; Radial pulse 2+, cap refill brisk; finger flexion, extension, thumb flexion/extension intact; median, ulnar, radial n distribution intact to light touch. A/P: 
- Orthopaedically stable, Imaging negative for fractures; no further planned procedures or imaging at this time; pt will need to follow up as an outpatient in order to further assess her L shoulder pain. I have instructed her to speak with her pain management MD about scheduling MRI for L shoulder in conjunction with her pain pump refill to avoid lapses in administration.  
- PT/OT for mobilization, strengthening; WBAT BLE, BUE; needs additional sessions in house. - Medical management per primary team 
- Discussed with Dr. Radhika Weller and he is in agreement with plan; Pt shoulder call for appt at time of discharge to see Dr. Delmar Noyola or [facility] for further eval of her L shoulder. Will Sign off Call with questions.

## 2020-10-28 NOTE — PROGRESS NOTES
Problem: Self Care Deficits Care Plan (Adult) Goal: *Acute Goals and Plan of Care (Insert Text) Description:  
FUNCTIONAL STATUS PRIOR TO ADMISSION: Patient was modified independent using a rolling walker for functional mobility. Recently difficulty with WB and reported \"dragging\" her legs HOME SUPPORT: The patient lived with  but reports being able to do her own ADLs. Occupational Therapy Goals Initiated 10/27/2020 1. Patient will perform seated bathing with moderate assistance  within 7 day(s). 2.  Patient will perform transfer to [facility] with least restrictive DME with minimal assistance/contact guard assist within 7 day(s). 3.  Patient will perform upper body dressing with minimal assistance/contact guard assist within 7 day(s). 4.  Patient will perform all aspects of toileting with moderate assistance  within 7 day(s). 5.  Patient will participate in upper extremity therapeutic exercise/activities with supervision/set-up for 5 minutes within 7 day(s). 6.  Patient will utilize energy conservation techniques during functional activities with verbal cues within 7 day(s). Outcome: Progressing Towards Goal 
 
OCCUPATIONAL THERAPY TREATMENT Patient: Leonardo Doshi (61 y.o. female) Date: 10/28/2020 Diagnosis: Multiple rib fractures [S22.49XA] <principal problem not specified> Precautions: Fall, Spinal, Bed Alarm Chart, occupational therapy assessment, plan of care, and goals were reviewed. ASSESSMENT Patient continues with skilled OT services and is not progressing towards goals. Pt greeted lying in bed having finished lunch. Requested to use bed pan prior to EOB activity. Assisted pt for set-up on bed pan with max A x 2 for rolling L<>R and increased discomfort on bedpan requiring increased time for placement. Pt left with call bell in reach and nursing aware. OT to continue to follow.  Pt continues to be well below reported baseline PLOF. Recommend OT to continue to follow while inpt  to optimize functional progress Current Level of Function Impacting Discharge (ADLs): up to total A Other factors to consider for discharge: below functional baseline, hospice/palliative consult PLAN : 
Patient continues to benefit from skilled intervention to address the above impairments. Continue treatment per established plan of care. to address goals. Recommend next OT session: grooming bed level, transfers to EOB with max A x 2 Recommendation for discharge: (in order for the patient to meet his/her long term goals) Therapy up to 5 days/week in SNF setting This discharge recommendation: 
Has not yet been discussed the attending provider and/or case management IF patient discharges home will need the following DME: TBD pending progress SUBJECTIVE:  
Patient stated I am having a lot of gas, I've been trying to go since last night.  OBJECTIVE DATA SUMMARY:  
Cognitive/Behavioral Status: 
Neurologic State: Alert; Appropriate for age Orientation Level: Oriented X4 Cognition: Follows commands Functional Mobility and Transfers for ADLs: 
Bed Mobility: 
Rolling: Maximum assistance;Assist x2; Additional time; Adaptive equipment Transfers: 
 Not tested - pt requesting to use bed pan prior to EOB activity ADL Intervention: Toileting Toileting Assistance: Maximum assistance Bowel Hygiene: Total assistance (dependent) Pain: Not rated, endorsed in R shoulder Activity Tolerance:  
Fair Please refer to the flowsheet for vital signs taken during this treatment. After treatment patient left in no apparent distress:  
Supine in bed, Call bell within reach, and on bed pan - nursing aware COMMUNICATION/COLLABORATION:  
The patients plan of care was discussed with: Physical therapist and Registered nurse. Monica Landry OT Time Calculation: 12 mins

## 2020-10-28 NOTE — PROGRESS NOTES
Problem: Falls - Risk of 
Goal: *Absence of Falls Description: Document Richard Merlin Fall Risk and appropriate interventions in the flowsheet. Outcome: Progressing Towards Goal 
Note: Fall Risk Interventions: 
Mobility Interventions: PT Consult for mobility concerns Mentation Interventions: Door open when patient unattended Medication Interventions: Teach patient to arise slowly Elimination Interventions: Call light in reach History of Falls Interventions: Room close to nurse's station

## 2020-10-28 NOTE — PROGRESS NOTES
Problem: Mobility Impaired (Adult and Pediatric) Goal: *Acute Goals and Plan of Care (Insert Text) Description: FUNCTIONAL STATUS PRIOR TO ADMISSION: Patient was modified independent using a rolling walker recently for functional mobility. Was declining with functional mobility within the last week prior to admission, requiring increased time for all ambulation within the home and stated she was \"dragging\" her feet. Fall off the toilet that prompted admission. HOME SUPPORT PRIOR TO ADMISSION: The patient lived with her  who is supportive but elderly as well. Patient's daughter is local and supportive. Physical Therapy Goals Initiated 10/27/2020 1. Patient will move from supine to sit and sit to supine , scoot up and down, and roll side to side in bed with moderate assistance  within 7 day(s). 2.  Patient will transfer from bed to chair and chair to bed with moderate assistance  using the least restrictive device within 7 day(s). 3.  Patient will perform sit to stand with moderate assistance  within 7 day(s). 4.  Patient will ambulate with moderate assistance for 10 feet with the least restrictive device within 7 day(s). 5.  Patient will improve Tinetti score by 4-5 points within 7 days. Outcome: Not Progressing Towards Goal 
 PHYSICAL THERAPY TREATMENT Patient: Javier Young (15 y.o. female) Date: 10/28/2020 Diagnosis: Multiple rib fractures [S22.49XA] <principal problem not specified> Precautions: Fall, Spinal, Bed Alarm Chart, physical therapy assessment, plan of care and goals were reviewed. ASSESSMENT Patient continues with skilled PT services and is not progressing towards goals. Pt received in supine and complaining of having \"lots of gas\" and stating she may have a BM. Assisted RN and pt with bed pan placement with rolling L and R with maximum assist x 2.   Pt complains of pain with rolling and took a while to get in a comfortable position on bed pan with assistance for shifting her hips and raising HOB. Per chart review family meeting possibly today to discuss Hospice. Current Level of Function Impacting Discharge (mobility/balance): rolling L and R with maximum assistance Other factors to consider for discharge: very limited due to her pain, PMH, high fall risk PLAN : 
Patient continues to benefit from skilled intervention to address the above impairments. Continue treatment per established plan of care. to address goals. Recommendation for discharge: (in order for the patient to meet his/her long term goals) Therapy up to 5 days/week in SNF setting versus home with hospice with family assisting/providing 24/7, hired caregiver ? This discharge recommendation: 
Has not yet been discussed the attending provider and/or case management IF patient discharges home will need the following DME: hospital bed, mechanical lift, and rolling walker SUBJECTIVE:  
Patient stated I may have a BM.  OBJECTIVE DATA SUMMARY:  
Critical Behavior: 
Neurologic State: Alert, Appropriate for age Orientation Level: Oriented X4 Cognition: Follows commands Safety/Judgement: Fall prevention Functional Mobility Training: 
Bed Mobility: 
Rolling: Maximum assistance;Assist x2; Additional time; Adaptive equipment Pain Rating: C/o shoulder pain and generalized pain with rolling and positioning onto bedpan Activity Tolerance:  
Poor After treatment patient left in no apparent distress:  
Supine in bed COMMUNICATION/COLLABORATION:  
The patients plan of care was discussed with: Registered nurseSalvador Padilla Time Calculation: 15 mins

## 2020-10-28 NOTE — PROGRESS NOTES
SPEECH PATHOLOGY BEDSIDE SWALLOW EVALUATION/DISCHARGE Patient: Mitchel Franco (58 y.o. female) Date: 10/28/2020 Primary Diagnosis: Multiple rib fractures [S22.49XA] Precautions:   Fall, Spinal, Bed Alarm ASSESSMENT : 
Based on the objective data described below, the patient presents with [facility] oropharyngeal swallow and suspected esophageal dysphagia given reported of EGD with esophageal dilation in 2016 (showing hiatal hernia 3 cm in size, narrowing of the G-E junction, dilated with CRE balloon from 15 to 18 mm for 1 minute) and patient report of globus sensation with hard, dry solids but no difficulty with purees or thin liquids. Skilled acute therapy provided by a speech-language pathologist is not indicated at this time. PLAN : 
Recommendations: 
-Continue current diet, with patient choosing naturally softer foods with extra moisture (sauce/gravy). Can downgrade patient's diet without additional SLP consult if needed 
-Unsure if GI could be beneficial given overall prognosis. Defer to MD 
Discharge Recommendations: None SUBJECTIVE:  
Patient stated I think I need my esophagus stretched.  OBJECTIVE:  
 
Past Medical History:  
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   
 spinal stenosis per pt  Fibromyalgia  GERD (gastroesophageal reflux disease)  High cholesterol  History of acute pancreatitis  History of blood transfusion  History of Salmonella infection 2009  Multiple myeloma ([address] 2007  Osteoporosis  Other complication due to venous access device 1/29/2013  Recurrent umbilical hernia 3/96/2898  Recurrent ventral incisional hernia 3/28/2016  Rheumatoid arthritis(714.0)  Sleep apnea INTOLERATANT OF CPAP  Thromboembolus ([address] Right leg DVT  Urinary incontinence Past Surgical History:  
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    
 COLONOSCOPIES, EGD  HX GI    
 ESOPHAGUS STREACHED  
 HX HEENT  10/2011  
 b/l cataract surgery in October 2011  HX HERNIA REPAIR  [id number] X6  
 HX HERNIA REPAIR  10-13-14  
 repair of recurrent ventral incisional hernia  with primary suture [id number]. Rolin Ing  HX KYPHOPLASTY  09/14/2017 L4  
 HX KYPHOPLASTY  2020 L1, L2, L5   
 HX ORTHOPAEDIC Right 2001 BONE SPURS SHOULDER  
 HX OTHER SURGICAL  2010 LLQ PAIN PUMP FOR MORPHINE INFUSION  
 HX OTHER SURGICAL  1-16-14  
 repair of recurrent umbilical hernia with ventralex pillow top mesh and extensive lysis of [id number]. Rolin Ing  HX OTHER SURGICAL  5-31-16  
 repair of recurrent ventral incisional hernia with underlay mesh-[facility]-Dr. Rahul Daniels  HX VASCULAR ACCESS Right 1/2013 POWER PORT   
 HX VASCULAR ACCESS  2010 PAIN PUMP  IR INJ FORAMIN EPID LUMB ANES/STER SNGL  3/19/2019  IR KYPHOPLASTY LUMBAR  6/12/2020  IR KYPHOPLASTY LUMBAR  7/22/2020  IR KYPHOPLASTY THORACIC  10/26/2020  
 NEUROLOGICAL PROCEDURE UNLISTED  2007 KYPHOPLASTY X2  
 NEUROLOGICAL PROCEDURE UNLISTED  10/26/15 Kyphoplasty t-5  PAIN PUMP DEVICE    
 implanted in LLQ, MORPHINE Prior Level of Function/Home Situation:  
Home Situation Home Environment: Private residence # Steps to Enter: 1 Rails to Enter: No 
One/Two Story Residence: One story Living Alone: No 
Support Systems: Spouse/Significant Other/Partner, Child(juana) Current DME Used/Available at Home: Walker, rolling, Grab bars, Raised toilet seat Tub or Shower Type: Shower Diet prior to admission: regular/thin Current Diet:  Regular/thin  
Cognitive and Communication Status: 
Neurologic State: Alert, Appropriate for age Orientation Level: Oriented X4 Cognition: Follows commands Perception: Appears intact Perseveration: No perseveration noted Safety/Judgement: Fall prevention Oral Assessment: P.O. Trials: 
Patient Position: upright in bed Vocal quality prior to P.O.: No impairment Consistency Presented: Thin liquid;Puree; Solid How Presented: Self-fed/presented;Cup/sip;Cup/gulp; Spoon;Straw;Successive swallows Bolus Acceptance: No impairment Bolus Formation/Control: No impairment Propulsion: No impairment Oral Residue: None Initiation of Swallow: No impairment Laryngeal Elevation: Functional 
Aspiration Signs/Symptoms: None Pharyngeal Phase Characteristics: No impairment, issues, or problems NOMS:  
The NOMS functional outcome measure was used to quantify this patient's level of swallowing impairment. Based on the NOMS, the patient was determined to be at level 6 for swallow function NOMS Swallowing Levels: 
Level 1 (CN): NPO Level 2 (CM): NPO but takes consistency in therapy Level 3 (CL): Takes less than 50% of nutrition p.o. and continues with nonoral feedings; and/or safe with mod cues; and/or max diet restriction Level 4 (CK): Safe swallow but needs mod cues; and/or mod diet restriction; and/or still requires some nonoral feeding/supplements Level 5 (CJ): Safe swallow with min diet restriction; and/or needs min cues Level 6 (CI): Independent with p.o.; rare cues; usually self cues; may need to avoid some foods or needs extra time Level 7 (CH): Independent for all p.o. EM. (2003). National Outcomes Measurement System (NOMS): Adult Speech-Language Pathology User's Guide. Pain: 
Pain Scale 1: Numeric (0 - 10) Pain Intensity 1: 0 Pain Location 1: Back After treatment:  
Patient left in no apparent distress in bed, Call bell within reach, Nursing notified and Caregiver / family present COMMUNICATION/EDUCATION:  
Patient was educated regarding her deficit(s) of dysphagia as this relates to her diagnosis. She demonstrated Good understanding as evidenced by verbalizing understandign.  
 
The patient's plan of care including recommendations, planned interventions, and recommended diet changes were discussed with: Registered nurse. Thank you for this referral. 
Joe Bonner, ZACHARY Time Calculation: 16 mins

## 2020-10-28 NOTE — PROGRESS NOTES
Hospitalist Progress Note Hospital summary: A 76year old female patient with PMH of Multiple myeloma with metastatic disease, rheumatoid arthritis, chronic pain/ opioid dependence on implanted morphine pump, DVT on xarelto, multiple pathological fractures, recent kyphoplasty of L spine presents to the ED for evaluation of left shoulder pain and difficulty weightbearing.  Patient states that she has been having trouble ambulating for a few days and was trying to get off her commode this am when she lost her hand hold and fell back onto the toilet. She had been evaluated for DVT earlier in the week but was found negative. She states that she has been unable to bear weight very well for about a week but denies prior injury. She complains of pain primarily in her left shoulder as well as right posterior hip/buttock region. She denied sob initially and stated that she is getting evaluated for sleep study and hypoxia at night time. Pt has pain on deep breathing. Denied cough, fever, abd pain, urinary or bowel changes. In ED, imaging showed acute multiple rib fractures and thoracic fractures. Hematology consulted in ED. Hospitalist consulted for admission. 
  
10/23/2020 Assessment/Plan: 
Acute hypoxic resp failure - improving  
-  due to bibasilar atelectasis / lung hypoventilation  from rib fractures - CXR: Nonspecific ill-defined diffuse bilateral pulmonary opacification. - febrile on 10/24  
- covid test x 2 negative 
- normal lactic, pro calcitonin 
- resp pcr negative - CTA chest : no PE. bibasilar atelectasis right greater than left - pt had rash - possible due to Vanco, so discontinued 
- changed to po Levaquin  
- saturating on 2l NC, try to wean down - appreciate Pulmonology input - IS Left shoulder pain/ Right hip pain - not improving Thoracic vertebra fractures/ multiple rib fractures Secondary to MM 
 - CT L spine: Acute versus subacute T11 and T12 partial compression fractures are new since 5 months ago. - CT hip : No evidence of acute abnormality 
- XR: Multiple left rib fractures. - XR shoulder: Degenerative changes left shoulder. . 
- appreciate thoracic surgery input for rib fractures conservative management  
- Orthopedics outpt f/u for left shoulder pain 
- bone scan: There is increased bony activity T11 suspicious for acute fracture. There are multiple foci of increased activity in the ribs consistent with rib fractures - s/p kyphoplasty T11 on 10/26 
- PT/OT  
  
Multiple myeloma with metastatic disease 
- not on any treatment now due to intolerance 
- follows with oncology Dr. Bria Earl - appreciate oncology input  
  
Chronic pain syndrome on implanted morphine drip - c/w home fentanyl patch, po dilaudid prn ( dose increased to 10mg prn). IV dilaudid prn   
 
Urinary retention - butts placed BRENDON - need outpt sleep study  
  Hx DVT -  Xarelto restarted 10/27 Palliative care on board. Extensive discussion on goals of care - pt agreed to DNR and paperwork signed. Still not decided on home hospice vs SNF. pt says she wants to go home but understands that her  can not help her and home health is sufficient. She is not ready for hospice as she wants her morphine implant. Hospice consult placed on 10/27 - for patient and  to discuss options for pain management. They have not consented to hospice care  
  
DVT ppx: Xarelto Code status: Full.  is mpoa Disposition: TBD. Home hospice vs SNF 
---------------------------------------------- 
 
CC:  Left shoulder pain S: Patient is seen and examined at bedside this AM.  present. Patent had a choking episode during my encounter and also has nose bleed. Reassured her. Speech consulted. C/w constipation - intensified bowel regimen Discussed with nursing Addendum: Extensive discussion again with patient and  on phone - clarified code status , DDNR, both agreed on DNR. They still have not decided on hospice. Both prefer home option but pt is weak, extremely concerned about morphine  Implant refill, tried my best to reassure them Review of Systems: A comprehensive review of systems was negative. O: 
Visit Vitals /69 Pulse 85 Temp 98.7 °F (37.1 °C) Resp 18 Ht 4' 8\" (1.422 m) Wt 79.8 kg (176 lb) SpO2 97% BMI 39.46 kg/m² PHYSICAL EXAM: 
Gen: mild - moderate distress, chronically ill looking HEENT: anicteric sclerae, normal conjunctiva, oropharynx clear, MM moist 
Neck: supple, trachea midline, no adenopathy Heart: RRR, no MRG, no JVD, no peripheral edema Lungs: decreased breath sounds Abd: soft, NT, ND, BS+, no organomegaly Extr: warm. Right hip pain. Left shoulder pain and ROM limited Skin: dry, no rash Neuro: normal speech, moves all extremities Psych: anxious Intake/Output Summary (Last 24 hours) at 10/28/2020 1547 Last data filed at 10/28/2020 1621 Gross per 24 hour Intake 1220 ml Output 1200 ml Net 20 ml Recent labs & imaging reviewed: 
No results found for this or any previous visit (from the past 24 hour(s)). Recent Labs 10/27/20 
9195 10/26/20 
5907 WBC 8.3 9.2 HGB 10.8* 11.5 HCT 34.4* 38.1  319 Recent Labs 10/27/20 
9743 10/26/20 
2387  136  
K 4.2 3.6  97 CO2 33* 34* BUN 8 5* CREA 0.62 0.59 * 113* CA 8.8 9.0 No results for input(s): ALT, AP, TBIL, TBILI, TP, ALB, GLOB, GGT, AML, LPSE in the last 72 hours. No lab exists for component: SGOT, GPT, AMYP, HLPSE No results for input(s): INR, PTP, APTT, INREXT, INREXT in the last 72 hours. No results for input(s): FE, TIBC, PSAT, FERR in the last 72 hours. Lab Results Component Value Date/Time  Folate 34.3 (H) 05/28/2010 04:30 AM  
  
 No results for input(s): PH, PCO2, PO2 in the last 72 hours. No results for input(s): CPK, CKNDX, TROIQ in the last 72 hours. No lab exists for component: CPKMB Lab Results Component Value Date/Time Cholesterol, total 137 03/06/2017 08:01 AM  
 HDL Cholesterol 47 03/06/2017 08:01 AM  
 LDL, calculated 38 03/06/2017 08:01 AM  
 Triglyceride 260 (H) 03/06/2017 08:01 AM  
 CHOL/HDL Ratio 5.0 12/16/2011 05:05 AM  
 
Lab Results Component Value Date/Time Glucose (POC) 113 (H) 06/02/2016 11:06 AM  
 Glucose (POC) 87 09/15/2012 08:29 PM  
 Glucose (POC) 87 09/15/2012 11:58 AM  
 
Lab Results Component Value Date/Time Color YELLOW/STRAW 10/23/2020 12:47 PM  
 Appearance CLEAR 10/23/2020 12:47 PM  
 Specific gravity 1.013 10/23/2020 12:47 PM  
 Specific gravity <1.005 01/24/2019 08:09 PM  
 pH (UA) 8.5 (H) 10/23/2020 12:47 PM  
 Protein Negative 10/23/2020 12:47 PM  
 Glucose Negative 10/23/2020 12:47 PM  
 Ketone TRACE (A) 10/23/2020 12:47 PM  
 Bilirubin Negative 10/23/2020 12:47 PM  
 Urobilinogen 0.2 10/23/2020 12:47 PM  
 Nitrites Negative 10/23/2020 12:47 PM  
 Leukocyte Esterase Negative 10/23/2020 12:47 PM  
 Epithelial cells FEW 10/23/2020 12:47 PM  
 Bacteria Negative 10/23/2020 12:47 PM  
 WBC 0-4 10/23/2020 12:47 PM  
 RBC 0-5 10/23/2020 12:47 PM  
 
 
Med list reviewed Current Facility-Administered Medications Medication Dose Route Frequency  HYDROmorphone (DILAUDID) tablet 10 mg  10 mg Oral Q3H PRN  
 morphine 10 mg/mL injection 5 mg  5 mg IntraVENous Q4H PRN  polyethylene glycol (MIRALAX) packet 17 g  17 g Oral BID PRN  
 balsam peru-castor oiL (VENELEX) ointment   Topical Q8H  
 diphenhydrAMINE-zinc acetate 1%-0.1% (BENADRYL) cream   Topical TID PRN  
 levoFLOXacin (LEVAQUIN) 750 mg in D5W IVPB  750 mg IntraVENous Q24H  
 amitriptyline (ELAVIL) tablet 100 mg  100 mg Oral QHS  aluminum hydrox-magnesium carb (GAVISCON) oral suspension 15 mL  15 mL Oral Q6H PRN  
  bisacodyL (DULCOLAX) suppository 10 mg  10 mg Rectal DAILY PRN  
 docusate sodium (COLACE) capsule 100 mg  100 mg Oral DAILY  docusate sodium (COLACE) capsule 300 mg  300 mg Oral QPM  
 fluticasone propionate (FLONASE) 50 mcg/actuation nasal spray 2 Spray  2 Spray Both Nostrils BID PRN  
 sodium chloride (NS) flush 5-40 mL  5-40 mL IntraVENous Q8H  
 sodium chloride (NS) flush 5-40 mL  5-40 mL IntraVENous PRN  
 acetaminophen (TYLENOL) tablet 650 mg  650 mg Oral Q6H PRN Or  
 acetaminophen (TYLENOL) suppository 650 mg  650 mg Rectal Q6H PRN  promethazine (PHENERGAN) tablet 12.5 mg  12.5 mg Oral Q6H PRN Or  
 ondansetron (ZOFRAN) injection 4 mg  4 mg IntraVENous Q6H PRN  
 diazePAM (VALIUM) tablet 10 mg  10 mg Oral BID  
 gabapentin (NEURONTIN) capsule 600 mg  600 mg Oral TID  
 . PHARMACY TO SUBSTITUTE PER PROTOCOL (Reordered from: linaCLOtide (Linzess) 290 mcg cap capsule)    Per Protocol  rivaroxaban (XARELTO) tablet 10 mg  10 mg Oral DAILY  senna (SENOKOT) tablet 34.4 mg  4 Tab Oral BID  fentaNYL (DURAGESIC) 50 mcg/hr patch 1 Patch  1 Patch TransDERmal Q72H  albuterol (PROVENTIL HFA, VENTOLIN HFA, PROAIR HFA) inhaler 1 Puff  1 Puff Inhalation Q4H PRN Facility-Administered Medications Ordered in Other Encounters Medication Dose Route Frequency  diphenhydrAMINE (BENADRYL) injection 12.5 mg  12.5 mg IntraVENous PRN Care Plan discussed with:  Patient/Family and Nurse Carlos Garcia MD 
Internal Medicine Date of Service: 10/28/2020

## 2020-10-28 NOTE — PROGRESS NOTES
Transition of Care Plan ? RUR- 21% Moderate Risks ? DISPOSITION: Patient presents from home and is expected to be going home with [facility] home hospice ? Transport: [facility] phone [phone] will call tomorrow when ready. ? Continue Medical Care ? Follow up: PCP/Specialist(s) Reviewed chart for transitions of care,and discussed in rounds. CM met with patient at bedside to explain role and offer support. Patient is alert and oriented x4, and confirmed demographics. Transition of Care Plan: The Plan for Transition of Care is related to the following treatment goals: home hospice The Patient was provided with a choice of provider and agrees  with the discharge plan. Yes [x] No [] A Freedom of choice list was provided with basic dialogue that supports the patient's individualized plan of care/goals and shares the quality data associated with the providers. Yes [x] No [] Hospice referral sent to [facility]. Pending approval. CM will continue to follow. Kevin Munoz Social Work Student

## 2020-10-28 NOTE — ACP (ADVANCE CARE PLANNING)
[facility] Adult  Hospitalist Group Advance Care Planning Note Name: Lexii Arana YOB: 1945 MRN: [id number] Admission Date: 10/23/2020  8:58 AM 
 
Date of discussion: 10/28/2020 Active Diagnoses: 
 
Hospital Problems  Date Reviewed: 6/12/2020 Codes Class Noted POA Multiple rib fractures ICD-10-CM: D97.83IM ICD-9-CM: 807.09  10/23/2020 Unknown These active diagnoses are of sufficient risk that focused discussion on advance care planning is indicated in order to allow the patient to thoughtfully consider personal goals of care, and if situations arise that prevent the ability to personally give input, to ensure appropriate representation of their personal desires for different levels and aggressiveness of care. Discussion:  
 
Persons present and participating in discussion: Carmina Bae MD,  Christopher Wells Topics Discussed: 
Patient's medical condition and diagnosis: Christiano.  ] yes [  ] no  
Surrogate decision maker: [  ] yes [  ] no Patient's current physical function/cognitive function/frailty: Christiano.  ] yes [  ] no Code Status: [ X ] yes [  ] no  
Artificial Nutrition / Dialysis / Non-Invasive Ventilation / Blood Transfusion: [  ] yes [  x] no Potential Resources for home (durable medical equipment, home nursing, home O2): [ X ] yes [  ] no Overview of Discussion:  
Discussed again regading goals of care. Explained again in detial that she is a poor candidate for CPR due to her MM and her fragile bones, will increase her pain and suffering. patient understood and agreed for DNR. DDNR paperwork signed. Time Spent: 
 
Total time spent face-to-face in education and discussion: 20 minutes.   
 
Aruna Arshad MD 
Date of Service:  10/28/2020 
1:17 PM

## 2020-10-29 NOTE — HOSPICE
[address] [facility] 
[phone] Patient Name: Velia Chavira YOB: 1945 Age: 76 y.o. [address] RN Note: Hospice consult received, chart reviewed. At this time patient does not meet GIP criteria, she is alert and oriented x4, denies pain but states she is itchy. She takes oral medications without difficulty and has an implanted morphine pump. Patient is disappointed that she is unable to stay at [facility] for GIP care. Daughter expressed to Liaison, Tyesha Pool that she is exploring her options regarding SNF vs Rehab vs hospice at home. If patient goes home with hospice she would like to use [facility]- she has used their home care services 4 times in the past and was pleased. We will follow up with daughter and patient tomorrow.

## 2020-10-29 NOTE — PROGRESS NOTES
Problem: Falls - Risk of 
Goal: *Absence of Falls Description: Document Tavonjacquiselwyn Luongkaiden Fall Risk and appropriate interventions in the flowsheet. Outcome: Progressing Towards Goal 
Note: Fall Risk Interventions: 
Mobility Interventions: Communicate number of staff needed for ambulation/transfer Mentation Interventions: Door open when patient unattended Medication Interventions: Teach patient to arise slowly Elimination Interventions: Call light in reach History of Falls Interventions: Door open when patient unattended Problem: Pressure Injury - Risk of 
Goal: *Prevention of pressure injury Description: Document Rodolfo Scale and appropriate interventions in the flowsheet. Outcome: Progressing Towards Goal 
Note: Pressure Injury Interventions: 
Sensory Interventions: Minimize linen layers Moisture Interventions: Apply protective barrier, creams and emollients, Check for incontinence Q2 hours and as needed, Internal/External urinary devices Activity Interventions: PT/OT evaluation Mobility Interventions: Pressure redistribution bed/mattress (bed type), HOB 30 degrees or less, Float heels Nutrition Interventions: Document food/fluid/supplement intake Friction and Shear Interventions: Apply protective barrier, creams and emollients

## 2020-10-29 NOTE — PROGRESS NOTES
PALLIATIVE MEDICINE Chart notes reviewed and noted oncology managing pain and a consult for hospice has been placed. Pt now DNR. Will sign off. Varghese Cummins.  [address] MSN, FNP-BC, [facility]

## 2020-10-29 NOTE — PROGRESS NOTES
Problem: Self Care Deficits Care Plan (Adult) Goal: *Acute Goals and Plan of Care (Insert Text) Description:  
FUNCTIONAL STATUS PRIOR TO ADMISSION: Patient was modified independent using a rolling walker for functional mobility. Recently difficulty with WB and reported \"dragging\" her legs HOME SUPPORT: The patient lived with  but reports being able to do her own ADLs. Occupational Therapy Goals Initiated 10/27/2020 1. Patient will perform seated bathing with moderate assistance  within 7 day(s). 2.  Patient will perform transfer to [facility] with least restrictive DME with minimal assistance/contact guard assist within 7 day(s). 3.  Patient will perform upper body dressing with minimal assistance/contact guard assist within 7 day(s). 4.  Patient will perform all aspects of toileting with moderate assistance  within 7 day(s). 5.  Patient will participate in upper extremity therapeutic exercise/activities with supervision/set-up for 5 minutes within 7 day(s). 6.  Patient will utilize energy conservation techniques during functional activities with verbal cues within 7 day(s). Outcome: Progressing Towards Goal 
  
OCCUPATIONAL THERAPY TREATMENT Patient: Irina Guerrero (89 y.o. female) Date: 10/29/2020 Diagnosis: Multiple rib fractures [S22.49XA] <principal problem not specified> Precautions: Fall, Spinal, Bed Alarm Chart, occupational therapy assessment, plan of care, and goals were reviewed. ASSESSMENT Patient continues with skilled OT services and is progressing towards goals. Patient received supine in bed, agreeable to session and motivated to progress. Patient limited this session by decreased cardiopulmonary endurance and generalized weakness as well as impaired gait for functional transfers. Patient requiring verbal and tactile cues for safe hand placement and for RW management with transfer.  Mod assist x2 needed for bed mobility and for several steps bed>chair with RW. Patient extremely fatigued with brief mobility becoming overtly SOB on supplemental O2 and with . Patient and spouse expressing significant anxiety regarding discharge and plan of care - discussed feasibility of return home as  reports this is preference - concern for safety of patient and spouse as home set-up limits DME options and significant physical assist of 2 persons is needed at this time for sit<>stand transfers and bed mobility. Notified RN and CM of concerns for follow-up. Current Level of Function Impacting Discharge (ADLs): mod A x2 with RW for SPT; up to total assist for distal lower body tasks; set-up for self-feeding/grooming tasks Other factors to consider for discharge: limited physical assistance available at home; environmental limitations of double-wide trailer ( does not believe w/c would fit through doors or pathways) PLAN : 
Patient continues to benefit from skilled intervention to address the above impairments. Continue treatment per established plan of care. to address goals. Recommend with staff: OOB to chair via SPT with RW and assist x2 persons; encourage upright sitting throughout the day, especially through meals Recommend next OT session: activity tolerance; functional transfers Recommendation for discharge: (in order for the patient to meet his/her long term goals) Therapy up to 5 days/week in SNF setting vs TBD pending hospice This discharge recommendation: 
Has been made in collaboration with the attending provider and/or case management IF patient discharges home will need the following DME: AE: long handled bathing, AE: long handled dressing, shower chair, and wheelchair SUBJECTIVE:  
Patient stated I think this is even harder on him than it is on me.  OBJECTIVE DATA SUMMARY:  
Cognitive/Behavioral Status: 
Neurologic State: Alert Orientation Level: Oriented X4 
 Cognition: Follows commands; Appropriate safety awareness Perception: Appears intact Perseveration: No perseveration noted Safety/Judgement: Fall prevention;Home safety; Insight into deficits Functional Mobility and Transfers for ADLs: 
Bed Mobility: 
Supine to Sit: Moderate assistance;Assist x2 Scooting: Moderate assistance;Assist x2 Transfers: 
Sit to Stand: Moderate assistance;Assist x2 Stand to Sit: Moderate assistance;Assist x2 Balance: 
Sitting: Impaired Sitting - Static: Fair (occasional) Standing: Impaired Standing - Static: Constant support; Fair 
Standing - Dynamic : Constant support;Poor ADL Intervention: 
Feeding Feeding Assistance: Set-up Food to Mouth: Supervision Drink to Mouth: Supervision Cognitive Retraining Safety/Judgement: Fall prevention;Home safety; Insight into deficits Therapeutic Exercises:  
Patient with decreased activity tolerance - noted  with SPT from bed>chair with mod assist x2 and RW. Patient overtly SOB and reporting significant fatigue. Pain: 
Patient with reports of back pain. Activity Tolerance:  
Poor, requires frequent rest breaks, and observed SOB with activity Please refer to the flowsheet for vital signs taken during this treatment. After treatment patient left in no apparent distress:  
Sitting in chair, Call bell within reach, and Caregiver / family present COMMUNICATION/COLLABORATION:  
The patients plan of care was discussed with: Physical therapist and Registered nurse. Helen Hoff OT Time Calculation: 42 mins

## 2020-10-29 NOTE — PROGRESS NOTES
Hospitalist Progress Note Hospital summary: A 76year old female patient with PMH of Multiple myeloma with metastatic disease, rheumatoid arthritis, chronic pain/ opioid dependence on implanted morphine pump, DVT on xarelto, multiple pathological fractures, recent kyphoplasty of L spine presents to the ED for evaluation of left shoulder pain and difficulty weightbearing.  Patient states that she has been having trouble ambulating for a few days and was trying to get off her commode this am when she lost her hand hold and fell back onto the toilet. She had been evaluated for DVT earlier in the week but was found negative. She states that she has been unable to bear weight very well for about a week but denies prior injury. She complains of pain primarily in her left shoulder as well as right posterior hip/buttock region. She denied sob initially and stated that she is getting evaluated for sleep study and hypoxia at night time. Pt has pain on deep breathing. Denied cough, fever, abd pain, urinary or bowel changes. In ED, imaging showed acute multiple rib fractures and thoracic fractures. Hematology consulted in ED. Hospitalist consulted for admission. 
  
10/23/2020 Assessment/Plan: 
Acute hypoxic resp failure - improving  
-  due to bibasilar atelectasis / lung hypoventilation  from rib fractures - CXR: Nonspecific ill-defined diffuse bilateral pulmonary opacification. - febrile on 10/24  
- covid test x 2 negative 
- normal lactic, pro calcitonin 
- resp pcr negative - CTA chest : no PE. bibasilar atelectasis right greater than left - pt had rash - possible due to Vanco, so discontinued 
- changed to po Levaquin  
- saturating on 2l NC, try to wean down - appreciate Pulmonology input - IS  
- resolving Left shoulder pain/ Right hip pain - not improving Thoracic vertebra fractures/ multiple rib fractures Secondary to MM 
 - CT L spine: Acute versus subacute T11 and T12 partial compression fractures are new since 5 months ago. - CT hip : No evidence of acute abnormality 
- XR: Multiple left rib fractures. - XR shoulder: Degenerative changes left shoulder. . 
- appreciate thoracic surgery input for rib fractures conservative management  
- Orthopedics outpt f/u for left shoulder pain 
- bone scan: There is increased bony activity T11 suspicious for acute fracture. There are multiple foci of increased activity in the ribs consistent with rib fractures - s/p kyphoplasty T11 on 10/26 
- PT/OT  
  
Multiple myeloma with metastatic disease 
- not on any treatment now due to intolerance 
- follows with oncology Dr. Kirk Horton - appreciate oncology input  
- hospice in consideration, case discussed w  hospice, RN,as well CM; For a meeting soon, seems the implanted morphine pump that pt has via Dr. Rosaura Elaine and for the multiple collapsed verterbra inserted by NeuroSurg is a block; no good refill options under Hospice.  
 
  
Chronic pain syndrome on implanted morphine drip - c/w home fentanyl patch, po dilaudid prn ( dose increased to 10mg prn). IV dilaudid prn   and and an implanted morphine pump directed directly to the vertebralcolum. Urinary retention - butts placed BRENDON - need outpt sleep study  
  Hx DVT -  Xarelto restarted 10/27 Palliative care on board. Extensive discussion on goals of care - pt agreed to DNR and paperwork signed. Still not decided on home hospice vs SNF. pt says she wants to go home but understands that her  can not help her and home health is sufficient. She is not ready for hospice as she wants her morphine implant. Hospice consult placed on 10/27 - for patient and  to discuss options for pain management. They have not consented to hospice care - see above, ; morphine pump is a block to hops ice as of this sitting. For a family meeting w all parties in am  
  
DVT ppx: Xarelto Code status: Full.  is sara Disposition: TBD. Home hospice vs SNF 
---------------------------------------------- 
 
CC:  Left shoulder pain S: Patient is seen and examined at bedside this AM.  present. Patent had a choking episode during my encounter and also has nose bleed. Reassured her. Speech consulted. C/w constipation - intensified bowel regimen Discussed with nursing Addendum: Extensive discussion again with patient and  on phone - clarified code status , DDNR, both agreed on DNR. They still have not decided on hospice. Both prefer home option but pt is weak, extremely concerned about morphine  Implant refill, tried my best to reassure them Review of Systems: A comprehensive review of systems was negative. O: 
Visit Vitals /70 (BP 1 Location: Left arm, BP Patient Position: At rest) Pulse (!) 108 Temp 98.3 °F (36.8 °C) Resp 18 Ht 4' 8\" (1.422 m) Wt 79.8 kg (176 lb) SpO2 91% BMI 39.46 kg/m² PHYSICAL EXAM: 
Gen: mild - moderate distress, chronically ill looking HEENT: anicteric sclerae, normal conjunctiva, oropharynx clear, MM moist 
Neck: supple, trachea midline, no adenopathy Heart: RRR, no MRG, no JVD, no peripheral edema Lungs: decreased breath sounds Abd: soft, NT, ND, BS+, no organomegaly Extr: warm. Right hip pain. Left shoulder pain and ROM limited Skin: dry, no rash Neuro: normal speech, moves all extremities Psych: anxious Intake/Output Summary (Last 24 hours) at 10/29/2020 1550 Last data filed at 10/29/2020 6170 Gross per 24 hour Intake 250 ml Output 2700 ml Net -2450 ml Recent labs & imaging reviewed: 
Recent Results (from the past 24 hour(s)) CBC W/O DIFF Collection Time: 10/29/20  6:34 AM  
Result Value Ref Range WBC 8.7 3.6 - 11.0 K/uL  
 RBC 3.69 (L) 3.80 - 5.20 M/uL  
 HGB 11.1 (L) 11.5 - 16.0 g/dL HCT 36.5 35.0 - 47.0 %  MCV 98.9 80.0 - 99.0 FL  
 MCH 30.1 26.0 - 34.0 PG  
 MCHC 30.4 30.0 - 36.5 g/dL  
 RDW 14.6 (H) 11.5 - 14.5 % PLATELET 653 544 - 651 K/uL MPV 8.5 (L) 8.9 - 12.9 FL  
 NRBC 0.0 0  WBC ABSOLUTE NRBC 0.00 0.00 - 0.01 K/uL METABOLIC PANEL, BASIC Collection Time: 10/29/20  6:34 AM  
Result Value Ref Range Sodium 140 136 - 145 mmol/L Potassium 3.4 (L) 3.5 - 5.1 mmol/L Chloride 102 97 - 108 mmol/L  
 CO2 32 21 - 32 mmol/L Anion gap 6 5 - 15 mmol/L Glucose 90 65 - 100 mg/dL BUN 9 6 - 20 MG/DL Creatinine 0.67 0.55 - 1.02 MG/DL  
 BUN/Creatinine ratio 13 12 - 20 GFR est AA >60 >60 ml/min/1.73m2 GFR est non-AA >60 >60 ml/min/1.73m2 Calcium 9.0 8.5 - 10.1 MG/DL Recent Labs 10/29/20 
3955 10/27/20 
7327 WBC 8.7 8.3 HGB 11.1* 10.8* HCT 36.5 34.4*  
 335 Recent Labs 10/29/20 
4619 10/27/20 
7141  138  
K 3.4* 4.2  101 CO2 32 33* BUN 9 8  
CREA 0.67 0.62 GLU 90 176* CA 9.0 8.8 No results for input(s): ALT, AP, TBIL, TBILI, TP, ALB, GLOB, GGT, AML, LPSE in the last 72 hours. No lab exists for component: SGOT, GPT, AMYP, HLPSE No results for input(s): INR, PTP, APTT, INREXT, INREXT in the last 72 hours. No results for input(s): FE, TIBC, PSAT, FERR in the last 72 hours. Lab Results Component Value Date/Time Folate 34.3 (H) 05/28/2010 04:30 AM  
  
No results for input(s): PH, PCO2, PO2 in the last 72 hours. No results for input(s): CPK, CKNDX, TROIQ in the last 72 hours. No lab exists for component: CPKMB Lab Results Component Value Date/Time Cholesterol, total 137 03/06/2017 08:01 AM  
 HDL Cholesterol 47 03/06/2017 08:01 AM  
 LDL, calculated 38 03/06/2017 08:01 AM  
 Triglyceride 260 (H) 03/06/2017 08:01 AM  
 CHOL/HDL Ratio 5.0 12/16/2011 05:05 AM  
 
Lab Results Component Value Date/Time Glucose (POC) 113 (H) 06/02/2016 11:06 AM  
 Glucose (POC) 87 09/15/2012 08:29 PM  
 Glucose (POC) 87 09/15/2012 11:58 AM  
 
Lab Results Component Value Date/Time Color YELLOW/STRAW 10/23/2020 12:47 PM  
 Appearance CLEAR 10/23/2020 12:47 PM  
 Specific gravity 1.013 10/23/2020 12:47 PM  
 Specific gravity <1.005 01/24/2019 08:09 PM  
 pH (UA) 8.5 (H) 10/23/2020 12:47 PM  
 Protein Negative 10/23/2020 12:47 PM  
 Glucose Negative 10/23/2020 12:47 PM  
 Ketone TRACE (A) 10/23/2020 12:47 PM  
 Bilirubin Negative 10/23/2020 12:47 PM  
 Urobilinogen 0.2 10/23/2020 12:47 PM  
 Nitrites Negative 10/23/2020 12:47 PM  
 Leukocyte Esterase Negative 10/23/2020 12:47 PM  
 Epithelial cells FEW 10/23/2020 12:47 PM  
 Bacteria Negative 10/23/2020 12:47 PM  
 WBC 0-4 10/23/2020 12:47 PM  
 RBC 0-5 10/23/2020 12:47 PM  
 
 
Med list reviewed Current Facility-Administered Medications Medication Dose Route Frequency  levoFLOXacin (LEVAQUIN) tablet 750 mg  750 mg Oral Q24H  polyethylene glycol (MIRALAX) packet 17 g  17 g Oral DAILY  HYDROmorphone (DILAUDID) tablet 10 mg  10 mg Oral Q3H PRN  
 morphine 10 mg/mL injection 5 mg  5 mg IntraVENous Q4H PRN  
 balsam peru-castor oiL (VENELEX) ointment   Topical Q8H  
 diphenhydrAMINE-zinc acetate 1%-0.1% (BENADRYL) cream   Topical TID PRN  
 amitriptyline (ELAVIL) tablet 100 mg  100 mg Oral QHS  aluminum hydrox-magnesium carb (GAVISCON) oral suspension 15 mL  15 mL Oral Q6H PRN  
 bisacodyL (DULCOLAX) suppository 10 mg  10 mg Rectal DAILY PRN  
 docusate sodium (COLACE) capsule 100 mg  100 mg Oral DAILY  docusate sodium (COLACE) capsule 300 mg  300 mg Oral QPM  
 fluticasone propionate (FLONASE) 50 mcg/actuation nasal spray 2 Spray  2 Spray Both Nostrils BID PRN  
 sodium chloride (NS) flush 5-40 mL  5-40 mL IntraVENous Q8H  
 sodium chloride (NS) flush 5-40 mL  5-40 mL IntraVENous PRN  
 acetaminophen (TYLENOL) tablet 650 mg  650 mg Oral Q6H PRN Or  
 acetaminophen (TYLENOL) suppository 650 mg  650 mg Rectal Q6H PRN  promethazine (PHENERGAN) tablet 12.5 mg  12.5 mg Oral Q6H PRN  Or  
  ondansetron (ZOFRAN) injection 4 mg  4 mg IntraVENous Q6H PRN  
 diazePAM (VALIUM) tablet 10 mg  10 mg Oral BID  
 gabapentin (NEURONTIN) capsule 600 mg  600 mg Oral TID  rivaroxaban (XARELTO) tablet 10 mg  10 mg Oral DAILY  senna (SENOKOT) tablet 34.4 mg  4 Tab Oral BID  fentaNYL (DURAGESIC) 50 mcg/hr patch 1 Patch  1 Patch TransDERmal Q72H  albuterol (PROVENTIL HFA, VENTOLIN HFA, PROAIR HFA) inhaler 1 Puff  1 Puff Inhalation Q4H PRN Facility-Administered Medications Ordered in Other Encounters Medication Dose Route Frequency  diphenhydrAMINE (BENADRYL) injection 12.5 mg  12.5 mg IntraVENous PRN Care Plan discussed with:  Patient/Family and Nurse Christine Rubi MD 
Internal Medicine Date of Service: 10/29/2020

## 2020-10-29 NOTE — PROGRESS NOTES
Problem: Mobility Impaired (Adult and Pediatric) Goal: *Acute Goals and Plan of Care (Insert Text) Description: FUNCTIONAL STATUS PRIOR TO ADMISSION: Patient was modified independent using a rolling walker recently for functional mobility. Was declining with functional mobility within the last week prior to admission, requiring increased time for all ambulation within the home and stated she was \"dragging\" her feet. Fall off the toilet that prompted admission. HOME SUPPORT PRIOR TO ADMISSION: The patient lived with her  who is supportive but elderly as well. Patient's daughter is local and supportive. Physical Therapy Goals Initiated 10/27/2020 1. Patient will move from supine to sit and sit to supine , scoot up and down, and roll side to side in bed with moderate assistance  within 7 day(s). 2.  Patient will transfer from bed to chair and chair to bed with moderate assistance  using the least restrictive device within 7 day(s). 3.  Patient will perform sit to stand with moderate assistance  within 7 day(s). 4.  Patient will ambulate with moderate assistance for 10 feet with the least restrictive device within 7 day(s). 5.  Patient will improve Tinetti score by 4-5 points within 7 days. Outcome: Progressing Towards Goal 
 PHYSICAL THERAPY TREATMENT Patient: Javier Young (5 y.o. female) Date: 10/29/2020 Diagnosis: Multiple rib fractures [S22.49XA] <principal problem not specified> Precautions: Fall, Spinal, Bed Alarm Chart, physical therapy assessment, plan of care and goals were reviewed. ASSESSMENT Patient continues with skilled PT services and is progressing very slowly towards goals. Patient limited greatly by pain as well as generalized weakness, gait instability and impaired balance. Overall modA x 2 for bed mobility and transfers. Takes a few steps to bedside chair with RW but balance is fair and requires physical assistance.   Patient and  very anxious regarding DC and POC going forward. Suggested they speak with care management or hospice social worker. Anticipate it will be very difficult for  to manage patient on his own at home. May need SNF pending hospice decision. Will need increased assistance if she does go home. Other factors to consider for discharge: at risk for falls, below functional baseline,  cannot provide level of physical assist she needs PLAN : 
Patient continues to benefit from skilled intervention to address the above impairments. Continue treatment per established plan of care. to address goals. Recommendation for discharge: (in order for the patient to meet his/her long term goals) Therapy up to 5 days/week in SNF setting vs TBD pending hospice IF patient discharges home will need the following DME: bedside commode, gait belt, rolling walker, and wheelchair SUBJECTIVE:  
Patient stated I don't know what we are going to do. I have to leave tmrw.  OBJECTIVE DATA SUMMARY:  
Critical Behavior: 
Neurologic State: Alert Orientation Level: Oriented X4 Cognition: Follows commands Safety/Judgement: Fall prevention Functional Mobility Training: 
Bed Mobility: 
  
Supine to Sit: Moderate assistance;Assist x2 Scooting: Moderate assistance;Assist x2 Transfers: 
Sit to Stand: Moderate assistance;Assist x2 Stand to Sit: Moderate assistance;Assist x2 Balance: 
Sitting: Impaired Sitting - Static: Fair (occasional) Standing: Impaired Standing - Static: Constant support; Fair 
Standing - Dynamic : Constant support;Poor Pain Rating: 
Reports pain with mobility but does not rate Activity Tolerance:  
Fair and requires rest breaks Please refer to the flowsheet for vital signs taken during this treatment. After treatment patient left in no apparent distress:  
Sitting in chair, Call bell within reach, and Caregiver / family present COMMUNICATION/COLLABORATION:  
The patients plan of care was discussed with: Physical therapist, Occupational therapist, and Registered nurse. Quyen Cohen PT, DPT Time Calculation: 32 mins

## 2020-10-29 NOTE — PROGRESS NOTES
Hematology-Oncology Progress Note Phil Delatorre 1945 
[id number] 
10/29/2020 Subjective:  
 
Complaining of back pain,and left shoulder pain; Allergies: Broccoli; Bumex [bumetanide]; Doxil [doxorubicin, peg-liposomal]; Flagyl [metronidazole]; Keflex [cephalexin]; Lasix [furosemide]; Macrobid [nitrofurantoin monohyd/m-cryst]; Methotrexate; Pcn [penicillins]; Pomalyst [pomalidomide]; Sulfa (sulfonamide antibiotics); Tetanus and diphtheria toxoids; Thalidomide; and Dipyridamole Current Facility-Administered Medications Medication Dose Route Frequency Provider Last Rate Last Dose  levoFLOXacin (LEVAQUIN) tablet 750 mg  750 mg Oral Q24H Madala, Sushma, MD      
 polyethylene glycol (MIRALAX) packet 17 g  17 g Oral DAILY Madala, Sushma, MD   17 g at 10/29/20 1001  
 HYDROmorphone (DILAUDID) tablet 10 mg  10 mg Oral Q3H PRN Caryle Olszewski, MD   10 mg at 10/29/20 3841  morphine 10 mg/mL injection 5 mg  5 mg IntraVENous Q4H PRN Andres Dumont MD      
 balsam peru-castor oiL (VENELEX) ointment   Topical Q8H Caryle Olszewski, MD      
 diphenhydrAMINE-zinc acetate 1%-0.1% (BENADRYL) cream   Topical TID PRN Caryle Olszewski, MD      
 amitriptyline (ELAVIL) tablet 100 mg  100 mg Oral QHS Kenyatta Melena, NP   100 mg at 10/28/20 2056  aluminum hydrox-magnesium carb (GAVISCON) oral suspension 15 mL  15 mL Oral Q6H PRN Mark Jones MD      
 bisacodyL (DULCOLAX) suppository 10 mg  10 mg Rectal DAILY PRN Caryle Olszewski, MD      
 docusate sodium (COLACE) capsule 100 mg  100 mg Oral DAILY Caryle Olszewski, MD   100 mg at 10/29/20 2109  docusate sodium (COLACE) capsule 300 mg  300 mg Oral QPM Caryle Olszewski, MD   300 mg at 10/28/20 2056  fluticasone propionate (FLONASE) 50 mcg/actuation nasal spray 2 Spray  2 Spray Both Nostrils BID PRN Caryle Olszewski, MD      
 sodium chloride (NS) flush 5-40 mL  5-40 mL IntraVENous Q8H Madala, Sushma, MD   10 mL at 10/29/20 0600  sodium chloride (NS) flush 5-40 mL  5-40 mL IntraVENous PRN Kalen Jones MD      
 acetaminophen (TYLENOL) tablet 650 mg  650 mg Oral Q6H PRN Araceli Fajardo MD   650 mg at 10/27/20 1821 Or  acetaminophen (TYLENOL) suppository 650 mg  650 mg Rectal Q6H PRN Kalen Jones MD      
 promethazine (PHENERGAN) tablet 12.5 mg  12.5 mg Oral Q6H PRN Madala, Sushma, MD      
 Or  
 ondansetron (ZOFRAN) injection 4 mg  4 mg IntraVENous Q6H PRN Madala, Sushma, MD   4 mg at 10/23/20 1320  
 diazePAM (VALIUM) tablet 10 mg  10 mg Oral BID Araceli Fajardo MD   10 mg at 10/29/20 0851  
 gabapentin (NEURONTIN) capsule 600 mg  600 mg Oral TID Araceli Fajardo MD   600 mg at 10/29/20 3380  . PHARMACY TO SUBSTITUTE PER PROTOCOL (Reordered from: linaCLOtide (Linzess) 290 mcg cap capsule)    Per Protocol Araceli Fajardo MD      
 rivaroxaban (XARELTO) tablet 10 mg  10 mg Oral DAILY Araceli Fajardo MD   10 mg at 10/28/20 2059  senna (SENOKOT) tablet 34.4 mg  4 Tab Oral BID Araceli Fajardo MD   34.4 mg at 10/29/20 2556  fentaNYL (DURAGESIC) 50 mcg/hr patch 1 Patch  1 Patch TransDERmal Q72H Kalen Jones MD   1 Patch at 10/26/20 2320  
 albuterol (PROVENTIL HFA, VENTOLIN HFA, PROAIR HFA) inhaler 1 Puff  1 Puff Inhalation Q4H PRN Araceli Fajardo MD      
 
Facility-Administered Medications Ordered in Other Encounters Medication Dose Route Frequency Provider Last Rate Last Dose  diphenhydrAMINE (BENADRYL) injection 12.5 mg  12.5 mg IntraVENous PRN Martín Maldonado MD   12.5 mg at 10/26/20 1847 Objective:  
 
Patient Vitals for the past 24 hrs: 
 BP Temp Pulse Resp SpO2  
10/29/20 0825 129/82 98.6 °F (37 °C) (!) 106 14 91 % 10/29/20 0508 116/64 99 °F (37.2 °C) (!) 103 16 94 % 10/28/20 2055 132/67 98.6 °F (37 °C) 95 18 95 % 10/28/20 1600 113/60 98.6 °F (37 °C) (!) 106 18 98 % Gen: NAD HEENT: PERRL, Sclerae anicteric Pulm: aerting well Abd: NABS, NTND, Ext: No c/c/e 
 
 Available labs reviewed: 
Labs:   
Recent Results (from the past 24 hour(s)) CBC W/O DIFF Collection Time: 10/29/20  6:34 AM  
Result Value Ref Range WBC 8.7 3.6 - 11.0 K/uL  
 RBC 3.69 (L) 3.80 - 5.20 M/uL  
 HGB 11.1 (L) 11.5 - 16.0 g/dL HCT 36.5 35.0 - 47.0 % MCV 98.9 80.0 - 99.0 FL  
 MCH 30.1 26.0 - 34.0 PG  
 MCHC 30.4 30.0 - 36.5 g/dL  
 RDW 14.6 (H) 11.5 - 14.5 % PLATELET 717 684 - 241 K/uL MPV 8.5 (L) 8.9 - 12.9 FL  
 NRBC 0.0 0  WBC ABSOLUTE NRBC 0.00 0.00 - 0.01 K/uL METABOLIC PANEL, BASIC Collection Time: 10/29/20  6:34 AM  
Result Value Ref Range Sodium 140 136 - 145 mmol/L Potassium 3.4 (L) 3.5 - 5.1 mmol/L Chloride 102 97 - 108 mmol/L  
 CO2 32 21 - 32 mmol/L Anion gap 6 5 - 15 mmol/L Glucose 90 65 - 100 mg/dL BUN 9 6 - 20 MG/DL Creatinine 0.67 0.55 - 1.02 MG/DL  
 BUN/Creatinine ratio 13 12 - 20 GFR est AA >60 >60 ml/min/1.73m2 GFR est non-AA >60 >60 ml/min/1.73m2 Calcium 9.0 8.5 - 10.1 MG/DL Assessment and Plan  
 
77 y/o woman with a long (since 2007) h/o myeloma, now with declining performance status and limited treatment options, admitted after wrenching her arm getting off of the toilet. Noted to have rib fracture and likely compression fracture. Also with dyspnea, noted to have bilateral pulmonary infiltrates, being ruled out for COVID. 1. Myeloma: not currently a candidate for treatment given declining performance status. Hospice is recommended 2. Rib fracture/comp fractures: likely due to #1 above. She may also have a torn Left rotator cuff. . she is willing to try patches/oral pain meds with hospice  (fearful that if she is on PCA and the pump malfunctions it will be horrible) 3. Chronic pain syndrome: has implanted morphine pump,  
 
6. Constipation . . pt takes lilnzess, not on formulary her  brought this in and it is ok for her to take it.  also takes miralax as well, will try to make these available Wylie Schlatter, MD 
 
Hematology/Oncology Phone [phone]

## 2020-10-29 NOTE — PROGRESS NOTES
Transition of Care Plan ? RUR- 21 %  Moderate Risks ? DISPOSITION: Patient presents from home and the disposition plan is TBD pending GIP vs hospice house vs home with hospice; clinically stable, await safe disposition plan ? Daughter Sukhdeep Roth [phone] is the main family contact ? Transport: TBD at discharge pending dispo type ? Continue Medical Care ? Follow up: PCP/Specialist(s) Reviewed chart for transitions of care,and discussed in rounds with the attending. Patient is clinically stable. Patient has been recommended for hospice care by oncologist as there is no possible cure and limited mortality time. Patient is agreeable with the plan. Initially the plan was for home with home hospice cared by [address] [place] [address] but seems like it couldn't be a safe option as patient's spouse Mathieu Tay [phone]. Patient and the spouse is open to have GIP vs Hospice House evaluation. As of now, [facility] doesn't have hospice house in operation but will consider [facility] if recommended. This writer had a long conversation with patient's daughter Sukhdeep Roth [phone] for over 40 minutes. I have answered all of her questions best of my ability. Here is the clear plan moving forward: 1. See if patient qualifies for General Inpatient Hospice admission, if yes, will go with it, if no, then no. 2 
2. Hospice House- will make referral to [facility] for evaluation and admission. If accepted, will go for it, if no then no. 3. 
3. Home with Hospice- provided by [address] [place] [address]. They can provide meds for PCA pump. They have already coordinated with [employer], the meds provider for PCA Pump, which will deliver and refill at home. Therefore, will wait for [facility] to evaluate and make their recommendation.   
 
MARIO ALBERTO Samaniego

## 2020-10-30 NOTE — HOSPICE
T/C to daughter, Rossy who reports that her visit with the patient and step father was not productive last evening. They are still not clear on what they want to do and feel they do not have the finances to have home caregivers. I notified them that [facility] can accommodate the morphine pump but only on an outpatient basis since we do not currently have a contract with anyone who can fill these devices in the home. If the [facility] in the home, this may be a better choice for them. We also reviewed the status of the [facility] inpatient [facility] at [facility]  (now closed) and the LOC required for admission. We are happy to serve the family in home hospice if they feel we are the best choice for them. I am available to meet with the family at anytime. I will wait to hear from Rossy or  if we are needed. Tariq Campbell RN MSN [facility] Nurse Liaison [facility] [phone] ( mobile) [phone] ( office)

## 2020-10-30 NOTE — PROGRESS NOTES
Problem: Mobility Impaired (Adult and Pediatric) Goal: *Acute Goals and Plan of Care (Insert Text) Description: FUNCTIONAL STATUS PRIOR TO ADMISSION: Patient was modified independent using a rolling walker recently for functional mobility. Was declining with functional mobility within the last week prior to admission, requiring increased time for all ambulation within the home and stated she was \"dragging\" her feet. Fall off the toilet that prompted admission. HOME SUPPORT PRIOR TO ADMISSION: The patient lived with her  who is supportive but elderly as well. Patient's daughter is local and supportive. Physical Therapy Goals Initiated 10/27/2020 1. Patient will move from supine to sit and sit to supine , scoot up and down, and roll side to side in bed with moderate assistance  within 7 day(s). 2.  Patient will transfer from bed to chair and chair to bed with moderate assistance  using the least restrictive device within 7 day(s). 3.  Patient will perform sit to stand with moderate assistance  within 7 day(s). 4.  Patient will ambulate with moderate assistance for 10 feet with the least restrictive device within 7 day(s). 5.  Patient will improve Tinetti score by 4-5 points within 7 days. Outcome: Progressing Towards Goal 
  
 
PHYSICAL THERAPY TREATMENT Patient: Lexie Bermudez (44 y.o. female) Date: 10/30/2020 Diagnosis: Multiple rib fractures [S22.49XA] <principal problem not specified> Precautions: Fall, Spinal, Bed Alarm Chart, physical therapy assessment, plan of care and goals were reviewed. ASSESSMENT Patient continues with skilled PT services and is progressing towards goals. Pt assisted with increased time and slow progress supine to sit with focus on increased performance rolling to the R and bracing chest wall L, standing and pivoting to BSC mod A and high fear, +flatus no BM, and pivoting to the chair with min A and heavy encouragement.  Discussed extensively home DC modifications, management, recommended that patient receive at least 8 hrs paid caregivers in order to DC home. Pt  is amenable to caregiver however both are refusing recommendation of hospital bed. Discussed at length, pt dtr wishes pt to have hospital bed for safety and so that they do not attempt to get pt up in the middle of the night and risk falling. Will continue to follow up. Current Level of Function Impacting Discharge (mobility/balance): mod A x1-2 depending on transfer, pt energy and strength. Pt highly fearful of falling. Will require extensive home assistance. Other factors to consider for discharge: PLAN : 
Patient continues to benefit from skilled intervention to address the above impairments. Continue treatment per established plan of care. to address goals. Recommendation for discharge: (in order for the patient to meet his/her long term goals) Therapy up to 5 days/week in SNF setting or intensive home health therapy program. Pt refusing SNF placement, will require home caregiver to DC home safely This discharge recommendation: A follow-up discussion with the attending provider and/or case management is planned IF patient discharges home will need the following DME: bedside commode, rolling walker, and hospital bed SUBJECTIVE:  
Patient stated im falling, I know I am! OBJECTIVE DATA SUMMARY:  
Critical Behavior: 
Neurologic State: Sleeping Orientation Level: Oriented X4 Cognition: Follows commands Safety/Judgement: Fall prevention, Home safety, Insight into deficits Functional Mobility Training: 
Bed Mobility: 
Rolling: Moderate assistance Supine to Sit: Moderate assistance Scooting: Moderate assistance Transfers: 
Sit to Stand: Moderate assistance;Assist x1 Stand to Sit: Moderate assistance;Assist x1 Balance: 
Sitting: Impaired Sitting - Static: Fair (occasional) Sitting - Dynamic: Fair (occasional) Standing: Impaired;Pull to stand; With support Standing - Static: Fair;Constant support Standing - Dynamic : Fair;Constant support Ambulation/Gait Training: 
  
    
Pain Rating: 
With all mobility Activity Tolerance:  
Fair and requires rest breaks Please refer to the flowsheet for vital signs taken during this treatment. After treatment patient left in no apparent distress:  
Sitting in chair, Call bell within reach, and Caregiver / family present COMMUNICATION/COLLABORATION:  
The patients plan of care was discussed with: Registered nurse and Case management. Daniel Dunham PT Time Calculation: 54 mins

## 2020-10-30 NOTE — PROGRESS NOTES
Transition of Care Plan · RUR- 21 %  Moderate Risks · DISPOSITION: Patient presents from home and the disposition plan is home with hospice, planned discharge for tomorrow at 1 PM  
· [id number] has accepted the patient, will admit the patient in hospice care tomorrow at 2 PM at patient's house · Richa Betancourt [phone] is the main family contact · Transport: [facility] [facility]) phone [phone]; 2 PM tomorrow · Continue Medical Care · Follow up: PCP/Specialist(s) Reviewed chart for transitions of care,and discussed in rounds with the attending. Patient is clinically stable for discharge, planned discharge for 10/31/2020 at 1 PM. A family meeting was held at bedside this morning at 10 am. The patient, spouse Wes Valdovinos, daughter Rashmi Bailon, attending Dr. Nadira Viera, [address] [address] Work Student/intern [employer], this author, and [facility] liaison Jean Kamara was on the phone [phone]. The care team including [facility] Liaison answered all of the questions. Opportunity to ask additional questions was provided. The meeting agreed on a plan to discharge patient home with [facility] tomorrow at 1 pm via BLS transport managed by [facility]. This Chichi Lair also provided contact information of [employer] (private caregiver company) to patient's daughter Florina so she can hire additional caregivers. Medicare pt has received, reviewed, and signed 2nd IM letter informing them of their right to appeal the discharge. Signed copy has been placed on pt bedside chart.  
 
MARIO ALBERTO Hernandez

## 2020-10-30 NOTE — PROGRESS NOTES
Hematology-Oncology Progress Note Rajesh Shaikh 1945 
[id number] 
10/30/2020 Subjective:  
 
Complaining of back pain,and left shoulder pain; Allergies: Broccoli; Bumex [bumetanide]; Doxil [doxorubicin, peg-liposomal]; Flagyl [metronidazole]; Keflex [cephalexin]; Lasix [furosemide]; Macrobid [nitrofurantoin monohyd/m-cryst]; Methotrexate; Pcn [penicillins]; Pomalyst [pomalidomide]; Sulfa (sulfonamide antibiotics); Tetanus and diphtheria toxoids; Thalidomide; and Dipyridamole Current Facility-Administered Medications Medication Dose Route Frequency Provider Last Rate Last Dose  lactulose (CHRONULAC) 10 gram/15 mL solution 45 mL  45 mL Oral Q2H Irina Childers MD   45 mL at 10/30/20 1238  polyethylene glycol (MIRALAX) packet 17 g  17 g Oral DAILY Jacques Choi MD   17 g at 10/30/20 0900  
 HYDROmorphone (DILAUDID) tablet 10 mg  10 mg Oral Q3H PRN Jacques Choi MD   10 mg at 10/30/20 1238  morphine 10 mg/mL injection 5 mg  5 mg IntraVENous Q4H PRN Teresa Tian MD      
 balsam peru-castor oiL (VENELEX) ointment   Topical Q8H Jacques Choi MD      
 diphenhydrAMINE-zinc acetate 1%-0.1% (BENADRYL) cream   Topical TID PRN Jacques Choi MD      
 amitriptyline (ELAVIL) tablet 100 mg  100 mg Oral QHS Onofre Barrios NP   100 mg at 10/29/20 [facility]  aluminum hydrox-magnesium carb (GAVISCON) oral suspension 15 mL  15 mL Oral Q6H PRN Jacques Choi MD   15 mL at 10/29/20 1644  bisacodyL (DULCOLAX) suppository 10 mg  10 mg Rectal DAILY PRN Jacques Choi MD      
 docusate sodium (COLACE) capsule 100 mg  100 mg Oral DAILY Madala, Sushma, MD   100 mg at 10/30/20 0729  
 docusate sodium (COLACE) capsule 300 mg  300 mg Oral QPM Jacques Choi MD   300 mg at 10/29/20 1922  fluticasone propionate (FLONASE) 50 mcg/actuation nasal spray 2 Spray  2 Spray Both Nostrils BID PRN Jacques Choi MD      
 sodium chloride (NS) flush 5-40 mL  5-40 mL IntraVENous Q8H Robert Alicia Cruz MD   10 mL at 10/30/20 0730  
 sodium chloride (NS) flush 5-40 mL  5-40 mL IntraVENous PRN Frank Pacheco MD      
 acetaminophen (TYLENOL) tablet 650 mg  650 mg Oral Q6H PRN Frank Pacheco MD   650 mg at 10/27/20 1821 Or  acetaminophen (TYLENOL) suppository 650 mg  650 mg Rectal Q6H PRN Alicia Jones MD      
 promethazine (PHENERGAN) tablet 12.5 mg  12.5 mg Oral Q6H PRN Alicia Jones MD      
 Or  
 ondansetron (ZOFRAN) injection 4 mg  4 mg IntraVENous Q6H PRN Madala, Sushma, MD   4 mg at 10/23/20 1320  
 diazePAM (VALIUM) tablet 10 mg  10 mg Oral BID Alicia Jones MD   10 mg at 10/30/20 0730  
 gabapentin (NEURONTIN) capsule 600 mg  600 mg Oral TID Frank Pacheco MD   600 mg at 10/30/20 4943  rivaroxaban (XARELTO) tablet 10 mg  10 mg Oral DAILY Madala, Sushma, MD   10 mg at 10/29/20 1747  
 senna (SENOKOT) tablet 34.4 mg  4 Tab Oral BID Frank Pacheco MD   34.4 mg at 10/30/20 0729  
 fentaNYL (DURAGESIC) 50 mcg/hr patch 1 Patch  1 Patch TransDERmal Q72H Frank Pacheco MD   1 Patch at 10/26/20 2320  
 albuterol (PROVENTIL HFA, VENTOLIN HFA, PROAIR HFA) inhaler 1 Puff  1 Puff Inhalation Q4H PRN Frank Pacheco MD      
 
Objective:  
 
Patient Vitals for the past 24 hrs: 
 BP Temp Pulse Resp SpO2  
10/30/20 0940 121/69 99.2 °F (37.3 °C) (!) 102 18 94 % 10/30/20 0259 118/64 98.1 °F (36.7 °C) 95 18 92 % 10/29/20 2129 116/61 99.5 °F (37.5 °C) (!) 105 18 94 % 10/29/20 1515 116/70 98.3 °F (36.8 °C) (!) 108 18 91 % Gen: NAD HEENT: PERRL, Sclerae anicteric Pulm: aerting well Abd: NABS, NTND, Ext: No c/c/e Available labs reviewed: 
Labs:   
No results found for this or any previous visit (from the past 24 hour(s)). Assessment and Plan  
 
75 y/o woman with a long (since 2007) h/o myeloma, now with declining performance status and limited treatment options, admitted after wrenching her arm getting off of the toilet.  Noted to have rib fracture and likely compression fracture. Also with dyspnea, noted to have bilateral pulmonary infiltrates, being ruled out for COVID. 1. Myeloma: not currently a candidate for treatment given declining performance status. Hospice is recommended, but they would not be able to manage her pain pump. ... 2. Rib fracture/comp fractures: likely due to #1 above. She may also have a torn Left rotator cuff. . she is willing to try patches/oral pain meds with hospice  (fearful that if she is on PCA and the pump malfunctions it will be horrible) 3. Chronic pain syndrome: has implanted morphine pump,  
 
6. Constipation . . pt takes lilnzess, not on formulary her  brought this in and it is ok for her to take it. also takes miralax as well, will try to make these available 7. Disposition. .. Ideal situation is for her to be in hospice program... though she will need aids in the house at night to assist . .. other option is snf. ... Dori Carlos MD 
 
Hematology/Oncology Phone [phone]

## 2020-10-30 NOTE — PROGRESS NOTES
Bedside shift change report given to Jackson Dean RN (oncoming nurse) by Claudell Junes, RN (offgoing nurse). Report included the following information SBAR, Kardex, Intake/Output, MAR and Recent Results.

## 2020-10-30 NOTE — PROGRESS NOTES
Occupational Therapy Completed chart review and discussed patient with nursing. Nursing recommends holding OT services as family is meeting with care management this morning for care planning. Will defer for OT services at this time and await outcome of meeting to see if OT is appropriate and able to provide services. Thank you, Brijesh Gupta, OT

## 2020-10-30 NOTE — PROGRESS NOTES
Hospitalist Progress Note Hospital summary: A 76year old female patient with PMH of Multiple myeloma with metastatic disease, rheumatoid arthritis, chronic pain/ opioid dependence on implanted morphine pump, DVT on xarelto, multiple pathological fractures, recent kyphoplasty of L spine presents to the ED for evaluation of left shoulder pain and difficulty weightbearing.  Patient states that she has been having trouble ambulating for a few days and was trying to get off her commode this am when she lost her hand hold and fell back onto the toilet. She had been evaluated for DVT earlier in the week but was found negative. She states that she has been unable to bear weight very well for about a week but denies prior injury. She complains of pain primarily in her left shoulder as well as right posterior hip/buttock region. She denied sob initially and stated that she is getting evaluated for sleep study and hypoxia at night time. Pt has pain on deep breathing. Denied cough, fever, abd pain, urinary or bowel changes. In ED, imaging showed acute multiple rib fractures and thoracic fractures. Hematology consulted in ED. Hospitalist consulted for admission. 10/30/2020 : 
 
Assessment/Plan: 
Acute hypoxic resp failure - improving  
-  due to bibasilar atelectasis / lung hypoventilation  from rib fractures - CXR: Nonspecific ill-defined diffuse bilateral pulmonary opacification. - febrile on 10/24  
- covid test x 2 negative 
- normal lactic, pro calcitonin 
- resp pcr negative - CTA chest : no PE. bibasilar atelectasis right greater than left - pt had rash - possible due to Vanco, so discontinued 
- changed to po Levaquin  
- saturating on 2l NC, try to wean down - appreciate Pulmonology input - IS  
- resolving  Cont on 02 Left shoulder pain/ Right hip pain - not improving Thoracic vertebra fractures/ multiple rib fractures Secondary to MM 
 - CT L spine: Acute versus subacute T11 and T12 partial compression fractures are new since 5 months ago. - CT hip : No evidence of acute abnormality 
- XR: Multiple left rib fractures. - XR shoulder: Degenerative changes left shoulder. . 
- appreciate thoracic surgery input for rib fractures conservative management  
- Orthopedics outpt f/u for left shoulder pain 
- bone scan: There is increased bony activity T11 suspicious for acute fracture. There are multiple foci of increased activity in the ribs consistent with rib fractures - s/p kyphoplasty T11 on 10/26 
- PT/OT  
  
Multiple myeloma with metastatic disease 
- not on any treatment now due to intolerance 
- follows with oncology Dr. Johana Briggs - appreciate oncology input  
- hospice in consideration, case discussed w  hospice, RN,as well CM; For a meeting soon, seems the implanted morphine pump that pt has via Dr. Lady Shabazz and for the multiple collapsed verterbra inserted by NeuroSurg is a block; no good refill options under Hospice. ; as of 10/30/2020 Hospice seems able to accomplish  
 
  
Chronic pain syndrome on implanted morphine drip - c/w home fentanyl patch, po dilaudid prn ( dose increased to 10mg prn). IV dilaudid prn   and and an implanted morphine pump directed directly to the vertebralcolum. Urinary retention - butts placed ,   
 
BRENDON - need outpt sleep study  
  Hx DVT -  Xarelto restarted 10/27 Palliative care on board. Now off as pt goes hospice. Extensive discussion on goals of care - pt agreed to DNR and paperwork signed. Still not decided on home hospice vs SNF. pt says she wants to go home but understands that her  can not help her and home health is sufficient. She is not ready for hospice as she wants her morphine implant.- as of now 10/30/2020 for home hospice Hospice consult placed on 10/27 - for patient and  to discuss options for pain management.  They have not consented to hospice care - see above, ; morphine pump is a block to hops ice as of this sitting. For a family meeting w all parties in am  
  
DVT ppx: Xarelto Code status: Full.  is sara Disposition: TBD. Home hospice vs SNF 
---------------------------------------------- 
 
CC:  Left shoulder pain S: Patient is seen and examined at bedside this AM.  present. Patent had a choking episode during my encounter and also has nose bleed. Reassured her. Speech consulted. C/w constipation - intensified bowel regimen Discussed with nursing Addendum: Extensive discussion again with patient and  on phone - clarified code status , DDNR, both agreed on DNR. They still have not decided on hospice. Both prefer home option but pt is weak, extremely concerned about morphine  Implant refill, tried my best to reassure them Review of Systems: A comprehensive review of systems was negative. O: 
Visit Vitals /69 (BP 1 Location: Right arm, BP Patient Position: At rest) Pulse (!) 102 Temp 99.2 °F (37.3 °C) Resp 18 Ht 4' 8\" (1.422 m) Wt 79.8 kg (176 lb) SpO2 94% BMI 39.46 kg/m² PHYSICAL EXAM: 
Gen: NAD, chronically ill looking HEENT: anicteric sclerae, normal conjunctiva, oropharynx clear, MM moist 
Neck: supple, trachea midline, no adenopathy Heart: RRR, no MRG decreased breath sounds Abd: soft, NT, ND, BS+, no organomegaly Extr: warm. Skin: dry, no rash Neuro: normal speech, moves all extremities Psych: anxious Intake/Output Summary (Last 24 hours) at 10/30/2020 1020 Last data filed at 10/30/2020 5255 Gross per 24 hour Intake  Output 1200 ml Net -1200 ml Recent labs & imaging reviewed: 
No results found for this or any previous visit (from the past 24 hour(s)). Recent Labs 10/29/20 
8044 WBC 8.7 HGB 11.1*  
HCT 36.5  Recent Labs 10/29/20 
0599   
K 3.4*  
 CO2 32 BUN 9  
CREA 0.67 GLU 90  
CA 9.0 No results for input(s): ALT, AP, TBIL, TBILI, TP, ALB, GLOB, GGT, AML, LPSE in the last 72 hours. No lab exists for component: SGOT, GPT, AMYP, HLPSE No results for input(s): INR, PTP, APTT, INREXT, INREXT in the last 72 hours. No results for input(s): FE, TIBC, PSAT, FERR in the last 72 hours. Lab Results Component Value Date/Time Folate 34.3 (H) 05/28/2010 04:30 AM  
  
No results for input(s): PH, PCO2, PO2 in the last 72 hours. No results for input(s): CPK, CKNDX, TROIQ in the last 72 hours. No lab exists for component: CPKMB Lab Results Component Value Date/Time Cholesterol, total 137 03/06/2017 08:01 AM  
 HDL Cholesterol 47 03/06/2017 08:01 AM  
 LDL, calculated 38 03/06/2017 08:01 AM  
 Triglyceride 260 (H) 03/06/2017 08:01 AM  
 CHOL/HDL Ratio 5.0 12/16/2011 05:05 AM  
 
Lab Results Component Value Date/Time Glucose (POC) 113 (H) 06/02/2016 11:06 AM  
 Glucose (POC) 87 09/15/2012 08:29 PM  
 Glucose (POC) 87 09/15/2012 11:58 AM  
 
Lab Results Component Value Date/Time Color YELLOW/STRAW 10/23/2020 12:47 PM  
 Appearance CLEAR 10/23/2020 12:47 PM  
 Specific gravity 1.013 10/23/2020 12:47 PM  
 Specific gravity <1.005 01/24/2019 08:09 PM  
 pH (UA) 8.5 (H) 10/23/2020 12:47 PM  
 Protein Negative 10/23/2020 12:47 PM  
 Glucose Negative 10/23/2020 12:47 PM  
 Ketone TRACE (A) 10/23/2020 12:47 PM  
 Bilirubin Negative 10/23/2020 12:47 PM  
 Urobilinogen 0.2 10/23/2020 12:47 PM  
 Nitrites Negative 10/23/2020 12:47 PM  
 Leukocyte Esterase Negative 10/23/2020 12:47 PM  
 Epithelial cells FEW 10/23/2020 12:47 PM  
 Bacteria Negative 10/23/2020 12:47 PM  
 WBC 0-4 10/23/2020 12:47 PM  
 RBC 0-5 10/23/2020 12:47 PM  
 
 
Med list reviewed Current Facility-Administered Medications Medication Dose Route Frequency  lactulose (CHRONULAC) 10 gram/15 mL solution 45 mL  45 mL Oral Q2H  
 levoFLOXacin (LEVAQUIN) tablet 750 mg  750 mg Oral Q24H  polyethylene glycol (MIRALAX) packet 17 g  17 g Oral DAILY  HYDROmorphone (DILAUDID) tablet 10 mg  10 mg Oral Q3H PRN  
 morphine 10 mg/mL injection 5 mg  5 mg IntraVENous Q4H PRN  
 balsam peru-castor oiL (VENELEX) ointment   Topical Q8H  
 diphenhydrAMINE-zinc acetate 1%-0.1% (BENADRYL) cream   Topical TID PRN  
 amitriptyline (ELAVIL) tablet 100 mg  100 mg Oral QHS  aluminum hydrox-magnesium carb (GAVISCON) oral suspension 15 mL  15 mL Oral Q6H PRN  
 bisacodyL (DULCOLAX) suppository 10 mg  10 mg Rectal DAILY PRN  
 docusate sodium (COLACE) capsule 100 mg  100 mg Oral DAILY  docusate sodium (COLACE) capsule 300 mg  300 mg Oral QPM  
 fluticasone propionate (FLONASE) 50 mcg/actuation nasal spray 2 Spray  2 Spray Both Nostrils BID PRN  
 sodium chloride (NS) flush 5-40 mL  5-40 mL IntraVENous Q8H  
 sodium chloride (NS) flush 5-40 mL  5-40 mL IntraVENous PRN  
 acetaminophen (TYLENOL) tablet 650 mg  650 mg Oral Q6H PRN Or  
 acetaminophen (TYLENOL) suppository 650 mg  650 mg Rectal Q6H PRN  promethazine (PHENERGAN) tablet 12.5 mg  12.5 mg Oral Q6H PRN Or  
 ondansetron (ZOFRAN) injection 4 mg  4 mg IntraVENous Q6H PRN  
 diazePAM (VALIUM) tablet 10 mg  10 mg Oral BID  
 gabapentin (NEURONTIN) capsule 600 mg  600 mg Oral TID  rivaroxaban (XARELTO) tablet 10 mg  10 mg Oral DAILY  senna (SENOKOT) tablet 34.4 mg  4 Tab Oral BID  fentaNYL (DURAGESIC) 50 mcg/hr patch 1 Patch  1 Patch TransDERmal Q72H  albuterol (PROVENTIL HFA, VENTOLIN HFA, PROAIR HFA) inhaler 1 Puff  1 Puff Inhalation Q4H PRN Care Plan discussed with:  Patient/Family and Nurse Sahra Reed MD 
Internal Medicine Date of Service: 10/30/2020

## 2020-10-30 NOTE — HOSPICE
[employer] [facility] in [facility] 
[phone] Patient Name: Sherita Dover YOB: 1945 Age: 76 y.o. [employer] RN Note:  Hospice consult noted. Chart reviewed. T/C to daughter Rossy who had a lot of questions about how hospice works vs. SNF. She reports that her mother and step father are decisional but overwhelmed and cannot make a decision. In addition, the patient is not understanding that hospice does not provide full time caregivers and that would be an out of pocket expense for the family. She had run some numbers on having full time caregivers in the home and it appeared to be too costly for her parent's income and assets. We discussed realistic care giving needs and that the because the patient is mostly bedbound, she probably won't need 24/7 caregivers- more like 5-6 hours per day and that hospice can place a butts catheter to assist with urinary output. Rossy will meet with the patient and her step father tonight to further discuss finances and clarify needs I will be checking with our clinical management on what we can do related to the morphine pump and get back to her in the morning. Thank you for the opportunity to be of service to this patient.

## 2020-10-30 NOTE — PROGRESS NOTES
Problem: Falls - Risk of 
Goal: *Absence of Falls Description: Document Bala Garzon Fall Risk and appropriate interventions in the flowsheet. Outcome: Progressing Towards Goal 
Note: Fall Risk Interventions: 
Mobility Interventions: Communicate number of staff needed for ambulation/transfer Mentation Interventions: Adequate sleep, hydration, pain control Medication Interventions: Evaluate medications/consider consulting pharmacy Elimination Interventions: Call light in reach History of Falls Interventions: Consult care management for discharge planning, Door open when patient unattended, Evaluate medications/consider consulting pharmacy, Room close to nurse's station Problem: Pressure Injury - Risk of 
Goal: *Prevention of pressure injury Description: Document Rodolfo Scale and appropriate interventions in the flowsheet. Outcome: Progressing Towards Goal 
Note: Pressure Injury Interventions: 
Sensory Interventions: Keep linens dry and wrinkle-free, Minimize linen layers Moisture Interventions: Absorbent underpads, Minimize layers Activity Interventions: Assess need for specialty bed Mobility Interventions: Assess need for specialty bed, Pressure redistribution bed/mattress (bed type) Nutrition Interventions: Document food/fluid/supplement intake Friction and Shear Interventions: Apply protective barrier, creams and emollients

## 2020-10-31 PROBLEM — S22.49XA MULTIPLE RIB FRACTURES: Status: RESOLVED | Noted: 2020-01-01 | Resolved: 2020-01-01

## 2020-10-31 NOTE — PROGRESS NOTES
Bedside shift change report given to Madeline (oncoming nurse) by Kong Mendiola (offgoing nurse). Report included the following information SBAR, Kardex and MAR.

## 2020-10-31 NOTE — DISCHARGE SUMMARY
Discharge Summary PATIENT ID: Bryce Marshall MRN: [id number] YOB: 1945 DATE OF ADMISSION: 10/23/2020  8:58 AM   
DATE OF DISCHARGE: 10/31/2020 PRIMARY CARE PROVIDER: Esther Rodriguez MD  
 
ATTENDING PHYSICIAN: Michelle Fonseca MD  
DISCHARGING PROVIDER: Michelle Fonseca MD   
To contact this individual call [phone] and ask the  to page. If unavailable ask to be transferred the Adult Hospitalist Department. CONSULTATIONS: IP CONSULT TO HEMATOLOGY 
IP CONSULT TO THORACIC SURGERY 
IP CONSULT TO HOSPITALIST 
IP CONSULT TO ORTHOPEDIC SURGERY 
IP CONSULT TO PULMONOLOGY PROCEDURES/SURGERIES: * No surgery found * ADMITTING DIAGNOSES & HOSPITAL COURSE:  
Per recent notes Note; pt goes Home Hospice Hospital summary: A 76year old female patient with PMH of Multiple myeloma with metastatic disease, rheumatoid arthritis, chronic pain/ opioid dependence on implanted morphine pump, DVT on xarelto, multiple pathological fractures, recent kyphoplasty of L spine presents to the ED for evaluation of left shoulder pain and difficulty weightbearing.  Patient states that she has been having trouble ambulating for a few days and was trying to get off her commode this am when she lost her hand hold and fell back onto the toilet. She had been evaluated for DVT earlier in the week but was found negative. She states that she has been unable to bear weight very well for about a week but denies prior injury. She complains of pain primarily in her left shoulder as well as right posterior hip/buttock region. She denied sob initially and stated that she is getting evaluated for sleep study and hypoxia at night time. Pt has pain on deep breathing. Denied cough, fever, abd pain, urinary or bowel changes. In ED, imaging showed acute multiple rib fractures and thoracic fractures. Hematology consulted in ED. Hospitalist consulted for admission. 
  
  
10/30/2020 : 
  
Assessment/Plan: Acute hypoxic resp failure - improving  
-  due to bibasilar atelectasis / lung hypoventilation  from rib fractures - CXR: Nonspecific ill-defined diffuse bilateral pulmonary opacification. - febrile on 10/24  
- covid test x 2 negative 
- normal lactic, pro calcitonin 
- resp pcr negative - CTA chest : no PE. bibasilar atelectasis right greater than left - pt had rash - possible due to Vanco, so discontinued 
- changed to po Levaquin  
- saturating on 2l NC, try to wean down - appreciate Pulmonology input - IS  
- resolving  Cont on 02 Left shoulder pain/ Right hip pain - not improving Thoracic vertebra fractures/ multiple rib fractures Secondary to MM 
- CT L spine: Acute versus subacute T11 and T12 partial compression fractures are new since 5 months ago. - CT hip : No evidence of acute abnormality 
- XR: Multiple left rib fractures.  
- XR shoulder: Degenerative changes left shoulder. . 
- appreciate thoracic surgery input for rib fractures conservative management  
- Orthopedics outpt f/u for left shoulder pain 
- bone scan: There is increased bony activity T11 suspicious for acute fracture. There are multiple foci of increased activity in the ribs consistent with rib fractures - s/p kyphoplasty T11 on 10/26 
- PT/OT  
  
Multiple myeloma with metastatic disease 
- not on any treatment now due to intolerance 
- follows with oncology Dr. Mario Alberto Hamilton - appreciate oncology input  
- hospice in consideration, case discussed w  hospice, RN,as well CM; For a meeting soon, seems the implanted morphine pump that pt has via Dr. Dionne Osler and for the multiple collapsed verterbra inserted by NeuroSurg is a block; no good refill options under Hospice. ; as of 10/30/2020 Hospice seems able to accomplish  
  
  
Chronic pain syndrome on implanted morphine drip - c/w home fentanyl patch, po dilaudid prn ( dose increased to 10mg prn).  IV dilaudid prn   and and an implanted morphine pump directed directly to the vertebralcolum.  
  
Urinary retention - butts placed ,   
  
BRENDON - need outpt sleep study  
  Hx DVT -  Xarelto restarted 10/27 
  
Palliative care on board. Now off as pt goes hospice. Extensive discussion on goals of care - pt agreed to DNR and paperwork signed. Still not decided on home hospice vs SNF. pt says she wants to go home but understands that her  can not help her and home health is sufficient. She is not ready for hospice as she wants her morphine implant.- as of now 10/30/2020 for home hospice  
  
Hospice consult placed on 10/27 - for patient and  to discuss options for pain management. They have not consented to hospice care - see above, ; morphine pump is a block to hops ice as of this sitting. For a family meeting w all parties in am  
  
DVT ppx: Xarelto Code status: Full.  is mpoa Disposition: TBD. Home hospice vs SNF 
---------------------------------------------- 
  
CC:  Left shoulder pain 
  
S: Patient is seen and examined at bedside this AM.  present. Patent had a choking episode during my encounter and also has nose bleed. Reassured her. Speech consulted. C/w constipation - intensified bowel regimen 
  
Discussed with nursing 
  
Addendum: Extensive discussion again with patient and  on phone - clarified code status , DDNR, both agreed on DNR. They still have not decided on hospice. Both prefer home option but pt is weak, extremely concerned about morphine  Implant refill, tried my best to reassure them 
  
  
  
Review of Systems: A comprehensive review of systems was negative. 
  
O: 
Visit Vitals /69 (BP 1 Location: Right arm, BP Patient Position: At rest) Pulse (!) 102 Temp 99.2 °F (37.3 °C) Resp 18 Ht 4' 8\" (1.422 m) Wt 79.8 kg (176 lb) SpO2 94% BMI 39.46 kg/m²  
  
  
PHYSICAL EXAM: 
Gen: NAD, chronically ill looking HEENT: anicteric sclerae, normal conjunctiva, oropharynx clear, MM moist 
 Neck: supple, trachea midline, no adenopathy Heart: RRR, no MRG decreased breath sounds Abd: soft, NT, ND, BS+, no organomegaly Extr: warm. Skin: dry, no rash Neuro: normal speech, moves all extremities Psych: anxious  
  
  
Intake/Output Summary (Last 24 hours) at 10/30/2020 1020 Last data filed at 10/30/2020 9639 Gross per 24 hour Intake  Output 1200 ml Net -1200 ml  
  
  
Recent labs & imaging reviewed: 
Recent Results No results found for this or any previous visit (from the past 24 hour(s)). Recent Labs 10/29/20 
6878 WBC 8.7 HGB 11.1*  
HCT 36.5   
  
   
Recent Labs 10/29/20 
1258   
K 3.4*  
 CO2 32 BUN 9  
CREA 0.67 GLU 90  
CA 9.0  
  
No results for input(s): ALT, AP, TBIL, TBILI, TP, ALB, GLOB, GGT, AML, LPSE in the last 72 hours. 
  
No lab exists for component: SGOT, GPT, AMYP, HLPSE No results for input(s): INR, PTP, APTT, INREXT, INREXT in the last 72 hours. No results for input(s): FE, TIBC, PSAT, FERR in the last 72 hours. Lab Results Component Value Date/Time  
  Folate 34.3 (H) 05/28/2010 04:30 AM  
  
No results for input(s): PH, PCO2, PO2 in the last 72 hours. No results for input(s): CPK, CKNDX, TROIQ in the last 72 hours. 
  
No lab exists for component: CPKMB Lab Results Component Value Date/Time  
  Cholesterol, total 137 03/06/2017 08:01 AM  
  HDL Cholesterol 47 03/06/2017 08:01 AM  
  LDL, calculated 38 03/06/2017 08:01 AM  
  Triglyceride 260 (H) 03/06/2017 08:01 AM  
  CHOL/HDL Ratio 5.0 12/16/2011 05:05 AM  
  
     
Lab Results Component Value Date/Time  
  Glucose (POC) 113 (H) 06/02/2016 11:06 AM  
  Glucose (POC) 87 09/15/2012 08:29 PM  
  Glucose (POC) 87 09/15/2012 11:58 AM  
  
     
Lab Results Component Value Date/Time  
  Color YELLOW/STRAW 10/23/2020 12:47 PM  
  Appearance CLEAR 10/23/2020 12:47 PM  
  Specific gravity 1.013 10/23/2020 12:47 PM  
   Specific gravity <1.005 01/24/2019 08:09 PM  
  pH (UA) 8.5 (H) 10/23/2020 12:47 PM  
  Protein Negative 10/23/2020 12:47 PM  
  Glucose Negative 10/23/2020 12:47 PM  
  Ketone TRACE (A) 10/23/2020 12:47 PM  
  Bilirubin Negative 10/23/2020 12:47 PM  
  Urobilinogen 0.2 10/23/2020 12:47 PM  
  Nitrites Negative 10/23/2020 12:47 PM  
  Leukocyte Esterase Negative 10/23/2020 12:47 PM  
  Epithelial cells FEW 10/23/2020 12:47 PM  
  Bacteria Negative 10/23/2020 12:47 PM  
  WBC 0-4 10/23/2020 12:47 PM  
  RBC 0-5 10/23/2020 12:47 PM  
  
  
Med list reviewed Current Facility-Administered Medications Medication Dose Route Frequency  lactulose (CHRONULAC) 10 gram/15 mL solution 45 mL  45 mL Oral Q2H  
 levoFLOXacin (LEVAQUIN) tablet 750 mg  750 mg Oral Q24H  polyethylene glycol (MIRALAX) packet 17 g  17 g Oral DAILY  HYDROmorphone (DILAUDID) tablet 10 mg  10 mg Oral Q3H PRN  
 morphine 10 mg/mL injection 5 mg  5 mg IntraVENous Q4H PRN  
 balsam peru-castor oiL (VENELEX) ointment   Topical Q8H  
 diphenhydrAMINE-zinc acetate 1%-0.1% (BENADRYL) cream   Topical TID PRN  
 amitriptyline (ELAVIL) tablet 100 mg  100 mg Oral QHS  aluminum hydrox-magnesium carb (GAVISCON) oral suspension 15 mL  15 mL Oral Q6H PRN  
 bisacodyL (DULCOLAX) suppository 10 mg  10 mg Rectal DAILY PRN  
 docusate sodium (COLACE) capsule 100 mg  100 mg Oral DAILY  docusate sodium (COLACE) capsule 300 mg  300 mg Oral QPM  
 fluticasone propionate (FLONASE) 50 mcg/actuation nasal spray 2 Spray  2 Spray Both Nostrils BID PRN  
 sodium chloride (NS) flush 5-40 mL  5-40 mL IntraVENous Q8H  
 sodium chloride (NS) flush 5-40 mL  5-40 mL IntraVENous PRN  
 acetaminophen (TYLENOL) tablet 650 mg  650 mg Oral Q6H PRN  
  Or  acetaminophen (TYLENOL) suppository 650 mg  650 mg Rectal Q6H PRN  promethazine (PHENERGAN) tablet 12.5 mg  12.5 mg Oral Q6H PRN  
  Or  ondansetron (ZOFRAN) injection 4 mg  4 mg IntraVENous Q6H PRN  
 diazePAM (VALIUM) tablet 10 mg  10 mg Oral BID  
 gabapentin (NEURONTIN) capsule 600 mg  600 mg Oral TID  rivaroxaban (XARELTO) tablet 10 mg  10 mg Oral DAILY  senna (SENOKOT) tablet 34.4 mg  4 Tab Oral BID  fentaNYL (DURAGESIC) 50 mcg/hr patch 1 Patch  1 Patch TransDERmal Q72H  albuterol (PROVENTIL HFA, VENTOLIN HFA, PROAIR HFA) inhaler 1 Puff  1 Puff Inhalation Q4H PRN  
  
  
Care Plan discussed with:  Patient/Family and Nurse 
  
Michelle Fonseca MD 
Internal Medicine Date of Service: 10/30/2020 Revision History DISCHARGE DIAGNOSES / PLAN:   
 
1. As above ADDITIONAL CARE RECOMMENDATIONS:  
na  
 
PENDING TEST RESULTS:  
At the time of discharge the following test results are still pending: na 
 
FOLLOW UP APPOINTMENTS:   
Follow-up Information Follow up With Specialties Details Why Contact Info [address]   Call today As needed to confirm home hospice set-up  [facility],[address]  
[phone] Esther Rodriguez MD Internal Medicine   [address] [facility] [address] [place] [address] 
[phone] DIET: Resume previous diet DISCHARGE MEDICATIONS: 
Current Discharge Medication List  
  
START taking these medications Details  
promethazine (PHENERGAN) 12.5 mg tablet Take 1 Tab by mouth every six (6) hours as needed for Nausea. Qty: 30 Tab, Refills: 0  
  
balsam peru-castor oiL (VENELEX) ointment Apply  to affected area every eight (8) hours. Qty: 1 Tube, Refills: 0  
  
albuterol (PROVENTIL HFA, VENTOLIN HFA, PROAIR HFA) 90 mcg/actuation inhaler Take 1 Puff by inhalation every four (4) hours as needed for Wheezing or Shortness of Breath. Qty: 1 Inhaler, Refills: 0 CONTINUE these medications which have CHANGED  Details  
fentaNYL (DURAGESIC) 50 mcg/hr PATCH 1 Patch by TransDERmal route every seventy-two (72) hours for 30 days. Max Daily Amount: 1 Patch. Qty: 5 Patch, Refills: 0 Associated Diagnoses: Closed fracture of multiple ribs, unspecified laterality, initial encounter HYDROmorphone (DILAUDID) 2 mg tablet Take 5 Tabs by mouth every three (3) hours as needed for Pain for up to 3 days. Max Daily Amount: 80 mg. 
Qty: 20 Tab, Refills: 0 Associated Diagnoses: Closed fracture of multiple ribs, unspecified laterality, initial encounter CONTINUE these medications which have NOT CHANGED Details  
!! docusate sodium (COLACE) 100 mg capsule Take 100 mg by mouth daily. Takes 100 mg QAM and 300 mg QPM  
  
!! docusate sodium (COLACE) 100 mg capsule Take 300 mg by mouth every evening. Takes 100 mg QAM and 300 mg QPM  
  
fluticasone propionate (Flonase Allergy Relief) 50 mcg/actuation nasal spray 2 Sprays by Both Nostrils route two (2) times daily as needed for Allergies. gabapentin (NEURONTIN) 300 mg capsule Take 600 mg by mouth three (3) times daily. polyethylene glycol (Miralax) 17 gram/dose powder Take 17 g by mouth daily. morphine 10 mg/mL injection 5 mg by IntraVENous route continuous. 5mg/day concentration is 20.0 mg/ml mixed with Bupivacaine 3.215 mg/day  
  
amitriptyline (ELAVIL) 50 mg tablet TAKE TWO TABLETS BY MOUTH AT BEDTIME AS NEEDED FOR SLEEP. Qty: 60 Tab, Refills: 5 Cetirizine (ZYRTEC) 10 mg cap Take 10 mg by mouth daily as needed (allergies). cholecalciferol (VITAMIN D3) 400 unit tab tablet Take 400 Units by mouth daily. clotrimazole (LOTRIMIN) 1 % topical cream Apply  to affected area two (2) times a day. Qty: 60 g, Refills: 0  
  
bisacodyl (DULCOLAX) 10 mg suppository Insert 10 mg into rectum daily as needed. Qty: 30 Suppository, Refills: 0  
  
rivaroxaban (XARELTO) 10 mg tablet Take 10 mg by mouth daily (with dinner).   
  
aluminum hydrox-magnesium carb (GAVISCON)  mg/15 mL suspension Take 15 mL by mouth every six (6) hours as needed for Indigestion. calcium carbonate (TUMS) 200 mg calcium (500 mg) chew Take 1 Tab by mouth as needed. calcium citrate-vitamin D3 (CITRACAL WITH VITAMIN D MAXIMUM) tablet Take 1 Tab by mouth daily. acetaminophen (TYLENOL) 500 mg tablet Take 1,000 mg by mouth every six (6) hours as needed for Pain.  
  
diazepam (VALIUM) 10 mg tablet Take 10 mg by mouth two (2) times a day. senna (SENOKOT) 8.6 mg tablet Take 4 Tabs by mouth two (2) times a day. 4 PILLS IN AM  4 PILLS IN PM  
  
 !! - Potential duplicate medications found. Please discuss with provider. STOP taking these medications  
  
 gentamicin (GARAMYCIN) 0.1 % topical cream Comments:  
Reason for Stopping:   
   
 dextran 70-hypromellose (Artificial Tears,[id number],) ophthalmic solution Comments:  
Reason for Stopping:   
   
 predniSONE (DELTASONE) 20 mg tablet Comments:  
Reason for Stopping:   
   
 milnacipran (Savella) 50 mg tablet Comments:  
Reason for Stopping:   
   
 linaCLOtide (Linzess) 290 mcg cap capsule Comments:  
Reason for Stopping:   
   
 simvastatin (ZOCOR) 20 mg tablet Comments:  
Reason for Stopping:   
   
 potassium chloride (K-DUR, KLOR-CON) 20 mEq tablet Comments:  
Reason for Stopping:   
   
 dexlansoprazole (Dexilant) 60 mg CpDB capsule (delayed release) Comments:  
Reason for Stopping:   
   
 magnesium hydroxide (MILK OF MAGNESIA) 400 mg/5 mL suspension Comments:  
Reason for Stopping:   
   
 prochlorperazine (COMPAZINE) 5 mg tablet Comments:  
Reason for Stopping:   
   
 fentaNYL (DURAGESIC) 75 mcg/hr Comments:  
Reason for Stopping: OTHER,NON-FORMULARY, Comments:  
Reason for Stopping:   
   
  
 
 
 
NOTIFY YOUR PHYSICIAN FOR ANY OF THE FOLLOWING:  
Fever over 101 degrees for 24 hours. Chest pain, shortness of breath, fever, chills, nausea, vomiting, diarrhea, change in mentation, falling, weakness, bleeding.  Severe pain or pain not relieved by medications. Or, any other signs or symptoms that you may have questions about. DISPOSITION: 
  Home With: 
 OT  PT  HH  RN  
  
 Long term SNF/Inpatient Rehab Independent/assisted living  
x Hospice= home Other:  
 
 
PATIENT CONDITION AT DISCHARGE:  
 
Functional status  
x Poor Deconditioned Independent Cognition  
 x Lucid Forgetful Dementia Catheters/lines (plus indication) x Palma PICC   
 PEG None Code status Full code   
x DNR   
 
  
 
CHRONIC MEDICAL DIAGNOSES: 
Problem List as of 10/31/2020 Date Reviewed: 10/31/2020 Codes Class Noted - Resolved Obesity, morbid ([facility] [facility] ICD-10-CM: E66.01 
ICD-9-CM: 278.01  8/10/2020 - Present Severe pain ICD-10-CM: B44 ICD-9-CM: 780.96  3/16/2019 - Present Recurrent ventral incisional hernia ICD-10-CM: M86.7 ICD-9-CM: 553.21  3/28/2016 - Present Advance directive on file ICD-10-CM: Z78.9 ICD-9-CM: V49.89  3/9/2016 - Present Acute bronchitis ICD-10-CM: J20.9 ICD-9-CM: 466.0  3/3/2016 - Present Sinusitis ICD-10-CM: J32.9 ICD-9-CM: 473.9  3/3/2016 - Present Dysphagia ICD-10-CM: R13.10 ICD-9-CM: 787.20  1/7/2016 - Present Umbilical hernia OCB-56-CZ: K42.9 ICD-9-CM: 553.1  10/15/2014 - Present Recurrent umbilical hernia VAV-98-LH: K42.9 ICD-9-CM: 553.1  6/10/2013 - Present Other complication due to venous access device ICD-10-CM: T82.898A ICD-9-CM: 996.74  1/29/2013 - Present Infected seroma, postoperative ICD-10-CM: NCX8507 ICD-9-CM: 998.51  9/13/2012 - Present Multiple myeloma (HCC) ICD-10-CM: C90.00 ICD-9-CM: 203.00  9/29/2009 - Present Fibromyalgia ICD-10-CM: M79.7 ICD-9-CM: 729.1  9/29/2009 - Present GERD (gastroesophageal reflux disease) ICD-10-CM: K21.9 ICD-9-CM: 530.81  9/29/2009 - Present  Rheumatoid arthritis involving multiple sites with positive rheumatoid factor ([facility] ICD-10-CM: M05.79 ICD-9-CM: 714.0  9/29/2009 - Present RESOLVED: Multiple rib fractures ICD-10-CM: S22.49XA ICD-9-CM: 807.09  10/23/2020 - 10/31/2020 RESOLVED: Pain ICD-10-CM: R52 ICD-9-CM: 780.96  3/16/2019 - 12/9/2019 RESOLVED: SBO (small bowel obstruction) ([facility] ICD-10-CM: V93.959 ICD-9-CM: 560.9  1/24/2019 - 12/9/2019 RESOLVED: Hypoxia ICD-10-CM: R09.02 
ICD-9-CM: 799.02  3/1/2016 - 3/3/2016 RESOLVED: Acute pancreatitis ICD-10-CM: K85.90 ICD-9-CM: 176.7  12/15/2011 - 12/18/2011 Greater than 20  minutes were spent with the patient on counseling and coordination of care Signed:  
Beti Plata MD 
10/31/2020 
9:31 AM

## 2020-10-31 NOTE — PROGRESS NOTES
Hematology-Oncology Progress Note Andrés Hartman 1945 
[id number] 
10/31/2020 Subjective:  
 
Patient resting comfortably Allergies: Broccoli; Bumex [bumetanide]; Doxil [doxorubicin, peg-liposomal]; Flagyl [metronidazole]; Keflex [cephalexin]; Lasix [furosemide]; Macrobid [nitrofurantoin monohyd/m-cryst]; Methotrexate; Pcn [penicillins]; Pomalyst [pomalidomide]; Sulfa (sulfonamide antibiotics); Tetanus and diphtheria toxoids; Thalidomide; and Dipyridamole Current Facility-Administered Medications Medication Dose Route Frequency Provider Last Rate Last Dose  polyethylene glycol (MIRALAX) packet 17 g  17 g Oral DAILY Mehran Villagran MD   17 g at 10/30/20 0900  
 HYDROmorphone (DILAUDID) tablet 10 mg  10 mg Oral Q3H PRN Mehran Villagran MD   10 mg at 10/31/20 0315  morphine 10 mg/mL injection 5 mg  5 mg IntraVENous Q4H PRN Vickey Self, MD      
 balsam peru-castor oiL (VENELEX) ointment   Topical Q8H Mehran Villagran MD      
 diphenhydrAMINE-zinc acetate 1%-0.1% (BENADRYL) cream   Topical TID PRN Mehran Villagran MD      
 amitriptyline (ELAVIL) tablet 100 mg  100 mg Oral QHS Daril Thornton, NP   100 mg at 10/30/20 1959  aluminum hydrox-magnesium carb (GAVISCON) oral suspension 15 mL  15 mL Oral Q6H PRN Mehran Villagran MD   15 mL at 10/29/20 1644  bisacodyL (DULCOLAX) suppository 10 mg  10 mg Rectal DAILY PRN Mehran Villagran MD      
 docusate sodium (COLACE) capsule 100 mg  100 mg Oral DAILY Madala, Sushma, MD   100 mg at 10/31/20 0701  
 docusate sodium (COLACE) capsule 300 mg  300 mg Oral QPM Madala, Sushma, MD   300 mg at 10/30/20 2000  fluticasone propionate (FLONASE) 50 mcg/actuation nasal spray 2 Spray  2 Spray Both Nostrils BID PRN Mehran Villagran MD      
 sodium chloride (NS) flush 5-40 mL  5-40 mL IntraVENous Q8H Madala, Sushma, MD   10 mL at 10/31/20 7338  sodium chloride (NS) flush 5-40 mL  5-40 mL IntraVENous PRN Mehran Villagran MD      
  acetaminophen (TYLENOL) tablet 650 mg  650 mg Oral Q6H PRN Niurka King MD   650 mg at 10/27/20 1821 Or  acetaminophen (TYLENOL) suppository 650 mg  650 mg Rectal Q6H PRN Lillian Jones MD      
 promethazine (PHENERGAN) tablet 12.5 mg  12.5 mg Oral Q6H PRN Madala, Sushma, MD      
 Or  
 ondansetron (ZOFRAN) injection 4 mg  4 mg IntraVENous Q6H PRN Niurka King MD   4 mg at 10/30/20 2247  diazePAM (VALIUM) tablet 10 mg  10 mg Oral BID Niurka King MD   10 mg at 10/31/20 0701  
 gabapentin (NEURONTIN) capsule 600 mg  600 mg Oral TID Niurka King MD   600 mg at 10/31/20 0701  rivaroxaban (XARELTO) tablet 10 mg  10 mg Oral DAILY Madala, Sushma, MD   10 mg at 10/29/20 1747  
 senna (SENOKOT) tablet 34.4 mg  4 Tab Oral BID Niurka King MD   34.4 mg at 10/31/20 0701  fentaNYL (DURAGESIC) 50 mcg/hr patch 1 Patch  1 Patch TransDERmal Q72H Madala, Sushma, MD   1 Patch at 10/26/20 2320  
 albuterol (PROVENTIL HFA, VENTOLIN HFA, PROAIR HFA) inhaler 1 Puff  1 Puff Inhalation Q4H PRN Madala, Sushma, MD      
 
Objective:  
 
Patient Vitals for the past 24 hrs: 
 BP Temp Pulse Resp SpO2  
10/31/20 0306 118/72 98.1 °F (36.7 °C) (!) 101 18 91 % 10/30/20 2117 109/77 98.1 °F (36.7 °C) (!) 116 18 93 % 10/30/20 1458 128/64 98.6 °F (37 °C) 98 18 96 % 10/30/20 0940 121/69 99.2 °F (37.3 °C) (!) 102 18 94 % Gen: NAD Resp No distress Available labs reviewed: 
Labs:   
No results found for this or any previous visit (from the past 24 hour(s)). Assessment and Plan  
 
75 y/o woman with a long (since 2007) h/o myeloma, now with declining performance status and limited treatment options, admitted after wrenching her arm getting off of the toilet. Noted to have rib fracture and likely compression fracture. Also with dyspnea, noted to have bilateral pulmonary infiltrates, being ruled out for COVID. 1. Myeloma: not currently a candidate for treatment given declining performance status. Hospice is recommended, but they would not be able to manage her pain pump. ... 2. Rib fracture/comp fractures: likely due to #1 above. She may also have a torn Left rotator cuff. . she is willing to try patches/oral pain meds with hospice  (fearful that if she is on PCA and the pump malfunctions it will be horrible) 3. Chronic pain syndrome: has implanted morphine pump,  
 
 
4. Disposition. .. Ideal situation is for her to be in hospice program... though she will need aids in the house at night to assist . .. other option is snf. ... Artist MD Benedicto 
 
Hematology/Oncology Phone [phone]

## 2020-10-31 NOTE — PROGRESS NOTES
Cm completed [facility] form and packet for the patients discharge home with [facility] @ 2pm today. Folder will be on patients chart.

## 2020-11-13 NOTE — PROGRESS NOTES
Physician Progress Note Michel Mancuso 
CSN #:                  [id number] :                       1945 ADMIT DATE:       10/23/2020 8:58 AM 
DISCH DATE:        10/31/2020 3:51 PM 
RESPONDING 
PROVIDER #:        [id number] [facility] NP 
 
 
 
 
QUERY TEXT: 
 
Patient admitted with Multiple Rib Fx. Noted documentation of acute respiratory failure in H&P, PNs, and D/C Summary. Please indicate one of the following and document in the medical record: The medical record reflects the following: 
Risk Factors: 77 y/o female admitted with multiple rib fractures causing SOB, hx of former Smoker, Multiple myeloma with metastatic disease, chronic pain/ opioid dependence on implanted morphine pump Clinical Indicators: desaturated to 80's , PO2 < 90% RA, SOB worsening with exertion, Per D/C Summary Acute hypoxic resp failure - improving due to bibasilar atelectasis / lung hypoventilation ?from rib fractures, per CXR: Nonspecific ill-defined diffuse bilateral pulmonary opacification Treatment: Supplemental O2 @ 2L, Pulmonary Consult, Thoracic Surgical Consult, Hematology Consult, Acute Respiratory Failure Clinical Indicators per 3M MS-DR[employer] Training Guide and Quick Reference Guide: 
pO2 < 60 mmHg or SpO2 (pulse oximetry) < 91% breathing room air 
pCO2 > 50 and pH < 7.35 
P/F ratio (pO2 / FIO2) < 300 
pO2 decrease or pCO2 increase by 10 mmHg from baseline (if known) Supplemental oxygen of 40% or more Presence of respiratory distress, tachypnea, dyspnea, shortness of breath, wheezing Unable to speak in complete sentences Use of accessory muscles to breathe Extreme anxiety and feeling of impending doom Tripod position Confusion/altered mental status/obtunded Options provided: 
-- Acute Respiratory Failure currently as evidenced by, Please document evidence. -- Currently resolved Acute Respiratory Failure was evidenced by, Please document evidence -- Acute Respiratory Failure ruled out after study -- Other - I will add my own diagnosis -- Disagree - Not applicable / Not valid -- Disagree - Clinically unable to determine / Unknown 
-- Refer to Clinical Documentation Reviewer PROVIDER RESPONSE TEXT: 
 
This patient is in acute respiratory failure as evidenced by PO2 < 90% RA, SOB worsening with exertion Query created by:  Edmundo Luis on 11/5/2020 9:32 AM 
 
 
Electronically signed by:  Linwood Canchola NP 11/13/2020 3:25 PM

## 2021-01-01 ENCOUNTER — HOSPITAL ENCOUNTER (INPATIENT)
Age: 76
LOS: 1 days | Discharge: HOSPICE | DRG: 840 | End: 2021-07-14
Attending: EMERGENCY MEDICINE | Admitting: INTERNAL MEDICINE
Payer: OTHER MISCELLANEOUS

## 2021-01-01 ENCOUNTER — APPOINTMENT (OUTPATIENT)
Dept: GENERAL RADIOLOGY | Age: 76
DRG: 840 | End: 2021-01-01
Attending: EMERGENCY MEDICINE
Payer: OTHER MISCELLANEOUS

## 2021-01-01 DIAGNOSIS — Z51.5 HOSPICE CARE: ICD-10-CM

## 2021-01-01 DIAGNOSIS — R50.9 FEVER, UNSPECIFIED FEVER CAUSE: Primary | ICD-10-CM

## 2021-01-01 DIAGNOSIS — C90.00 MULTIPLE MYELOMA NOT HAVING ACHIEVED REMISSION (HCC): ICD-10-CM

## 2021-01-01 LAB
COMMENT, HOLDF: NORMAL
COVID-19 RAPID TEST, COVR: NOT DETECTED
LACTATE SERPL-SCNC: 2.5 MMOL/L (ref 0.4–2)
SAMPLES BEING HELD,HOLD: NORMAL
SOURCE, COVRS: NORMAL

## 2021-01-01 PROCEDURE — 36415 COLL VENOUS BLD VENIPUNCTURE: CPT

## 2021-01-01 PROCEDURE — 65270000029 HC RM PRIVATE

## 2021-01-01 PROCEDURE — 74011250636 HC RX REV CODE- 250/636: Performed by: EMERGENCY MEDICINE

## 2021-01-01 PROCEDURE — 74011000258 HC RX REV CODE- 258: Performed by: EMERGENCY MEDICINE

## 2021-01-01 PROCEDURE — XW033N5 INTRODUCTION OF MEROPENEM-VABORBACTAM ANTI-INFECTIVE INTO PERIPHERAL VEIN, PERCUTANEOUS APPROACH, NEW TECHNOLOGY GROUP 5: ICD-10-PCS | Performed by: INTERNAL MEDICINE

## 2021-01-01 PROCEDURE — 83605 ASSAY OF LACTIC ACID: CPT

## 2021-01-01 PROCEDURE — 99285 EMERGENCY DEPT VISIT HI MDM: CPT

## 2021-01-01 PROCEDURE — 74011250637 HC RX REV CODE- 250/637: Performed by: INTERNAL MEDICINE

## 2021-01-01 PROCEDURE — 74011250636 HC RX REV CODE- 250/636: Performed by: INTERNAL MEDICINE

## 2021-01-01 PROCEDURE — 96365 THER/PROPH/DIAG IV INF INIT: CPT

## 2021-01-01 PROCEDURE — 74011250637 HC RX REV CODE- 250/637: Performed by: EMERGENCY MEDICINE

## 2021-01-01 PROCEDURE — 87635 SARS-COV-2 COVID-19 AMP PRB: CPT

## 2021-01-01 PROCEDURE — 96375 TX/PRO/DX INJ NEW DRUG ADDON: CPT

## 2021-01-01 PROCEDURE — 71045 X-RAY EXAM CHEST 1 VIEW: CPT

## 2021-01-01 RX ORDER — ACETAMINOPHEN 325 MG/1
650 TABLET ORAL
Status: DISCONTINUED | OUTPATIENT
Start: 2021-01-01 | End: 2021-07-14 | Stop reason: HOSPADM

## 2021-01-01 RX ORDER — SCOLOPAMINE TRANSDERMAL SYSTEM 1 MG/1
1 PATCH, EXTENDED RELEASE TRANSDERMAL
Status: DISCONTINUED | OUTPATIENT
Start: 2021-01-01 | End: 2021-07-14 | Stop reason: HOSPADM

## 2021-01-01 RX ORDER — LORAZEPAM 2 MG/ML
2 INJECTION INTRAMUSCULAR
Status: DISCONTINUED | OUTPATIENT
Start: 2021-01-01 | End: 2021-07-14 | Stop reason: HOSPADM

## 2021-01-01 RX ORDER — ACETAMINOPHEN 650 MG/1
650 SUPPOSITORY RECTAL
Status: CANCELLED | OUTPATIENT
Start: 2021-01-01

## 2021-01-01 RX ORDER — ONDANSETRON 4 MG/1
4 TABLET, ORALLY DISINTEGRATING ORAL
Status: CANCELLED | OUTPATIENT
Start: 2021-01-01

## 2021-01-01 RX ORDER — MORPHINE SULFATE 4 MG/ML
4 INJECTION INTRAVENOUS
Status: COMPLETED | OUTPATIENT
Start: 2021-01-01 | End: 2021-01-01

## 2021-01-01 RX ORDER — MORPHINE SULFATE 20 MG/ML
10 SOLUTION ORAL
Status: DISCONTINUED | OUTPATIENT
Start: 2021-01-01 | End: 2021-07-14 | Stop reason: HOSPADM

## 2021-01-01 RX ORDER — PROCHLORPERAZINE EDISYLATE 5 MG/ML
10 INJECTION INTRAMUSCULAR; INTRAVENOUS
Status: CANCELLED | OUTPATIENT
Start: 2021-01-01

## 2021-01-01 RX ORDER — FACIAL-BODY WIPES
10 EACH TOPICAL DAILY PRN
Status: DISCONTINUED | OUTPATIENT
Start: 2021-01-01 | End: 2021-07-14 | Stop reason: HOSPADM

## 2021-01-01 RX ORDER — ACETAMINOPHEN 650 MG/1
975 SUPPOSITORY RECTAL ONCE
Status: COMPLETED | OUTPATIENT
Start: 2021-01-01 | End: 2021-01-01

## 2021-01-01 RX ORDER — ONDANSETRON 2 MG/ML
4 INJECTION INTRAMUSCULAR; INTRAVENOUS
Status: DISCONTINUED | OUTPATIENT
Start: 2021-01-01 | End: 2021-07-14 | Stop reason: HOSPADM

## 2021-01-01 RX ORDER — ACETAMINOPHEN 650 MG/1
650 SUPPOSITORY RECTAL
Status: DISCONTINUED | OUTPATIENT
Start: 2021-01-01 | End: 2021-07-14 | Stop reason: HOSPADM

## 2021-01-01 RX ORDER — ACETAMINOPHEN 325 MG/1
650 TABLET ORAL
Status: CANCELLED | OUTPATIENT
Start: 2021-01-01

## 2021-01-01 RX ORDER — PROCHLORPERAZINE EDISYLATE 5 MG/ML
10 INJECTION INTRAMUSCULAR; INTRAVENOUS
Status: DISCONTINUED | OUTPATIENT
Start: 2021-01-01 | End: 2021-01-01

## 2021-01-01 RX ORDER — DEXLANSOPRAZOLE 60 MG/1
CAPSULE, DELAYED RELEASE ORAL
Qty: 90 CAP | Refills: 1 | Status: SHIPPED | OUTPATIENT
Start: 2021-01-01 | End: 2021-01-01

## 2021-01-01 RX ORDER — HYDROMORPHONE HYDROCHLORIDE 1 MG/ML
1 INJECTION, SOLUTION INTRAMUSCULAR; INTRAVENOUS; SUBCUTANEOUS ONCE
Status: COMPLETED | OUTPATIENT
Start: 2021-01-01 | End: 2021-01-01

## 2021-01-01 RX ORDER — LORAZEPAM 2 MG/ML
1 CONCENTRATE ORAL
Status: CANCELLED | OUTPATIENT
Start: 2021-01-01

## 2021-01-01 RX ORDER — DEXLANSOPRAZOLE 60 MG/1
CAPSULE, DELAYED RELEASE ORAL
Qty: 90 CAPSULE | Refills: 1 | Status: SHIPPED | OUTPATIENT
Start: 2021-01-01

## 2021-01-01 RX ORDER — MORPHINE SULFATE 2 MG/ML
1 INJECTION, SOLUTION INTRAMUSCULAR; INTRAVENOUS
Status: DISCONTINUED | OUTPATIENT
Start: 2021-01-01 | End: 2021-07-14 | Stop reason: HOSPADM

## 2021-01-01 RX ORDER — ONDANSETRON 2 MG/ML
4 INJECTION INTRAMUSCULAR; INTRAVENOUS
Status: CANCELLED | OUTPATIENT
Start: 2021-01-01

## 2021-01-01 RX ADMIN — MEROPENEM 500 MG: 500 INJECTION, POWDER, FOR SOLUTION INTRAVENOUS at 17:14

## 2021-01-01 RX ADMIN — MORPHINE SULFATE 1 MG: 2 INJECTION, SOLUTION INTRAMUSCULAR; INTRAVENOUS at 22:34

## 2021-01-01 RX ADMIN — ACETAMINOPHEN 650 MG: 650 SUPPOSITORY RECTAL at 22:20

## 2021-01-01 RX ADMIN — MORPHINE SULFATE 4 MG: 4 INJECTION, SOLUTION INTRAMUSCULAR; INTRAVENOUS at 17:22

## 2021-01-01 RX ADMIN — MORPHINE SULFATE 10 MG: 20 SOLUTION ORAL at 22:20

## 2021-01-01 RX ADMIN — MORPHINE SULFATE 1 MG: 2 INJECTION, SOLUTION INTRAMUSCULAR; INTRAVENOUS at 19:39

## 2021-01-01 RX ADMIN — ACETAMINOPHEN 975 MG: 650 SUPPOSITORY RECTAL at 16:42

## 2021-01-01 RX ADMIN — SODIUM CHLORIDE 1000 ML: 9 INJECTION, SOLUTION INTRAVENOUS at 16:45

## 2021-01-01 RX ADMIN — LORAZEPAM 2 MG: 2 INJECTION INTRAMUSCULAR; INTRAVENOUS at 19:29

## 2021-01-01 RX ADMIN — HYDROMORPHONE HYDROCHLORIDE 1 MG: 1 INJECTION, SOLUTION INTRAMUSCULAR; INTRAVENOUS; SUBCUTANEOUS at 18:39

## 2021-01-01 RX ADMIN — SODIUM CHLORIDE 1000 ML: 9 INJECTION, SOLUTION INTRAVENOUS at 17:55

## 2021-01-01 RX ADMIN — ONDANSETRON 4 MG: 2 INJECTION INTRAMUSCULAR; INTRAVENOUS at 22:34

## 2021-07-13 NOTE — ED NOTES
Hospice called and states they did not have anyone available tonight will have someone avail tomorrow am.

## 2021-07-13 NOTE — H&P
[id number]  Hospitalist Group  History and Physical    Primary Care Provider: Peng Tate MD  Date of Service:  7/13/2021    Chief Complaint: Altered mental status and fever, distress. For admission under inpatient hospice-transition from [facility] home hospice to [facility]    Subjective:     Wilma Avila is a 76 y.o. female with PMH of multiple myeloma with metastatic disease, rheumatoid arthritis, chronic pain/opiate dependence on implanted morphine pump, fentanyl patch-currently over right arm, DVT-on Xarelto previously, multiple pathological fractures and Hx of kyphoplasty of L-spine in 2020, who is currently enrolled with [facility] hospice services at home for her multiple myeloma. Patient was well controlled with her symptoms until today, when she started having some early confusion last night and subsequently aggravated today. Little after noon patient was altered and not able to carry out any conversation. Patient used to manage her medications by herself and per hospice care liaison/clinical coordinator, Ms. Minerva Liang, this is a major changes otherwise she is very smart and taking care of a lot of her things by herself. Daughter, Ms. Liana Em present at bedside. In the ED patient was noted to have very high-grade fever of 105.8 °F, tachycardic, tachypneic with abdominal thoracic type breathing. [facility] services were contacted for admission for inpatient hospice care with transition of services from [facility] to [facility]. However they were not available tonight for [facility] admission and hospitalist service has been asked to admit the patient for comfort care measures only and transition to [facility] inpatient hospice service tomorrow morning. [facility] inpatient hospice service is aware about the admission. Patient has had a lower lip cut with bleeding. Patient has obvious respiratory distress.   Patient is not able to answer any questions and not responding much.  Hemodynamically stable at present. Patient has responded to ice packs and oral Tylenol 975 mg with improvement of tactile temperature per nursing team.  In the ED patient was given IV fluids and meropenem IV x1. Dilaudid 1 mg IV x1 also given due to patient distress. Review of Systems:    Review of systems not obtained due to patient factors. Past Medical History:   Diagnosis Date    Anemia     Aneurysm ([facility] [address] 3/2005    HX LEFT OPTIC NERVE    Anxiety     Chronic pain     spinal stenosis per pt    Fibromyalgia     GERD (gastroesophageal reflux disease)     High cholesterol     History of acute pancreatitis     History of blood transfusion     History of Salmonella infection 2009    Multiple myeloma ([facility] [address] 2007    Osteoporosis     Other complication due to venous access device 1/29/2013    Recurrent umbilical hernia 7/21/8658    Recurrent ventral incisional hernia 3/28/2016    Rheumatoid arthritis(714.0)     Sleep apnea     INTOLERATANT OF CPAP    Thromboembolus (HCC)     Right leg DVT    Urinary incontinence       Past Surgical History:   Procedure Laterality Date    HX CHOLECYSTECTOMY  2004    HX CRANIOTOMY      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 Garrison Troy    HX KYPHOPLASTY  09/14/2017    L4    HX KYPHOPLASTY  2020    L1, L2, L5     HX ORTHOPAEDIC Right 2001    BONE SPURS SHOULDER    HX OTHER SURGICAL  2010    LLQ PAIN PUMP FOR MORPHINE INFUSION    HX OTHER SURGICAL  1-16-14    repair of recurrent umbilical hernia with ventralex pillow top mesh and extensive lysis of [id number]. Kit Simmons    HX OTHER SURGICAL  5-31-16    repair of recurrent ventral incisional hernia with underlay mesh-[facility]-Dr. Celsa Camarillo    HX VASCULAR ACCESS Right 1/2013    POWER PORT     HX VASCULAR ACCESS  2010    PAIN PUMP    IR INJ FORAMIN EPID LUMB ANES/STER SNGL  3/19/2019    IR KYPHOPLASTY LUMBAR  6/12/2020    IR KYPHOPLASTY LUMBAR  7/22/2020    IR KYPHOPLASTY THORACIC  10/26/2020    NEUROLOGICAL PROCEDURE UNLISTED  2007    KYPHOPLASTY X2    NEUROLOGICAL PROCEDURE UNLISTED  10/26/15    Kyphoplasty t-5    PAIN PUMP DEVICE      implanted in LLQ, MORPHINE     Prior to Admission medications    Medication Sig Start Date End Date Taking? Authorizing Provider   Dexilant 60 mg CpDB capsule (delayed release) TAKE 1 CAPSULE BY MOUTH EVERY DAY 7/2/21   Chioma Thompson III, MD   milnacipran (Savella) 50 mg tablet TAKE 1 TABLET BY MOUTH TWICE DAILY 5/27/21   Chioma Thompson III, MD   milnacipran (Savella) 50 mg tablet TAKE 1 TABLET BY MOUTH TWICE DAILY 12/31/20   Chioma Thompson III, MD   promethazine (PHENERGAN) 12.5 mg tablet Take 1 Tab by mouth every six (6) hours as needed for Nausea. 10/31/20   Fiordaliza Barrier, MD   balsam peru-castor oiL (VENELEX) ointment Apply  to affected area every eight (8) hours. 10/31/20   Fiordaliza Gray MD   albuterol (PROVENTIL HFA, VENTOLIN HFA, PROAIR HFA) 90 mcg/actuation inhaler Take 1 Puff by inhalation every four (4) hours as needed for Wheezing or Shortness of Breath. 10/31/20   Fiordaliza Gray MD   docusate sodium (COLACE) 100 mg capsule Take 100 mg by mouth daily. Takes 100 mg QAM and 300 mg QPM    Provider, Historical   docusate sodium (COLACE) 100 mg capsule Take 300 mg by mouth every evening. Takes 100 mg QAM and 300 mg QPM    Provider, Historical   fluticasone propionate (Flonase Allergy Relief) 50 mcg/actuation nasal spray 2 Sprays by Both Nostrils route two (2) times daily as needed for Allergies. Provider, Historical   gabapentin (NEURONTIN) 300 mg capsule Take 600 mg by mouth three (3) times daily.     Provider, Historical   polyethylene glycol (Miralax) 17 gram/dose powder Take 17 g by mouth daily. 8/10/20   Flores Bhatia MD   morphine 10 mg/mL injection 5 mg by IntraVENous route continuous. 5mg/day concentration is 20.0 mg/ml mixed with Bupivacaine 3.215 mg/day    Provider, Historical   amitriptyline (ELAVIL) 50 mg tablet TAKE TWO TABLETS BY MOUTH AT BEDTIME AS NEEDED FOR SLEEP. 6/29/20   Flores Bhatia MD   Cetirizine (ZYRTEC) 10 mg cap Take 10 mg by mouth daily as needed (allergies). Other, MD Sherif   cholecalciferol (VITAMIN D3) 400 unit tab tablet Take 400 Units by mouth daily. Provider, Historical   clotrimazole (LOTRIMIN) 1 % topical cream Apply  to affected area two (2) times a day. 2/7/19   Isrrael Parrish III, MD   bisacodyl (DULCOLAX) 10 mg suppository Insert 10 mg into rectum daily as needed. 1/31/19   Mono Arevalo MD   rivaroxaban (XARELTO) 10 mg tablet Take 10 mg by mouth daily (with dinner). Provider, Historical   aluminum hydrox-magnesium carb (GAVISCON)  mg/15 mL suspension Take 15 mL by mouth every six (6) hours as needed for Indigestion. Provider, Historical   calcium carbonate (TUMS) 200 mg calcium (500 mg) chew Take 1 Tab by mouth as needed. Provider, Historical   calcium citrate-vitamin D3 (CITRACAL WITH VITAMIN D MAXIMUM) tablet Take 1 Tab by mouth daily. Provider, Historical   acetaminophen (TYLENOL) 500 mg tablet Take 1,000 mg by mouth every six (6) hours as needed for Pain. Provider, Historical   diazepam (VALIUM) 10 mg tablet Take 10 mg by mouth two (2) times a day. Provider, Historical   senna (SENOKOT) 8.6 mg tablet Take 4 Tabs by mouth two (2) times a day.  4 PILLS IN AM  4 PILLS IN PM    Provider, Historical     Allergies   Allergen Reactions    Broccoli Other (comments)     C/O GAS    Bumex [Bumetanide] Rash    Doxil [Doxorubicin, Peg-Liposomal] Rash     Burning of skin      Flagyl [Metronidazole] Rash    Keflex [Cephalexin] Rash    Lasix [Furosemide] Rash    Macrobid [Nitrofurantoin Monohyd/M-Cryst] Shortness of Breath    Methotrexate Hives and Rash    Pcn [Penicillins] Rash    Pomalyst [Pomalidomide] Hives    Sulfa (Sulfonamide Antibiotics) Hives    Tetanus And Diphtheria Toxoids Swelling    Thalidomide Rash    Dipyridamole Rash      Family History   Problem Relation Age of Onset    Arthritis-osteo Mother     Anesth Problems Neg Hx         SOCIAL HISTORY: Obtained from family  Patient resides at Home. Patient ambulates with with assistance. Smoking history: Former smoker. Quit October 2005  Alcohol history: Denied by family        Objective:     Visit Vitals  BP (!) 93/45   Pulse (!) 131   Temp (!) 105.8 °F (41 °C)   Resp 20   Ht 5' 1\" (1.549 m)   Wt 79.8 kg (175 lb 14.8 oz)   SpO2 97%   BMI 33.24 kg/m²       Physical Exam:   General:           Obtunded, moderate distress, ill-appearing. Pale. HEENT:            Limited exam due to poor patient cooperation. Lower lip bleeding +  Neck:               Supple, symmetrical  Lungs:              Air entry much reduced. AE + Bilateral upper zones but much reduced in mid and lower zones. No Wheezing or Rhonchi. No rales audible. Abdominal/abdominal thoracic bleeding. Moderate respiratory distress  Heart:              Tachycardia +. No edema. Distant heart sounds. Abdomen:        Soft, baseline distended. Bowel sounds absent  Extremities:     No cyanosis. No clubbing, no edema. RUE-arm fentanyl patch in place  Skin:                Not pale. Not Jaundiced  No rashes   Psych:             Unable to assess  Neurologic:       Obtunded. Limited exam.    Cap refill: Brisk, less than 3 seconds  Pulses: 2+, symmetric in all extremities  Skin: Warm, dry, without rashes or lesions    ECG: Not done today. Tachycardic and regular on telemetry. No obvious ST-T changes visible on single lead on telemetry    Data Review: All diagnostic labs and studies have been reviewed.     Radiology  XR CHEST PORT    Result Date: 7/13/2021  Bilateral increased interstitial markings, likely chronic in etiology. Mild left basilar atelectasis. Recent Results (from the past 24 hour(s))   SAMPLES BEING HELD    Collection Time: 07/13/21  4:01 PM   Result Value Ref Range    SAMPLES BEING HELD 1BLU 1LAV PST 1RED 2BC(1BLU 1PURP)     COMMENT        Add-on orders for these samples will be processed based on acceptable specimen integrity and analyte stability, which may vary by analyte. LACTIC ACID    Collection Time: 07/13/21  4:15 PM   Result Value Ref Range    Lactic acid 2.5 (HH) 0.4 - 2.0 MMOL/L   COVID-19 RAPID TEST    Collection Time: 07/13/21  4:59 PM   Result Value Ref Range    Specimen source Nasopharyngeal      COVID-19 rapid test Not detected NOTD           Assessment:     Active Problems:    * No active hospital problems. *    #Hospice patient with poorly controlled symptoms with home hospice. Needs transition to [facility] inpatient hospice care  #Respiratory distress  #Symptomatic management with pain control, anxiety control and any other comfort measures as appropriate  #Acute toxic metabolic encephalopathy. Limited evaluation due to goal for comfort care only  #SIRS with fever, tachypnea, tachycardia due to poorly controlled symptoms/possible infection/other etiology  #Acute on chronic hypoxic respiratory failure. #Hypertension on initial presentation, subsequently hypotension, likely under effect of sepsis VS medications for comfort  #Multiple myeloma with metastatic disease, under home hospice care service with [facility]  #Chronic opiate dependence due to chronic pain as well as for comfort measures with hospice        Plan:     -Admit to inpatient, medical floor for comfort measures only  -No labs/IV fluids/antibiotics. Only comfort measure medications  -[facility] clinical manager, MsSalvador  Enedelia José Miguel, [phone] assisted with continuation of hospice care during admission as well as will be available to assist with hospice care until [facility] secures hospice service takes over the care under [facility] tomorrow morning  -Daughter, Ms. Samina Mcnutly, [phone] was available in the ED. She will be available for any update if needed. -Comfort measures only with nausea control, anxiety control, pain control, fever control, secretion control, oxygen supplementation, constipation care ordered in consultation with [facility] hospice care clinical manager as noted above.  -As needed Ativan IV  -As needed morphine solution oral  -Scopolamine patch  -Tylenol as needed  -Zofran as needed/Compazine as needed  -No labs  -Port accessed for IV access      Patient has DDNR on file.   CODE STATUS: DNR      FUNCTIONAL STATUS PRIOR TO HOSPITALIZATION Ambulatory with Use of Assistive Devices (including history of recent falls)       Signed By: Haley Lira MD     July 13, 2021

## 2021-07-13 NOTE — ED PROVIDER NOTES
Ms. Tristen Felix is a 75yo female presents to the ER with altered mental status and fever. She is on hospice for multiple myeloma. She has had a decline in her mental status and condition over the last 2 days. She was sent into the ER by the hospice personnel. Patient has had high fevers to 104 at home despite Tylenol. The patient normally can have a full conversation but has been incomprehensible for the last day. She may have been reporting some pain in her abdomen, but it is unclear from her daughter if she is actually in pain. No other complaints reported      Patient's history is admitted by her current mental status. The history was obtained to the patient's daughter.            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    Anxiety     Chronic pain     spinal stenosis per pt    Fibromyalgia     GERD (gastroesophageal reflux disease)     High cholesterol     History of acute pancreatitis     History of blood transfusion     History of Salmonella infection 2009    Multiple myeloma ([facility] [address] 2007    Osteoporosis     Other complication due to venous access device 1/29/2013    Recurrent umbilical hernia 4/08/6386    Recurrent ventral incisional hernia 3/28/2016    Rheumatoid arthritis(714.0)     Sleep apnea     INTOLERATANT OF CPAP    Thromboembolus (HCC)     Right leg DVT    Urinary incontinence        Past Surgical History:   Procedure Laterality Date    HX CHOLECYSTECTOMY  2004    HX CRANIOTOMY      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 Haritha Guan    HX KYPHOPLASTY  09/14/2017    L4    HX KYPHOPLASTY  2020    L1, L2, L5     HX ORTHOPAEDIC Right 2001    BONE SPURS SHOULDER    HX OTHER SURGICAL  2010    LLQ PAIN PUMP FOR MORPHINE INFUSION    HX OTHER SURGICAL  1-16-14    repair of recurrent umbilical hernia with ventralex pillow top mesh and extensive lysis of [id number]. Carey Rock    HX OTHER SURGICAL  5-31-16    repair of recurrent ventral incisional hernia with underlay mesh-[facility]-Dr. Ammy Singh    HX VASCULAR ACCESS Right 1/2013    POWER PORT     HX VASCULAR ACCESS  2010    PAIN PUMP    IR INJ FORAMIN EPID LUMB ANES/STER SNGL  3/19/2019    IR KYPHOPLASTY LUMBAR  6/12/2020    IR KYPHOPLASTY LUMBAR  7/22/2020    IR KYPHOPLASTY THORACIC  10/26/2020    NEUROLOGICAL PROCEDURE UNLISTED  2007    KYPHOPLASTY X2    NEUROLOGICAL PROCEDURE UNLISTED  10/26/15    Kyphoplasty t-5    PAIN PUMP DEVICE      implanted in LLQ, MORPHINE         Family History:   Problem Relation Age of Onset    Arthritis-osteo Mother     Anesth Problems Neg Hx        Social History     Socioeconomic History    Marital status:      Spouse name: Not on file    Number of children: Not on file    Years of education: Not on file    Highest education level: Not on file   Occupational History    Not on file   Tobacco Use    Smoking status: Former Smoker     Years: 10.00     Quit date: 10/7/2005     Years since quitting: 15.7    Smokeless tobacco: Never Used   Substance and Sexual Activity    Alcohol use: No    Drug use: No    Sexual activity: Not on file   Other Topics Concern    Not on file   Social History Narrative    Not on file     Social Determinants of Health     Financial Resource Strain:     Difficulty of Paying Living Expenses:    Food Insecurity:     Worried About Running Out of Food in the Last Year:     [address] in the Last Year:    Transportation Needs:     Lack of Transportation (Medical):      Lack of Transportation (Non-Medical):    Physical Activity:     Days of Exercise per Week:     Minutes of Exercise per Session:    Stress:     Feeling of Stress :    Social Connections:     Frequency of Communication with Friends and Family:     Frequency of Social Gatherings with Friends and Family:     Attends Mosque Services:     Active Member of Clubs or Organizations:     Attends Club or Organization Meetings:     Marital Status:    Intimate Partner Violence:     Fear of Current or Ex-Partner:     Emotionally Abused:     Physically Abused:     Sexually Abused: ALLERGIES: Broccoli; Bumex [bumetanide]; Doxil [doxorubicin, peg-liposomal]; Flagyl [metronidazole]; Keflex [cephalexin]; Lasix [furosemide]; Macrobid [nitrofurantoin monohyd/m-cryst]; Methotrexate; Pcn [penicillins]; Pomalyst [pomalidomide]; Sulfa (sulfonamide antibiotics); Tetanus and diphtheria toxoids; Thalidomide; and Dipyridamole    Review of Systems   Unable to perform ROS: Mental status change   Constitutional: Positive for fever. Psychiatric/Behavioral: Positive for suicidal ideas. Vitals:    07/13/21 1522   BP: (!) 155/88   Pulse: (!) 133   Resp: (!) 37   Temp: (!) 105.8 °F (41 °C)   SpO2: 98%   Weight: 79.8 kg (175 lb 14.8 oz)   Height: 5' 1\" (1.549 m)            Physical Exam     Vital signs reviewed. Nursing notes reviewed.     Const: Altered mental status, agitated  Head:  Atraumatic, normocephalic  Eyes:  PERRL, conjunctiva normal, no scleral icterus  Neck:  Supple, trachea midline  Cardiovascular: Tachycardic  Resp: Moderate resp distress, increased work of breathing  Abd:  Soft, non-tender, non-distended, no rebound, no guarding, no CVA tenderness  MSK:  No pedal edema, normal ROM  Neuro: Mumbles to voice, does not follow commands, no cranial nerve defect  Skin:  Warm, dry, intact  Psych: Nonverbal            MDM  Number of Diagnoses or Management Options     Amount and/or Complexity of Data Reviewed  Clinical lab tests: ordered and reviewed  Tests in the radiology section of CPT®: ordered and reviewed  Review and summarize past medical records: yes    Patient Progress  Patient progress: stable         Procedures    Total critical care time spent exclusive of procedures:  35 min. 4:15 PM  I had several long conversations with the patient's daughter. We discussed the options for treatment/work-up. The daughter spoke with the , and they would only like to take measures to make her more comfortable. They do not wish that we obtain labs or imagine (apart from a covid test and chest xray due to possible exposures of hospice staff of possible covid). They would like her to be under the care of hospice. She is currently unable to be cared for at home. Pt. May need inpatient hospice. I spoke with the hospitalist and Case management, who will help arrange. Perfect Serve Consult for Admission  5:51 PM    ED Room Number: ER17/17  Patient Name and age:  Caridad Peres 76 y.o.  female  Working Diagnosis:   1. Fever, unspecified fever cause    2. Hospice care    3. Multiple myeloma not having achieved remission ([facility] [address]        COVID-19 Suspicion:  yes  Sepsis present:  yes  Reassessment needed: no  Code Status:  Do Not Resuscitate  Readmission: no  Isolation Requirements:  yes  Recommended Level of Care:  med/surg  Department:[facility] Adult ED - 21   Other:  [facility] hospice will follow-up with tomorrow. Ms. Cuca Wright is a 77yo female with high fever and AMS. Pt. Has known multiple myeloma and is on hospice. Pt's daughter and  decided to pursue comfort measures only. Attempted to admit to inpatient hospice but was unsuccessful. Pt.  To be evaluated for admission by the hospitalist.

## 2021-07-13 NOTE — PROGRESS NOTES
Date of previous inpatient admission/ ED visit?   10/23/20-10/31/2020    What brought the patient back to ED? Symptoms unable to be managed in the home    Did patient decline recommended services during last admission/ ED visit (if yes, what)? N/A    Has patient seen a provider since their last inpatient admission/ED visit (if yes, when)? Yes    PCP: First and Last name:   Name of Practice:    Are you a current patient: Yes/No:    Approximate date of last visit:    CM Interventions:    5094 Spoke with Dr. Donovan Cope, ED physician. Dr. Donovan Cope to place hospice consult. [facility] sent patient to [facility] for symptom management/symptoms that were unable to be managed in the home. [facility] Call made to [phone] [facility]. Spoke with Santiago Sapp, she has paged triage nurse. 1750 Received call back from [facility], she is enroute to the patient's home to have spouse sign paperwork. 1623 [place] call back to [facility]. Hospice talking with [facility]. Asked Hospice to update CM.      1825 Received call from [facility], they are unable to accept patient for GIP tonight. The Hospice Liaison will follow up.       Anibal Meyer, RN, BSN, [facility]  ED Care Management  [phone]

## 2021-07-13 NOTE — ED NOTES
Verbal shift change report given to Sendy RN (oncoming nurse) by Agustín Wilson RN (offgoing nurse). Report included the following information SBAR, Kardex, ED Summary, [facility] and Recent Results.

## 2021-07-14 VITALS
RESPIRATION RATE: 11 BRPM | WEIGHT: 175.93 LBS | BODY MASS INDEX: 33.22 KG/M2 | OXYGEN SATURATION: 80 % | HEIGHT: 61 IN | TEMPERATURE: 105 F | HEART RATE: 106 BPM | SYSTOLIC BLOOD PRESSURE: 130 MMHG | DIASTOLIC BLOOD PRESSURE: 106 MMHG

## 2021-07-14 NOTE — ED NOTES
Daughter took patients two gold rings home with her, pt also had socks and a sheet that daughter requested to be thrown away.

## 2021-07-14 NOTE — DISCHARGE SUMMARY
[facility]  Hospitalist Group     Death Discharge Summary   PATIENT ID: Soni Olson  MRN: [id number]   YOB: 1945    DATE OF ADMISSION: 7/13/2021  3:14 PM    PRIMARY CARE PROVIDER: Rupa Donahue MD   ATTENDING PHYSICIAN: Lori Dumont MD  CONSULTATIONS:   None    PROCEDURES/SURGERIES:   * No surgery found *    REASON FOR ADMISSION: <principal problem not specified>   #Hospice patient with poorly controlled symptoms with home hospice. Needs transition to [facility] inpatient hospice care  #Respiratory distress  #Symptomatic management with pain control, anxiety control and any other comfort measures as appropriate  #Acute toxic metabolic encephalopathy. Limited evaluation due to goal for comfort care only  #SIRS with fever, tachypnea, tachycardia due to poorly controlled symptoms/possible infection/other etiology  #Acute on chronic hypoxic respiratory failure.   #Hypertension on initial presentation, subsequently hypotension, likely under effect of sepsis VS medications for comfort  #Multiple myeloma with metastatic disease, under home hospice care service with [facility]  #Chronic opiate dependence due to chronic pain as well as for comfort measures with hospice    HOSPITAL PROBLEM LIST:  Patient Active Problem List   Diagnosis Code    Multiple myeloma ([address] C90.00    Fibromyalgia M79.7    GERD (gastroesophageal reflux disease) K21.9    Rheumatoid arthritis involving multiple sites with positive rheumatoid factor ([address] M05.79    Infected seroma, postoperative [id number]    Other complication due to venous access device T82.898A    Recurrent umbilical hernia W14.0    Umbilical hernia R17.6    Dysphagia R13.10    Acute bronchitis J20.9    Sinusitis J32.9    Advance directive on file Z78.9    Recurrent ventral incisional hernia K43.2    Severe pain R52    Obesity, morbid ([address] E66.01       DATE AND TIME OF DEATH: 7/13/21, 2340 PM     CODE STATUS AT DISCHARGE:   Full Code x DNR    Partial    Comfort Care     DISCHARGE DIAGNOSES:   Acute on chronic respiratory failure  Acute cardiac arrest  Multiple myeloma  SIRS  Comfort care/ Hospice care    Brief HPI and Hospital Course:      Pt was admitted on 7/13/21 for poorly controlled symptoms at home with home hospice agency and they requested for inpatient [facility] hospice admission. Pt was in interim admitted to hospitalist service for comfort care with plan to transition to inpatient hospice care service with plan to transfer from [facility] to [address] [place] [address]. Pt has had DDNR which was respected. Pt's family (Daughter) did not want any curative diagnostics, labs, treatment and just wanted focus of hospitalization and treatment only on her mother's comfort care only. Pt passed away on 7/13/21 at 2340 as noted in the death note by Mr. Jean Arredondo. On exam, no heart sounds or breath sounds were noted after 1 minute of auscultation. Pupils were fixed and dilated without pupillary light reflex. Patient was pronounced dead on 7/13/21  at 46hrs. Cause of Death:   Immediate: Acute on chronic respiratory failure, acute cardiac arrest    Events related to death:  Was code called: NO   notified: YES  Family notified: Ramonselwyn Denys  Autopsy requested: NO  Death certificate completed: NO  Code Status Prior to Death: Prior DDNR    PHYSICAL EXAMINATION AT DISCHARGE:  GEN: No response to verbal or tactile stimuli  HEENT: Pupils fixed, dilated  CV: No cardiac activity or heart sounds appreciated. No carotid pulse. PULM: No respiratory effort or spontaneous respirations. Ext: No peripheral pulses.       Signed:   Shimon Mosley MD  Date of Service:  7/14/2021  6:59 AM lower back, right knee

## 2021-07-14 NOTE — ED NOTES
PTA benjamín removed. Nursing supervisor in ED to help with post mortem care and delivery to [facility]. NS  Corporation given record of death.

## 2021-07-14 NOTE — ED NOTES
0000- Spoke with Emilia Garnica at [facility] about organ donation. Pt is not a candidate for organ or tissue donation.      0005- Paged noemí, no response     0010- Spoke with Mikayla Wolf with [phone], pt not a candidate for eye donation     0121- Noem[facility] paged again

## 2021-07-14 NOTE — PROGRESS NOTES
review chart for missed death report. Please contact [phone] [facility] for further support.      [address] Braden Matute, MACE   [phone]

## 2021-07-14 NOTE — PROGRESS NOTES
Death Pronouncement Note      Events prior to patients death:    Called to bedside at 12:04 AM for unresponsive and pulseless patient. On exam, no heart sounds or breath sounds were noted after 1 minute of auscultation. Pupils were fixed and dilated without pupillary light reflex. Patient was pronounced dead on 7/14/2021  at 2340.     Date/Time of death:     7/14/2021 at 0    Cause:     Immediate: acute on chronic hypoxic resp failure    Underlying cause: multiple myeloma, HTN, encephalopathy      Hospital Problems  Date Reviewed: 10/31/2020        Codes Class Noted POA    Multiple myeloma ([facility] [address] ICD-10-CM: C90.00  ICD-9-CM: 203.00  9/29/2009 Unknown              Physician Pronouncing Death: Mike Taveras NP    Attending Physician of Record:   Melissa Sal MD     Events related to death:    Was code called: NO   notified: YES  Family notified: YES  Autopsy requested: NO  Death certificate completed: NO  Code Status Prior to Death: DNR     Discharge summary and death certificate will be completed by: Melissa Sal MD    Date of Service:  7/14/2021

## 2021-07-14 NOTE — ED NOTES
The patient arrived febrile, confused. Her daughter arrived to her bedside right away. The patients  was in waiting room. The patient was treated with tylenol MT, fluids, atibiotics and ice packs to groin, axilla and nape of neck. She tactilly was much cooler within an hour of tratments. The patient ER MD states it was not necessary to recheck temp rectally as care was geared toward comfort at this point. The patients personal  came to bedside and prayed with her and the family. The patients Hospice case manager also arrived to her bedside and collaborated with the hospitalist on her admission orders. The patient was  treated for non-verbal signs of pain several times.  Care was relinquished to [facility]

## 2021-07-22 NOTE — PROGRESS NOTES
Physician Progress Note      PATIENT:               Kosta German  CSN #:                  [id number]  :                       1945  ADMIT DATE:       2021 3:14 PM  Tea Cannon Unga DATE:        2021 3:05 AM  RESPONDING  PROVIDER #:        Akanksha Carroll MD          QUERY TEXT:    Patient presented to ED for complaints of AMS & Fever and noted to have SIRS. If possible, please document in progress notes and discharge summary if you are evaluating and/or treating any of the following: The medical record reflects the following:    Risk Factors: 67yo female presented with 2 day onset of AMS & fever on top of known Metastatic Multiple Myeloma  Clinical Indicators:  -VS: , RR 37, Temp 105.8F  -Lactic Acid 2.5, CBC not performed  -CXR: Bilateral increased interstitial markings, likely chronic in nature.  Mild left basilar atelectasis  -Admit Diagnosis: Multiple Myeloma  -H&P: Respiratory Distress, Acute Toxic Metabolic Encephalopathy, SIRS due to poorly controlled symptoms / possible infection/ other etiology, Acute on Chronic Hypoxic Respiratory Failure, Hypotension likely under effect of Sepsis vs. Medications for comfort  Treatment: Tylenol [place], 2LIVF bolus in ED, IV Merrem, IV Dilaudid / MSO4 / Ativan, Ice pack to groin / axilla / nape of neck, per ED MD 'care geared toward comfort at this point', Comfort Measure only order    Thank you,  Keanu Logan, ALEXN, RN, CCDS, SMART, CDI Supervisor  Office [phone] or [phone]  Options provided:  -- SIRS of non-infectious origin due to poorly controlled symptoms, possible infection, and/or other etiology without acute organ dysfunction  -- SIRS of non-infectious origin due to poorly controlled symptoms, possible infection, and/or other etiology with associate Acute Respiratory Failure, Metabolic Encephalopathy, and Hypotension  -- suspected Sepsis, POA  -- Other - I will add my own diagnosis  -- Disagree - Not applicable / Not valid  -- Disagree - Clinically unable to determine / Unknown  -- Refer to Clinical Documentation Reviewer    PROVIDER RESPONSE TEXT:    This patient was suspected to have sepsis which was present on admission. Query created by:  Isabel Ríos on 7/15/2021 7:01 PM      Electronically signed by:  Óscar Haines MD 7/22/2021 4:30 PM

## 2021-09-25 NOTE — Clinical Note
Dr Tali Shaikh our discussion, patient will continue with Diazepam and stop Lorazepam. Thanks, Fara Ann 0.25